# Patient Record
Sex: MALE | Race: WHITE | NOT HISPANIC OR LATINO | Employment: OTHER | URBAN - METROPOLITAN AREA
[De-identification: names, ages, dates, MRNs, and addresses within clinical notes are randomized per-mention and may not be internally consistent; named-entity substitution may affect disease eponyms.]

---

## 2017-01-03 ENCOUNTER — COMMUNICATION - HEALTHEAST (OUTPATIENT)
Dept: INTERNAL MEDICINE | Facility: CLINIC | Age: 82
End: 2017-01-03

## 2017-04-17 ENCOUNTER — RECORDS - HEALTHEAST (OUTPATIENT)
Dept: ADMINISTRATIVE | Facility: OTHER | Age: 82
End: 2017-04-17

## 2017-04-18 ENCOUNTER — COMMUNICATION - HEALTHEAST (OUTPATIENT)
Dept: INTERNAL MEDICINE | Facility: CLINIC | Age: 82
End: 2017-04-18

## 2017-04-19 ENCOUNTER — COMMUNICATION - HEALTHEAST (OUTPATIENT)
Dept: INTERNAL MEDICINE | Facility: CLINIC | Age: 82
End: 2017-04-19

## 2017-04-19 DIAGNOSIS — I10 UNSPECIFIED ESSENTIAL HYPERTENSION: ICD-10-CM

## 2017-04-26 ENCOUNTER — OFFICE VISIT - HEALTHEAST (OUTPATIENT)
Dept: INTERNAL MEDICINE | Facility: CLINIC | Age: 82
End: 2017-04-26

## 2017-04-26 DIAGNOSIS — M48.00 SPINAL STENOSIS, UNSPECIFIED REGION OTHER THAN CERVICAL: ICD-10-CM

## 2017-04-26 DIAGNOSIS — Z79.899 ENCOUNTER FOR LONG-TERM (CURRENT) USE OF OTHER MEDICATIONS: ICD-10-CM

## 2017-04-26 DIAGNOSIS — G47.30 SLEEP APNEA, UNSPECIFIED TYPE: ICD-10-CM

## 2017-04-26 DIAGNOSIS — I25.84 CORONARY ATHEROSCLEROSIS DUE TO CALCIFIED CORONARY LESION: ICD-10-CM

## 2017-04-26 DIAGNOSIS — I10 ESSENTIAL HYPERTENSION WITH GOAL BLOOD PRESSURE LESS THAN 140/90: ICD-10-CM

## 2017-04-26 DIAGNOSIS — I25.10 CORONARY ATHEROSCLEROSIS DUE TO CALCIFIED CORONARY LESION: ICD-10-CM

## 2017-04-26 ASSESSMENT — MIFFLIN-ST. JEOR: SCORE: 1336.96

## 2017-04-27 ENCOUNTER — COMMUNICATION - HEALTHEAST (OUTPATIENT)
Dept: INTERNAL MEDICINE | Facility: CLINIC | Age: 82
End: 2017-04-27

## 2017-04-27 DIAGNOSIS — E87.6 HYPOKALEMIA: ICD-10-CM

## 2017-05-05 ENCOUNTER — AMBULATORY - HEALTHEAST (OUTPATIENT)
Dept: LAB | Facility: CLINIC | Age: 82
End: 2017-05-05

## 2017-05-05 DIAGNOSIS — E87.6 HYPOKALEMIA: ICD-10-CM

## 2017-05-06 ENCOUNTER — COMMUNICATION - HEALTHEAST (OUTPATIENT)
Dept: INTERNAL MEDICINE | Facility: CLINIC | Age: 82
End: 2017-05-06

## 2017-05-06 DIAGNOSIS — J30.9 ALLERGIC RHINITIS: ICD-10-CM

## 2017-05-08 ENCOUNTER — COMMUNICATION - HEALTHEAST (OUTPATIENT)
Dept: INTERNAL MEDICINE | Facility: CLINIC | Age: 82
End: 2017-05-08

## 2017-05-12 ENCOUNTER — AMBULATORY - HEALTHEAST (OUTPATIENT)
Dept: CARDIOLOGY | Facility: CLINIC | Age: 82
End: 2017-05-12

## 2017-05-17 ENCOUNTER — RECORDS - HEALTHEAST (OUTPATIENT)
Dept: ADMINISTRATIVE | Facility: OTHER | Age: 82
End: 2017-05-17

## 2017-05-30 ENCOUNTER — COMMUNICATION - HEALTHEAST (OUTPATIENT)
Dept: INTERNAL MEDICINE | Facility: CLINIC | Age: 82
End: 2017-05-30

## 2017-06-29 ENCOUNTER — RECORDS - HEALTHEAST (OUTPATIENT)
Dept: ADMINISTRATIVE | Facility: OTHER | Age: 82
End: 2017-06-29

## 2017-09-01 ENCOUNTER — RECORDS - HEALTHEAST (OUTPATIENT)
Dept: ADMINISTRATIVE | Facility: OTHER | Age: 82
End: 2017-09-01

## 2017-09-05 ENCOUNTER — HOSPITAL ENCOUNTER (OUTPATIENT)
Dept: CT IMAGING | Facility: HOSPITAL | Age: 82
Discharge: HOME | End: 2017-09-05
Attending: INTERNAL MEDICINE

## 2017-09-05 ENCOUNTER — RECORDS - HEALTHEAST (OUTPATIENT)
Dept: ADMINISTRATIVE | Facility: OTHER | Age: 82
End: 2017-09-05

## 2017-09-05 DIAGNOSIS — C18.9 MALIGNANT NEOPLASM OF COLON, UNSPECIFIED SITE: ICD-10-CM

## 2017-09-06 ENCOUNTER — RECORDS - HEALTHEAST (OUTPATIENT)
Dept: ADMINISTRATIVE | Facility: OTHER | Age: 82
End: 2017-09-06

## 2017-09-07 ENCOUNTER — COMMUNICATION - HEALTHEAST (OUTPATIENT)
Dept: INTERNAL MEDICINE | Facility: CLINIC | Age: 82
End: 2017-09-07

## 2017-09-08 ENCOUNTER — COMMUNICATION - HEALTHEAST (OUTPATIENT)
Dept: INTERNAL MEDICINE | Facility: CLINIC | Age: 82
End: 2017-09-08

## 2017-09-08 ENCOUNTER — OFFICE VISIT - HEALTHEAST (OUTPATIENT)
Dept: INTERNAL MEDICINE | Facility: CLINIC | Age: 82
End: 2017-09-08

## 2017-09-08 DIAGNOSIS — I25.10 CORONARY ATHEROSCLEROSIS DUE TO CALCIFIED CORONARY LESION: ICD-10-CM

## 2017-09-08 DIAGNOSIS — I10 ESSENTIAL HYPERTENSION WITH GOAL BLOOD PRESSURE LESS THAN 140/90: ICD-10-CM

## 2017-09-08 DIAGNOSIS — D12.6 HIGH GRADE DYSPLASIA IN COLONIC ADENOMA: ICD-10-CM

## 2017-09-08 DIAGNOSIS — Z79.899 ENCOUNTER FOR LONG-TERM (CURRENT) USE OF OTHER MEDICATIONS: ICD-10-CM

## 2017-09-08 DIAGNOSIS — Z01.818 PRE-OP EXAM: ICD-10-CM

## 2017-09-08 DIAGNOSIS — G47.30 SLEEP APNEA, UNSPECIFIED TYPE: ICD-10-CM

## 2017-09-08 DIAGNOSIS — I25.84 CORONARY ATHEROSCLEROSIS DUE TO CALCIFIED CORONARY LESION: ICD-10-CM

## 2017-09-08 LAB
ATRIAL RATE - MUSE: 67 BPM
DIASTOLIC BLOOD PRESSURE - MUSE: NORMAL MMHG
INTERPRETATION ECG - MUSE: NORMAL
P AXIS - MUSE: 62 DEGREES
PR INTERVAL - MUSE: 152 MS
QRS DURATION - MUSE: 86 MS
QT - MUSE: 394 MS
QTC - MUSE: 416 MS
R AXIS - MUSE: -12 DEGREES
SYSTOLIC BLOOD PRESSURE - MUSE: NORMAL MMHG
T AXIS - MUSE: 49 DEGREES
VENTRICULAR RATE- MUSE: 67 BPM

## 2017-09-08 ASSESSMENT — MIFFLIN-ST. JEOR: SCORE: 1336.96

## 2017-09-11 ENCOUNTER — AMBULATORY - HEALTHEAST (OUTPATIENT)
Dept: LAB | Facility: CLINIC | Age: 82
End: 2017-09-11

## 2017-09-11 ENCOUNTER — COMMUNICATION - HEALTHEAST (OUTPATIENT)
Dept: INTERNAL MEDICINE | Facility: CLINIC | Age: 82
End: 2017-09-11

## 2017-09-12 ASSESSMENT — MIFFLIN-ST. JEOR: SCORE: 1336.96

## 2017-09-13 ENCOUNTER — ANESTHESIA - HEALTHEAST (OUTPATIENT)
Dept: SURGERY | Facility: HOSPITAL | Age: 82
End: 2017-09-13

## 2017-09-13 ENCOUNTER — SURGERY - HEALTHEAST (OUTPATIENT)
Dept: SURGERY | Facility: HOSPITAL | Age: 82
End: 2017-09-13

## 2017-09-13 ASSESSMENT — MIFFLIN-ST. JEOR: SCORE: 1342.4

## 2017-09-21 ENCOUNTER — OFFICE VISIT - HEALTHEAST (OUTPATIENT)
Dept: GERIATRICS | Facility: CLINIC | Age: 82
End: 2017-09-21

## 2017-09-21 DIAGNOSIS — I25.10 CORONARY ATHEROSCLEROSIS: ICD-10-CM

## 2017-09-21 DIAGNOSIS — C18.9 COLON CANCER (H): ICD-10-CM

## 2017-09-21 DIAGNOSIS — M25.551 RIGHT HIP PAIN: ICD-10-CM

## 2017-09-21 DIAGNOSIS — G47.33 OSA (OBSTRUCTIVE SLEEP APNEA): ICD-10-CM

## 2017-09-21 DIAGNOSIS — I10 ESSENTIAL HYPERTENSION: ICD-10-CM

## 2017-09-25 ENCOUNTER — COMMUNICATION - HEALTHEAST (OUTPATIENT)
Dept: INTERNAL MEDICINE | Facility: CLINIC | Age: 82
End: 2017-09-25

## 2017-09-26 ENCOUNTER — AMBULATORY - HEALTHEAST (OUTPATIENT)
Dept: GERIATRICS | Facility: CLINIC | Age: 82
End: 2017-09-26

## 2017-09-27 ENCOUNTER — COMMUNICATION - HEALTHEAST (OUTPATIENT)
Dept: GERIATRICS | Facility: CLINIC | Age: 82
End: 2017-09-27

## 2017-09-29 ENCOUNTER — OFFICE VISIT - HEALTHEAST (OUTPATIENT)
Dept: INTERNAL MEDICINE | Facility: CLINIC | Age: 82
End: 2017-09-29

## 2017-09-29 DIAGNOSIS — G47.30 SLEEP APNEA, UNSPECIFIED TYPE: ICD-10-CM

## 2017-09-29 DIAGNOSIS — I25.10 CORONARY ATHEROSCLEROSIS DUE TO CALCIFIED CORONARY LESION: ICD-10-CM

## 2017-09-29 DIAGNOSIS — N40.0 BENIGN PROSTATIC HYPERPLASIA, PRESENCE OF LOWER URINARY TRACT SYMPTOMS UNSPECIFIED, UNSPECIFIED MORPHOLOGY: ICD-10-CM

## 2017-09-29 DIAGNOSIS — I25.84 CORONARY ATHEROSCLEROSIS DUE TO CALCIFIED CORONARY LESION: ICD-10-CM

## 2017-09-29 DIAGNOSIS — Z79.899 ENCOUNTER FOR LONG-TERM (CURRENT) USE OF OTHER MEDICATIONS: ICD-10-CM

## 2017-09-29 DIAGNOSIS — D64.9 ANEMIA, UNSPECIFIED TYPE: ICD-10-CM

## 2017-09-29 DIAGNOSIS — C18.9 MALIGNANT NEOPLASM OF COLON, UNSPECIFIED PART OF COLON (H): ICD-10-CM

## 2017-09-29 DIAGNOSIS — M48.00 SPINAL STENOSIS, UNSPECIFIED REGION OTHER THAN CERVICAL: ICD-10-CM

## 2017-09-29 DIAGNOSIS — I10 ESSENTIAL HYPERTENSION WITH GOAL BLOOD PRESSURE LESS THAN 140/90: ICD-10-CM

## 2017-09-29 ASSESSMENT — MIFFLIN-ST. JEOR: SCORE: 1282.53

## 2017-10-02 ENCOUNTER — COMMUNICATION - HEALTHEAST (OUTPATIENT)
Dept: INTERNAL MEDICINE | Facility: CLINIC | Age: 82
End: 2017-10-02

## 2017-10-04 ENCOUNTER — RECORDS - HEALTHEAST (OUTPATIENT)
Dept: ADMINISTRATIVE | Facility: OTHER | Age: 82
End: 2017-10-04

## 2017-10-11 ENCOUNTER — RECORDS - HEALTHEAST (OUTPATIENT)
Dept: ADMINISTRATIVE | Facility: OTHER | Age: 82
End: 2017-10-11

## 2017-11-01 ENCOUNTER — OFFICE VISIT - HEALTHEAST (OUTPATIENT)
Dept: INTERNAL MEDICINE | Facility: CLINIC | Age: 82
End: 2017-11-01

## 2017-11-01 ENCOUNTER — COMMUNICATION - HEALTHEAST (OUTPATIENT)
Dept: INTERNAL MEDICINE | Facility: CLINIC | Age: 82
End: 2017-11-01

## 2017-11-01 DIAGNOSIS — I10 ESSENTIAL HYPERTENSION: ICD-10-CM

## 2017-11-01 DIAGNOSIS — I25.84 CORONARY ATHEROSCLEROSIS DUE TO CALCIFIED CORONARY LESION: ICD-10-CM

## 2017-11-01 DIAGNOSIS — G47.30 SLEEP APNEA, UNSPECIFIED TYPE: ICD-10-CM

## 2017-11-01 DIAGNOSIS — D64.9 ANEMIA, UNSPECIFIED TYPE: ICD-10-CM

## 2017-11-01 DIAGNOSIS — M48.061 SPINAL STENOSIS OF LUMBAR REGION WITHOUT NEUROGENIC CLAUDICATION: ICD-10-CM

## 2017-11-01 DIAGNOSIS — I25.10 CORONARY ATHEROSCLEROSIS DUE TO CALCIFIED CORONARY LESION: ICD-10-CM

## 2017-11-01 DIAGNOSIS — N40.0 BENIGN PROSTATIC HYPERPLASIA, UNSPECIFIED WHETHER LOWER URINARY TRACT SYMPTOMS PRESENT: ICD-10-CM

## 2017-11-01 ASSESSMENT — MIFFLIN-ST. JEOR: SCORE: 1314.28

## 2017-11-02 ENCOUNTER — COMMUNICATION - HEALTHEAST (OUTPATIENT)
Dept: INTERNAL MEDICINE | Facility: CLINIC | Age: 82
End: 2017-11-02

## 2017-11-29 ENCOUNTER — COMMUNICATION - HEALTHEAST (OUTPATIENT)
Dept: INTERNAL MEDICINE | Facility: CLINIC | Age: 82
End: 2017-11-29

## 2017-12-07 ENCOUNTER — COMMUNICATION - HEALTHEAST (OUTPATIENT)
Dept: INTERNAL MEDICINE | Facility: CLINIC | Age: 82
End: 2017-12-07

## 2017-12-07 ENCOUNTER — OFFICE VISIT - HEALTHEAST (OUTPATIENT)
Dept: INTERNAL MEDICINE | Facility: CLINIC | Age: 82
End: 2017-12-07

## 2017-12-07 DIAGNOSIS — I10 ESSENTIAL HYPERTENSION: ICD-10-CM

## 2017-12-07 DIAGNOSIS — Z12.5 PROSTATE CANCER SCREENING: ICD-10-CM

## 2017-12-07 DIAGNOSIS — Z00.00 ROUTINE GENERAL MEDICAL EXAMINATION AT A HEALTH CARE FACILITY: ICD-10-CM

## 2017-12-07 DIAGNOSIS — Z51.81 MEDICATION MONITORING ENCOUNTER: ICD-10-CM

## 2017-12-07 DIAGNOSIS — G47.30 SLEEP APNEA, UNSPECIFIED TYPE: ICD-10-CM

## 2017-12-07 DIAGNOSIS — Z00.00 HEALTH CARE MAINTENANCE: ICD-10-CM

## 2017-12-07 DIAGNOSIS — M48.00 SPINAL STENOSIS: ICD-10-CM

## 2017-12-07 DIAGNOSIS — N40.0 BENIGN PROSTATIC HYPERPLASIA, UNSPECIFIED WHETHER LOWER URINARY TRACT SYMPTOMS PRESENT: ICD-10-CM

## 2017-12-07 DIAGNOSIS — E78.00 PURE HYPERCHOLESTEROLEMIA: ICD-10-CM

## 2017-12-07 DIAGNOSIS — I25.84 CORONARY ATHEROSCLEROSIS DUE TO CALCIFIED CORONARY LESION: ICD-10-CM

## 2017-12-07 DIAGNOSIS — I25.10 CORONARY ATHEROSCLEROSIS DUE TO CALCIFIED CORONARY LESION: ICD-10-CM

## 2017-12-07 LAB
CHOLEST SERPL-MCNC: 93 MG/DL
FASTING STATUS PATIENT QL REPORTED: YES
HDLC SERPL-MCNC: 39 MG/DL
LDLC SERPL CALC-MCNC: 47 MG/DL
PSA SERPL-MCNC: 1.9 NG/ML (ref 0–6.5)
TRIGL SERPL-MCNC: 33 MG/DL

## 2017-12-07 ASSESSMENT — MIFFLIN-ST. JEOR: SCORE: 1318.81

## 2017-12-08 ENCOUNTER — OFFICE VISIT - HEALTHEAST (OUTPATIENT)
Dept: CARDIOLOGY | Facility: CLINIC | Age: 82
End: 2017-12-08

## 2017-12-08 DIAGNOSIS — I25.10 CORONARY ATHEROSCLEROSIS DUE TO CALCIFIED CORONARY LESION: ICD-10-CM

## 2017-12-08 DIAGNOSIS — I25.84 CORONARY ATHEROSCLEROSIS DUE TO CALCIFIED CORONARY LESION: ICD-10-CM

## 2017-12-08 ASSESSMENT — MIFFLIN-ST. JEOR: SCORE: 1310.88

## 2017-12-12 ENCOUNTER — RECORDS - HEALTHEAST (OUTPATIENT)
Dept: ADMINISTRATIVE | Facility: OTHER | Age: 82
End: 2017-12-12

## 2018-01-04 ENCOUNTER — COMMUNICATION - HEALTHEAST (OUTPATIENT)
Dept: INTERNAL MEDICINE | Facility: CLINIC | Age: 83
End: 2018-01-04

## 2018-01-04 DIAGNOSIS — E78.00 PURE HYPERCHOLESTEROLEMIA: ICD-10-CM

## 2018-02-05 ENCOUNTER — RECORDS - HEALTHEAST (OUTPATIENT)
Dept: ADMINISTRATIVE | Facility: OTHER | Age: 83
End: 2018-02-05

## 2018-05-21 ENCOUNTER — RECORDS - HEALTHEAST (OUTPATIENT)
Dept: ADMINISTRATIVE | Facility: OTHER | Age: 83
End: 2018-05-21

## 2018-05-21 ENCOUNTER — COMMUNICATION - HEALTHEAST (OUTPATIENT)
Dept: INTERNAL MEDICINE | Facility: CLINIC | Age: 83
End: 2018-05-21

## 2018-05-25 ENCOUNTER — COMMUNICATION - HEALTHEAST (OUTPATIENT)
Dept: INTERNAL MEDICINE | Facility: CLINIC | Age: 83
End: 2018-05-25

## 2018-05-25 DIAGNOSIS — J30.9 ALLERGIC RHINITIS: ICD-10-CM

## 2018-06-02 ENCOUNTER — COMMUNICATION - HEALTHEAST (OUTPATIENT)
Dept: INTERNAL MEDICINE | Facility: CLINIC | Age: 83
End: 2018-06-02

## 2018-06-07 ENCOUNTER — OFFICE VISIT - HEALTHEAST (OUTPATIENT)
Dept: INTERNAL MEDICINE | Facility: CLINIC | Age: 83
End: 2018-06-07

## 2018-06-07 ENCOUNTER — COMMUNICATION - HEALTHEAST (OUTPATIENT)
Dept: INTERNAL MEDICINE | Facility: CLINIC | Age: 83
End: 2018-06-07

## 2018-06-07 DIAGNOSIS — N40.0 BENIGN PROSTATIC HYPERPLASIA, UNSPECIFIED WHETHER LOWER URINARY TRACT SYMPTOMS PRESENT: ICD-10-CM

## 2018-06-07 DIAGNOSIS — E78.00 PURE HYPERCHOLESTEROLEMIA: ICD-10-CM

## 2018-06-07 DIAGNOSIS — M48.061 SPINAL STENOSIS OF LUMBAR REGION, UNSPECIFIED WHETHER NEUROGENIC CLAUDICATION PRESENT: ICD-10-CM

## 2018-06-07 DIAGNOSIS — G47.30 SLEEP APNEA, UNSPECIFIED TYPE: ICD-10-CM

## 2018-06-07 DIAGNOSIS — I10 ESSENTIAL HYPERTENSION: ICD-10-CM

## 2018-06-07 DIAGNOSIS — H93.11 TINNITUS OF RIGHT EAR: ICD-10-CM

## 2018-06-07 DIAGNOSIS — I25.84 CORONARY ATHEROSCLEROSIS DUE TO CALCIFIED CORONARY LESION: ICD-10-CM

## 2018-06-07 DIAGNOSIS — I25.10 CORONARY ATHEROSCLEROSIS DUE TO CALCIFIED CORONARY LESION: ICD-10-CM

## 2018-06-07 DIAGNOSIS — Z51.81 MEDICATION MONITORING ENCOUNTER: ICD-10-CM

## 2018-06-07 LAB
ALBUMIN SERPL-MCNC: 4.1 G/DL (ref 3.5–5)
ALP SERPL-CCNC: 61 U/L (ref 45–120)
ALT SERPL W P-5'-P-CCNC: 12 U/L (ref 0–45)
ANION GAP SERPL CALCULATED.3IONS-SCNC: 9 MMOL/L (ref 5–18)
AST SERPL W P-5'-P-CCNC: 18 U/L (ref 0–40)
BILIRUB SERPL-MCNC: 0.5 MG/DL (ref 0–1)
BUN SERPL-MCNC: 27 MG/DL (ref 8–28)
CALCIUM SERPL-MCNC: 9.8 MG/DL (ref 8.5–10.5)
CHLORIDE BLD-SCNC: 107 MMOL/L (ref 98–107)
CO2 SERPL-SCNC: 25 MMOL/L (ref 22–31)
CREAT SERPL-MCNC: 1.16 MG/DL (ref 0.7–1.3)
ERYTHROCYTE [DISTWIDTH] IN BLOOD BY AUTOMATED COUNT: 13 % (ref 11–14.5)
GFR SERPL CREATININE-BSD FRML MDRD: 60 ML/MIN/1.73M2
GLUCOSE BLD-MCNC: 110 MG/DL (ref 70–125)
HCT VFR BLD AUTO: 40.9 % (ref 40–54)
HGB BLD-MCNC: 13.4 G/DL (ref 14–18)
MCH RBC QN AUTO: 30.8 PG (ref 27–34)
MCHC RBC AUTO-ENTMCNC: 32.7 G/DL (ref 32–36)
MCV RBC AUTO: 94 FL (ref 80–100)
PLATELET # BLD AUTO: 218 THOU/UL (ref 140–440)
PMV BLD AUTO: 6.8 FL (ref 7–10)
POTASSIUM BLD-SCNC: 5.1 MMOL/L (ref 3.5–5)
PROT SERPL-MCNC: 7 G/DL (ref 6–8)
RBC # BLD AUTO: 4.35 MILL/UL (ref 4.4–6.2)
SODIUM SERPL-SCNC: 141 MMOL/L (ref 136–145)
WBC: 6.8 THOU/UL (ref 4–11)

## 2018-06-07 RX ORDER — CALCIUM POLYCARBOPHIL 625 MG
2 TABLET ORAL DAILY
Status: ON HOLD | COMMUNITY
Start: 2018-06-07

## 2018-06-07 ASSESSMENT — MIFFLIN-ST. JEOR: SCORE: 1310.88

## 2018-06-11 ENCOUNTER — RECORDS - HEALTHEAST (OUTPATIENT)
Dept: ADMINISTRATIVE | Facility: OTHER | Age: 83
End: 2018-06-11

## 2018-07-11 ENCOUNTER — COMMUNICATION - HEALTHEAST (OUTPATIENT)
Dept: INTERNAL MEDICINE | Facility: CLINIC | Age: 83
End: 2018-07-11

## 2018-08-13 ENCOUNTER — OFFICE VISIT - HEALTHEAST (OUTPATIENT)
Dept: INTERNAL MEDICINE | Facility: CLINIC | Age: 83
End: 2018-08-13

## 2018-08-13 DIAGNOSIS — I25.84 CORONARY ATHEROSCLEROSIS DUE TO CALCIFIED CORONARY LESION: ICD-10-CM

## 2018-08-13 DIAGNOSIS — G47.30 SLEEP APNEA, UNSPECIFIED TYPE: ICD-10-CM

## 2018-08-13 DIAGNOSIS — N40.0 BENIGN PROSTATIC HYPERPLASIA, UNSPECIFIED WHETHER LOWER URINARY TRACT SYMPTOMS PRESENT: ICD-10-CM

## 2018-08-13 DIAGNOSIS — Z85.038 HISTORY OF COLON CANCER: ICD-10-CM

## 2018-08-13 DIAGNOSIS — I10 ESSENTIAL HYPERTENSION: ICD-10-CM

## 2018-08-13 DIAGNOSIS — M48.061 SPINAL STENOSIS OF LUMBAR REGION, UNSPECIFIED WHETHER NEUROGENIC CLAUDICATION PRESENT: ICD-10-CM

## 2018-08-13 DIAGNOSIS — Z01.818 PREOPERATIVE EXAMINATION: ICD-10-CM

## 2018-08-13 DIAGNOSIS — I25.10 CORONARY ATHEROSCLEROSIS DUE TO CALCIFIED CORONARY LESION: ICD-10-CM

## 2018-08-13 DIAGNOSIS — Z51.81 MEDICATION MONITORING ENCOUNTER: ICD-10-CM

## 2018-08-13 LAB
ALBUMIN SERPL-MCNC: 4.1 G/DL (ref 3.5–5)
ALP SERPL-CCNC: 62 U/L (ref 45–120)
ALT SERPL W P-5'-P-CCNC: 19 U/L (ref 0–45)
ANION GAP SERPL CALCULATED.3IONS-SCNC: 13 MMOL/L (ref 5–18)
AST SERPL W P-5'-P-CCNC: 23 U/L (ref 0–40)
BILIRUB SERPL-MCNC: 0.4 MG/DL (ref 0–1)
BUN SERPL-MCNC: 26 MG/DL (ref 8–28)
CALCIUM SERPL-MCNC: 9.4 MG/DL (ref 8.5–10.5)
CHLORIDE BLD-SCNC: 106 MMOL/L (ref 98–107)
CO2 SERPL-SCNC: 22 MMOL/L (ref 22–31)
CREAT SERPL-MCNC: 1.08 MG/DL (ref 0.7–1.3)
ERYTHROCYTE [DISTWIDTH] IN BLOOD BY AUTOMATED COUNT: 12.3 % (ref 11–14.5)
GFR SERPL CREATININE-BSD FRML MDRD: >60 ML/MIN/1.73M2
GLUCOSE BLD-MCNC: 91 MG/DL (ref 70–125)
HCT VFR BLD AUTO: 39.1 % (ref 40–54)
HGB BLD-MCNC: 12.8 G/DL (ref 14–18)
MCH RBC QN AUTO: 31.1 PG (ref 27–34)
MCHC RBC AUTO-ENTMCNC: 32.8 G/DL (ref 32–36)
MCV RBC AUTO: 95 FL (ref 80–100)
PLATELET # BLD AUTO: 197 THOU/UL (ref 140–440)
PMV BLD AUTO: 7.2 FL (ref 7–10)
POTASSIUM BLD-SCNC: 5.4 MMOL/L (ref 3.5–5)
PROT SERPL-MCNC: 7.1 G/DL (ref 6–8)
RBC # BLD AUTO: 4.13 MILL/UL (ref 4.4–6.2)
SODIUM SERPL-SCNC: 141 MMOL/L (ref 136–145)
WBC: 6 THOU/UL (ref 4–11)

## 2018-08-13 ASSESSMENT — MIFFLIN-ST. JEOR: SCORE: 1319.96

## 2018-08-14 ENCOUNTER — COMMUNICATION - HEALTHEAST (OUTPATIENT)
Dept: INTERNAL MEDICINE | Facility: CLINIC | Age: 83
End: 2018-08-14

## 2018-08-14 LAB
ATRIAL RATE - MUSE: 51 BPM
DIASTOLIC BLOOD PRESSURE - MUSE: NORMAL MMHG
INTERPRETATION ECG - MUSE: NORMAL
P AXIS - MUSE: -21 DEGREES
PR INTERVAL - MUSE: 146 MS
QRS DURATION - MUSE: 88 MS
QT - MUSE: 430 MS
QTC - MUSE: 396 MS
R AXIS - MUSE: -17 DEGREES
SYSTOLIC BLOOD PRESSURE - MUSE: NORMAL MMHG
T AXIS - MUSE: 35 DEGREES
VENTRICULAR RATE- MUSE: 51 BPM

## 2018-08-31 ENCOUNTER — COMMUNICATION - HEALTHEAST (OUTPATIENT)
Dept: INTERNAL MEDICINE | Facility: CLINIC | Age: 83
End: 2018-08-31

## 2018-09-19 ENCOUNTER — COMMUNICATION - HEALTHEAST (OUTPATIENT)
Dept: ADMINISTRATIVE | Facility: CLINIC | Age: 83
End: 2018-09-19

## 2018-10-03 ENCOUNTER — COMMUNICATION - HEALTHEAST (OUTPATIENT)
Dept: INTERNAL MEDICINE | Facility: CLINIC | Age: 83
End: 2018-10-03

## 2018-11-19 ENCOUNTER — COMMUNICATION - HEALTHEAST (OUTPATIENT)
Dept: INTERNAL MEDICINE | Facility: CLINIC | Age: 83
End: 2018-11-19

## 2018-11-19 DIAGNOSIS — I10 ESSENTIAL HYPERTENSION: ICD-10-CM

## 2018-12-04 ENCOUNTER — RECORDS - HEALTHEAST (OUTPATIENT)
Dept: ADMINISTRATIVE | Facility: OTHER | Age: 83
End: 2018-12-04

## 2018-12-08 ENCOUNTER — COMMUNICATION - HEALTHEAST (OUTPATIENT)
Dept: INTERNAL MEDICINE | Facility: CLINIC | Age: 83
End: 2018-12-08

## 2018-12-08 DIAGNOSIS — M79.2 NERVE PAIN: ICD-10-CM

## 2018-12-10 ENCOUNTER — COMMUNICATION - HEALTHEAST (OUTPATIENT)
Dept: FAMILY MEDICINE | Facility: CLINIC | Age: 83
End: 2018-12-10

## 2018-12-10 ENCOUNTER — OFFICE VISIT - HEALTHEAST (OUTPATIENT)
Dept: INTERNAL MEDICINE | Facility: CLINIC | Age: 83
End: 2018-12-10

## 2018-12-10 DIAGNOSIS — Z12.5 SCREENING FOR PROSTATE CANCER: ICD-10-CM

## 2018-12-10 DIAGNOSIS — Z51.81 MEDICATION MONITORING ENCOUNTER: ICD-10-CM

## 2018-12-10 DIAGNOSIS — I25.84 CORONARY ATHEROSCLEROSIS DUE TO CALCIFIED CORONARY LESION: ICD-10-CM

## 2018-12-10 DIAGNOSIS — N40.0 BENIGN PROSTATIC HYPERPLASIA, UNSPECIFIED WHETHER LOWER URINARY TRACT SYMPTOMS PRESENT: ICD-10-CM

## 2018-12-10 DIAGNOSIS — E78.00 PURE HYPERCHOLESTEROLEMIA: ICD-10-CM

## 2018-12-10 DIAGNOSIS — Z85.038 HISTORY OF COLON CANCER: ICD-10-CM

## 2018-12-10 DIAGNOSIS — Z00.00 HEALTH CARE MAINTENANCE: ICD-10-CM

## 2018-12-10 DIAGNOSIS — I25.10 CORONARY ATHEROSCLEROSIS DUE TO CALCIFIED CORONARY LESION: ICD-10-CM

## 2018-12-10 DIAGNOSIS — G47.30 SLEEP APNEA, UNSPECIFIED TYPE: ICD-10-CM

## 2018-12-10 DIAGNOSIS — Z00.00 ROUTINE GENERAL MEDICAL EXAMINATION AT A HEALTH CARE FACILITY: ICD-10-CM

## 2018-12-10 DIAGNOSIS — I10 ESSENTIAL HYPERTENSION: ICD-10-CM

## 2018-12-10 DIAGNOSIS — Z23 NEED FOR INFLUENZA VACCINATION: ICD-10-CM

## 2018-12-10 DIAGNOSIS — M48.061 SPINAL STENOSIS OF LUMBAR REGION, UNSPECIFIED WHETHER NEUROGENIC CLAUDICATION PRESENT: ICD-10-CM

## 2018-12-10 LAB
ALBUMIN SERPL-MCNC: 4.1 G/DL (ref 3.5–5)
ALP SERPL-CCNC: 66 U/L (ref 45–120)
ALT SERPL W P-5'-P-CCNC: 15 U/L (ref 0–45)
ANION GAP SERPL CALCULATED.3IONS-SCNC: 8 MMOL/L (ref 5–18)
AST SERPL W P-5'-P-CCNC: 18 U/L (ref 0–40)
BILIRUB SERPL-MCNC: 0.6 MG/DL (ref 0–1)
BUN SERPL-MCNC: 26 MG/DL (ref 8–28)
CALCIUM SERPL-MCNC: 9.4 MG/DL (ref 8.5–10.5)
CHLORIDE BLD-SCNC: 107 MMOL/L (ref 98–107)
CHOLEST SERPL-MCNC: 100 MG/DL
CO2 SERPL-SCNC: 28 MMOL/L (ref 22–31)
CREAT SERPL-MCNC: 1.07 MG/DL (ref 0.7–1.3)
ERYTHROCYTE [DISTWIDTH] IN BLOOD BY AUTOMATED COUNT: 12.4 % (ref 11–14.5)
FASTING STATUS PATIENT QL REPORTED: YES
GFR SERPL CREATININE-BSD FRML MDRD: >60 ML/MIN/1.73M2
GLUCOSE BLD-MCNC: 105 MG/DL (ref 70–125)
HCT VFR BLD AUTO: 40.9 % (ref 40–54)
HDLC SERPL-MCNC: 43 MG/DL
HGB BLD-MCNC: 13.4 G/DL (ref 14–18)
LDLC SERPL CALC-MCNC: 48 MG/DL
MCH RBC QN AUTO: 31.4 PG (ref 27–34)
MCHC RBC AUTO-ENTMCNC: 32.8 G/DL (ref 32–36)
MCV RBC AUTO: 96 FL (ref 80–100)
PLATELET # BLD AUTO: 190 THOU/UL (ref 140–440)
PMV BLD AUTO: 7.3 FL (ref 7–10)
POTASSIUM BLD-SCNC: 4.6 MMOL/L (ref 3.5–5)
PROT SERPL-MCNC: 7 G/DL (ref 6–8)
PSA SERPL-MCNC: 1.4 NG/ML (ref 0–6.5)
RBC # BLD AUTO: 4.27 MILL/UL (ref 4.4–6.2)
SODIUM SERPL-SCNC: 143 MMOL/L (ref 136–145)
TRIGL SERPL-MCNC: 45 MG/DL
WBC: 5.9 THOU/UL (ref 4–11)

## 2018-12-10 ASSESSMENT — MIFFLIN-ST. JEOR: SCORE: 1305.44

## 2018-12-12 ENCOUNTER — OFFICE VISIT - HEALTHEAST (OUTPATIENT)
Dept: CARDIOLOGY | Facility: CLINIC | Age: 83
End: 2018-12-12

## 2018-12-12 DIAGNOSIS — I25.119 ATHEROSCLEROSIS OF NATIVE CORONARY ARTERY OF NATIVE HEART WITH ANGINA PECTORIS (H): ICD-10-CM

## 2018-12-12 ASSESSMENT — MIFFLIN-ST. JEOR: SCORE: 1306.35

## 2019-01-07 ENCOUNTER — COMMUNICATION - HEALTHEAST (OUTPATIENT)
Dept: INTERNAL MEDICINE | Facility: CLINIC | Age: 84
End: 2019-01-07

## 2019-01-07 DIAGNOSIS — E78.00 PURE HYPERCHOLESTEROLEMIA: ICD-10-CM

## 2019-01-21 ENCOUNTER — AMBULATORY - HEALTHEAST (OUTPATIENT)
Dept: CARDIOLOGY | Facility: CLINIC | Age: 84
End: 2019-01-21

## 2019-01-21 ENCOUNTER — HOSPITAL ENCOUNTER (OUTPATIENT)
Dept: CARDIOLOGY | Facility: HOSPITAL | Age: 84
Discharge: HOME | End: 2019-01-21
Attending: INTERNAL MEDICINE

## 2019-01-21 DIAGNOSIS — I25.119 ATHEROSCLEROSIS OF NATIVE CORONARY ARTERY OF NATIVE HEART WITH ANGINA PECTORIS (H): ICD-10-CM

## 2019-01-21 DIAGNOSIS — I34.0 MITRAL REGURGITATION: ICD-10-CM

## 2019-01-21 LAB
CV STRESS CURRENT BP HE: NORMAL
CV STRESS CURRENT HR HE: 102
CV STRESS CURRENT HR HE: 102
CV STRESS CURRENT HR HE: 103
CV STRESS CURRENT HR HE: 59
CV STRESS CURRENT HR HE: 61
CV STRESS CURRENT HR HE: 67
CV STRESS CURRENT HR HE: 68
CV STRESS CURRENT HR HE: 68
CV STRESS CURRENT HR HE: 70
CV STRESS CURRENT HR HE: 71
CV STRESS CURRENT HR HE: 71
CV STRESS CURRENT HR HE: 72
CV STRESS CURRENT HR HE: 73
CV STRESS CURRENT HR HE: 74
CV STRESS CURRENT HR HE: 76
CV STRESS CURRENT HR HE: 76
CV STRESS CURRENT HR HE: 77
CV STRESS CURRENT HR HE: 81
CV STRESS CURRENT HR HE: 81
CV STRESS CURRENT HR HE: 82
CV STRESS CURRENT HR HE: 84
CV STRESS CURRENT HR HE: 86
CV STRESS CURRENT HR HE: 87
CV STRESS CURRENT HR HE: 87
CV STRESS CURRENT HR HE: 89
CV STRESS CURRENT HR HE: 89
CV STRESS CURRENT HR HE: 91
CV STRESS CURRENT HR HE: 91
CV STRESS CURRENT HR HE: 94
CV STRESS CURRENT HR HE: 96
CV STRESS CURRENT HR HE: 98
CV STRESS CURRENT HR HE: 99
CV STRESS DEVIATION TIME HE: NORMAL
CV STRESS ECHO PERCENT HR HE: NORMAL
CV STRESS EXERCISE STAGE HE: NORMAL
CV STRESS FINAL RESTING BP HE: NORMAL
CV STRESS FINAL RESTING HR HE: 71
CV STRESS MAX HR HE: 132
CV STRESS MAX TREADMILL GRADE HE: 16
CV STRESS MAX TREADMILL SPEED HE: 4.2
CV STRESS PEAK DIA BP HE: NORMAL
CV STRESS PEAK SYS BP HE: NORMAL
CV STRESS PHASE HE: NORMAL
CV STRESS PROTOCOL HE: NORMAL
CV STRESS RESTING PT POSITION HE: NORMAL
CV STRESS RESTING PT POSITION HE: NORMAL
CV STRESS ST DEVIATION AMOUNT HE: NORMAL
CV STRESS ST DEVIATION ELEVATION HE: NORMAL
CV STRESS ST EVELATION AMOUNT HE: NORMAL
CV STRESS TEST TYPE HE: NORMAL
CV STRESS TOTAL STAGE TIME MIN 1 HE: NORMAL
STRESS ECHO BASELINE BP: NORMAL
STRESS ECHO BASELINE HR: 62
STRESS ECHO CALCULATED PERCENT HR: 95 %
STRESS ECHO LAST STRESS BP: NORMAL
STRESS ECHO LAST STRESS HR: 102
STRESS ECHO POST ESTIMATED WORKLOAD: 10.5
STRESS ECHO POST EXERCISE DUR MIN: 9
STRESS ECHO POST EXERCISE DUR SEC: 0
STRESS ECHO TARGET HR: 118

## 2019-01-22 ENCOUNTER — COMMUNICATION - HEALTHEAST (OUTPATIENT)
Dept: CARDIOLOGY | Facility: CLINIC | Age: 84
End: 2019-01-22

## 2019-01-28 ENCOUNTER — HOSPITAL ENCOUNTER (OUTPATIENT)
Dept: CARDIOLOGY | Facility: HOSPITAL | Age: 84
Discharge: HOME | End: 2019-01-28
Attending: INTERNAL MEDICINE

## 2019-01-28 DIAGNOSIS — I34.0 MITRAL REGURGITATION: ICD-10-CM

## 2019-01-28 LAB
AORTIC ROOT: 2 CM
AORTIC VALVE MEAN VELOCITY: 81.4 CM/S
AR DECEL SLOPE: 1560 MM/S2
AR PEAK VELOCITY: 360 CM/S
AV DIMENSIONLESS INDEX VTI: 1
AV MEAN GRADIENT: 3 MMHG
AV PEAK GRADIENT: 6.5 MMHG
AV REGURGITANT PEAK GRADIENT: 51.8 MMHG
AV REGURGITATION PRESSURE HALF TIME: 679 MS
AV VALVE AREA: 3.1 CM2
BSA FOR ECHO PROCEDURE: 1.78 M2
CV BLOOD PRESSURE: ABNORMAL MMHG
CV ECHO HEIGHT: 65 IN
CV ECHO WEIGHT: 152 LBS
DOP CALC AO PEAK VEL: 127 CM/S
DOP CALC AO VTI: 23.4 CM
DOP CALC LVOT AREA: 3.14 CM2
DOP CALC LVOT DIAMETER: 2 CM
DOP CALC LVOT STROKE VOLUME: 71.9 CM3
DOP CALCLVOT PEAK VEL VTI: 22.9 CM
EJECTION FRACTION: 70 % (ref 55–75)
FRACTIONAL SHORTENING: 41.9 % (ref 28–44)
INTERVENTRICULAR SEPTUM IN END DIASTOLE: 1 CM (ref 0.6–1)
IVS/PW RATIO: 0.9
LA AREA 1: 20.7 CM2
LA AREA 2: 19.2 CM2
LEFT ATRIUM AREA: 19.2 CM2
LEFT ATRIUM LENGTH: 5 CM
LEFT ATRIUM SIZE: 4 CM
LEFT ATRIUM VOLUME INDEX: 38 ML/M2
LEFT ATRIUM VOLUME: 67.6 ML
LEFT VENTRICLE DIASTOLIC VOLUME INDEX: 32 CM3/M2 (ref 34–74)
LEFT VENTRICLE DIASTOLIC VOLUME: 57 CM3 (ref 62–150)
LEFT VENTRICLE MASS INDEX: 85.7 G/M2
LEFT VENTRICLE SYSTOLIC VOLUME INDEX: 9.6 CM3/M2 (ref 11–31)
LEFT VENTRICLE SYSTOLIC VOLUME: 17 CM3 (ref 21–61)
LEFT VENTRICULAR INTERNAL DIMENSION IN DIASTOLE: 4.3 CM (ref 4.2–5.8)
LEFT VENTRICULAR INTERNAL DIMENSION IN SYSTOLE: 2.5 CM (ref 2.5–4)
LEFT VENTRICULAR MASS: 152.6 G
LEFT VENTRICULAR OUTFLOW TRACT MEAN GRADIENT: 3 MMHG
LEFT VENTRICULAR OUTFLOW TRACT MEAN VELOCITY: 73.2 CM/S
LEFT VENTRICULAR POSTERIOR WALL IN END DIASTOLE: 1.1 CM (ref 0.6–1)
LV STROKE VOLUME INDEX: 40.4 ML/M2
MITRAL VALVE E/A RATIO: 0.9
MV AVERAGE E/E' RATIO: 7.7 CM/S
MV DECELERATION TIME: 257 MS
MV E'TISSUE VEL-LAT: 8.87 CM/S
MV E'TISSUE VEL-MED: 5.36 CM/S
MV LATERAL E/E' RATIO: 6.2
MV MEDIAL E/E' RATIO: 10.3
MV PEAK A VELOCITY: 60.9 CM/S
MV PEAK E VELOCITY: 55.1 CM/S
NUC REST DIASTOLIC VOLUME INDEX: 2432 LBS
NUC REST SYSTOLIC VOLUME INDEX: 65 IN
PR MAX PG: 4 MMHG
PR PEAK VELOCITY: 105 CM/S
TRICUSPID REGURGITATION PEAK PRESSURE GRADIENT: 22.7 MMHG
TRICUSPID VALVE ANULAR PLANE SYSTOLIC EXCURSION: 1.3 CM
TRICUSPID VALVE PEAK REGURGITANT VELOCITY: 238 CM/S

## 2019-01-28 ASSESSMENT — MIFFLIN-ST. JEOR: SCORE: 1306.35

## 2019-02-11 ENCOUNTER — RECORDS - HEALTHEAST (OUTPATIENT)
Dept: ADMINISTRATIVE | Facility: OTHER | Age: 84
End: 2019-02-11

## 2019-02-21 ENCOUNTER — COMMUNICATION - HEALTHEAST (OUTPATIENT)
Dept: INTERNAL MEDICINE | Facility: CLINIC | Age: 84
End: 2019-02-21

## 2019-02-21 DIAGNOSIS — I10 ESSENTIAL HYPERTENSION: ICD-10-CM

## 2019-05-22 ENCOUNTER — RECORDS - HEALTHEAST (OUTPATIENT)
Dept: ADMINISTRATIVE | Facility: OTHER | Age: 84
End: 2019-05-22

## 2019-05-24 ENCOUNTER — RECORDS - HEALTHEAST (OUTPATIENT)
Dept: ADMINISTRATIVE | Facility: OTHER | Age: 84
End: 2019-05-24

## 2019-06-03 ENCOUNTER — OFFICE VISIT - HEALTHEAST (OUTPATIENT)
Dept: INTERNAL MEDICINE | Facility: CLINIC | Age: 84
End: 2019-06-03

## 2019-06-03 DIAGNOSIS — Z51.81 MEDICATION MONITORING ENCOUNTER: ICD-10-CM

## 2019-06-03 DIAGNOSIS — M48.061 SPINAL STENOSIS OF LUMBAR REGION, UNSPECIFIED WHETHER NEUROGENIC CLAUDICATION PRESENT: ICD-10-CM

## 2019-06-03 DIAGNOSIS — I25.84 CORONARY ATHEROSCLEROSIS DUE TO CALCIFIED CORONARY LESION: ICD-10-CM

## 2019-06-03 DIAGNOSIS — I25.10 CORONARY ATHEROSCLEROSIS DUE TO CALCIFIED CORONARY LESION: ICD-10-CM

## 2019-06-03 DIAGNOSIS — E78.00 PURE HYPERCHOLESTEROLEMIA: ICD-10-CM

## 2019-06-03 DIAGNOSIS — Z85.038 HISTORY OF COLON CANCER: ICD-10-CM

## 2019-06-03 DIAGNOSIS — G47.30 SLEEP APNEA, UNSPECIFIED TYPE: ICD-10-CM

## 2019-06-03 DIAGNOSIS — I10 ESSENTIAL HYPERTENSION: ICD-10-CM

## 2019-06-03 DIAGNOSIS — L30.4 INTERTRIGO: ICD-10-CM

## 2019-06-03 LAB
ALBUMIN SERPL-MCNC: 4 G/DL (ref 3.5–5)
ALP SERPL-CCNC: 64 U/L (ref 45–120)
ALT SERPL W P-5'-P-CCNC: 17 U/L (ref 0–45)
ANION GAP SERPL CALCULATED.3IONS-SCNC: 8 MMOL/L (ref 5–18)
AST SERPL W P-5'-P-CCNC: 20 U/L (ref 0–40)
BILIRUB SERPL-MCNC: 0.4 MG/DL (ref 0–1)
BUN SERPL-MCNC: 23 MG/DL (ref 8–28)
CALCIUM SERPL-MCNC: 9.4 MG/DL (ref 8.5–10.5)
CHLORIDE BLD-SCNC: 108 MMOL/L (ref 98–107)
CK SERPL-CCNC: 121 U/L (ref 30–190)
CO2 SERPL-SCNC: 27 MMOL/L (ref 22–31)
CREAT SERPL-MCNC: 1.09 MG/DL (ref 0.7–1.3)
GFR SERPL CREATININE-BSD FRML MDRD: >60 ML/MIN/1.73M2
GLUCOSE BLD-MCNC: 99 MG/DL (ref 70–125)
POTASSIUM BLD-SCNC: 4.1 MMOL/L (ref 3.5–5)
PROT SERPL-MCNC: 6.8 G/DL (ref 6–8)
SODIUM SERPL-SCNC: 143 MMOL/L (ref 136–145)

## 2019-06-04 ENCOUNTER — COMMUNICATION - HEALTHEAST (OUTPATIENT)
Dept: INTERNAL MEDICINE | Facility: CLINIC | Age: 84
End: 2019-06-04

## 2019-06-09 ENCOUNTER — COMMUNICATION - HEALTHEAST (OUTPATIENT)
Dept: INTERNAL MEDICINE | Facility: CLINIC | Age: 84
End: 2019-06-09

## 2019-06-09 DIAGNOSIS — M79.2 NERVE PAIN: ICD-10-CM

## 2019-08-20 ENCOUNTER — COMMUNICATION - HEALTHEAST (OUTPATIENT)
Dept: INTERNAL MEDICINE | Facility: CLINIC | Age: 84
End: 2019-08-20

## 2019-08-20 DIAGNOSIS — I10 ESSENTIAL HYPERTENSION: ICD-10-CM

## 2019-10-16 ENCOUNTER — COMMUNICATION - HEALTHEAST (OUTPATIENT)
Dept: INTERNAL MEDICINE | Facility: CLINIC | Age: 84
End: 2019-10-16

## 2019-10-16 DIAGNOSIS — J30.9 ALLERGIC RHINITIS: ICD-10-CM

## 2019-11-16 ENCOUNTER — COMMUNICATION - HEALTHEAST (OUTPATIENT)
Dept: INTERNAL MEDICINE | Facility: CLINIC | Age: 84
End: 2019-11-16

## 2019-11-16 DIAGNOSIS — I10 ESSENTIAL HYPERTENSION: ICD-10-CM

## 2019-12-03 ENCOUNTER — RECORDS - HEALTHEAST (OUTPATIENT)
Dept: ADMINISTRATIVE | Facility: OTHER | Age: 84
End: 2019-12-03

## 2019-12-09 ENCOUNTER — OFFICE VISIT - HEALTHEAST (OUTPATIENT)
Dept: CARDIOLOGY | Facility: CLINIC | Age: 84
End: 2019-12-09

## 2019-12-09 DIAGNOSIS — I49.5 SSS (SICK SINUS SYNDROME) (H): ICD-10-CM

## 2019-12-09 DIAGNOSIS — I25.119 ATHEROSCLEROSIS OF NATIVE CORONARY ARTERY OF NATIVE HEART WITH ANGINA PECTORIS (H): ICD-10-CM

## 2019-12-09 ASSESSMENT — MIFFLIN-ST. JEOR: SCORE: 1319.96

## 2019-12-11 ENCOUNTER — OFFICE VISIT - HEALTHEAST (OUTPATIENT)
Dept: INTERNAL MEDICINE | Facility: CLINIC | Age: 84
End: 2019-12-11

## 2019-12-11 DIAGNOSIS — Z12.5 SCREENING FOR PROSTATE CANCER: ICD-10-CM

## 2019-12-11 DIAGNOSIS — G47.30 SLEEP APNEA, UNSPECIFIED TYPE: ICD-10-CM

## 2019-12-11 DIAGNOSIS — Z51.81 MEDICATION MONITORING ENCOUNTER: ICD-10-CM

## 2019-12-11 DIAGNOSIS — Z23 NEED FOR IMMUNIZATION AGAINST INFLUENZA: ICD-10-CM

## 2019-12-11 DIAGNOSIS — I10 ESSENTIAL HYPERTENSION: ICD-10-CM

## 2019-12-11 DIAGNOSIS — M48.061 SPINAL STENOSIS OF LUMBAR REGION, UNSPECIFIED WHETHER NEUROGENIC CLAUDICATION PRESENT: ICD-10-CM

## 2019-12-11 DIAGNOSIS — Z00.01 ENCOUNTER FOR GENERAL ADULT MEDICAL EXAMINATION WITH ABNORMAL FINDINGS: ICD-10-CM

## 2019-12-11 DIAGNOSIS — I25.10 CORONARY ATHEROSCLEROSIS DUE TO CALCIFIED CORONARY LESION: ICD-10-CM

## 2019-12-11 DIAGNOSIS — I25.84 CORONARY ATHEROSCLEROSIS DUE TO CALCIFIED CORONARY LESION: ICD-10-CM

## 2019-12-11 DIAGNOSIS — Z00.00 HEALTHCARE MAINTENANCE: ICD-10-CM

## 2019-12-11 DIAGNOSIS — Z85.038 HISTORY OF COLON CANCER: ICD-10-CM

## 2019-12-11 DIAGNOSIS — E78.00 PURE HYPERCHOLESTEROLEMIA: ICD-10-CM

## 2019-12-11 LAB
ALBUMIN SERPL-MCNC: 4.1 G/DL (ref 3.5–5)
ALP SERPL-CCNC: 67 U/L (ref 45–120)
ALT SERPL W P-5'-P-CCNC: 14 U/L (ref 0–45)
ANION GAP SERPL CALCULATED.3IONS-SCNC: 10 MMOL/L (ref 5–18)
AST SERPL W P-5'-P-CCNC: 20 U/L (ref 0–40)
BILIRUB SERPL-MCNC: 0.4 MG/DL (ref 0–1)
BUN SERPL-MCNC: 25 MG/DL (ref 8–28)
CALCIUM SERPL-MCNC: 9.1 MG/DL (ref 8.5–10.5)
CHLORIDE BLD-SCNC: 106 MMOL/L (ref 98–107)
CHOLEST SERPL-MCNC: 96 MG/DL
CO2 SERPL-SCNC: 24 MMOL/L (ref 22–31)
CREAT SERPL-MCNC: 1.24 MG/DL (ref 0.7–1.3)
ERYTHROCYTE [DISTWIDTH] IN BLOOD BY AUTOMATED COUNT: 11.2 % (ref 11–14.5)
FASTING STATUS PATIENT QL REPORTED: YES
GFR SERPL CREATININE-BSD FRML MDRD: 56 ML/MIN/1.73M2
GLUCOSE BLD-MCNC: 100 MG/DL (ref 70–125)
HCT VFR BLD AUTO: 41.6 % (ref 40–54)
HDLC SERPL-MCNC: 40 MG/DL
HGB BLD-MCNC: 13.5 G/DL (ref 14–18)
LDLC SERPL CALC-MCNC: 48 MG/DL
MCH RBC QN AUTO: 32.1 PG (ref 27–34)
MCHC RBC AUTO-ENTMCNC: 32.5 G/DL (ref 32–36)
MCV RBC AUTO: 99 FL (ref 80–100)
PLATELET # BLD AUTO: 238 THOU/UL (ref 140–440)
PMV BLD AUTO: 7.6 FL (ref 7–10)
POTASSIUM BLD-SCNC: 5 MMOL/L (ref 3.5–5)
PROT SERPL-MCNC: 7 G/DL (ref 6–8)
PSA SERPL-MCNC: 2.2 NG/ML (ref 0–6.5)
RBC # BLD AUTO: 4.22 MILL/UL (ref 4.4–6.2)
SODIUM SERPL-SCNC: 140 MMOL/L (ref 136–145)
TRIGL SERPL-MCNC: 39 MG/DL
WBC: 6.1 THOU/UL (ref 4–11)

## 2019-12-11 ASSESSMENT — MIFFLIN-ST. JEOR: SCORE: 1310.88

## 2019-12-12 ENCOUNTER — COMMUNICATION - HEALTHEAST (OUTPATIENT)
Dept: INTERNAL MEDICINE | Facility: CLINIC | Age: 84
End: 2019-12-12

## 2020-01-05 ENCOUNTER — COMMUNICATION - HEALTHEAST (OUTPATIENT)
Dept: INTERNAL MEDICINE | Facility: CLINIC | Age: 85
End: 2020-01-05

## 2020-01-05 DIAGNOSIS — E78.00 PURE HYPERCHOLESTEROLEMIA: ICD-10-CM

## 2020-01-08 ENCOUNTER — HOSPITAL ENCOUNTER (OUTPATIENT)
Dept: MRI IMAGING | Facility: HOSPITAL | Age: 85
Discharge: HOME | End: 2020-01-08
Attending: INTERNAL MEDICINE

## 2020-01-08 ENCOUNTER — COMMUNICATION - HEALTHEAST (OUTPATIENT)
Dept: INTERNAL MEDICINE | Facility: CLINIC | Age: 85
End: 2020-01-08

## 2020-01-08 DIAGNOSIS — M48.061 SPINAL STENOSIS OF LUMBAR REGION, UNSPECIFIED WHETHER NEUROGENIC CLAUDICATION PRESENT: ICD-10-CM

## 2020-01-10 ENCOUNTER — HOSPITAL ENCOUNTER (OUTPATIENT)
Dept: PHYSICAL MEDICINE AND REHAB | Facility: CLINIC | Age: 85
Discharge: HOME | End: 2020-01-10
Attending: INTERNAL MEDICINE

## 2020-01-10 DIAGNOSIS — M48.062 SPINAL STENOSIS OF LUMBAR REGION WITH NEUROGENIC CLAUDICATION: ICD-10-CM

## 2020-01-10 DIAGNOSIS — M54.16 LUMBAR RADICULAR PAIN: ICD-10-CM

## 2020-01-10 DIAGNOSIS — M54.12 CERVICAL RADICULAR PAIN: ICD-10-CM

## 2020-01-10 DIAGNOSIS — M48.02 NEURAL FORAMINAL STENOSIS OF CERVICAL SPINE: ICD-10-CM

## 2020-01-10 ASSESSMENT — MIFFLIN-ST. JEOR: SCORE: 1329.03

## 2020-01-20 ENCOUNTER — HOSPITAL ENCOUNTER (OUTPATIENT)
Dept: MRI IMAGING | Facility: HOSPITAL | Age: 85
Discharge: HOME | End: 2020-01-20
Attending: PHYSICAL MEDICINE & REHABILITATION

## 2020-01-20 DIAGNOSIS — M48.02 NEURAL FORAMINAL STENOSIS OF CERVICAL SPINE: ICD-10-CM

## 2020-01-20 DIAGNOSIS — M99.81 OTHER BIOMECHANICAL LESIONS OF CERVICAL REGION: ICD-10-CM

## 2020-01-20 DIAGNOSIS — M54.12 CERVICAL RADICULAR PAIN: ICD-10-CM

## 2020-01-21 ENCOUNTER — COMMUNICATION - HEALTHEAST (OUTPATIENT)
Dept: PHYSICAL MEDICINE AND REHAB | Facility: CLINIC | Age: 85
End: 2020-01-21

## 2020-01-21 DIAGNOSIS — M54.12 CERVICAL RADICULAR PAIN: ICD-10-CM

## 2020-01-24 ENCOUNTER — OFFICE VISIT - HEALTHEAST (OUTPATIENT)
Dept: PHYSICAL THERAPY | Facility: REHABILITATION | Age: 85
End: 2020-01-24

## 2020-01-24 DIAGNOSIS — M54.2 CERVICALGIA: ICD-10-CM

## 2020-01-24 DIAGNOSIS — M25.511 PAIN IN JOINT OF RIGHT SHOULDER: ICD-10-CM

## 2020-01-24 DIAGNOSIS — M54.12 CERVICAL RADICULAR PAIN: ICD-10-CM

## 2020-01-24 DIAGNOSIS — M54.12 CERVICAL RADICULITIS: ICD-10-CM

## 2020-01-24 DIAGNOSIS — M48.00 SPINAL STENOSIS, UNSPECIFIED REGION OTHER THAN CERVICAL: ICD-10-CM

## 2020-01-28 ENCOUNTER — RECORDS - HEALTHEAST (OUTPATIENT)
Dept: ADMINISTRATIVE | Facility: OTHER | Age: 85
End: 2020-01-28

## 2020-01-30 ENCOUNTER — HOSPITAL ENCOUNTER (OUTPATIENT)
Dept: CARDIOLOGY | Facility: HOSPITAL | Age: 85
Discharge: HOME | End: 2020-01-30
Attending: INTERNAL MEDICINE

## 2020-01-30 DIAGNOSIS — I49.5 SSS (SICK SINUS SYNDROME) (H): ICD-10-CM

## 2020-02-05 ENCOUNTER — OFFICE VISIT - HEALTHEAST (OUTPATIENT)
Dept: INTERNAL MEDICINE | Facility: CLINIC | Age: 85
End: 2020-02-05

## 2020-02-05 DIAGNOSIS — I10 ESSENTIAL HYPERTENSION: ICD-10-CM

## 2020-02-05 DIAGNOSIS — G47.30 SLEEP APNEA, UNSPECIFIED TYPE: ICD-10-CM

## 2020-02-05 DIAGNOSIS — I25.84 CORONARY ATHEROSCLEROSIS DUE TO CALCIFIED CORONARY LESION: ICD-10-CM

## 2020-02-05 DIAGNOSIS — M48.061 SPINAL STENOSIS OF LUMBAR REGION, UNSPECIFIED WHETHER NEUROGENIC CLAUDICATION PRESENT: ICD-10-CM

## 2020-02-05 DIAGNOSIS — M48.02 CERVICAL STENOSIS OF SPINAL CANAL: ICD-10-CM

## 2020-02-05 DIAGNOSIS — I25.10 CORONARY ATHEROSCLEROSIS DUE TO CALCIFIED CORONARY LESION: ICD-10-CM

## 2020-02-07 ENCOUNTER — OFFICE VISIT - HEALTHEAST (OUTPATIENT)
Dept: PHYSICAL THERAPY | Facility: REHABILITATION | Age: 85
End: 2020-02-07

## 2020-02-07 DIAGNOSIS — M48.00 SPINAL STENOSIS, UNSPECIFIED REGION OTHER THAN CERVICAL: ICD-10-CM

## 2020-02-07 DIAGNOSIS — M25.511 PAIN IN JOINT OF RIGHT SHOULDER: ICD-10-CM

## 2020-02-07 DIAGNOSIS — M54.12 CERVICAL RADICULITIS: ICD-10-CM

## 2020-02-07 DIAGNOSIS — M54.12 CERVICAL RADICULAR PAIN: ICD-10-CM

## 2020-02-07 DIAGNOSIS — M54.2 CERVICALGIA: ICD-10-CM

## 2020-02-10 ENCOUNTER — OFFICE VISIT - HEALTHEAST (OUTPATIENT)
Dept: PHYSICAL THERAPY | Facility: REHABILITATION | Age: 85
End: 2020-02-10

## 2020-02-10 ENCOUNTER — COMMUNICATION - HEALTHEAST (OUTPATIENT)
Dept: CARDIOLOGY | Facility: CLINIC | Age: 85
End: 2020-02-10

## 2020-02-10 DIAGNOSIS — G89.29 CHRONIC BILATERAL LOW BACK PAIN WITH RIGHT-SIDED SCIATICA: ICD-10-CM

## 2020-02-10 DIAGNOSIS — M54.41 CHRONIC BILATERAL LOW BACK PAIN WITH RIGHT-SIDED SCIATICA: ICD-10-CM

## 2020-02-10 DIAGNOSIS — R20.0 NUMBNESS OF RIGHT FOOT: ICD-10-CM

## 2020-02-10 DIAGNOSIS — M48.061 SPINAL STENOSIS OF LUMBAR REGION, UNSPECIFIED WHETHER NEUROGENIC CLAUDICATION PRESENT: ICD-10-CM

## 2020-02-21 ENCOUNTER — OFFICE VISIT - HEALTHEAST (OUTPATIENT)
Dept: PHYSICAL THERAPY | Facility: REHABILITATION | Age: 85
End: 2020-02-21

## 2020-02-21 DIAGNOSIS — M48.061 SPINAL STENOSIS OF LUMBAR REGION, UNSPECIFIED WHETHER NEUROGENIC CLAUDICATION PRESENT: ICD-10-CM

## 2020-02-21 DIAGNOSIS — M54.41 CHRONIC BILATERAL LOW BACK PAIN WITH RIGHT-SIDED SCIATICA: ICD-10-CM

## 2020-02-21 DIAGNOSIS — G89.29 CHRONIC BILATERAL LOW BACK PAIN WITH RIGHT-SIDED SCIATICA: ICD-10-CM

## 2020-02-21 DIAGNOSIS — M54.12 CERVICAL RADICULITIS: ICD-10-CM

## 2020-02-21 DIAGNOSIS — M54.2 CERVICALGIA: ICD-10-CM

## 2020-02-21 DIAGNOSIS — R20.0 NUMBNESS OF RIGHT FOOT: ICD-10-CM

## 2020-02-21 DIAGNOSIS — M54.12 CERVICAL RADICULAR PAIN: ICD-10-CM

## 2020-02-26 ENCOUNTER — OFFICE VISIT - HEALTHEAST (OUTPATIENT)
Dept: PHYSICAL THERAPY | Facility: REHABILITATION | Age: 85
End: 2020-02-26

## 2020-02-26 DIAGNOSIS — M54.41 CHRONIC BILATERAL LOW BACK PAIN WITH RIGHT-SIDED SCIATICA: ICD-10-CM

## 2020-02-26 DIAGNOSIS — M48.061 SPINAL STENOSIS OF LUMBAR REGION, UNSPECIFIED WHETHER NEUROGENIC CLAUDICATION PRESENT: ICD-10-CM

## 2020-02-26 DIAGNOSIS — G89.29 CHRONIC BILATERAL LOW BACK PAIN WITH RIGHT-SIDED SCIATICA: ICD-10-CM

## 2020-02-26 DIAGNOSIS — R20.0 NUMBNESS OF RIGHT FOOT: ICD-10-CM

## 2020-03-06 ENCOUNTER — OFFICE VISIT - HEALTHEAST (OUTPATIENT)
Dept: PHYSICAL THERAPY | Facility: REHABILITATION | Age: 85
End: 2020-03-06

## 2020-03-06 DIAGNOSIS — M54.41 CHRONIC BILATERAL LOW BACK PAIN WITH RIGHT-SIDED SCIATICA: ICD-10-CM

## 2020-03-06 DIAGNOSIS — M54.12 CERVICAL RADICULITIS: ICD-10-CM

## 2020-03-06 DIAGNOSIS — M25.511 PAIN IN JOINT OF RIGHT SHOULDER: ICD-10-CM

## 2020-03-06 DIAGNOSIS — R20.0 NUMBNESS OF RIGHT FOOT: ICD-10-CM

## 2020-03-06 DIAGNOSIS — G89.29 CHRONIC BILATERAL LOW BACK PAIN WITH RIGHT-SIDED SCIATICA: ICD-10-CM

## 2020-03-06 DIAGNOSIS — M54.2 CERVICALGIA: ICD-10-CM

## 2020-03-06 DIAGNOSIS — M48.061 SPINAL STENOSIS OF LUMBAR REGION, UNSPECIFIED WHETHER NEUROGENIC CLAUDICATION PRESENT: ICD-10-CM

## 2020-03-06 DIAGNOSIS — M54.12 CERVICAL RADICULAR PAIN: ICD-10-CM

## 2020-03-13 ENCOUNTER — COMMUNICATION - HEALTHEAST (OUTPATIENT)
Dept: CARDIOLOGY | Facility: CLINIC | Age: 85
End: 2020-03-13

## 2020-03-23 ENCOUNTER — COMMUNICATION - HEALTHEAST (OUTPATIENT)
Dept: CARDIOLOGY | Facility: CLINIC | Age: 85
End: 2020-03-23

## 2020-03-27 ENCOUNTER — COMMUNICATION - HEALTHEAST (OUTPATIENT)
Dept: PHYSICAL THERAPY | Facility: REHABILITATION | Age: 85
End: 2020-03-27

## 2020-04-17 ENCOUNTER — COMMUNICATION - HEALTHEAST (OUTPATIENT)
Dept: PHYSICAL THERAPY | Facility: REHABILITATION | Age: 85
End: 2020-04-17

## 2020-05-10 ENCOUNTER — COMMUNICATION - HEALTHEAST (OUTPATIENT)
Dept: INTERNAL MEDICINE | Facility: CLINIC | Age: 85
End: 2020-05-10

## 2020-05-10 DIAGNOSIS — I10 ESSENTIAL HYPERTENSION: ICD-10-CM

## 2020-05-26 ENCOUNTER — RECORDS - HEALTHEAST (OUTPATIENT)
Dept: ADMINISTRATIVE | Facility: OTHER | Age: 85
End: 2020-05-26

## 2020-05-29 ENCOUNTER — OFFICE VISIT - HEALTHEAST (OUTPATIENT)
Dept: PHYSICAL THERAPY | Facility: REHABILITATION | Age: 85
End: 2020-05-29

## 2020-05-29 ENCOUNTER — COMMUNICATION - HEALTHEAST (OUTPATIENT)
Dept: PHYSICAL THERAPY | Facility: REHABILITATION | Age: 85
End: 2020-05-29

## 2020-05-29 DIAGNOSIS — M54.2 CERVICALGIA: ICD-10-CM

## 2020-05-29 DIAGNOSIS — M54.41 CHRONIC BILATERAL LOW BACK PAIN WITH RIGHT-SIDED SCIATICA: ICD-10-CM

## 2020-05-29 DIAGNOSIS — R20.0 NUMBNESS OF RIGHT FOOT: ICD-10-CM

## 2020-05-29 DIAGNOSIS — M25.511 PAIN IN JOINT OF RIGHT SHOULDER: ICD-10-CM

## 2020-05-29 DIAGNOSIS — G89.29 CHRONIC BILATERAL LOW BACK PAIN WITH RIGHT-SIDED SCIATICA: ICD-10-CM

## 2020-05-29 DIAGNOSIS — M54.12 CERVICAL RADICULAR PAIN: ICD-10-CM

## 2020-05-29 DIAGNOSIS — M54.12 CERVICAL RADICULITIS: ICD-10-CM

## 2020-05-29 DIAGNOSIS — M48.061 SPINAL STENOSIS OF LUMBAR REGION, UNSPECIFIED WHETHER NEUROGENIC CLAUDICATION PRESENT: ICD-10-CM

## 2020-06-02 ENCOUNTER — OFFICE VISIT - HEALTHEAST (OUTPATIENT)
Dept: INTERNAL MEDICINE | Facility: CLINIC | Age: 85
End: 2020-06-02

## 2020-06-02 ENCOUNTER — COMMUNICATION - HEALTHEAST (OUTPATIENT)
Dept: INTERNAL MEDICINE | Facility: CLINIC | Age: 85
End: 2020-06-02

## 2020-06-02 DIAGNOSIS — M48.061 SPINAL STENOSIS OF LUMBAR REGION, UNSPECIFIED WHETHER NEUROGENIC CLAUDICATION PRESENT: ICD-10-CM

## 2020-06-02 DIAGNOSIS — M79.2 NERVE PAIN: ICD-10-CM

## 2020-06-02 DIAGNOSIS — I10 ESSENTIAL HYPERTENSION: ICD-10-CM

## 2020-06-02 DIAGNOSIS — I25.84 CORONARY ATHEROSCLEROSIS DUE TO CALCIFIED CORONARY LESION: ICD-10-CM

## 2020-06-02 DIAGNOSIS — G47.30 SLEEP APNEA, UNSPECIFIED TYPE: ICD-10-CM

## 2020-06-02 DIAGNOSIS — I25.10 CORONARY ATHEROSCLEROSIS DUE TO CALCIFIED CORONARY LESION: ICD-10-CM

## 2020-06-02 DIAGNOSIS — R00.1 BRADYCARDIA: ICD-10-CM

## 2020-06-05 ENCOUNTER — OFFICE VISIT - HEALTHEAST (OUTPATIENT)
Dept: PHYSICAL THERAPY | Facility: REHABILITATION | Age: 85
End: 2020-06-05

## 2020-06-05 DIAGNOSIS — M54.2 CERVICALGIA: ICD-10-CM

## 2020-06-05 DIAGNOSIS — M48.061 SPINAL STENOSIS OF LUMBAR REGION, UNSPECIFIED WHETHER NEUROGENIC CLAUDICATION PRESENT: ICD-10-CM

## 2020-06-05 DIAGNOSIS — G89.29 CHRONIC BILATERAL LOW BACK PAIN WITH RIGHT-SIDED SCIATICA: ICD-10-CM

## 2020-06-05 DIAGNOSIS — M54.12 CERVICAL RADICULITIS: ICD-10-CM

## 2020-06-05 DIAGNOSIS — R20.0 NUMBNESS OF RIGHT FOOT: ICD-10-CM

## 2020-06-05 DIAGNOSIS — M25.511 PAIN IN JOINT OF RIGHT SHOULDER: ICD-10-CM

## 2020-06-05 DIAGNOSIS — M54.12 CERVICAL RADICULAR PAIN: ICD-10-CM

## 2020-06-05 DIAGNOSIS — M54.41 CHRONIC BILATERAL LOW BACK PAIN WITH RIGHT-SIDED SCIATICA: ICD-10-CM

## 2020-06-26 ENCOUNTER — OFFICE VISIT - HEALTHEAST (OUTPATIENT)
Dept: PHYSICAL THERAPY | Facility: REHABILITATION | Age: 85
End: 2020-06-26

## 2020-06-26 DIAGNOSIS — M54.12 CERVICAL RADICULITIS: ICD-10-CM

## 2020-06-26 DIAGNOSIS — M54.2 CERVICALGIA: ICD-10-CM

## 2020-06-26 DIAGNOSIS — R20.0 NUMBNESS OF RIGHT FOOT: ICD-10-CM

## 2020-06-26 DIAGNOSIS — M25.511 PAIN IN JOINT OF RIGHT SHOULDER: ICD-10-CM

## 2020-06-26 DIAGNOSIS — M54.41 CHRONIC BILATERAL LOW BACK PAIN WITH RIGHT-SIDED SCIATICA: ICD-10-CM

## 2020-06-26 DIAGNOSIS — M54.12 CERVICAL RADICULAR PAIN: ICD-10-CM

## 2020-06-26 DIAGNOSIS — G89.29 CHRONIC BILATERAL LOW BACK PAIN WITH RIGHT-SIDED SCIATICA: ICD-10-CM

## 2020-06-26 DIAGNOSIS — M48.061 SPINAL STENOSIS OF LUMBAR REGION, UNSPECIFIED WHETHER NEUROGENIC CLAUDICATION PRESENT: ICD-10-CM

## 2020-07-10 ENCOUNTER — OFFICE VISIT - HEALTHEAST (OUTPATIENT)
Dept: PHYSICAL THERAPY | Facility: REHABILITATION | Age: 85
End: 2020-07-10

## 2020-07-10 DIAGNOSIS — R20.0 NUMBNESS OF RIGHT FOOT: ICD-10-CM

## 2020-07-10 DIAGNOSIS — M54.12 CERVICAL RADICULITIS: ICD-10-CM

## 2020-07-10 DIAGNOSIS — G89.29 CHRONIC BILATERAL LOW BACK PAIN WITH RIGHT-SIDED SCIATICA: ICD-10-CM

## 2020-07-10 DIAGNOSIS — M54.41 CHRONIC BILATERAL LOW BACK PAIN WITH RIGHT-SIDED SCIATICA: ICD-10-CM

## 2020-07-10 DIAGNOSIS — M54.12 CERVICAL RADICULAR PAIN: ICD-10-CM

## 2020-07-10 DIAGNOSIS — M25.511 PAIN IN JOINT OF RIGHT SHOULDER: ICD-10-CM

## 2020-07-10 DIAGNOSIS — M54.2 CERVICALGIA: ICD-10-CM

## 2020-07-10 DIAGNOSIS — M48.061 SPINAL STENOSIS OF LUMBAR REGION, UNSPECIFIED WHETHER NEUROGENIC CLAUDICATION PRESENT: ICD-10-CM

## 2020-07-17 ENCOUNTER — OFFICE VISIT - HEALTHEAST (OUTPATIENT)
Dept: PHYSICAL THERAPY | Facility: REHABILITATION | Age: 85
End: 2020-07-17

## 2020-07-17 DIAGNOSIS — M48.061 SPINAL STENOSIS OF LUMBAR REGION, UNSPECIFIED WHETHER NEUROGENIC CLAUDICATION PRESENT: ICD-10-CM

## 2020-07-17 DIAGNOSIS — M25.511 PAIN IN JOINT OF RIGHT SHOULDER: ICD-10-CM

## 2020-07-17 DIAGNOSIS — R20.0 NUMBNESS OF RIGHT FOOT: ICD-10-CM

## 2020-07-17 DIAGNOSIS — M54.12 CERVICAL RADICULAR PAIN: ICD-10-CM

## 2020-07-17 DIAGNOSIS — G89.29 CHRONIC BILATERAL LOW BACK PAIN WITH RIGHT-SIDED SCIATICA: ICD-10-CM

## 2020-07-17 DIAGNOSIS — M54.12 CERVICAL RADICULITIS: ICD-10-CM

## 2020-07-17 DIAGNOSIS — M54.2 CERVICALGIA: ICD-10-CM

## 2020-07-17 DIAGNOSIS — M54.41 CHRONIC BILATERAL LOW BACK PAIN WITH RIGHT-SIDED SCIATICA: ICD-10-CM

## 2020-07-24 ENCOUNTER — OFFICE VISIT - HEALTHEAST (OUTPATIENT)
Dept: PHYSICAL THERAPY | Facility: REHABILITATION | Age: 85
End: 2020-07-24

## 2020-07-24 DIAGNOSIS — M54.12 CERVICAL RADICULITIS: ICD-10-CM

## 2020-07-24 DIAGNOSIS — M54.2 CERVICALGIA: ICD-10-CM

## 2020-07-24 DIAGNOSIS — M54.12 CERVICAL RADICULAR PAIN: ICD-10-CM

## 2020-07-24 DIAGNOSIS — M54.41 CHRONIC BILATERAL LOW BACK PAIN WITH RIGHT-SIDED SCIATICA: ICD-10-CM

## 2020-07-24 DIAGNOSIS — M48.061 SPINAL STENOSIS OF LUMBAR REGION, UNSPECIFIED WHETHER NEUROGENIC CLAUDICATION PRESENT: ICD-10-CM

## 2020-07-24 DIAGNOSIS — M25.511 PAIN IN JOINT OF RIGHT SHOULDER: ICD-10-CM

## 2020-07-24 DIAGNOSIS — G89.29 CHRONIC BILATERAL LOW BACK PAIN WITH RIGHT-SIDED SCIATICA: ICD-10-CM

## 2020-07-24 DIAGNOSIS — R20.0 NUMBNESS OF RIGHT FOOT: ICD-10-CM

## 2020-07-30 ENCOUNTER — COMMUNICATION - HEALTHEAST (OUTPATIENT)
Dept: INTERNAL MEDICINE | Facility: CLINIC | Age: 85
End: 2020-07-30

## 2020-08-03 ENCOUNTER — RECORDS - HEALTHEAST (OUTPATIENT)
Dept: ADMINISTRATIVE | Facility: OTHER | Age: 85
End: 2020-08-03

## 2020-09-02 ENCOUNTER — HOSPITAL ENCOUNTER (OUTPATIENT)
Dept: PHYSICAL MEDICINE AND REHAB | Facility: CLINIC | Age: 85
Discharge: HOME | End: 2020-09-02
Attending: PHYSICAL MEDICINE & REHABILITATION

## 2020-09-02 DIAGNOSIS — M43.16 SPONDYLOLISTHESIS OF LUMBAR REGION: ICD-10-CM

## 2020-09-02 DIAGNOSIS — M48.062 SPINAL STENOSIS OF LUMBAR REGION WITH NEUROGENIC CLAUDICATION: ICD-10-CM

## 2020-09-02 DIAGNOSIS — M48.02 FORAMINAL STENOSIS OF CERVICAL REGION: ICD-10-CM

## 2020-09-02 DIAGNOSIS — R20.2 PARESTHESIA OF RIGHT ARM: ICD-10-CM

## 2020-09-02 DIAGNOSIS — M54.16 LUMBAR RADICULAR PAIN: ICD-10-CM

## 2020-09-02 DIAGNOSIS — G89.29 CHRONIC RIGHT SHOULDER PAIN: ICD-10-CM

## 2020-09-02 DIAGNOSIS — M25.511 CHRONIC RIGHT SHOULDER PAIN: ICD-10-CM

## 2020-09-10 ENCOUNTER — HOSPITAL ENCOUNTER (OUTPATIENT)
Dept: PHYSICAL MEDICINE AND REHAB | Facility: CLINIC | Age: 85
Discharge: HOME | End: 2020-09-10
Attending: PHYSICAL MEDICINE & REHABILITATION

## 2020-09-10 DIAGNOSIS — M43.16 SPONDYLOLISTHESIS OF LUMBAR REGION: ICD-10-CM

## 2020-09-10 DIAGNOSIS — M54.16 LUMBAR RADICULAR PAIN: ICD-10-CM

## 2020-09-10 DIAGNOSIS — M48.062 SPINAL STENOSIS OF LUMBAR REGION WITH NEUROGENIC CLAUDICATION: ICD-10-CM

## 2020-09-14 ENCOUNTER — COMMUNICATION - HEALTHEAST (OUTPATIENT)
Dept: INTERNAL MEDICINE | Facility: CLINIC | Age: 85
End: 2020-09-14

## 2020-09-14 DIAGNOSIS — H91.90 HEARING LOSS, UNSPECIFIED HEARING LOSS TYPE, UNSPECIFIED LATERALITY: ICD-10-CM

## 2020-09-15 ENCOUNTER — HOSPITAL ENCOUNTER (OUTPATIENT)
Dept: MRI IMAGING | Facility: HOSPITAL | Age: 85
Discharge: HOME | End: 2020-09-15
Attending: PHYSICAL MEDICINE & REHABILITATION

## 2020-09-15 DIAGNOSIS — G89.29 CHRONIC RIGHT SHOULDER PAIN: ICD-10-CM

## 2020-09-15 DIAGNOSIS — M25.511 CHRONIC RIGHT SHOULDER PAIN: ICD-10-CM

## 2020-09-16 ENCOUNTER — COMMUNICATION - HEALTHEAST (OUTPATIENT)
Dept: PHYSICAL MEDICINE AND REHAB | Facility: CLINIC | Age: 85
End: 2020-09-16

## 2020-09-16 ENCOUNTER — AMBULATORY - HEALTHEAST (OUTPATIENT)
Dept: PHYSICAL MEDICINE AND REHAB | Facility: CLINIC | Age: 85
End: 2020-09-16

## 2020-09-16 DIAGNOSIS — M75.121 NONTRAUMATIC COMPLETE TEAR OF RIGHT ROTATOR CUFF: ICD-10-CM

## 2020-09-16 DIAGNOSIS — M75.121 COMPLETE TEAR OF RIGHT ROTATOR CUFF, UNSPECIFIED WHETHER TRAUMATIC: ICD-10-CM

## 2020-09-23 ENCOUNTER — HOSPITAL ENCOUNTER (OUTPATIENT)
Dept: PHYSICAL MEDICINE AND REHAB | Facility: CLINIC | Age: 85
Discharge: HOME | End: 2020-09-23
Attending: PHYSICAL MEDICINE & REHABILITATION

## 2020-09-23 DIAGNOSIS — M54.16 LUMBAR RADICULAR PAIN: ICD-10-CM

## 2020-09-23 DIAGNOSIS — M48.062 SPINAL STENOSIS OF LUMBAR REGION WITH NEUROGENIC CLAUDICATION: ICD-10-CM

## 2020-09-23 DIAGNOSIS — R20.2 PARESTHESIA OF RIGHT ARM: ICD-10-CM

## 2020-09-23 DIAGNOSIS — M43.16 SPONDYLOLISTHESIS OF LUMBAR REGION: ICD-10-CM

## 2020-09-23 DIAGNOSIS — M75.121 NONTRAUMATIC COMPLETE TEAR OF RIGHT ROTATOR CUFF: ICD-10-CM

## 2020-09-23 DIAGNOSIS — M48.02 FORAMINAL STENOSIS OF CERVICAL REGION: ICD-10-CM

## 2020-09-24 ENCOUNTER — RECORDS - HEALTHEAST (OUTPATIENT)
Dept: ADMINISTRATIVE | Facility: OTHER | Age: 85
End: 2020-09-24

## 2020-09-30 ENCOUNTER — HOSPITAL ENCOUNTER (OUTPATIENT)
Dept: PHYSICAL MEDICINE AND REHAB | Facility: CLINIC | Age: 85
Discharge: HOME | End: 2020-09-30
Attending: PHYSICAL MEDICINE & REHABILITATION

## 2020-09-30 DIAGNOSIS — M54.16 LUMBAR RADICULAR PAIN: ICD-10-CM

## 2020-09-30 DIAGNOSIS — M48.062 SPINAL STENOSIS OF LUMBAR REGION WITH NEUROGENIC CLAUDICATION: ICD-10-CM

## 2020-09-30 DIAGNOSIS — M43.16 SPONDYLOLISTHESIS OF LUMBAR REGION: ICD-10-CM

## 2020-10-21 ENCOUNTER — HOSPITAL ENCOUNTER (OUTPATIENT)
Dept: PHYSICAL MEDICINE AND REHAB | Facility: CLINIC | Age: 85
Discharge: HOME | End: 2020-10-21
Attending: PHYSICAL MEDICINE & REHABILITATION

## 2020-10-21 DIAGNOSIS — G56.01 CARPAL TUNNEL SYNDROME OF RIGHT WRIST: ICD-10-CM

## 2020-10-21 DIAGNOSIS — M48.062 SPINAL STENOSIS OF LUMBAR REGION WITH NEUROGENIC CLAUDICATION: ICD-10-CM

## 2020-10-21 DIAGNOSIS — M43.16 SPONDYLOLISTHESIS OF LUMBAR REGION: ICD-10-CM

## 2020-10-21 DIAGNOSIS — M54.16 LUMBAR RADICULAR PAIN: ICD-10-CM

## 2020-10-21 DIAGNOSIS — M75.121 NONTRAUMATIC COMPLETE TEAR OF RIGHT ROTATOR CUFF: ICD-10-CM

## 2020-10-27 ENCOUNTER — RECORDS - HEALTHEAST (OUTPATIENT)
Dept: ADMINISTRATIVE | Facility: OTHER | Age: 85
End: 2020-10-27

## 2020-11-18 ENCOUNTER — OFFICE VISIT - HEALTHEAST (OUTPATIENT)
Dept: INTERNAL MEDICINE | Facility: CLINIC | Age: 85
End: 2020-11-18

## 2020-11-18 DIAGNOSIS — Z20.822 EXPOSURE TO COVID-19 VIRUS: ICD-10-CM

## 2020-11-20 ENCOUNTER — COMMUNICATION - HEALTHEAST (OUTPATIENT)
Dept: INTERNAL MEDICINE | Facility: CLINIC | Age: 85
End: 2020-11-20

## 2020-11-20 DIAGNOSIS — Z20.822 EXPOSURE TO COVID-19 VIRUS: ICD-10-CM

## 2020-11-24 ENCOUNTER — AMBULATORY - HEALTHEAST (OUTPATIENT)
Dept: FAMILY MEDICINE | Facility: CLINIC | Age: 85
End: 2020-11-24

## 2020-11-24 DIAGNOSIS — Z20.822 EXPOSURE TO COVID-19 VIRUS: ICD-10-CM

## 2020-11-26 ENCOUNTER — COMMUNICATION - HEALTHEAST (OUTPATIENT)
Dept: SCHEDULING | Facility: CLINIC | Age: 85
End: 2020-11-26

## 2020-11-27 ENCOUNTER — COMMUNICATION - HEALTHEAST (OUTPATIENT)
Dept: INTERNAL MEDICINE | Facility: CLINIC | Age: 85
End: 2020-11-27

## 2020-12-02 ENCOUNTER — HOSPITAL ENCOUNTER (OUTPATIENT)
Dept: PHYSICAL MEDICINE AND REHAB | Facility: CLINIC | Age: 85
Discharge: HOME | End: 2020-12-02
Attending: PHYSICAL MEDICINE & REHABILITATION

## 2020-12-02 DIAGNOSIS — R40.0 DROWSINESS: ICD-10-CM

## 2020-12-02 DIAGNOSIS — M54.16 LUMBAR RADICULAR PAIN: ICD-10-CM

## 2020-12-02 DIAGNOSIS — M43.16 SPONDYLOLISTHESIS OF LUMBAR REGION: ICD-10-CM

## 2020-12-02 DIAGNOSIS — M75.121 COMPLETE TEAR OF RIGHT ROTATOR CUFF, UNSPECIFIED WHETHER TRAUMATIC: ICD-10-CM

## 2020-12-02 DIAGNOSIS — M48.062 SPINAL STENOSIS OF LUMBAR REGION WITH NEUROGENIC CLAUDICATION: ICD-10-CM

## 2020-12-08 ENCOUNTER — COMMUNICATION - HEALTHEAST (OUTPATIENT)
Dept: INTERNAL MEDICINE | Facility: CLINIC | Age: 85
End: 2020-12-08

## 2020-12-08 ENCOUNTER — COMMUNICATION - HEALTHEAST (OUTPATIENT)
Dept: CARDIOLOGY | Facility: CLINIC | Age: 85
End: 2020-12-08

## 2020-12-08 DIAGNOSIS — E78.00 PURE HYPERCHOLESTEROLEMIA: ICD-10-CM

## 2020-12-08 RX ORDER — ATORVASTATIN CALCIUM 20 MG/1
20 TABLET, FILM COATED ORAL DAILY
Qty: 90 TABLET | Refills: 3 | Status: SHIPPED | OUTPATIENT
Start: 2020-12-08 | End: 2021-12-20

## 2020-12-09 ENCOUNTER — OFFICE VISIT - HEALTHEAST (OUTPATIENT)
Dept: CARDIOLOGY | Facility: CLINIC | Age: 85
End: 2020-12-09

## 2020-12-09 DIAGNOSIS — I25.119 ATHEROSCLEROSIS OF NATIVE CORONARY ARTERY OF NATIVE HEART WITH ANGINA PECTORIS (H): ICD-10-CM

## 2020-12-09 ASSESSMENT — MIFFLIN-ST. JEOR: SCORE: 1283.67

## 2020-12-14 ENCOUNTER — OFFICE VISIT - HEALTHEAST (OUTPATIENT)
Dept: INTERNAL MEDICINE | Facility: CLINIC | Age: 85
End: 2020-12-14

## 2020-12-14 ENCOUNTER — COMMUNICATION - HEALTHEAST (OUTPATIENT)
Dept: INTERNAL MEDICINE | Facility: CLINIC | Age: 85
End: 2020-12-14

## 2020-12-14 DIAGNOSIS — Z00.00 ROUTINE GENERAL MEDICAL EXAMINATION AT A HEALTH CARE FACILITY: ICD-10-CM

## 2020-12-14 DIAGNOSIS — G47.33 OBSTRUCTIVE SLEEP APNEA SYNDROME: ICD-10-CM

## 2020-12-14 DIAGNOSIS — E78.00 PURE HYPERCHOLESTEROLEMIA: ICD-10-CM

## 2020-12-14 DIAGNOSIS — I25.119 ATHEROSCLEROSIS OF NATIVE CORONARY ARTERY OF NATIVE HEART WITH ANGINA PECTORIS (H): ICD-10-CM

## 2020-12-14 DIAGNOSIS — Z85.038 HISTORY OF COLON CANCER: ICD-10-CM

## 2020-12-14 DIAGNOSIS — K40.90 UNILATERAL INGUINAL HERNIA WITHOUT OBSTRUCTION OR GANGRENE, RECURRENCE NOT SPECIFIED: ICD-10-CM

## 2020-12-14 DIAGNOSIS — Z12.5 SCREENING FOR PROSTATE CANCER: ICD-10-CM

## 2020-12-14 DIAGNOSIS — I10 ESSENTIAL HYPERTENSION: ICD-10-CM

## 2020-12-14 DIAGNOSIS — Z51.81 MEDICATION MONITORING ENCOUNTER: ICD-10-CM

## 2020-12-14 DIAGNOSIS — M48.061 SPINAL STENOSIS OF LUMBAR REGION, UNSPECIFIED WHETHER NEUROGENIC CLAUDICATION PRESENT: ICD-10-CM

## 2020-12-14 DIAGNOSIS — Z00.00 ENCOUNTER FOR WELLNESS EXAMINATION IN ADULT: ICD-10-CM

## 2020-12-14 LAB
ALBUMIN SERPL-MCNC: 4.2 G/DL (ref 3.5–5)
ALP SERPL-CCNC: 71 U/L (ref 45–120)
ALT SERPL W P-5'-P-CCNC: 10 U/L (ref 0–45)
ANION GAP SERPL CALCULATED.3IONS-SCNC: 9 MMOL/L (ref 5–18)
AST SERPL W P-5'-P-CCNC: 16 U/L (ref 0–40)
BILIRUB SERPL-MCNC: 0.4 MG/DL (ref 0–1)
BUN SERPL-MCNC: 23 MG/DL (ref 8–28)
CALCIUM SERPL-MCNC: 9 MG/DL (ref 8.5–10.5)
CHLORIDE BLD-SCNC: 103 MMOL/L (ref 98–107)
CHOLEST SERPL-MCNC: 98 MG/DL
CO2 SERPL-SCNC: 27 MMOL/L (ref 22–31)
CREAT SERPL-MCNC: 1.1 MG/DL (ref 0.7–1.3)
ERYTHROCYTE [DISTWIDTH] IN BLOOD BY AUTOMATED COUNT: 11.3 % (ref 11–14.5)
FASTING STATUS PATIENT QL REPORTED: YES
GFR SERPL CREATININE-BSD FRML MDRD: >60 ML/MIN/1.73M2
GLUCOSE BLD-MCNC: 109 MG/DL (ref 70–125)
HCT VFR BLD AUTO: 42.4 % (ref 40–54)
HDLC SERPL-MCNC: 44 MG/DL
HGB BLD-MCNC: 13.9 G/DL (ref 14–18)
LDLC SERPL CALC-MCNC: 46 MG/DL
MCH RBC QN AUTO: 32.4 PG (ref 27–34)
MCHC RBC AUTO-ENTMCNC: 32.9 G/DL (ref 32–36)
MCV RBC AUTO: 98 FL (ref 80–100)
PLATELET # BLD AUTO: 239 THOU/UL (ref 140–440)
PMV BLD AUTO: 7.2 FL (ref 7–10)
POTASSIUM BLD-SCNC: 4.3 MMOL/L (ref 3.5–5)
PROT SERPL-MCNC: 6.9 G/DL (ref 6–8)
PSA SERPL-MCNC: 2.5 NG/ML (ref 0–6.5)
RBC # BLD AUTO: 4.3 MILL/UL (ref 4.4–6.2)
SODIUM SERPL-SCNC: 139 MMOL/L (ref 136–145)
TRIGL SERPL-MCNC: 38 MG/DL
WBC: 7.1 THOU/UL (ref 4–11)

## 2020-12-14 ASSESSMENT — MIFFLIN-ST. JEOR: SCORE: 1282.76

## 2021-01-07 ENCOUNTER — COMMUNICATION - HEALTHEAST (OUTPATIENT)
Dept: PHYSICAL MEDICINE AND REHAB | Facility: CLINIC | Age: 86
End: 2021-01-07

## 2021-01-07 DIAGNOSIS — M54.16 LUMBAR RADICULAR PAIN: ICD-10-CM

## 2021-01-07 DIAGNOSIS — M48.062 SPINAL STENOSIS OF LUMBAR REGION WITH NEUROGENIC CLAUDICATION: ICD-10-CM

## 2021-01-11 ENCOUNTER — HOSPITAL ENCOUNTER (OUTPATIENT)
Dept: NUCLEAR MEDICINE | Facility: HOSPITAL | Age: 86
Discharge: HOME | End: 2021-01-11
Attending: INTERNAL MEDICINE

## 2021-01-11 ENCOUNTER — HOSPITAL ENCOUNTER (OUTPATIENT)
Dept: CARDIOLOGY | Facility: HOSPITAL | Age: 86
Discharge: HOME | End: 2021-01-11
Attending: INTERNAL MEDICINE

## 2021-01-11 DIAGNOSIS — I25.119 ATHEROSCLEROSIS OF NATIVE CORONARY ARTERY OF NATIVE HEART WITH ANGINA PECTORIS (H): ICD-10-CM

## 2021-01-11 LAB
CV STRESS CURRENT BP HE: NORMAL
CV STRESS CURRENT HR HE: 61
CV STRESS CURRENT HR HE: 61
CV STRESS CURRENT HR HE: 63
CV STRESS CURRENT HR HE: 74
CV STRESS CURRENT HR HE: 74
CV STRESS CURRENT HR HE: 78
CV STRESS CURRENT HR HE: 80
CV STRESS CURRENT HR HE: 82
CV STRESS CURRENT HR HE: 83
CV STRESS CURRENT HR HE: 84
CV STRESS CURRENT HR HE: 84
CV STRESS CURRENT HR HE: 85
CV STRESS DEVIATION TIME HE: NORMAL
CV STRESS ECHO PERCENT HR HE: NORMAL
CV STRESS EXERCISE STAGE HE: NORMAL
CV STRESS FINAL RESTING BP HE: NORMAL
CV STRESS FINAL RESTING HR HE: 74
CV STRESS MAX HR HE: 85
CV STRESS MAX TREADMILL GRADE HE: 0
CV STRESS MAX TREADMILL SPEED HE: 0
CV STRESS PEAK DIA BP HE: NORMAL
CV STRESS PEAK SYS BP HE: NORMAL
CV STRESS PHASE HE: NORMAL
CV STRESS PROTOCOL HE: NORMAL
CV STRESS RESTING PT POSITION HE: NORMAL
CV STRESS ST DEVIATION AMOUNT HE: NORMAL
CV STRESS ST DEVIATION ELEVATION HE: NORMAL
CV STRESS ST EVELATION AMOUNT HE: NORMAL
CV STRESS TEST TYPE HE: NORMAL
CV STRESS TOTAL STAGE TIME MIN 1 HE: NORMAL
RATE PRESSURE PRODUCT: NORMAL
STRESS ECHO BASELINE DIASTOLIC HE: 79
STRESS ECHO BASELINE HR: 62
STRESS ECHO BASELINE SYSTOLIC BP: 161
STRESS ECHO CALCULATED PERCENT HR: 62 %
STRESS ECHO LAST STRESS DIASTOLIC BP: 69
STRESS ECHO LAST STRESS HR: 83
STRESS ECHO LAST STRESS SYSTOLIC BP: 157
STRESS ECHO TARGET HR: 137

## 2021-01-13 ENCOUNTER — RECORDS - HEALTHEAST (OUTPATIENT)
Dept: ADMINISTRATIVE | Facility: OTHER | Age: 86
End: 2021-01-13

## 2021-01-14 ENCOUNTER — COMMUNICATION - HEALTHEAST (OUTPATIENT)
Dept: CARDIOLOGY | Facility: CLINIC | Age: 86
End: 2021-01-14

## 2021-01-26 ENCOUNTER — COMMUNICATION - HEALTHEAST (OUTPATIENT)
Dept: INTERNAL MEDICINE | Facility: CLINIC | Age: 86
End: 2021-01-26

## 2021-01-26 DIAGNOSIS — J30.9 ALLERGIC RHINITIS: ICD-10-CM

## 2021-01-26 RX ORDER — IPRATROPIUM BROMIDE 21 UG/1
SPRAY, METERED NASAL
Qty: 30 ML | Refills: 6 | Status: SHIPPED | OUTPATIENT
Start: 2021-01-26 | End: 2022-01-17

## 2021-02-01 ENCOUNTER — COMMUNICATION - HEALTHEAST (OUTPATIENT)
Dept: ADMINISTRATIVE | Facility: CLINIC | Age: 86
End: 2021-02-01

## 2021-02-03 ENCOUNTER — OFFICE VISIT - HEALTHEAST (OUTPATIENT)
Dept: INTERNAL MEDICINE | Facility: CLINIC | Age: 86
End: 2021-02-03

## 2021-02-03 ENCOUNTER — COMMUNICATION - HEALTHEAST (OUTPATIENT)
Dept: INTERNAL MEDICINE | Facility: CLINIC | Age: 86
End: 2021-02-03

## 2021-02-03 DIAGNOSIS — G47.33 OBSTRUCTIVE SLEEP APNEA SYNDROME: ICD-10-CM

## 2021-02-03 DIAGNOSIS — R06.02 SHORT OF BREATH ON EXERTION: ICD-10-CM

## 2021-02-03 DIAGNOSIS — M75.101 TEAR OF RIGHT ROTATOR CUFF, UNSPECIFIED TEAR EXTENT, UNSPECIFIED WHETHER TRAUMATIC: ICD-10-CM

## 2021-02-03 DIAGNOSIS — I10 ESSENTIAL HYPERTENSION: ICD-10-CM

## 2021-02-03 DIAGNOSIS — M40.00 KYPHOSIS (ACQUIRED) (POSTURAL): ICD-10-CM

## 2021-02-03 DIAGNOSIS — I25.119 ATHEROSCLEROSIS OF NATIVE CORONARY ARTERY OF NATIVE HEART WITH ANGINA PECTORIS (H): ICD-10-CM

## 2021-02-03 ASSESSMENT — MIFFLIN-ST. JEOR: SCORE: 1292.74

## 2021-02-11 ENCOUNTER — HOSPITAL ENCOUNTER (OUTPATIENT)
Dept: PHYSICAL MEDICINE AND REHAB | Facility: CLINIC | Age: 86
Discharge: HOME | End: 2021-02-11
Attending: PHYSICAL MEDICINE & REHABILITATION

## 2021-02-11 DIAGNOSIS — M54.50 LUMBAR SPINE PAIN: ICD-10-CM

## 2021-02-11 DIAGNOSIS — M25.411 SHOULDER EFFUSION, RIGHT: ICD-10-CM

## 2021-02-11 DIAGNOSIS — R40.0 DROWSINESS: ICD-10-CM

## 2021-02-11 DIAGNOSIS — G56.01 CARPAL TUNNEL SYNDROME OF RIGHT WRIST: ICD-10-CM

## 2021-02-11 DIAGNOSIS — M75.121 COMPLETE TEAR OF RIGHT ROTATOR CUFF, UNSPECIFIED WHETHER TRAUMATIC: ICD-10-CM

## 2021-02-11 DIAGNOSIS — M43.16 SPONDYLOLISTHESIS OF LUMBAR REGION: ICD-10-CM

## 2021-02-11 DIAGNOSIS — M48.062 SPINAL STENOSIS OF LUMBAR REGION WITH NEUROGENIC CLAUDICATION: ICD-10-CM

## 2021-02-11 DIAGNOSIS — M54.16 LUMBAR RADICULAR PAIN: ICD-10-CM

## 2021-02-11 ASSESSMENT — MIFFLIN-ST. JEOR: SCORE: 1265.52

## 2021-02-25 ENCOUNTER — COMMUNICATION - HEALTHEAST (OUTPATIENT)
Dept: INTERNAL MEDICINE | Facility: CLINIC | Age: 86
End: 2021-02-25

## 2021-02-25 DIAGNOSIS — I10 ESSENTIAL HYPERTENSION: ICD-10-CM

## 2021-02-25 RX ORDER — LISINOPRIL 2.5 MG/1
TABLET ORAL
Qty: 90 TABLET | Refills: 2 | Status: SHIPPED | OUTPATIENT
Start: 2021-02-25 | End: 2021-11-30

## 2021-04-14 ENCOUNTER — COMMUNICATION - HEALTHEAST (OUTPATIENT)
Dept: PHYSICAL MEDICINE AND REHAB | Facility: CLINIC | Age: 86
End: 2021-04-14

## 2021-04-14 DIAGNOSIS — M48.062 SPINAL STENOSIS OF LUMBAR REGION WITH NEUROGENIC CLAUDICATION: ICD-10-CM

## 2021-04-14 DIAGNOSIS — M54.16 LUMBAR RADICULAR PAIN: ICD-10-CM

## 2021-04-16 ENCOUNTER — AMBULATORY - HEALTHEAST (OUTPATIENT)
Dept: NURSING | Facility: CLINIC | Age: 86
End: 2021-04-16

## 2021-05-18 ENCOUNTER — AMBULATORY - HEALTHEAST (OUTPATIENT)
Dept: NURSING | Facility: CLINIC | Age: 86
End: 2021-05-18

## 2021-05-18 ENCOUNTER — RECORDS - HEALTHEAST (OUTPATIENT)
Dept: ADMINISTRATIVE | Facility: OTHER | Age: 86
End: 2021-05-18

## 2021-05-21 ENCOUNTER — HOSPITAL ENCOUNTER (OUTPATIENT)
Dept: PHYSICAL MEDICINE AND REHAB | Facility: CLINIC | Age: 86
Discharge: HOME | End: 2021-05-21
Attending: PHYSICAL MEDICINE & REHABILITATION

## 2021-05-21 ENCOUNTER — COMMUNICATION - HEALTHEAST (OUTPATIENT)
Dept: PHYSICAL MEDICINE AND REHAB | Facility: CLINIC | Age: 86
End: 2021-05-21

## 2021-05-21 DIAGNOSIS — M25.411 SHOULDER EFFUSION, RIGHT: ICD-10-CM

## 2021-05-21 DIAGNOSIS — M43.16 SPONDYLOLISTHESIS OF LUMBAR REGION: ICD-10-CM

## 2021-05-21 DIAGNOSIS — M75.121 COMPLETE TEAR OF RIGHT ROTATOR CUFF, UNSPECIFIED WHETHER TRAUMATIC: ICD-10-CM

## 2021-05-21 DIAGNOSIS — M48.062 SPINAL STENOSIS OF LUMBAR REGION WITH NEUROGENIC CLAUDICATION: ICD-10-CM

## 2021-05-21 RX ORDER — PREGABALIN 25 MG/1
25 CAPSULE ORAL 2 TIMES DAILY
Qty: 60 CAPSULE | Refills: 2 | Status: SHIPPED | OUTPATIENT
Start: 2021-05-21 | End: 2021-09-01

## 2021-05-21 ASSESSMENT — MIFFLIN-ST. JEOR: SCORE: 1290.47

## 2021-05-25 ENCOUNTER — AMBULATORY - HEALTHEAST (OUTPATIENT)
Dept: NEUROSURGERY | Facility: CLINIC | Age: 86
End: 2021-05-25

## 2021-05-25 ENCOUNTER — RECORDS - HEALTHEAST (OUTPATIENT)
Dept: ADMINISTRATIVE | Facility: CLINIC | Age: 86
End: 2021-05-25

## 2021-05-25 DIAGNOSIS — M54.16 LUMBAR RADICULOPATHY: ICD-10-CM

## 2021-05-27 ENCOUNTER — RECORDS - HEALTHEAST (OUTPATIENT)
Dept: GENERAL RADIOLOGY | Facility: CLINIC | Age: 86
End: 2021-05-27

## 2021-05-27 ENCOUNTER — OFFICE VISIT - HEALTHEAST (OUTPATIENT)
Dept: INTERNAL MEDICINE | Facility: CLINIC | Age: 86
End: 2021-05-27

## 2021-05-27 ENCOUNTER — RECORDS - HEALTHEAST (OUTPATIENT)
Dept: ADMINISTRATIVE | Facility: OTHER | Age: 86
End: 2021-05-27

## 2021-05-27 DIAGNOSIS — M48.061 SPINAL STENOSIS OF LUMBAR REGION, UNSPECIFIED WHETHER NEUROGENIC CLAUDICATION PRESENT: ICD-10-CM

## 2021-05-27 DIAGNOSIS — Z51.81 ENCOUNTER FOR THERAPEUTIC DRUG MONITORING: ICD-10-CM

## 2021-05-27 DIAGNOSIS — R06.02 SHORTNESS OF BREATH: ICD-10-CM

## 2021-05-27 DIAGNOSIS — H93.8X1 EAR CONGESTION, RIGHT: ICD-10-CM

## 2021-05-27 DIAGNOSIS — I10 ESSENTIAL HYPERTENSION: ICD-10-CM

## 2021-05-27 DIAGNOSIS — R06.02 SHORT OF BREATH ON EXERTION: ICD-10-CM

## 2021-05-27 DIAGNOSIS — I25.119 ATHEROSCLEROSIS OF NATIVE CORONARY ARTERY OF NATIVE HEART WITH ANGINA PECTORIS (H): ICD-10-CM

## 2021-05-27 DIAGNOSIS — R53.83 FATIGUE, UNSPECIFIED TYPE: ICD-10-CM

## 2021-05-27 DIAGNOSIS — G47.33 OBSTRUCTIVE SLEEP APNEA SYNDROME: ICD-10-CM

## 2021-05-27 LAB
ALBUMIN SERPL-MCNC: 4.3 G/DL (ref 3.5–5)
ALP SERPL-CCNC: 72 U/L (ref 45–120)
ALT SERPL W P-5'-P-CCNC: 12 U/L (ref 0–45)
ANION GAP SERPL CALCULATED.3IONS-SCNC: 11 MMOL/L (ref 5–18)
AST SERPL W P-5'-P-CCNC: 18 U/L (ref 0–40)
BILIRUB SERPL-MCNC: 0.3 MG/DL (ref 0–1)
BUN SERPL-MCNC: 26 MG/DL (ref 8–28)
CALCIUM SERPL-MCNC: 8.9 MG/DL (ref 8.5–10.5)
CHLORIDE BLD-SCNC: 104 MMOL/L (ref 98–107)
CO2 SERPL-SCNC: 25 MMOL/L (ref 22–31)
CREAT SERPL-MCNC: 1.04 MG/DL (ref 0.7–1.3)
ERYTHROCYTE [DISTWIDTH] IN BLOOD BY AUTOMATED COUNT: 13.4 % (ref 11–14.5)
GFR SERPL CREATININE-BSD FRML MDRD: >60 ML/MIN/1.73M2
GLUCOSE BLD-MCNC: 104 MG/DL (ref 70–125)
HCT VFR BLD AUTO: 38.3 % (ref 40–54)
HGB BLD-MCNC: 12.5 G/DL (ref 14–18)
MCH RBC QN AUTO: 32.1 PG (ref 27–34)
MCHC RBC AUTO-ENTMCNC: 32.6 G/DL (ref 32–36)
MCV RBC AUTO: 99 FL (ref 80–100)
PLATELET # BLD AUTO: 228 THOU/UL (ref 140–440)
PMV BLD AUTO: 9 FL (ref 7–10)
POTASSIUM BLD-SCNC: 4.7 MMOL/L (ref 3.5–5)
PROT SERPL-MCNC: 7.3 G/DL (ref 6–8)
RBC # BLD AUTO: 3.89 MILL/UL (ref 4.4–6.2)
SODIUM SERPL-SCNC: 140 MMOL/L (ref 136–145)
TSH SERPL DL<=0.005 MIU/L-ACNC: 0.79 UIU/ML (ref 0.3–5)
WBC: 6.3 THOU/UL (ref 4–11)

## 2021-05-28 ENCOUNTER — COMMUNICATION - HEALTHEAST (OUTPATIENT)
Dept: INTERNAL MEDICINE | Facility: CLINIC | Age: 86
End: 2021-05-28

## 2021-05-29 NOTE — PATIENT INSTRUCTIONS - HE
Try the antifungal cream on the skin in the rear.  If that is not improving after 2 to 3 weeks, I would have you consider seeing the dermatologist for further evaluation of that.    Continue on current medications at this time.    If you do get blood pressures less than 110/60 or increasing lightheaded dizzy spells, contact office and you may need to consider a lower dose of atenolol, or different beta-blocker.    If you otherwise feeling well, follow-up in 6 months for physical exam.    Continue to walk on a daily basis.    If you have worsening foot numbness or spinal stenosis symptoms, request a referral to the spine clinic or spine surgeons.    Your 1 year colonoscopy will be due in September.    Results of today's lab work will be mailed to you.  Checking kidney, liver and muscle test.

## 2021-05-29 NOTE — PROGRESS NOTES
[facility] clinic Follow Up Note    Antoni Stoll   84 y.o. male    Date of Visit: 6/3/2019    Chief Complaint   Patient presents with     Follow-up     Subjective  Wily is here for follow-up of multiple medical issues, also complaining of some mild increased intermittent lightheaded dizzy spells with lower blood pressures at home.  He has gotten systolics down to 100.  But here in clinic its been in the 130 over 60s.    Remains on atenolol 25 mg a day and lisinopril 2.5 mg a day.    Blood pressure is 130/68 last December.    He does have coronary artery disease.  Bypass in 2013.  He did have a stress echo and cardiac echo January 2019, I did review those reports today.  Normal echo.  No history of MI.  No pulmonary hypertension.  No aortic stenosis.  There is no wall motion abnormality.  Just mild mitral regurgitation.  Ejection fraction 70%.    No lower extremity edema.  No palpitations or history of arrhythmia.    His CPAP machine is working well, denies significant daytime sleepiness.    Continues on Flomax.  I did review the urology note from May, no nodule on exam.  Given a 2-year follow-up.  December 2018 PSA was stable at 1.4.    He recently saw dermatology last month had a AK frozen on scalp.  Past history of basal cell cancer, no recurrent skin cancer.    He does have a past history of severe spinal stenosis.  Last MRI in 2016 with severe spinal stenosis of L3-L5.  He is seen Dr. Grant in the past but has not seen a surgeon recently.  He takes Tylenol and gabapentin at night.    He does have some mild increased intermittent numbness and paresthesia feeling in his right foot intermittently generally does not last very long, positional.  Relieved with walking.    No falls.    He had some arm paresthesias in bed that he discussed last appointment, but no complaints today.  Last MRI of the neck was in 2015 with some foraminal stenosis from C7-T1.    No chest pain or palpitations.    Continues to go  to [facility] 5 days a week for regular walking.  Stable exertional ability, no new leg weakness.    Past history of colon cancer and a large polyp September 2017 with right hemicolectomy.  September 2018 colonoscopy was negative with a 1 year follow-up.  Bowels are normal and no blood in stool.    No new cough.  Non-smoker.    No flare of his sinusitis.    Past history of borderline increased eye pressures with 1 year follow-up with ophthalmology due in October.    He has some gluteal intertrigo, he has been using some over-the-counter diaper rash cream, similar to zinc oxide.  But the intertrigo does continue, denies significant itch.  He was using a antibacterial ointment in the past.    Retired .  Lives independently.    PMHx:    Past Medical History:   Diagnosis Date     Anemia      Cancer (H)     colon     Carotid artery occlusion      Coronary artery disease      High cholesterol      Hyperlipidemia      Hypertension      Left inguinal hernia      Low back pain      Sleep apnea, obstructive     CPAP     Spinal stenosis     lumbar     PSHx:    Past Surgical History:   Procedure Laterality Date     ARTERIAL BYPASS SURGERY       BLEPHAROPLASTY Bilateral      CARDIAC CATHETERIZATION  06/03/2013     CATARACT EXTRACTION Bilateral      CORONARY ARTERY BYPASS GRAFT  06/28/2013    5-vessel     CORONARY ARTERY BYPASS GRAFT  2013    5 vessel     HERNIA REPAIR      right inguinal     VARICOSE VEIN SURGERY Left      Immunizations:   Immunization History   Administered Date(s) Administered     Influenza [id number], 01/18/2010     Influenza high dose, seasonal 10/13/2015, 10/18/2016, 11/01/2017, 12/10/2018     Influenza, inj, historic,unspecified 10/23/2007, 10/29/2009, 10/11/2010, 11/08/2011     Influenza, seasonal,quad inj 6-35 mos 11/29/2012, 10/25/2013, 11/19/2014     Pneumo Conj 13-V (2010&after) 04/09/2015     Pneumo Polysac 23-V 10/03/1999, 03/06/2007     Td,adult,historic,unspecified 03/11/1992,  10/24/2000, 04/11/2011     ZOSTER, LIVE 11/08/2007       ROS A comprehensive review of systems was performed and was otherwise negative    Medications, allergies, and problem list were reviewed and updated    Exam  /60 (Patient Site: Right Arm, Patient Position: Sitting, Cuff Size: Adult Regular)   Pulse (!) 56   Resp 16   Wt 155 lb (70.3 kg)   BMI 25.79 kg/m    Lungs are clear.  Heart is regular without murmur.  No ankle edema.  Nonfocal neurologic exam.  Able to clamp on the exam table.    Buttock crease examined with moderate upper intertrigo without skin breakdown.    Assessment/Plan  1. Essential hypertension  Blood pressure well controlled today.  He is reporting some lower blood pressures intermittently at home.  Unclear accuracy of those blood pressure checks.  If he does have increasing lightheaded dizzy spells or more documented blood pressures less than 110/60, I would have him consider a lower dose of beta-blocker.  His heart rate is borderline in the 50s and 60s at times.    Consider reduction of atenolol down to 12.5 mg a day by cutting pill in half, or consider change of beta-blocker to Toprol-XL 25 mg a day.    Continue on low-dose lisinopril at this time, but may need to reconsider that as well if lower blood pressure symptoms.    Follow-up as needed the summer or fall, otherwise 6-month follow-up for physical.    2. Coronary atherosclerosis due to calcified coronary lesion  Asymptomatic.  Recent stress test and echo in January, reviewed by me today.    Medical management with Lipitor and aspirin.    No epigastric pain or bleeding with full dose aspirin.    I did discuss toxicity risk with Lipitor including muscle toxicity.  He is having some leg cramps at night.  He can increase to regular stretching.  That may be more related to his spinal stenosis.  He was told to follow-up if worsening leg cramps    3. Hypercholesterolemia  As above.  Continue Lipitor.  Cholesterol well controlled in  December.  Given age and spinal stenosis issue, if worsening leg cramps or myalgia issues, may need a lower dose of Lipitor down to 10 mg a day.    4. Spinal stenosis of lumbar region, unspecified whether neurogenic claudication present  Severe spinal stenosis.  He was offered a referral to the spine clinic or neurosurgeon.  He declined at this time.  Emphasized the importance of regular walking.    Tylenol gabapentin at night.    If symptoms worsen, re-MRI and sent to spine clinic.    5. History of colon cancer  1 year colonoscopy due in September    6. Sleep apnea, unspecified type  Compliant with CPAP    7. Medication monitoring encounter    - Comprehensive Metabolic Panel  - CK Total    8. Intertrigo  Persistent.  Tried antifungal cream.  If it still fails to improve, he may need to consider dermatology referral, or clindamycin cream.  - ketoconazole (NIZORAL) 2 % cream; Apply topically 2 (two) times a day. To affected areas of skin  Dispense: 30 g; Refill: 2    History of basal cell cancer, recently saw dermatology last month.  Otherwise 1 year follow-up.    BPH, saw urology last month.  Continue Flomax.  To your follow-up with urology.    Return in about 6 months (around 12/3/2019) for Annual physical.   Patient Instructions   Try the antifungal cream on the skin in the rear.  If that is not improving after 2 to 3 weeks, I would have you consider seeing the dermatologist for further evaluation of that.    Continue on current medications at this time.    If you do get blood pressures less than 110/60 or increasing lightheaded dizzy spells, contact office and you may need to consider a lower dose of atenolol, or different beta-blocker.    If you otherwise feeling well, follow-up in 6 months for physical exam.    Continue to walk on a daily basis.    If you have worsening foot numbness or spinal stenosis symptoms, request a referral to the spine clinic or spine surgeons.    Your 1 year colonoscopy will be due in  September.    Results of today's lab work will be mailed to you.  Checking kidney, liver and muscle test.    Sarthak Romano MD        Current Outpatient Medications   Medication Sig Dispense Refill     acetaminophen (TYLENOL) 500 MG tablet Take 1,000 mg by mouth daily .             aspirin 325 MG tablet Take 325 mg by mouth daily.       atenolol (TENORMIN) 25 MG tablet TAKE 1 TABLET BY MOUTH DAILY 90 tablet 2     atorvastatin (LIPITOR) 20 MG tablet TAKE 1 TABLET BY MOUTH DAILY 90 tablet 3     B2/VITS A,C,E/LUT/ZEAXANTH/MIN (ICAPS ORAL) Take 1 capsule by mouth daily.       calcium polycarbophil (FIBERCON) 625 mg tablet Take 1,250 mg by mouth daily.       cholecalciferol, vitamin D3, 1,000 unit tablet Take 1,000 Units by mouth daily.       fluticasone (FLONASE) 50 mcg/actuation nasal spray Apply 1 spray into each nostril daily.       gabapentin (NEURONTIN) 300 MG capsule TAKE 1 TO 2 CAPSULES BY MOUTH EVERY EVENING AS NEEDED FOR NERVE PAIN 180 capsule 1     glucosamine-chondroitin 500-400 mg cap Take 1 capsule by mouth 2 (two) times a day.       ipratropium (ATROVENT) 0.03 % nasal spray USE 2 SPRAYS IN EACH NOSTRIL THREE TIMES DAILY AS NEEDED (Patient taking differently: USE 2 SPRAYS IN EACH NOSTRIL DAILY AS NEEDED) 30 mL 8     lisinopril (PRINIVIL,ZESTRIL) 2.5 MG tablet TAKE 1 TABLET BY MOUTH EVERY DAY 90 tablet 2     loratadine (CLARITIN) 10 mg tablet Take 10 mg by mouth daily.       saw palmetto fruit 450 mg cap Take 1 capsule by mouth 2 (two) times a day.       tamsulosin (FLOMAX) 0.4 mg Cp24 Take 0.4 mg by mouth at bedtime.       ketoconazole (NIZORAL) 2 % cream Apply topically 2 (two) times a day. To affected areas of skin 30 g 2     No current facility-administered medications for this visit.      No Known Allergies  Social History     Tobacco Use     Smoking status: Never Smoker     Smokeless tobacco: Never Used   Substance Use Topics     Alcohol use: Yes     Comment: 5 drinks weekly     Drug use: No

## 2021-05-29 NOTE — TELEPHONE ENCOUNTER
Refill Approved    Rx renewed per Medication Renewal Policy. Medication was last renewed on 12/9/18.    Janet Cole, [facility] Triage/Med Refill 6/10/2019     Requested Prescriptions   Pending Prescriptions Disp Refills     gabapentin (NEURONTIN) 300 MG capsule [Pharmacy Med Name: GABAPENTIN 300MG CAPSULES] 180 capsule 0     Sig: TAKE 1 TO 2 CAPSULES BY MOUTH EVERY EVENING AS NEEDED FOR NERVE PAIN       Gabapentin/Levetiracetam/Tiagabine Refill Protocol  Passed - 6/9/2019  3:59 AM        Passed - PCP or prescribing provider visit in past 12 months or next 3 months     Last office visit with prescriber/PCP: 6/3/2019 Sarthak Romano MD OR same dept: Visit date not found OR same specialty: 6/3/2019 Sarthak Romano MD  Last physical: 12/10/2018 Last MTM visit: Visit date not found   Next visit within 3 mo: Visit date not found  Next physical within 3 mo: Visit date not found  Prescriber OR PCP: Sarthak Romano MD  Last diagnosis associated with med order: 1. Nerve pain  - gabapentin (NEURONTIN) 300 MG capsule [Pharmacy Med Name: GABAPENTIN 300MG CAPSULES]; TAKE 1 TO 2 CAPSULES BY MOUTH EVERY EVENING AS NEEDED FOR NERVE PAIN  Dispense: 180 capsule; Refill: 0    If protocol passes may refill for 12 months if within 3 months of last provider visit (or a total of 15 months).

## 2021-05-30 ENCOUNTER — RECORDS - HEALTHEAST (OUTPATIENT)
Dept: ADMINISTRATIVE | Facility: CLINIC | Age: 86
End: 2021-05-30

## 2021-05-30 VITALS — BODY MASS INDEX: 25.23 KG/M2 | HEIGHT: 66 IN | WEIGHT: 157 LBS

## 2021-05-31 VITALS — WEIGHT: 153 LBS | HEIGHT: 65 IN | BODY MASS INDEX: 25.49 KG/M2

## 2021-05-31 VITALS — BODY MASS INDEX: 24.43 KG/M2 | WEIGHT: 152 LBS | HEIGHT: 66 IN

## 2021-05-31 VITALS — WEIGHT: 158.2 LBS | BODY MASS INDEX: 25.43 KG/M2 | HEIGHT: 66 IN

## 2021-05-31 VITALS — HEIGHT: 66 IN | BODY MASS INDEX: 25.23 KG/M2 | WEIGHT: 157 LBS

## 2021-05-31 VITALS — BODY MASS INDEX: 23.3 KG/M2 | WEIGHT: 145 LBS | HEIGHT: 66 IN

## 2021-05-31 VITALS — BODY MASS INDEX: 24.59 KG/M2 | HEIGHT: 66 IN | WEIGHT: 153 LBS

## 2021-05-31 NOTE — TELEPHONE ENCOUNTER
Refill Approved    Rx renewed per Medication Renewal Policy. Medication was last renewed on 11/21/18  Last OV:  6/3/19    Malena Alcazar, [facility] Connection Triage/Med Refill 8/20/2019     Requested Prescriptions   Pending Prescriptions Disp Refills     atenolol (TENORMIN) 25 MG tablet [Pharmacy Med Name: ATENOLOL 25MG TABLETS] 90 tablet 2     Sig: Take 1 tablet (25 mg total) by mouth daily.       Beta-Blockers Refill Protocol Passed - 8/20/2019  9:14 AM        Passed - PCP or prescribing provider visit in past 12 months or next 3 months     Last office visit with prescriber/PCP: 6/3/2019 Sarthak Romano MD OR same dept: 6/3/2019 Sarthak Romano MD OR same specialty: 6/3/2019 Sarthak Romano MD  Last physical: 12/10/2018 Last MTM visit: Visit date not found   Next visit within 3 mo: Visit date not found  Next physical within 3 mo: Visit date not found  Prescriber OR PCP: Sarthak Romano MD  Last diagnosis associated with med order: 1. Essential hypertension  - atenolol (TENORMIN) 25 MG tablet [Pharmacy Med Name: ATENOLOL 25MG TABLETS]; TAKE 1 TABLET BY MOUTH DAILY  Dispense: 90 tablet; Refill: 0    If protocol passes may refill for 12 months if within 3 months of last provider visit (or a total of 15 months).             Passed - Blood pressure filed in past 12 months     BP Readings from Last 1 Encounters:   06/03/19 136/60

## 2021-06-01 VITALS — WEIGHT: 155 LBS | BODY MASS INDEX: 25.83 KG/M2 | HEIGHT: 65 IN

## 2021-06-01 VITALS — HEIGHT: 65 IN | WEIGHT: 153 LBS | BODY MASS INDEX: 25.49 KG/M2

## 2021-06-02 VITALS — BODY MASS INDEX: 25.29 KG/M2 | HEIGHT: 65 IN | WEIGHT: 151.8 LBS

## 2021-06-02 VITALS — HEIGHT: 65 IN | WEIGHT: 152 LBS | BODY MASS INDEX: 25.33 KG/M2

## 2021-06-02 VITALS — WEIGHT: 152 LBS | BODY MASS INDEX: 25.33 KG/M2 | HEIGHT: 65 IN

## 2021-06-02 VITALS — BODY MASS INDEX: 25.79 KG/M2 | WEIGHT: 155 LBS

## 2021-06-02 NOTE — TELEPHONE ENCOUNTER
Refill Approved    Rx renewed per Medication Renewal Policy. Medication was last renewed on 5/29/18.    Janet Cole, [facility] Triage/Med Refill 10/17/2019     Requested Prescriptions   Pending Prescriptions Disp Refills     ipratropium (ATROVENT) 0.03 % nasal spray [Pharmacy Med Name: IPRATROPIUM 0.03% SILVANA SP 30ML (345)] 30 mL 0     Sig: USE 2 SPRAYS IN EACH NOSTRIL THREE TIMES DAILY AS NEEDED       Nasal Spray Protocol Passed - 10/16/2019  7:06 PM        Passed - Patient has had office visit/physical in last 1 year     Last office visit with prescriber/PCP: 6/3/2019 Sarthak Romano MD OR same dept: Visit date not found OR same specialty: 6/3/2019 Sarthak Romano MD Last physical: 12/10/2018 Last MTM visit: Visit date not found    Next visit within 3 mo: Visit date not found  Next physical within 3 mo: Visit date not found  Prescriber OR PCP: Sarthak Romano MD  Last diagnosis associated with med order: 1. Allergic rhinitis  - ipratropium (ATROVENT) 0.03 % nasal spray [Pharmacy Med Name: IPRATROPIUM 0.03% SILVANA SP 30ML (345)]; USE 2 SPRAYS IN EACH NOSTRIL THREE TIMES DAILY AS NEEDED  Dispense: 30 mL; Refill: 0

## 2021-06-03 VITALS
SYSTOLIC BLOOD PRESSURE: 138 MMHG | HEART RATE: 60 BPM | HEIGHT: 65 IN | WEIGHT: 153 LBS | BODY MASS INDEX: 25.49 KG/M2 | DIASTOLIC BLOOD PRESSURE: 65 MMHG

## 2021-06-03 VITALS
HEART RATE: 64 BPM | SYSTOLIC BLOOD PRESSURE: 140 MMHG | HEIGHT: 65 IN | RESPIRATION RATE: 14 BRPM | WEIGHT: 155 LBS | DIASTOLIC BLOOD PRESSURE: 66 MMHG | BODY MASS INDEX: 25.83 KG/M2

## 2021-06-03 NOTE — TELEPHONE ENCOUNTER
Refill Approved    Rx renewed per Medication Renewal Policy. Medication was last renewed on 2/22/19.    Janet Cole, [facility] Triage/Med Refill 11/17/2019     Requested Prescriptions   Pending Prescriptions Disp Refills     lisinopril (PRINIVIL,ZESTRIL) 2.5 MG tablet [Pharmacy Med Name: LISINOPRIL 2.5MG TABLETS] 90 tablet 0     Sig: TAKE 1 TABLET BY MOUTH EVERY DAY       Ace Inhibitors Refill Protocol Passed - 11/16/2019  3:59 AM        Passed - PCP or prescribing provider visit in past 12 months       Last office visit with prescriber/PCP: 6/3/2019 Sarthak Romano MD OR same dept: Visit date not found OR same specialty: 6/3/2019 Sarthak Romano MD  Last physical: 12/10/2018 Last MTM visit: Visit date not found   Next visit within 3 mo: Visit date not found  Next physical within 3 mo: Visit date not found  Prescriber OR PCP: Sarthak Romano MD  Last diagnosis associated with med order: 1. Essential hypertension  - lisinopril (PRINIVIL,ZESTRIL) 2.5 MG tablet [Pharmacy Med Name: LISINOPRIL 2.5MG TABLETS]; TAKE 1 TABLET BY MOUTH EVERY DAY  Dispense: 90 tablet; Refill: 0    If protocol passes may refill for 12 months if within 3 months of last provider visit (or a total of 15 months).             Passed - Serum Potassium in past 12 months     Lab Results   Component Value Date    Potassium 4.1 06/03/2019             Passed - Blood pressure filed in past 12 months     BP Readings from Last 1 Encounters:   06/03/19 136/60             Passed - Serum Creatinine in past 12 months     Creatinine   Date Value Ref Range Status   06/03/2019 1.09 0.70 - 1.30 mg/dL Final

## 2021-06-04 VITALS
BODY MASS INDEX: 25.46 KG/M2 | WEIGHT: 153 LBS | SYSTOLIC BLOOD PRESSURE: 130 MMHG | HEART RATE: 64 BPM | DIASTOLIC BLOOD PRESSURE: 64 MMHG

## 2021-06-04 VITALS — BODY MASS INDEX: 26.16 KG/M2 | WEIGHT: 157 LBS | HEIGHT: 65 IN

## 2021-06-04 NOTE — PATIENT INSTRUCTIONS - HE
Antoni Stoll,    It was a pleasure to see you today at the [facility].     My recommendations after this visit include:    Heart monitor    JANET Guevara MD, FACC, AMIE

## 2021-06-04 NOTE — PROGRESS NOTES
"Cardiology Progress Note    Assessment:  Coronary artery disease status post coronary artery bypass graft surgery in 2013, stable, no angina   Exertional dyspnea, mild, chronic  History of mild sinus bradycardia, rule out chronotropic incompetence  Hypercholesterolemia, good control  Hypertension, good control  Spinal stenosis  Colon cancer, status post partial colectomy      Plan:  Holter monitor  I discussed the results of echo and stress test from last year.  There was no evidence of ischemia,  significant valvular abnormalities or indications of LV systolic or significant diastolic dysfunction.  Clinically he is not fluid overloaded today.    Follow-up in 1 year      Subjective:   This is 84 y.o. male who comes in today for visit.  He reports no new cardiac symptoms.  He continues to feel mildly short of breath.  This is particularly noticeable when he climbs stairs.  He does not have PND orthopnea.  He continues to go to gym 5 times a week.  He denies exertional chest pain or shortness of breath during exercises.  He denies syncope but one time he felt severely lightheaded.  He has not had heart palpitations    Review of Systems:   General: WNL  Eyes: WNL  Ears/Nose/Throat: WNL  Lungs: Shortness of Breath  Heart: Shortness of Breath with activity  Stomach: WNL  Bladder: WNL  Muscle/Joints: WNL  Skin: WNL  Nervous System: WNL  Mental Health: WNL     Blood: WNL    Objective:   /66 (Patient Site: Left Arm, Patient Position: Sitting, Cuff Size: Adult Regular)   Pulse 64   Resp 14   Ht 5' 5\" (1.651 m)   Wt 155 lb (70.3 kg)   BMI 25.79 kg/m    Physical Exam:  GENERAL: no distress  NECK: No JVD  LUNGS: Clear to auscultation.  CARDIAC: regular rhythm, S1 & S2 normal.  No heaves, thrills, gallops or murmurs.  ABDOMEN: flat, negative hepatosplenomegaly, soft and non-tender.  EXTREMITIES: No evidence of cyanosis, clubbing or edema.    Current Outpatient Medications   Medication Sig Dispense Refill     " acetaminophen (TYLENOL) 500 MG tablet Take 1,000 mg by mouth daily .             aspirin 325 MG tablet Take 325 mg by mouth daily.       atenolol (TENORMIN) 25 MG tablet Take 1 tablet (25 mg total) by mouth daily. 90 tablet 2     atorvastatin (LIPITOR) 20 MG tablet TAKE 1 TABLET BY MOUTH DAILY 90 tablet 3     calcium polycarbophil (FIBERCON) 625 mg tablet Take 1,250 mg by mouth daily.       cholecalciferol, vitamin D3, 1,000 unit tablet Take 1,000 Units by mouth daily.       fluticasone (FLONASE) 50 mcg/actuation nasal spray Apply 1 spray into each nostril daily.       gabapentin (NEURONTIN) 300 MG capsule TAKE 1 TO 2 CAPSULES BY MOUTH EVERY EVENING AS NEEDED FOR NERVE PAIN 180 capsule 3     glucosamine-chondroitin 500-400 mg cap Take 1 capsule by mouth 2 (two) times a day.       ipratropium (ATROVENT) 0.03 % nasal spray USE 2 SPRAYS IN EACH NOSTRIL THREE TIMES DAILY AS NEEDED (Patient taking differently: USE 2 SPRAYS IN EACH NOSTRIL DAILY AS NEEDED) 30 mL 8     ipratropium (ATROVENT) 0.03 % nasal spray USE 2 SPRAYS IN EACH NOSTRIL THREE TIMES DAILY AS NEEDED 30 mL 6     lisinopril (PRINIVIL,ZESTRIL) 2.5 MG tablet TAKE 1 TABLET BY MOUTH EVERY DAY 90 tablet 1     loratadine (CLARITIN) 10 mg tablet Take 10 mg by mouth daily.       saw palmetto fruit 450 mg cap Take 1 capsule by mouth 2 (two) times a day.       tamsulosin (FLOMAX) 0.4 mg Cp24 Take 0.4 mg by mouth at bedtime.       B2/VITS A,C,E/LUT/ZEAXANTH/MIN (ICAPS ORAL) Take 1 capsule by mouth daily.       ketoconazole (NIZORAL) 2 % cream Apply topically 2 (two) times a day. To affected areas of skin 30 g 2     No current facility-administered medications for this visit.        Cardiographics:    Holter 2014   Sinus bradycardia noted, particularly at night with some suggestion of   sinus node dysfunction, palpitations associated with sinus rhythm without ectopy.     Stress echo: January 2019  1. Normal stress echocardiogram without evidence of stress induced  ischemia.   2. Normal resting LV systolic performance with an ejection fraction of 55-60%. There is normal improvement in left ventricular systolic performance with a peak ejection fraction of 70-75%.  3. No ECG evidence of ischemia.  4. No anginal chest pain reported with exercise.  5. Good functional capacity for age.      Echo: January 2019    Mild left atrial enlargement    Left ventricle ejection fraction is normal. The calculated left ventricular ejection fraction is 70% without wall motion abnormality.    Normal right ventricular size with decreased systolic function.    Aortic valve sclerosis with trace insufficiency    Mild mitral and tricuspid regurgitation. No evidence of pulmonary hypertension    When compared to the previous study dated 1/21/2019, degree of mitral regurgitation appears only mild on the current study.    Normal central venous pressure    Lab Results:       Lab Results   Component Value Date    CHOL 100 12/10/2018    CHOL 93 12/07/2017    CHOL 97 10/18/2016     Lab Results   Component Value Date    HDL 43 12/10/2018    HDL 39 (L) 12/07/2017    HDL 52 10/18/2016     Lab Results   Component Value Date    LDLCALC 48 12/10/2018    LDLCALC 47 12/07/2017    LDLCALC 36 10/18/2016     Lab Results   Component Value Date    TRIG 45 12/10/2018    TRIG 33 12/07/2017    TRIG 43 10/18/2016     No results found for: BNP    Mars Guevara MD (Ted)

## 2021-06-04 NOTE — TELEPHONE ENCOUNTER
Refill Approved    Rx renewed per Medication Renewal Policy. Medication was last renewed on 1/7/19.    Janet Cole, [facility] Triage/Med Refill 1/5/2020     Requested Prescriptions   Pending Prescriptions Disp Refills     atorvastatin (LIPITOR) 20 MG tablet [Pharmacy Med Name: ATORVASTATIN 20MG TABLETS] 90 tablet 3     Sig: TAKE 1 TABLET BY MOUTH DAILY       Statins Refill Protocol (Hmg CoA Reductase Inhibitors) Passed - 1/5/2020  3:58 AM        Passed - PCP or prescribing provider visit in past 12 months      Last office visit with prescriber/PCP: 6/3/2019 Sarthak Romano MD OR same dept: Visit date not found OR same specialty: 6/3/2019 Sarthak Romano MD  Last physical: 12/11/2019 Last MTM visit: Visit date not found   Next visit within 3 mo: Visit date not found  Next physical within 3 mo: Visit date not found  Prescriber OR PCP: Sarthak Romano MD  Last diagnosis associated with med order: 1. Hypercholesterolemia  - atorvastatin (LIPITOR) 20 MG tablet [Pharmacy Med Name: ATORVASTATIN 20MG TABLETS]; TAKE 1 TABLET BY MOUTH DAILY  Dispense: 90 tablet; Refill: 3    If protocol passes may refill for 12 months if within 3 months of last provider visit (or a total of 15 months).

## 2021-06-04 NOTE — PROGRESS NOTES
Assessment and Plan:   Patient instructions:  Continue with daily walking to reduce tightness in your spinal stenosis.    See  today for referral to the spine clinic and MRI of your back before that appointment.    Proceed with heart monitor in January as planned.    See me in early February to follow-up on the spinal stenosis and the heart rate issue.      1. Healthcare maintenance  Patient's main issue currently is his progressive severe spinal stenosis with reduced mobility.    His artery disease has been asymptomatic.    He is had some increased fatigue with ambulation with history of low heart rate.  Holter monitor planned for January.  I will see him in early February for follow-up on that.    He did get a routine eye exam in October eye pressures were okay and no other issues.  1 year follow-up.    I confirmed full CODE STATUS today.    2. Need for immunization against influenza  Given today  - Influenza High Dose,Seasonal,PF 65+ Yrs    3. Coronary atherosclerosis due to calcified coronary lesion  No chest pain.  He still able to go to the [facility] and do the exercise classes as usual 5 days a week.    Coronary bypass in 2013.  No history of MI.    January 2019 stress echo normal without ischemia.  No wall motion abnormality.  70% ejection fraction.  No aortic stenosis just mild mitral regurgitation.  No pulmonary hypertension.    Continue medical management with Lipitor 20 mg.  LDL 48 and HDL 43 last year.  No new generalized myalgias right upper quadrant pain.  No epigastric pain or bleeding on full dose aspirin.    Reviewed cardiology note from December 9.  No changes planned except for a Holter monitor for bradycardia evaluation.  Blood pressure was 140/66 and heart rate 64 at that appointment.        Given a 1 year follow-up with cardiology for next December.    4. Essential hypertension  Variable control between 115//80.  Just occasional mild orthostasis.    He does  tend to run bradycardic with a heart rate around 60.  That stable.  He still on atenolol 25 mg a day.    He will get the Holter monitor in January which was ordered by cardiology earlier this month.    On lisinopril 2.5 mg a day.    He did report a presyncopal episode while in exercise class 2 months ago.  I will see him again in February but follow-up sooner if recurrence of those spells.  Await Holter monitor.  Blood pressure has been fluctuating, occasionally mildly low.  Could consider discontinuation of lisinopril or change in beta-blocker if lower blood pressures.    5. Hypercholesterolemia  Tolerating Lipitor 20 mg.  Goal LDL less than 80.  - Lipid Cascade    6. Spinal stenosis of lumbar region, unspecified whether neurogenic claudication present  2016 MRI showed severe spinal stenosis at L4-5.  He is having intermittent right leg weakness when going on long car rides.    With regular walking at the [facility] and regular back stretching he has been managing okay for a number of years.    Takes Tylenol and gabapentin at night.    With increasing leg claudication and reduced mobility, I will have him repeat MRI and referred to the spine clinic.  Consider spinal decompression surgery.  - MR Lumbar Spine Without Contrast; Future  - Ambulatory referral to [facility]    7. History of colon cancer  Colon cancer in a large polyp September 2017 with right hemicolectomy.  September 2018 colonoscopy negative.  Patient met with the colorectal clinic last week and they told him the change in plan was a 3-year follow-up colonoscopy which would be due September 2021.  His bowels are regular no blood in stool.    If there is any blood in stool or change in stools, patient was told to get reevaluated to consider earlier colonoscopy surveillance.    8. Sleep apnea, unspecified type  Compliant with CPAP.  No worsening daytime sleepiness.  No pulmonary hypertension on the January 2018 echo.    9. Medication  monitoring encounter    - Comprehensive Metabolic Panel  - HM2(CBC w/o Differential)    10. Screening for prostate cancer  Patient does wish to recheck PSA.  No new urinary complaints  - PSA (Prostatic-Specific Antigen), Annual Screen    Moderate BPH symptoms stable.  On Flomax.  Saw urology in May without nodule and no nodule on today's exam.  December 2018 PSA was 1.4.    11. Encounter for general adult medical examination with abnormal findings  Moderate sized left inguinal hernia without significant symptoms.  He does not wish to have inguinal hernia repair.    History of basal cell cancer.  Saw dermatology in May, negative in 1 year follow-up.    The patient's current medical problems were reviewed.    I have had an Advance Directives discussion with the patient.  The following health maintenance schedule was reviewed with the patient and provided in printed form in the after visit summary:   Health Maintenance   Topic Date Due     ZOSTER VACCINES (2 of 3) 01/03/2008     INFLUENZA VACCINE RULE BASED (1) 08/01/2019     FALL RISK ASSESSMENT  12/10/2019     MEDICARE ANNUAL WELLNESS VISIT  12/10/2019     TD 18+ HE  04/11/2021     ADVANCE CARE PLANNING  12/10/2023     PNEUMOCOCCAL IMMUNIZATION 65+ LOW/MEDIUM RISK  Completed        Subjective:   Chief Complaint: Antoni Stoll is an 84 y.o. male here for an Annual Wellness visit.   HPI: Retired , lives independently.  He has describes some decrease in his regular activity but still going to [facility] 5 days a week with regular ambulation.    Severe spinal stenosis as outlined above.  Worsening right leg claudication symptoms with intermittent weakness.  After a long car ride to [facility] for 4 hours he had right leg weakness that took about an hour to resolve with activity while bowling.  No leg weakness currently.  No falls.  He does have some intermittent radicular paresthesias and numbness sensation on his right foot  intermittently.  He is not had previous spine surgical intervention.    He is not bothered by the left inguinal hernia.  He does not plan intervention.    He had an orthostatic type presyncopal spell 2 months ago and exercise class.    He is having some increasing general fatigue with activity, slight increased dyspnea on exertion with steps.  He runs chronic bradycardia and will have a Holter monitor next month with cardiology.    He did see cardiology earlier this month.  No chest pain or evidence of new ischemia.    Compliant with CPAP.  No increasing lower extremity edema.    Moderate kyphoscoliosis.    No new cough.  He is never smoked.  No swallowing difficulty or mouth sores.    No new headache complaints.    See above for the colon cancer discussion, saw colorectal last week.    Gluteal intertrigo, well controlled after Nizoral cream.  He still uses some zinc oxide cream intermittently, no pain with that now.    Review of Systems: Otherwise negative.  Urinating well.  Please see above.  The rest of the review of systems are negative for all systems.    Patient Care Team:  Sarthak Romano MD as PCP - General  Sarthak Romano MD as Assigned PCP     Patient Active Problem List   Diagnosis     Unspecified glaucoma     Cataract     Spinal Stenosis     Inguinal Hernia     Hypercholesterolemia     Essential hypertension     Atherosclerosis of native coronary artery of native heart with angina pectoris (H)     Sleep Apnea     Skin Condition     Seborrheic Keratosis     Colon cancer (H)     Anemia     BPH (benign prostatic hyperplasia)     Spinal stenosis     History of colon cancer     Medication monitoring encounter     SSS (sick sinus syndrome) (H)     Past Medical History:   Diagnosis Date     Anemia      Cancer (H)     colon     Carotid artery occlusion      Coronary artery disease      High cholesterol      Hyperlipidemia      Hypertension      Left inguinal hernia      Low back pain      Sleep apnea,  obstructive     CPAP     Spinal stenosis     lumbar      Past Surgical History:   Procedure Laterality Date     ARTERIAL BYPASS SURGERY       BLEPHAROPLASTY Bilateral      CARDIAC CATHETERIZATION  06/03/2013     CATARACT EXTRACTION Bilateral      CORONARY ARTERY BYPASS GRAFT  06/28/2013    5-vessel     CORONARY ARTERY BYPASS GRAFT  2013    5 vessel     HERNIA REPAIR      right inguinal     VARICOSE VEIN SURGERY Left       Family History   Problem Relation Age of Onset     Coronary artery disease Mother      Colon cancer Father      Coronary artery disease Father       Social History     Socioeconomic History     Marital status: Single     Spouse name: Not on file     Number of children: Not on file     Years of education: Not on file     Highest education level: Not on file   Occupational History     Not on file   Social Needs     Financial resource strain: Not on file     Food insecurity:     Worry: Not on file     Inability: Not on file     Transportation needs:     Medical: Not on file     Non-medical: Not on file   Tobacco Use     Smoking status: Never Smoker     Smokeless tobacco: Never Used   Substance and Sexual Activity     Alcohol use: Yes     Comment: 5 drinks weekly     Drug use: No     Sexual activity: Not on file   Lifestyle     Physical activity:     Days per week: Not on file     Minutes per session: Not on file     Stress: Not on file   Relationships     Social connections:     Talks on phone: Not on file     Gets together: Not on file     Attends Yarsanism service: Not on file     Active member of club or organization: Not on file     Attends meetings of clubs or organizations: Not on file     Relationship status: Not on file     Intimate partner violence:     Fear of current or ex partner: Not on file     Emotionally abused: Not on file     Physically abused: Not on file     Forced sexual activity: Not on file   Other Topics Concern     Not on file   Social History Narrative     Not on file     "  Current Outpatient Medications   Medication Sig Dispense Refill     acetaminophen (TYLENOL) 500 MG tablet Take 1,000 mg by mouth daily .             aspirin 325 MG tablet Take 325 mg by mouth daily.       atenolol (TENORMIN) 25 MG tablet Take 1 tablet (25 mg total) by mouth daily. 90 tablet 2     atorvastatin (LIPITOR) 20 MG tablet TAKE 1 TABLET BY MOUTH DAILY 90 tablet 3     B2/VITS A,C,E/LUT/ZEAXANTH/MIN (ICAPS ORAL) Take 1 capsule by mouth daily.       calcium polycarbophil (FIBERCON) 625 mg tablet Take 1,250 mg by mouth daily.       cholecalciferol, vitamin D3, 1,000 unit tablet Take 1,000 Units by mouth daily.       fluticasone (FLONASE) 50 mcg/actuation nasal spray Apply 1 spray into each nostril daily.       gabapentin (NEURONTIN) 300 MG capsule TAKE 1 TO 2 CAPSULES BY MOUTH EVERY EVENING AS NEEDED FOR NERVE PAIN 180 capsule 3     glucosamine-chondroitin 500-400 mg cap Take 1 capsule by mouth 2 (two) times a day.       ipratropium (ATROVENT) 0.03 % nasal spray USE 2 SPRAYS IN EACH NOSTRIL THREE TIMES DAILY AS NEEDED 30 mL 6     lisinopril (PRINIVIL,ZESTRIL) 2.5 MG tablet TAKE 1 TABLET BY MOUTH EVERY DAY 90 tablet 1     loratadine (CLARITIN) 10 mg tablet Take 10 mg by mouth daily.       saw palmetto fruit 450 mg cap Take 1 capsule by mouth 2 (two) times a day.       tamsulosin (FLOMAX) 0.4 mg Cp24 Take 0.4 mg by mouth at bedtime.       No current facility-administered medications for this visit.       Objective:   Vital Signs:   Visit Vitals  /74 (Patient Site: Right Arm, Patient Position: Sitting, Cuff Size: Adult Regular)   Pulse 60   Ht 5' 5\" (1.651 m)   Wt 153 lb (69.4 kg)   BMI 25.46 kg/m         VisionScreening:  No exam data present     PHYSICAL EXAM blood pressure was 138/65 on my recheck  Alert and oriented x3.  Good mood and affect.  Clock face drawing and word recall normal.  Pupils and irises equal and reactive.  Extraocular muscles intact.  External ears and nose exam is normal and " tympanic membranes are normal.  Pharynx is moderately crowded.  No pharyngitis.  No oral lesions or leukoplakia.  Teeth in good condition.  No cervical or supraclavicular adenopathy.  No JVD and no carotid bruits.  No thyromegaly or nodularity.  Lungs clear to auscultation with normal respiratory excursion.  Moderate kyphoscoliosis.  Heart is regular, heart rate around 65, no ectopy.  No murmur rub or gallop.  There is no ankle edema and +1 pedal pulses.  Able to climb up on the exam table.  There is no foot drop or leg weakness currently.  Straight leg raise negative.  Abdomen is mildly overweight, nontender and no hepatosplenomegaly.  No pulsatile mass.  There is a moderate sized left inguinal hernia without tenderness.  No testicular mass.  Prostate exam shows mild enlargement but smooth and no nodule.  Skin exam did not show any suspicious lesions.      Assessment Results 12/11/2019   Activities of Daily Living No help needed   Instrumental Activities of Daily Living No help needed   Get Up and Go Score -   Mini Cog Total Score 5   Some recent data might be hidden     A Mini-Cog score of 0-2 suggests the possibility of dementia, score of 3-5 suggests no dementia    Identified Health Risks:     The patient was counseled and encouraged to consider modifying their diet and eating habits. He was provided with information on recommended healthy diet options.  The patient was provided with written information regarding signs of hearing loss.  Patient's advanced directive was discussed and I am comfortable with the patient's wishes.

## 2021-06-05 VITALS
BODY MASS INDEX: 24.49 KG/M2 | OXYGEN SATURATION: 98 % | RESPIRATION RATE: 14 BRPM | HEIGHT: 65 IN | DIASTOLIC BLOOD PRESSURE: 74 MMHG | WEIGHT: 147 LBS | SYSTOLIC BLOOD PRESSURE: 136 MMHG | HEART RATE: 85 BPM

## 2021-06-05 VITALS
HEART RATE: 103 BPM | WEIGHT: 149 LBS | OXYGEN SATURATION: 95 % | BODY MASS INDEX: 24.83 KG/M2 | SYSTOLIC BLOOD PRESSURE: 152 MMHG | HEIGHT: 65 IN | DIASTOLIC BLOOD PRESSURE: 85 MMHG

## 2021-06-05 VITALS — HEIGHT: 65 IN | WEIGHT: 143 LBS | BODY MASS INDEX: 23.82 KG/M2

## 2021-06-05 NOTE — PROGRESS NOTES
Assessment/Plan:      Diagnoses and all orders for this visit:    Lumbar radicular pain  -     Ambulatory referral to Orthopedic Surgery    Spinal stenosis of lumbar region with neurogenic claudication  -     Ambulatory referral to Orthopedic Surgery    Cervical radicular pain  -     MR Cervical Spine Without Contrast; Future; Expected date: 01/10/2020    Neural foraminal stenosis of cervical spine  -     MR Cervical Spine Without Contrast; Future; Expected date: 01/10/2020        Assessment: Pleasant 84 y.o. male with history of colon cancer, hyperlipidemia, hypertension, carotid artery occlusion, coronary artery disease with:    1.  Chronic lumbar spine pain with right lower extremity radicular pain and now weakness in the right EHL.  He has severe lumbar stenosis at L3-4 and L4-5 with an L4-5 spondylolisthesis.  He is now having neurologic compromise.    2.  Cervical spine pain with right shoulder and arm pain in a C5 distribution.  He has multilevel cervical degenerative disc disease with severe foraminal stenosis on the right at C4-5 on MRI from 2016 likely responsible for his right arm pain.  This is chronic.      Discussion:    1. I had a lengthy discussion with the patient today regarding his lumbar spine.  We reviewed the new MRI and the severity of the stenosis.  He now has neurologic compromise with EHL weakness on the right and is having constant numbness.  There is severe stenosis at L4-5 and L3-4 I recommend surgical evaluation.  He was seen by Dr. Mejias and did not care for that interaction and was then seen by Dr. Elkins at [facility] and would like to be sent back to [facility].  I will refer him back to see Dr. Elkins  versus Dr. Kenny at [facility] per patient request.    2.  For the cervical spine, I recommend updated MRI to evaluate for progression of the right foraminal stenosis C4-5.    3.  Following MRI we will contact him by phone with the results.  Likely  would recommend starting physical therapy for the cervical spine along with potential right C4-5 transforaminal epidural steroid injection.    4.  Again follow-up by phone with results and further recommendations.      It was our pleasure caring for your patient today, if there any questions or concerns please do not hesitate to contact us.      Subjective:   Patient ID: Antoni Stoll is a 84 y.o. male.    History of Present Illness: Patient presents for evaluation of low back pain and progressive right leg numbness.  He has a longstanding history of low back pain progressive particularly over the past 3 years.  This is at the lumbosacral junction bilaterally.  He has had numbness down the right medial calf since previous bypass surgery.  Over the past couple of years this is become constant numbness over the right medial ankle into the bottom and top of the right foot.  This numbness and tingling is constant without any nation.  The low back pain seems to be worse with any standing or walking better with sitting for short period of time.  He also has noted some right leg weakness.  It has not given out yet but in general it is becoming weaker.  No bowel or bladder incontinence has been noted or urinary retention.    He also has chronic cervical spine pain and over the past year or so it has become constant on the right arm to the deltoid.  Seems to be worse with carrying or lifting objects but nothing really makes it worse or better.  This is a constant pain and it can even occur with him laying down doing nothing.  Does take Tylenol for all of his pain in the morning and gabapentin 600 mg in the evening this seems to be helpful.  Has had physical therapy for his lumbar spine last and March 2016.    In the past he has been evaluated by Dr. Mejias for lumbar stenosis and the patient felt that the surgery would be to invasive.  He was also seen by Dr. Elkins  at [facility] orth[facility] and the patient reports a  potential minimally invasive surgery could be done but he was waiting to see if his symptoms would progress.  He was happier with the [facility] orthopedics interaction in general.      Imaging: MRI report and images were personally reviewed and discussed with the patient.  A plastic model was utilized during the discussion.  MRI of the lumbar spine personally reviewed.  This was from December 2019.  This shows severe unchanged spinal stenosis L4-5 with spondylolisthesis.  L3-4 unchanged severe spinal stenosis.  There is also severe left foraminal stenosis L1-2    Cervical spine MRI from November 2015 personally reviewed multilevel degenerative changes.  Moderate to severe right moderate left foraminal stenosis C4-5 moderate to severe bilateral foraminal stenosis C6-7.  All other findings are less severe with regards to foraminal stenosis.    Prior interventions:  1.  Physical therapy for the lumbar spine in the past.    Review of Systems: Complains of some ringing in his ears, shortness of, joint and muscle pain, some lightheadedness on occasion.  He has no fevers, headaches, change in vision, chest pain, palpitations, abdominal pain, nausea, vomiting, bowel or bladder incontinence, rash. Remainder of 12 point review systems negative unless listed above.    Past Medical History:   Diagnosis Date     Anemia      Cancer (H)     colon     Carotid artery occlusion      Coronary artery disease      High cholesterol      Hyperlipidemia      Hypertension      Left inguinal hernia      Low back pain      Sleep apnea, obstructive     CPAP     Spinal stenosis     lumbar     Social history: Retired optometrist.  Single.  No tobacco.  Does drink alcohol.  .  The following portions of the patient's history were reviewed and updated as appropriate: allergies, current medications, past family history, past medical history, past social history, past surgical history and problem list.      WHO 5: 25    KOSTA Score:  32      Objective:   Physical Exam:    Vitals:    01/10/20 0957   BP: 123/58   Pulse: 63     Body mass index is 26.13 kg/m .    General:  Well-appearing male in no acute distress.  Pleasant,   cooperative, and interactive throughout the examination and interview.  CV: No lower extremity edema.  Lymphatics: No cervical lymphadenopathy palpated.  Eyes: sclera clear.  Skin: No rashes or lesions seen.  Respirations unlabored.  MSK: Gait is normal.  Able to heel-toe stand without difficulty.    Negative Romberg.  Spine: Appears to have a mild lumbar scoliotic curve.  Dramatically reduced range of motion cervical spine rotation bilaterally.  Head forward posture.  Decreased flexion-extension lumbar spine.  On finger to floor testing.  Palpation: No significant tenderness over the lumbar paraspinals.  Some mild tenderness over the L5-S1 region.  No tenderness of the gluteal tissues.  Hypertonic tissue textures upper trapezius with no significant tenderness in the cervical spine.  Extremities: Full range of motion of the shoulders and abduction, elbows, and wrists with no effusions or tenderness to palpation.  Negative arm drop, empty can, and Speed's test bilaterally.  Negative Hightower and Neer's test bilaterally.  Full range of motion of the hips, knees, and ankles from a seated position with no effusions or tenderness to palpation.    Neurologic exam: Mental status: Patient is alert and oriented with normal affect.  Attention, knowledge, memory, and language are intact.  Normal coordination throughout the examination.  Reflexes are 2+ and symmetric biceps, triceps, brachioradialis, patellar, and 0 Achilles with down-going toes and Negative Etelvina's.  Sensation is decreased to light touch right foot first webspace, intact to light touch throughout the remainder of upper and lower extremities bilaterally.  Manual muscle testing reveals 5 out of 5 strength in the shoulder abductors, elbow   flexors/extensors, wrist  extensors, interosseous, and finger flexors; 5 out of 5   in the hip flexors, knee flexors/extensors, ankle plantar flexors, ankle   dorsiflexors, and 5-/5 right, 5/5 left EHL.  Normal muscle bulk and tone.  Negative   Spurling's test bilaterally.  Negative seated straight leg raise bilaterally.

## 2021-06-05 NOTE — PROGRESS NOTES
[facility] Certification Request    February 10, 2020      Patient: Antoni Stoll  MR Number: [id number]  YOB: 1935  Date of Visit: 2/10/2020      Dear Todd Holter,     Thank you for this referral.   We are seeing Antoni Stoll for Physical Therapy of low back pain.    Medicare and/or Medicaid requires physician review and approval of the treatment plan. Please review the plan of care and verify that you agree with the therapy plan of care by co-signing this note.      Plan of Care  Authorization / Certification Start Date: 02/10/20  Authorization / Certification End Date: 05/10/20  Authorization / Certification Number of Visits: 10  Communication with: Referral Source  Patient Related Instruction: Nature of Condition;Treatment plan and rationale;Self Care instruction;Basis of treatment;Body mechanics;Posture;Precautions;Next steps;Expected outcome  Times per Week: 1  Number of Weeks: 10  Number of Visits: 6-10  Discharge Planning: when goals  are met or plateau in progress  Precautions / Restrictions : history of colon cancer, spinal stenosis  Therapeutic Exercise: ROM;Stretching;Strengthening  Neuromuscular Reeducation: kinesio tape;posture;balance/proprioception;TNE;core  Manual Therapy: soft tissue mobilization;myofascial release;joint mobilization;muscle energy  Manual Therapy: neural mobilization  Modalities: TENS  Modalities: check precautions first  Gait Training: level and uneven ground  Equipment: theraband      Goals:  Pt. will demonstrate/verbalize independence in self-management of condition in : 6 weeks  Pt. will be independent with home exercise program in : 6 weeks  Pt. will have improved quality of sleep: Comment;with less pain;in 12 weeks  Comment:: the patient will be able to fall asleep with 50% less pain so that he can fall asleep fster   Patient will reach / maintain arm movement: forward;overhead;for home chores;for dressing;for sport/recreation;with full  ROM;with less pain;with less difficulty;Comment  Comment: patient will not have a painful arc with reaching forward and up    Pt will: be able to tolerate standing for 20minutes without needing to sit due to increase in pain, within 10 weeks.  Pt will: improve KOSTA score by 6 points or more to decrease impact back pain is having on activities of daily living, within 10 weeks.        If you have any questions or concerns, please don't hesitate to call.    Sincerely,      Rosanna Felipe, PT, DPT        Physician recommendation:     ___ Follow therapist's recommendation        ___ Modify therapy      *Physician co-signature indicates they certify the need for these services furnished within this plan and while under their care.      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 Chronic bilateral low back pain with right-sided sciatica M54.41     G89.29    2. Numbness of right foot R20.0    3. Spinal stenosis of lumbar region, unspecified whether neurogenic claudication present M48.061        Assessment:      Antoni Stoll is a 84 y.o. male who presents to therapy today with chief complaints of low back pain and numbness in the right foot. Low back pain has been present for 10+ years, foot numbness started and has gotten worse over the past year.  Pain symptoms are worse with standing for long and walking for long.  Patient is at risk of falling, and has had two recent falls. Pt demo's signs and sx consistent with chronic low back pain and foot numbness and tingling likely due to lumbar spinal stenosis L3-4/4-5, spondylosis with anterolisthesis. Treatment was initiated focusing on core strengthening and mobility. Patient is motivated and willing to participate in physical  therapy treatment.      The POC is dynamic and will be modified on an ongoing basis.  Barriers to achieving goals as noted in the assessment section may affect outcome.  Prognosis to achieve goals is  good   Pt. is appropriate for skilled PT intervention as outlined in the Plan of Care (POC).  Pt. is a good candidate for skilled PT services to improve pain levels and function.    Plan of care and goals were established in collaboration with patient.     Goals:  Pt. will demonstrate/verbalize independence in self-management of condition in : 6 weeks  Pt. will be independent with home exercise program in : 6 weeks  Pt. will have improved quality of sleep: Comment;with less pain;in 12 weeks  Comment:: the patient will be able to fall asleep with 50% less pain so that he can fall asleep fster   Patient will reach / maintain arm movement: forward;overhead;for home chores;for dressing;for sport/recreation;with full ROM;with less pain;with less difficulty;Comment  Comment: patient will not have a painful arc with reaching forward and up    Pt will: be able to tolerate standing for 20minutes without needing to sit due to increase in pain, within 10 weeks.  Pt will: improve KOSTA score by 6 points or more to decrease impact back pain is having on activities of daily living, within 10 weeks.    Patient's expectations/goals are realistic.    Barriers to Learning or Achieving Goals:  Chronicity of the problem.  Age.        Plan / Patient Instructions:        Plan of Care:   Authorization / Certification Start Date: 02/10/20  Authorization / Certification End Date: 05/10/20  Authorization / Certification Number of Visits: 10  Communication with: Referral Source  Patient Related Instruction: Nature of Condition;Treatment plan and rationale;Self Care instruction;Basis of treatment;Body mechanics;Posture;Precautions;Next steps;Expected outcome  Times per Week: 1  Number of Weeks: 10  Number of Visits: 6-10  Discharge Planning: when  goals  are met or plateau in progress  Precautions / Restrictions : history of colon cancer, spinal stenosis  Therapeutic Exercise: ROM;Stretching;Strengthening  Neuromuscular Reeducation: kinesio tape;posture;balance/proprioception;TNE;core  Manual Therapy: soft tissue mobilization;myofascial release;joint mobilization;muscle energy  Manual Therapy: neural mobilization  Modalities: TENS  Modalities: check precautions first  Gait Training: level and uneven ground  Equipment: theraband      POC and pathology of condition were reviewed with patient.  Pt. is in agreement with the Plan of Care  A Home Exercise Program (HEP) was initiated today.  Pt. was instructed in exercises by PT and patient was given a handout with detailed instructions.    Plan for next visit: progress core exercises, ensure performance of balance exercise, 30 sec STS, trial leg distraction     Subjective:       Patient presents for low back pain and right foot numbness and tingling. Numbness started in the foot within the last year. Low back pain has been present for the last 10 years or so, hasn't changed at all, is able to control with tyelonol and gabapentin.       He went to [facility] ([place] for an orthopedic surgical consult, 1/28/2020. At this point they have decided to try conservative measures. Tingling and numbness occurs all the time. It is in the whole bottom and part of the top part of the foot. Standing increases pain and increases numbness and tingling. Sitting he just has to adjust but is able to tolerate it.     History of numbness in lower leg for years. From heart bypass surgery     Fall history: 10th of January slipped when walking dog, and then a couple weeks ago fell on the ice injured bottom, is now worried about falling outside on the ice. Exercise classes 5 days a week 3 strength training and 2 yoga, walking 30min on treadmill holding on     From Dr. Aviles's:  had a lengthy discussion with the patient today regarding his lumbar spine.   We reviewed the new MRI and the severity of the stenosis.  He now has neurologic compromise with EHL weakness on the right and is having constant numbness.  There is severe stenosis at L4-5 and L3-4 I recommend surgical evaluation.  He was seen by Dr. Mejias and did not care for that interaction and was then seen by Dr. Elkins at [facility] orthopedics and would like to be sent back to [facility].  I will refer him back to see Dr. Elkins  versus Dr. Kenny at [facility] orthopedics per patient request.    Social information:   Living Situation:townhouse, on own, split level, [place] with stairs.    Occupation:retired   Work Status:NA   Equipment Available: None and none    Pain Ratin in the lower back  Pain description: pain in back, tingling and numbness in foot     Functional limitations are described as occurring with:   standing about 15min, sit for about an hour, walk 20-30min    Patient reports benefit from:  gabapentin, tyelonal        Objective:      Note: Items left blank indicates the item was not performed or not indicated at the time of the evaluation.     Patient Outcome Measures :    Modified Oswestry Low Back Pain Disablity Questionnaire  in %: 30     Scores range from 0-100%, where a score of 0% represents minimal pain and maximal function. The minimal clinically important difference is a score reduction of 12%.    Examination  1. Chronic bilateral low back pain with right-sided sciatica     2. Numbness of right foot     3. Spinal stenosis of lumbar region, unspecified whether neurogenic claudication present       Involved side: Right  Posture Observation:      General sitting posture is  fair.  General standing posture is fair.  Shoulder/Thoracic complex: forward rounded shoulders, protracted scapula     Balance Testing:  NBOS: 30 sec, Tandem: 15 right, 16 left  Single leg stance: 10 sec left 5 sec right     Lumbar ROM:    Date: 02/10/20     *Indicate scale AROM AROM AROM   Lumbar Flexion  Pain in low back moderate limitation     Lumbar Extension moderatly limited more pain then flexion      Right Left Right Left Right Left   Lumbar Sidebending WNL pain in back WNL pain in back       Lumbar Rotation WNL wnl       Thoracic Flexion      Thoracic Extension      Thoracic Sidebending         Thoracic Rotation           Lower Extremity Strength:   Within normal limits bilaterally 5/5 unless noted bellow  Date: 02/10/20     LE strength/5 Right Left Right Left Right Left   Hip Flexion (L1-3)         Hip Extension (L5-S1)         Hip Abduction (L4-5)         Hip Adduction (L2-3)         Hip External Rotation         Hip Internal Rotation         Knee Extension (L3-4)         Knee Flexion         Ankle Dorsiflexion (L4-5)         Great Toe Extension (L5)         Ankle Plantar flexion (S1)         Abdominals        Sensation  Within normal limits to light touch jesus LE (reports numbness and tinling in right foot   Palpation: non tender to palpation on this date    Lumbar Special Tests:     Lumbar Special Tests Right Left SI Tests Right  Left   Quadrant test - - SI Compression - -   Straight leg raise - - SI Distraction - -   Crossover response - - POSH Test     Slump + - Sacral Thrust     Sit-up test weakness FADIR - -   SLR  gaenslens - -   Trunk extensor endurance test  Resisted Abduction     Prone instability test  Other:     Pubic shotgun  Other:       Repeated Motion Testing:  Does not centralize  Does not peripheralize    Passive Mobility - Joint Integrity:  Hypomobile    LE Screen:  WNL    Exercises:  Exercise #1: scapular retraction  Comment #1: gently off and on throughout the day  Exercise #2: cervical retraction  Comment #2: gently off and on throughout the day  Exercise #3: standing shoulder flexion with AAROM trying to relax and drop the scapula down and back during this  Comment #3: holding as tolerated  Exercise #4: shoulder IR and ER isometrics  Comment #4: 5-10 seconds 5-10 reps 1-2 times per  "day.  Exercise #5: Pelvic Tilt/TA activation x10  Comment #5: clamshell x10  Exercise #6: bridge x10  Comment #6: seated slump nerve glide x20 (educated on option for SLR slump as well)      Appt time: 885 - 822    Treatment Today:  TREATMENT MINUTES COMMENTS   Evaluation 19 Low Complexity Eval  Patient educated on pathology  Discussed POC   Self-care/ Home management     Manual therapy 12 SL lumbar mobilizations, grade II-III. No change in distal symptoms with performance. \"feels good\" on the low back.    Neuromuscular Re-education     Therapeutic Activity     Therapeutic Exercises 26 Demo/performance of HEP, education on purpose of exercise. Handouts with written instruction provided.    Gait training     Modality__________________                Total 58    Blank areas are intentional and mean the treatment did not include these items.     PT Evaluation Code: (Please list factors)  Patient History/Comorbidities: See PMH   Examination: see above, 4+ elements   Clinical Presentation: stable  Clinical Decision Making: low    Patient History/  Comorbidities Examination  (body structures and functions, activity limitations, and/or participation restrictions) Clinical Presentation Clinical Decision Making (Complexity)   No documented Comorbidities or personal factors 1-2 Elements Stable and/or uncomplicated Low   1-2 documented comorbidities or personal factor 3 Elements Evolving clinical presentation with changing characteristics Moderate   3-4 documented comorbidities or personal factors 4 or more Unstable and unpredictable High            Rosanna Felipe, PT, DPT  2/10/2020  7:38 AM                 "

## 2021-06-05 NOTE — TELEPHONE ENCOUNTER
----- Message from Orlin Mir DO sent at 1/21/2020  7:55 AM CST -----  MRI reviewed of the cervical spine showing multilevel narrowing around the exiting nerve roots.  There has been progression of the stenosis at C4-5 likely responsible for his right shoulder symptoms.    Recommendations: I can offer physical therapy for the cervical spine while he is waiting to see the surgeon for his lumbar spine.

## 2021-06-05 NOTE — PATIENT INSTRUCTIONS - HE
Continue on current medications.  If you do have increasing lightheaded dizzy spells or feeling like you are going to faint when you stand up, contact me and you may consider medication changes at that time.    Otherwise see me in 4 months for routine blood pressure checkup.    Continue to walk and do your exercises on a regular basis, as you are doing.  If you do have progressive numbness or weakness of your legs or arms, seek medical attention to consider further intervention.  
Statement Selected

## 2021-06-05 NOTE — PROGRESS NOTES
[facility] Follow Up Note    Antoni Stoll   84 y.o. male    Date of Visit: 2/5/2020    Chief Complaint   Patient presents with     Follow-up     Recheck spinal stenosis and heart rate issue     Subjective  Wily is here for follow-up on hypertension, relative bradycardia, also severe spinal stenosis.    He does have a past history of coronary disease with a bypass in 2013.  No previous MI.  Negative stress echo January 2019.  No chest pain or worsening dyspnea on exertion.    He goes to [facility] for walking 5 days a week, stable exertional ability on the treadmill.    He still has some stable dyspnea on exertion when he walks upstairs, especially carrying groceries.    He did have a Holter monitor late last month and I did review that today with patient.  Relative bradycardia with average heart rate of 60.  He did have some shortness of breath when climbing stairs and a heart rate of 67 with sinus rhythm at that time.  He does have a tendency towards some chronotropic incompetence, but no bradycardia or pauses.  Rare atrial ectopy but no atrial fibrillation.  No V. tach or complex ventricular ectopy.    Patient continues on atenolol 25 mg a day and lisinopril 2.5 mg a day.    Blood pressure has been well controlled and heart rate usually around 60.    He had a presyncopal episode and exercise class back in October but no recurrence of that.    January 2018 echo reviewed with ejection fraction 70%.  No pulmonary hypertension, mild mitral regurgitation.    He is compliant with CPAP and that is working well for sleep apnea.  No progressive daytime sleepiness.    No lower extremity edema.    No chest pain with exertion.    No increased difficulty with his left inguinal hernia, is not wanted surgery.  Moderate BPH stable on Flomax.    Bowels are normal with right hemicolectomy for T1 colon cancer without chemotherapy.  3-year colonoscopy will be due in September of next year.    He does  have severe spinal stenosis.  He had a repeat MRI of the cervical and lumbar spine in January which I reviewed with patient today.  He has severe central canal stenosis L3-L5 unchanged.  He does have some progression of the left foraminal stenosis at L1-2 as progressions 2016 but he has symptoms more related to an L5-S1 on the right leg with some numbness and paresthesia pain but not severe.  No weakness.  That has not progressed.  Stable, but worse since last year.    He did see the spine surgeon last month, plan for physical therapy, but not planning steroid injections or surgery at this time.  He may see Dr. Kenny of spine clinic.    PMHx:    Past Medical History:   Diagnosis Date     Anemia      Cancer (H)     colon     Carotid artery occlusion      Coronary artery disease      High cholesterol      Hyperlipidemia      Hypertension      Left inguinal hernia      Low back pain      Sleep apnea, obstructive     CPAP     Spinal stenosis     lumbar     PSHx:    Past Surgical History:   Procedure Laterality Date     ARTERIAL BYPASS SURGERY       BLEPHAROPLASTY Bilateral      CARDIAC CATHETERIZATION  06/03/2013     CATARACT EXTRACTION Bilateral      CORONARY ARTERY BYPASS GRAFT  06/28/2013    5-vessel     CORONARY ARTERY BYPASS GRAFT  2013    5 vessel     HERNIA REPAIR      right inguinal     VARICOSE VEIN SURGERY Left      Immunizations:   Immunization History   Administered Date(s) Administered     Influenza [id number], 01/18/2010     Influenza high dose,seasonal,PF, 65+ yrs 10/13/2015, 10/18/2016, 11/01/2017, 12/10/2018, 12/11/2019     Influenza, inj, historic,unspecified 10/23/2007, 10/29/2009, 10/11/2010, 11/08/2011     Influenza, seasonal,quad inj 6-35 mos 11/29/2012, 10/25/2013, 11/19/2014     Pneumo Conj 13-V (2010&after) 04/09/2015     Pneumo Polysac 23-V 10/03/1999, 03/06/2007     Td,adult,historic,unspecified 03/11/1992, 10/24/2000, 04/11/2011     ZOSTER, LIVE 11/08/2007       ROS A comprehensive review of  systems was performed and was otherwise negative    Medications, allergies, and problem list were reviewed and updated    Exam  /64 (Patient Site: Right Arm, Patient Position: Sitting, Cuff Size: Adult Regular)   Pulse 64   Wt 153 lb (69.4 kg)   BMI 25.46 kg/m    Appears well.  Moderate kyphosis.  Able to stand and climb on the table.  No foot drop or leg weakness.  No ankle edema.  Lungs clear.  Heart regular without murmur.  Abdomen nontender.    Assessment/Plan  1. Essential hypertension  Controlled.  No recurrent orthostasis or presyncope.    Relative bradycardia with a heart rate around 60 but tolerates well for the most part.  If he does have increasing dyspnea on exertion, could have him consider reducing atenolol to half pill a day or changing that to Toprol-XL 25 mg a day.    Continue lisinopril 2.5 mg a day.  Follow-up in 4 months for recheck    2. Coronary atherosclerosis due to calcified coronary lesion  Asymptomatic.  Medical management with Lipitor and aspirin    3. Spinal stenosis of lumbar region, unspecified whether neurogenic claudication present  Severe, has stabilized.  Not planning surgery at this time but if progressive symptoms, would need to consider.  Continue physical therapy and regular walking.  Go to [facility] 5 days a week.    4. Cervical stenosis of spinal canal  Right arm radicular pain.  That has stabilized.  Could consider transforaminal epidural steroid injection if worsening follow-up with the spine clinic as needed.  Continue physical therapy at this time.    Takes gabapentin at night and Tylenol once or twice a day.    5. Sleep apnea, unspecified type  Compliant with CPAP    Left inguinal hernia without increased symptoms.  Is not wanted surgery.    Moderate BPH stable on Flomax.    History of right hemicolectomy for colon cancer.  3-year colonoscopy due September 2021    Return in about 4 months (around 6/5/2020) for Recheck.   Patient Instructions    Continue on current medications.  If you do have increasing lightheaded dizzy spells or feeling like you are going to faint when you stand up, contact me and you may consider medication changes at that time.    Otherwise see me in 4 months for routine blood pressure checkup.    Continue to walk and do your exercises on a regular basis, as you are doing.  If you do have progressive numbness or weakness of your legs or arms, seek medical attention to consider further intervention.    Sarthak Romano MD        Current Outpatient Medications   Medication Sig Dispense Refill     acetaminophen (TYLENOL) 500 MG tablet Take 1,000 mg by mouth daily .             aspirin 325 MG tablet Take 325 mg by mouth daily.       atenolol (TENORMIN) 25 MG tablet Take 1 tablet (25 mg total) by mouth daily. 90 tablet 2     atorvastatin (LIPITOR) 20 MG tablet TAKE 1 TABLET BY MOUTH DAILY 90 tablet 3     B2/VITS A,C,E/LUT/ZEAXANTH/MIN (ICAPS ORAL) Take 1 capsule by mouth daily.       calcium polycarbophil (FIBERCON) 625 mg tablet Take 1,250 mg by mouth daily.       cholecalciferol, vitamin D3, 1,000 unit tablet Take 1,000 Units by mouth daily.       fluticasone (FLONASE) 50 mcg/actuation nasal spray Apply 1 spray into each nostril daily.       gabapentin (NEURONTIN) 300 MG capsule TAKE 1 TO 2 CAPSULES BY MOUTH EVERY EVENING AS NEEDED FOR NERVE PAIN 180 capsule 3     glucosamine-chondroitin 500-400 mg cap Take 1 capsule by mouth 2 (two) times a day.       ipratropium (ATROVENT) 0.03 % nasal spray USE 2 SPRAYS IN EACH NOSTRIL THREE TIMES DAILY AS NEEDED 30 mL 6     lisinopril (PRINIVIL,ZESTRIL) 2.5 MG tablet TAKE 1 TABLET BY MOUTH EVERY DAY 90 tablet 1     loratadine (CLARITIN) 10 mg tablet Take 10 mg by mouth daily.       saw palmetto fruit 450 mg cap Take 1 capsule by mouth 2 (two) times a day.       tamsulosin (FLOMAX) 0.4 mg Cp24 Take 0.4 mg by mouth at bedtime.       No current facility-administered medications for this visit.      No  Known Allergies  Social History     Tobacco Use     Smoking status: Never Smoker     Smokeless tobacco: Never Used   Substance Use Topics     Alcohol use: Yes     Comment: 5 drinks weekly     Drug use: No

## 2021-06-05 NOTE — TELEPHONE ENCOUNTER
Patient returned call. Results given and explained. Explained provider has recommended PT for his shoulder while he awaits surgical consult. He reports he would like the referral. Also inquired about an injection. Explained if PT is not helpful, injection may be a possibility per office visit note. Stated understanding. He would like to go to [facility] as he has gone there before. He is aware he will be contacted by the schedulers once order has been placed.    Reports he will be seeing [facility] for lumbar spine next week.

## 2021-06-05 NOTE — PROGRESS NOTES
[facility] Daily Progress     Patient Name: Antoni Stoll  Date: 2/7/2020  Visit #: 2  PTA visit #:  0  Referral Diagnosis: cervical radiculopathy and right shoulder pain  Referring provider: Sarthak Romano MD  Visit Diagnosis:     ICD-10-CM    1. Cervicalgia M54.2    2. Cervical radiculitis M54.12    3. Pain in joint of right shoulder M25.511    4. Cervical radicular pain M54.12         The patient had a fall on his right shoulder after his visit with Dr. Mir.  He is currently presenting with neck upper trap and upper arm pain and will occasionally have numbness and tingling in his hand.  He also presents with weak shoulder ER and painful arc as well as positive impingement signs in his right shoulder.  He has significantly poor posture.  And also reports dizziness with turning his head with walking  Assessment:     Listening to T8 on the left left rotation after neutral spine at T9  14 seconds right leg one leg balance and 3-4 left leg on leg balance.    Manual nerve mobilization to the cervical spine nerve roots. Manual nerve mobilizations to the intercostal nerves between ribs 8 and 9.  Added in shoulder IR and ER isometrics as tolerated.  HEP/POC compliance is  good .  Patient demonstrates understanding/independence with home program.  Patient is benefitting from skilled physical therapy and is making steady progress toward functional goals.    Goal Status:  Pt. will demonstrate/verbalize independence in self-management of condition in : 12 weeks  Pt. will be independent with home exercise program in : 12 weeks  Pt. will have improved quality of sleep: Comment;with less pain;in 12 weeks  Comment:: the patient will be able to fall asleep with 50% less pain so that he can fall asleep fster   Patient will reach / maintain arm movement: forward;overhead;for home chores;for dressing;for sport/recreation;with full ROM;with less pain;with less difficulty;Comment  Comment: patient will not have a  painful arc with reaching forward and up    Pt will: be able to lift light weights overhead in exercise class and will be able to return to putting things into the cupboard with only slight increase in pain.      Plan / Patient Education:   Plan for next visit: assess response to HEP.  Assess balance and gait.  Assess spine mobility throughout the spine.  Assess S/C and A/C joints.   Look at nerve gliding look at dural mobility.  Check cervical spine roots.  Go over posture and body mechanics.  Assess shoulder motion and GH joint mobility.   Continue with initial plan of care.  Progress with home program as tolerated.    Subjective:   The patient reports that he fell on his buttocks when walking in a parking lot on a thin sheet of ice. Fell   Fell on right arm and   Fell on buttocks.    Wednesday maybe overdid on the arm movements because he had a hard time with raising his arm that night.      Pain Ratin        Objective:   Listening to T8 on the left left rotation after neutral spine at T9  14 seconds right leg one leg balance and 3-4 left leg on leg balance.      Treatment Today     TREATMENT MINUTES COMMENTS   Evaluation     Self-care/ Home management     Manual therapy 45 Manual nerve mobilization to the cervical spine nerve roots. Manual nerve mobilizations to the intercostal nerves between ribs 8 and 9.       Neuromuscular Re-education     Therapeutic Activity     Therapeutic Exercises 15   Added in shoulder IR and ER isometrics as tolerated.  One leg baance     Gait training     Modality__________________                Total 60    Blank areas are intentional and mean the treatment did not include these items.       Desi Mobley PT  2020

## 2021-06-06 NOTE — PROGRESS NOTES
[facility] Daily Progress     Patient Name: Antoni Stoll  Date: 2/21/2020  Visit #: 3  PTA visit #:  0  Referral Diagnosis:M54.12 (ICD-10-CM) - Cervical radicular pain, spinal stenosis L3-4,4,5 spondylosis with L3-4,5 anterolisthesis  Referring provider: Sarthak Romano MD, Dr. Todd Holter  Visit Diagnosis:     ICD-10-CM    1. Chronic bilateral low back pain with right-sided sciatica M54.41     G89.29    2. Numbness of right foot R20.0    3. Spinal stenosis of lumbar region, unspecified whether neurogenic claudication present M48.061    4. Cervicalgia M54.2    5. Cervical radiculitis M54.12    6. Cervical radicular pain M54.12          Assessment:   L5,4,3,2,  T10 T9 T8 right rotated    Intercostal nerves on the left between 11 and 12 and between 11 and 10.   then afterwards the spine was neutral up to T8 where it was right rotated  T8 and the patient still has the convexity on the right in the upper back.  HEP/POC compliance is  good .  Patient demonstrates understanding/independence with home program.  Patient is benefitting from skilled physical therapy and is making steady progress toward functional goals.    Goal Status:  Pt. will demonstrate/verbalize independence in self-management of condition in : 6 weeks  Pt. will be independent with home exercise program in : 6 weeks  Pt. will have improved quality of sleep: Comment;with less pain;in 12 weeks  Comment:: the patient will be able to fall asleep with 50% less pain so that he can fall asleep fster   Patient will reach / maintain arm movement: forward;overhead;for home chores;for dressing;for sport/recreation;with full ROM;with less pain;with less difficulty;Comment  Comment: patient will not have a painful arc with reaching forward and up    Pt will: be able to tolerate standing for 20minutes without needing to sit due to increase in pain, within 10 weeks.  Pt will: improve KOSTA score by 6 points or more to decrease impact back pain is having on  activities of daily living, within 10 weeks.      Plan / Patient Education:     Continue with initial plan of care.  Progress with home program as tolerated.    Subjective:      Elbow dull sensation constant.  Increased pain with use 5/10 pain up to the shoulder   Right arm pain 3-4/10 pain.  Neck constant 2/10 pain up to a 4 with certain movements  Upper back 1-2/10 pain  Low back pain 2-3/10 pain constant   Right foot numb like a stocking worse on the bottom 7/10 numbness.  constant prickly.  Left foot will get a little prickling but not as bad as the right left 1-2/10.          Objective:   L5,4,3,2,  T10 T9 T8 right rotated    Intercostal nerves on the left between 11 and 12 and between 11 and 10.   then afterwards the spine was neutral up to T8 where it was right rotated  T8 and the patient still has the convexity on the right in the upper back.      Exercises:  Exercise #1: scapular retraction verbsl cues to drop the shoulder blades  Comment #1: gently off and on throughout the day  Exercise #2: cervical retraction patient needs to do slight flexion with this to make his head neutral with his spine  Comment #2: gently off and on throughout the day  Exercise #3: standing shoulder flexion with AAROM trying to relax and drop the scapula down and back during this, verbal review  Comment #3: holding as tolerated  Exercise #4: shoulder IR and ER isometrics, progressed to IR and ER with Exband as tolerated patient not quite able to do ER with band without pain so is to wait until he is able to do this without pain before adding this in.  Comment #4: 5-10 seconds 5-10 reps 1-2 times per day.  Exercise #5: Pelvic Tilt/TA activation x10 patient needed cuing to use his abdominals to posteriorly tilt his pelvis as he was having pain in his hamstrings with this.  Comment #5: clamshell x10, cues to keep is back straight and not to roll when raising is leg he is also to tighten his abdominals with tis exercise.  Exercise #6:  bridge x10, patient to place his legs whereever he needs to in order to avoid cramping  Comment #6: seated slump nerve glide x20 (educated on option for SLR slump as well) patient able to do the supine SLR well today.      Treatment Today     TREATMENT MINUTES COMMENTS   Evaluation     Self-care/ Home management     Manual therapy 20 Manual nerve mobilizationIntercostal nerves on the left between 11 and 12 and between 11 and 10.   then afterwards the spine was neutral up to T8 where it was right rotated   Neuromuscular Re-education     Therapeutic Activity     Therapeutic Exercises 40 See flowsheet   Gait training     Modality__________________                Total 60    Blank areas are intentional and mean the treatment did not include these items.       Desi Moran  2/21/2020

## 2021-06-06 NOTE — TELEPHONE ENCOUNTER
Possible sick sinus syndrome, pacemaker consideration    S: 84-year-old gentleman with a history of coronary artery bypass graft surgery in 2013.  Please see cardiology note Dr. Dewey Guevara 12/9/2019.  At that time there was no history to suggest angina but patient describes some mild shortness of breath with climbing stairs.  Medications include atenolol 25 or possibly 50 mg daily.      O: Holter monitor was ordered showed a mean heart rate of 60 peak heart rate 101bpm.  Diary indicated that he walked on the treadmill for up to 30 minutes achieving a heart rate of 95 bpm.  A single brief run of atrial tachycardia was noted.  There were no significant pauses.  Patient had a stress cardiac echo in January 2019.  He went 9 minutes on a Petros protocol achieving a heart rate of 132 bpm.  There was no evidence for ischemia with ejection fraction increasing to 70 to 75%.  Cardiac echo January 28, 2019 showed ejection fraction of 70% and mild mitral insufficiency.    A:   Chronotropic response to exercise appears appropriate and by description patient's symptoms seem to be mild.    P: Assess exercise tolerance and symptoms with phone interview.  If symptoms are indeed mild a pacemaker is not recommended      Discontinue atenolol      Repeat Holter monitor if there is concern for significant symptoms 1 week after stopping atenolol

## 2021-06-06 NOTE — TELEPHONE ENCOUNTER
Follow up phone call to patient, he is agreeable to discontinuing atenolol and calling us in 1 week to report symptoms.    He is encouraged to call prior to 1 week if he has issues.    Will order holter as needed pending symptom reassessment.    Patient also ask that I forward this to his PMD.    Medication list updated.  JW

## 2021-06-06 NOTE — PROGRESS NOTES
[facility] Daily Progress     Patient Name: Antoni Stoll  Date: 2/26/2020  Visit #: 4  PTA visit #:  0  Referral Diagnosis:M54.12 (ICD-10-CM) - Cervical radicular pain, spinal stenosis L3-4,4,5 spondylosis with L3-4,5 anterolisthesis  Referring provider: Sarthak Romano MD, Dr. Todd Holter  Visit Diagnosis:     ICD-10-CM    1. Chronic bilateral low back pain with right-sided sciatica M54.41     G89.29    2. Numbness of right foot R20.0    3. Spinal stenosis of lumbar region, unspecified whether neurogenic claudication present M48.061        Initial Eval : Antoni Stoll is a 84 y.o. male who presents to therapy today with chief complaints of low back pain and numbness in the right foot. Low back pain has been present for 10+ years, foot numbness started and has gotten worse over the past year.  Pain symptoms are worse with standing for long and walking for long.  Patient is at risk of falling, and has had two recent falls. Pt demo's signs and sx consistent with chronic low back pain and foot numbness and tingling likely due to lumbar spinal stenosis L3-4/4-5, spondylosis with anterolisthesis. Treatment was initiated focusing on core strengthening and mobility. Patient is motivated and willing to participate in physical therapy treatment.    Assessment:   No change in pain or numbness yet with therapy. No positive nerve tension testing in the LEs on this date. Hypomobility in the lumbar spine. Treatment included manual therapy and exercise, patient responded well with 50% decrease in pain.  HEP/POC compliance is  good .  Patient demonstrates understanding/independence with home program.  Patient is benefitting from skilled physical therapy and is making steady progress toward functional goals.    Goal Status:  Pt. will demonstrate/verbalize independence in self-management of condition in : 6 weeks  Pt. will be independent with home exercise program in : 6 weeks  Pt. will have improved quality of  sleep: Comment;with less pain;in 12 weeks  Comment:: the patient will be able to fall asleep with 50% less pain so that he can fall asleep fster   Patient will reach / maintain arm movement: forward;overhead;for home chores;for dressing;for sport/recreation;with full ROM;with less pain;with less difficulty;Comment  Comment: patient will not have a painful arc with reaching forward and up    Pt will: be able to tolerate standing for 20minutes without needing to sit due to increase in pain, within 10 weeks.  Pt will: improve KOSTA score by 6 points or more to decrease impact back pain is having on activities of daily living, within 10 weeks.      Plan / Patient Education:     Continue with initial plan of care.  Progress with home program as tolerated.  Plan for next visit: as of right now two different episodes of care, should we combine and just stick with one of us or have you do neck and me back?, come up with POC  Subjective:      Pain in low back: 2-3  Neck and arm pain: 2    Patient reports that his pain does not seem to have changed much. Numbness in the foot is still present has not varied at all. After seeing therapy last week improved neck/arm symptoms for 2 days then worse again. Has been doing exercises two times a day when he can.       Objective:      Balance Testing:  NBOS: 30 sec, Tandem: 15 right, 16 left  Single leg stance: 10 sec left 5 sec right     Slump:negative right and left  SLR: negative right and left    Exercises:  Exercise #1: scapular retraction verbsl cues to drop the shoulder blades  Comment #1: gently off and on throughout the day  Exercise #2: cervical retraction patient needs to do slight flexion with this to make his head neutral with his spine  Comment #2: gently off and on throughout the day  Exercise #3: standing shoulder flexion with AAROM trying to relax and drop the scapula down and back during this, verbal review  Comment #3: holding as tolerated  Exercise #4: shoulder IR and  "ER isometrics, progressed to IR and ER with Exband as tolerated patient not quite able to do ER with band without pain so is to wait until he is able to do this without pain before adding this in.  Comment #4: 5-10 seconds 5-10 reps 1-2 times per day.  Exercise #5: Pelvic Tilt/TA activation x10 needing cuing to perform correcting  Comment #5: clamshell x10 progressed adding band  Exercise #6: bridge x10, added marching jesus  Comment #6: seated slump nerve glide x20 (educated on option for SLR slump as well)  Exercise #7: LTR x20 jesus        Treatment Today     TREATMENT MINUTES COMMENTS   Evaluation     Self-care/ Home management     Manual therapy 16 SL lumbar mobilizations, grade II-III. No change in distal symptoms with performance. \"feels good\" on the low back decrease in pain in low back.  LE distraction [id number] right   Neuromuscular Re-education     Therapeutic Activity     Therapeutic Exercises 24 See flowsheet Progressed and reviewed low back exercise   Gait training     Modality__________________                Total 40    Blank areas are intentional and mean the treatment did not include these items.       Rosanna Nicholas, DPT  2/26/2020  "

## 2021-06-07 NOTE — TELEPHONE ENCOUNTER
Dr. Garay,     See telephone visit for possible sick sinus syndrome.    Patient is calling today 1 week after stopping atenolol as instructed.    He reports feeling well.  Mild symptoms of dizziness that haven't changed since stopping the atenolol.  Hasn't be able to go to the gym to work out but doesn't feel his shortness of breath or activity tolerance is different.  Regularly checks his BP and Pulse, both have been normal with pulse range of 59-80.     Overall he is feeling well.    Further recommendations?    Thanks  Lianne

## 2021-06-07 NOTE — TELEPHONE ENCOUNTER
Called the patient he is getting the exercises in 2 times per day the arm is getting a little better and not gone completely.  He is to try to add in scapular retraction with the exercise band and if he needs a new exercise band he is to call and get a new one.    Angela Mobley PT

## 2021-06-07 NOTE — TELEPHONE ENCOUNTER
Called the patient and discussed with him adding in cervical retraction into the bed or wall and supine nodding and holding 5 seconds to gently strengthen cervical spine anteriorly.    Patient to call if he has any questions.    Angela Mobley PT

## 2021-06-08 NOTE — PROGRESS NOTES
"Antoni Stoll is a 85 y.o. male who is being evaluated via a billable telephone visit.      The patient has been notified of following:     \"This telephone visit will be conducted via a call between you and your physician/provider. We have found that certain health care needs can be provided without the need for a physical exam.  This service lets us provide the care you need with a short phone conversation.  If a prescription is necessary we can send it directly to your pharmacy.  If lab work is needed we can place an order for that and you can then stop by our lab to have the test done at a later time.    Telephone visits are billed at different rates depending on your insurance coverage. During this emergency period, for some insurers they may be billed the same as an in-person visit.  Please reach out to your insurance provider with any questions.    If during the course of the call the physician/provider feels a telephone visit is not appropriate, you will not be charged for this service.\"    Patient has given verbal consent to a Telephone visit? Yes    What phone number would you like to be contacted at? [phone]     Patient would like to receive their AVS by AVS Preference: Mail a copy.    Additional provider notes:    [facility] clinic Follow Up Note    Antoni Stoll   85 y.o. male    Date of Visit: 6/2/2020    Chief Complaint   Patient presents with     Follow-up     Frequent BP checks since atenolol discontinued. BP's ranging 140-163/56-93 and lowest BP was 94/66.     Subjective  Antoni requested a phone call visit to avoid clinic visit during the coronavirus outbreak.    Patient is following up for hypertension and a history of bradycardia with fatigue.    Patient does have a history of hypertension and chronic bradycardia.    I did review today the Holter monitor from January of this year with an average heart rate of 60 and no major tachycardia or bradycardia or pauses.    Patient did " have some fatigue with lower heart rates in March and cardiology by phone told him to stop the atenolol 25 mg a day.    He continues on lisinopril 2.5 mg a day.    In February his blood pressure was 130/64.    Today's blood pressure was 113/71.    He has had labile blood pressures, sometimes as high as 168/93 but that is rare.  Only occasionally above 140/80.  Sometimes he does get lower blood pressures with some mild lightheadedness but not severe.    She has not had any major fatigue or near syncopal spells.    He still walking for about an hour every day outside.  Stable exertional ability.    No increasing shortness of breath or chest pain.    Coronary artery disease with a bypass in 2013.  No history of MI.  Negative stress echo in 2019.    He is on Lipitor 20 mg and full dose aspirin.    I did review labs from December 2019 with an LDL of 48 and HDL 40.  Creatinine 1.2 and potassium 5.0.    I did review heart echo from 2018 with ejection fraction 70%.  No pulmonary hypertension and mild mitral regurgitation.    He is compliant with CPAP for his sleep apnea.  No lower extremity edema.  No worsening daytime sleepiness.    He has severe L3-5 spinal stenosis.  He is being followed by the spine clinic and did not want to proceed with decompressive surgery at this time.  He continues to get some intermittent right leg radicular pain.  Is mainly paresthesias which is some very mild subjective numbness.  He has complained of some intermittent worsening, but it is intermittent, he can alleviate symptoms with change in position.  He still able to walk on a regular basis without foot drop or weakness.  No falls.    He has restarted the physical therapy exercises daily.    Moderate BPH stable on Flomax.    Still no significant symptoms from his left inguinal hernia.  He has not wanted surgery for that.    Status post right hemicolectomy for a T1 colon cancer, no chemo.  3-year colonoscopy will be due September of next  year.  Bowels are regular without blood in stool.    PMHx:    Past Medical History:   Diagnosis Date     Anemia      Cancer (H)     colon     Carotid artery occlusion      Coronary artery disease      High cholesterol      Hyperlipidemia      Hypertension      Left inguinal hernia      Low back pain      Sleep apnea, obstructive     CPAP     Spinal stenosis     lumbar     PSHx:    Past Surgical History:   Procedure Laterality Date     ARTERIAL BYPASS SURGERY       BLEPHAROPLASTY Bilateral      CARDIAC CATHETERIZATION  06/03/2013     CATARACT EXTRACTION Bilateral      CORONARY ARTERY BYPASS GRAFT  06/28/2013    5-vessel     CORONARY ARTERY BYPASS GRAFT  2013    5 vessel     HERNIA REPAIR      right inguinal     VARICOSE VEIN SURGERY Left      Immunizations:   Immunization History   Administered Date(s) Administered     Influenza [id number], 01/18/2010     Influenza high dose,seasonal,PF, 65+ yrs 10/13/2015, 10/18/2016, 11/01/2017, 12/10/2018, 12/11/2019     Influenza, inj, historic,unspecified 10/23/2007, 10/29/2009, 10/11/2010, 11/08/2011     Influenza, seasonal,quad inj 6-35 mos 11/29/2012, 10/25/2013, 11/19/2014     Pneumo Conj 13-V (2010&after) 04/09/2015     Pneumo Polysac 23-V 10/03/1999, 03/06/2007     Td,adult,historic,unspecified 03/11/1992, 10/24/2000, 04/11/2011     ZOSTER, LIVE 11/08/2007       ROS A comprehensive review of systems was performed and was otherwise negative    Medications, allergies, and problem list were reviewed and updated    Exam  There were no vitals taken for this visit.  Phone consult.  Alert and oriented.    Assessment/Plan  1. Essential hypertension  Blood pressure labile but usually on the low side.  Some very mild intermittent fatigue and some mild more positional dizziness but not bk orthostasis.    Continue on the lisinopril 2.5 mg a day.  Patient was told to contact me if worsening lightheaded dizzy spells or weak spells.    Just some borderline renal insufficiency with  elevated potassium in December of last year.  Follow-up in December of this year for physical exam.    2. Bradycardia  Chronic bradycardia consistent with conduction disease.  If significant presyncope or sudden fatigue spells or significant worsening dyspnea on exertion, plan to repeat heart monitor and evaluate for progressive conduction disease that may need a pacemaker.    He will stay off atenolol.    3. Coronary atherosclerosis due to calcified coronary lesion  No chest pain/asymptomatic.  Negative stress echo January 2019.    Continue medical management with Lipitor 20 mg with excellent cholesterol last December.  Continue aspirin daily without evidence of bleeding or history of ulcer.    4. Spinal stenosis of lumbar region, unspecified whether neurogenic claudication present  Severe spinal stenosis with continued intermittent right leg pain.  Patient was warned that if he does delay intervention with progressive symptoms at there is risk of permanent nerve injury with neurologic deficit.    At this time patient is minimally symptomatic and symptoms are intermittent.  He still able to walk on a daily basis.    Continue back exercises and regular walking.    If worsening symptoms, consider repeat MRI and refer back to spine clinic to consider surgical options.    5. Sleep apnea, unspecified type  Compliant with CPAP.  No lower extremity edema.    Moderate BPH controlled on Flomax.    Left inguinal hernia still without significant symptoms and he does not want surgery.    Colon cancer history, 3-year colonoscopy due September 2021    Return in 6 months (on 12/14/2020) for Annual physical.   Patient Instructions   Contact me if your heart rate is running less than 50, or if you do start developing worsening lightheaded dizzy spells or feeling like you are going to faint, or sudden weak or severe fatigue spells, or if you begin to have rapid heart rate spells or palpitations.    Continue lisinopril 2.5 mg a day.   Goal blood pressure 120//85    Contact me if you are having worsening right foot numbness or pain symptoms.  You may need to repeat your MRI and return to the spine clinic to discuss surgical decompression option.  Continue your regular walking and physical therapy exercises for your back.    Sarthak Romano MD        Current Outpatient Medications   Medication Sig Dispense Refill     acetaminophen (TYLENOL) 500 MG tablet Take 1,000 mg by mouth daily .             aspirin 325 MG tablet Take 325 mg by mouth daily.       atorvastatin (LIPITOR) 20 MG tablet TAKE 1 TABLET BY MOUTH DAILY 90 tablet 3     B2/VITS A,C,E/LUT/ZEAXANTH/MIN (ICAPS ORAL) Take 1 capsule by mouth daily.       calcium polycarbophil (FIBERCON) 625 mg tablet Take 1,250 mg by mouth daily.       cholecalciferol, vitamin D3, 1,000 unit tablet Take 1,000 Units by mouth daily.       fluticasone (FLONASE) 50 mcg/actuation nasal spray Apply 1 spray into each nostril daily.       gabapentin (NEURONTIN) 300 MG capsule TAKE 1 TO 2 CAPSULES BY MOUTH EVERY EVENING AS NEEDED FOR NERVE PAIN 180 capsule 3     glucosamine-chondroitin 500-400 mg cap Take 1 capsule by mouth 2 (two) times a day.       ipratropium (ATROVENT) 0.03 % nasal spray USE 2 SPRAYS IN EACH NOSTRIL THREE TIMES DAILY AS NEEDED 30 mL 6     lisinopriL (PRINIVIL,ZESTRIL) 2.5 MG tablet TAKE 1 TABLET BY MOUTH EVERY DAY 90 tablet 2     loratadine (CLARITIN) 10 mg tablet Take 10 mg by mouth daily.       saw palmetto fruit 450 mg cap Take 1 capsule by mouth 2 (two) times a day.       tamsulosin (FLOMAX) 0.4 mg Cp24 Take 0.4 mg by mouth at bedtime.       No current facility-administered medications for this visit.      No Known Allergies  Social History     Tobacco Use     Smoking status: Never Smoker     Smokeless tobacco: Never Used   Substance Use Topics     Alcohol use: Yes     Comment: 5 drinks weekly     Drug use: No             Phone call duration:  13 minutes    Elyssa Lopez, [facility]

## 2021-06-08 NOTE — PROGRESS NOTES
[facility] Daily Progress     Patient Name: Antoni Stoll  Date: 6/5/2020  Visit #: 8  PTA visit #:  0  Referral Diagnosis:M54.12 (ICD-10-CM) - Cervical radicular pain, spinal stenosis L3-4,4,5 spondylosis with L3-4,5 anterolisthesis  Referring provider: Holter, Todd J., PA-C, Dr. Todd Holter  Visit Diagnosis:     ICD-10-CM    1. Chronic bilateral low back pain with right-sided sciatica  M54.41     G89.29    2. Numbness of right foot  R20.0    3. Spinal stenosis of lumbar region, unspecified whether neurogenic claudication present  M48.061    4. Cervicalgia  M54.2    5. Cervical radiculitis  M54.12    6. Cervical radicular pain  M54.12    7. Pain in joint of right shoulder  M25.511        Initial Eval : Antoni Stoll is a 84 y.o. male who presents to therapy today with chief complaints of low back pain and numbness in the right foot. Low back pain has been present for 10+ years, foot numbness started and has gotten worse over the past year.  Pain symptoms are worse with standing for long and walking for long.  Patient is at risk of falling, and has had two recent falls. Pt demo's signs and sx consistent with chronic low back pain and foot numbness and tingling likely due to lumbar spinal stenosis L3-4/4-5, spondylosis with anterolisthesis. Treatment was initiated focusing on core strengthening and mobility. Patient is motivated and willing to participate in physical therapy treatment.  Assessment:   T4 and T11,12 right rotated.  Pain with reaching cleaning the shower doors and washing hair pain.  Pain at night not too bad when first go to bed and wakes him 1 time per night and is able to fall back asleep.  Isometrics 5-10 seconds 5-10 reps each increase.  Reviewed shoulder distraction with IE and ER.patient needed cuing for how to do these exercises.    HEP/POC compliance is  good .  Patient demonstrates understanding/independence with home program.  Patient is benefitting from skilled physical  therapy and is making steady progress toward functional goals.    Goal Status:  Pt. will demonstrate/verbalize independence in self-management of condition in : 6 weeks;Met  Pt. will be independent with home exercise program in : 6 weeks;Met    Pt will: be able to tolerate standing for 20minutes without needing to sit due to increase in pain, within 10 weeks. goal improving 10-20 minutes  Pt will: improve KOSTA score by 6 points or more to decrease impact back pain is having on activities of daily living, within 10 weeks.  Goals:  Pt. will demonstrate/verbalize independence in self-management of condition in : 12 weeks  Pt. will be independent with home exercise program in : 12 weeks  Pt. will have improved quality of sleep: Comment;with less pain;in 12 weeks  Comment:: the patient will be able to fall asleep with 50% less pain so that he can fall asleep fster   Patient will reach / maintain arm movement: forward;overhead;for home chores;for dressing;for sport/recreation;with full ROM;with less pain;with less difficulty;Comment  Comment: patient will not have a painful arc with reaching forward and up         Plan / Patient Education:     Continue with initial plan of care.  Progress with home program as tolerated.  Plan for next visit: Assess response to plank and cervical isometrics as well as discontinuing bridges and wall circles.  Reassess strength and ROM.  Go over posture and body mechanics.  Reassess hip flexor stretch standing with anterior fascial line stretching.  Assess shoulder special tests and reassess cervical spine ROM.  Assess nerve mobility ad assess response to reach and rolls.  Check hernia with planks.  Subjective:   2-3/10 pain low back 1-2 shoulder at rest and 2-3 with motio  Numbness in his right foot is returning to original level after getting rid of the wall circles. Patient is having pain with reaching cleaning the shower doors and washing hair pain.  Pain at night not too bad when first  go to bed and wakes him 1 time per night and is able to fall back asleep.  Isometrics 5-10 seconds 5-10 reps each increase.    Pain with reaching cleaning the shower doors and washing hair pain.  Pain at night not too bad when first go to bed and wakes him 1 time per night and is able to fall back asleep.  Isometrics 5-10 seconds 5-10 reps each increase.  Reviewed shoulder distraction with IE and ER.        Objective:   aT4 and T11,12 right rotated.  Lateral shoulder bursa is tight and tender posterior lateral and straight lateral shoulder did manual therapy to this .  Lateral shoulder 1/4th the way down is tight and did manual therapy to the lateral shoulder.          Exercises:  Exercise #1: scapular retraction verbal cues to drop the shoulder blades patient performed well today  Comment #1: gently off and on throughout the day  Exercise #2: cervical retraction patient needs to do slight flexion with this to make his head neutral with his spine, patient performed well today  Comment #2: gently off and on throughout the day  Exercise #3: standing shoulder flexion with AAROM trying to relax and drop the scapula down and back during this, patient performed well today  Comment #3: holding as tolerated  Exercise #4: IR and ER with Exband patient performed well today with verbal cuing  Comment #4: 5-10 seconds 5-10 reps 1-2 times per day.  Exercise #5: Pelvic Tilt/TA activation x10 needing cuing to perform correcting patient performed well today  Comment #5: clamshell x10 progressed adding band, patient performed well today  Exercise #6: bridge x10, added marching jesus may progress to plank with protracted shoulders shoulders patient performed well today patient may discontinue bridges as he gets cramping with this exercise  Comment #6: seated slump nerve glide x20 (educated on option for SLR slump as well)  Exercise #7: LTR x20 jesus, patient performed well today  Exercise #8: Wall circles (amyla hoop)  Slowly keeping your  hips facing forward and your arms straight slowly bring your pelvis around in a [place] clockwise and counterclockwise 10 reps each no pain just stretching.discontinue as this caused more symptoms into his foot.  Exercise #9: Elbows and forearms on the wall one leg back and then stay there until it loosens up.  Let the stomach come forward.verbal  Exercise #10: supine cervical roation gently side to side  Exercise #11: Elbows on the wall stanng with the feet even try to pull your shoulder blades down and back.  Do this in various positions working toward the spot where you have the most difficulty.not done today  Exercise #12: cervical isometrics flexion,  extension and sidebending and rotation  Exercise #13: pectoralis muscle stretch one arm low middle and high, verbal review  Exercise #14: biceps stretch hand in the doorway, verbal  Exercise #15: home manual traction with IR and ER gently, patient demonstratd well after verbal and tactile instruction  Exercise #16: reach and roll modified  Comment #16: gently back and forth knees below 90    T10,11  T4 right rotated      Treatment Today     TREATMENT MINUTES COMMENTS   Evaluation     Self-care/ Home management     Manual therapy 25  Did manual therapy to the anterior pectorals on the right.  Did manual therapy to the bursa on the right as well as the lateral shoulder about 1/4 the way down.   Neuromuscular Re-education     Therapeutic Activity     Therapeutic Exercises 30 Changed standing stretch hip flexors on the wall to supine psoas stretch with pillow under the hips and arms over the head.  Added Side lying latissimus stretch or quadriped latissimus stretch.  Counter pushups with a plus he may do this with his plank as needed.  Instructed the patient in how to monitor his separation in his stomach to make sure that it does not get any wider.   Gait training     Modality__________________                Total 55    Blank areas are intentional and mean the  treatment did not include these items.       Desi Moran  6/5/2020

## 2021-06-08 NOTE — PATIENT INSTRUCTIONS - HE
Contact me if your heart rate is running less than 50, or if you do start developing worsening lightheaded dizzy spells or feeling like you are going to faint, or sudden weak or severe fatigue spells, or if you begin to have rapid heart rate spells or palpitations.    Continue lisinopril 2.5 mg a day.  Goal blood pressure 120//85    Contact me if you are having worsening right foot numbness or pain symptoms.  You may need to repeat your MRI and return to the spine clinic to discuss surgical decompression option.  Continue your regular walking and physical therapy exercises for your back.

## 2021-06-08 NOTE — TELEPHONE ENCOUNTER
Refill Approved    Rx renewed per Medication Renewal Policy. Medication was last renewed on 11/17/19.    Janet Cole, [facility] Triage/Med Refill 5/12/2020     Requested Prescriptions   Pending Prescriptions Disp Refills     atenoloL (TENORMIN) 25 MG tablet [Pharmacy Med Name: ATENOLOL 25MG TABLETS] 90 tablet 2     Sig: TAKE 1 TABLET(25 MG) BY MOUTH DAILY       Beta-Blockers Refill Protocol Passed - 5/10/2020  3:59 AM        Passed - PCP or prescribing provider visit in past 12 months or next 3 months     Last office visit with prescriber/PCP: 2/5/2020 Sarthak Romano MD OR same dept: 2/5/2020 Sarthak Romano MD OR same specialty: 2/5/2020 Sarthak Romano MD  Last physical: 12/11/2019 Last MTM visit: Visit date not found   Next visit within 3 mo: Visit date not found  Next physical within 3 mo: Visit date not found  Prescriber OR PCP: Sarthak Romano MD  Last diagnosis associated with med order: 1. Essential hypertension  - atenoloL (TENORMIN) 25 MG tablet [Pharmacy Med Name: ATENOLOL 25MG TABLETS]; TAKE 1 TABLET(25 MG) BY MOUTH DAILY  Dispense: 90 tablet; Refill: 2  - lisinopriL (PRINIVIL,ZESTRIL) 2.5 MG tablet [Pharmacy Med Name: LISINOPRIL 2.5MG TABLETS]; TAKE 1 TABLET BY MOUTH EVERY DAY  Dispense: 90 tablet; Refill: 1    If protocol passes may refill for 12 months if within 3 months of last provider visit (or a total of 15 months).             Passed - Blood pressure filed in past 12 months     BP Readings from Last 1 Encounters:   02/05/20 130/64                lisinopriL (PRINIVIL,ZESTRIL) 2.5 MG tablet [Pharmacy Med Name: LISINOPRIL 2.5MG TABLETS] 90 tablet 1     Sig: TAKE 1 TABLET BY MOUTH EVERY DAY       Ace Inhibitors Refill Protocol Passed - 5/10/2020  3:59 AM        Passed - PCP or prescribing provider visit in past 12 months       Last office visit with prescriber/PCP: 2/5/2020 Sarthak Romano MD OR same dept: 2/5/2020 Sarthak Romano MD OR same specialty: 2/5/2020 Sarthak Romano MD  Last  physical: 12/11/2019 Last MTM visit: Visit date not found   Next visit within 3 mo: Visit date not found  Next physical within 3 mo: Visit date not found  Prescriber OR PCP: Sarthak Romano MD  Last diagnosis associated with med order: 1. Essential hypertension  - atenoloL (TENORMIN) 25 MG tablet [Pharmacy Med Name: ATENOLOL 25MG TABLETS]; TAKE 1 TABLET(25 MG) BY MOUTH DAILY  Dispense: 90 tablet; Refill: 2  - lisinopriL (PRINIVIL,ZESTRIL) 2.5 MG tablet [Pharmacy Med Name: LISINOPRIL 2.5MG TABLETS]; TAKE 1 TABLET BY MOUTH EVERY DAY  Dispense: 90 tablet; Refill: 1    If protocol passes may refill for 12 months if within 3 months of last provider visit (or a total of 15 months).             Passed - Serum Potassium in past 12 months     Lab Results   Component Value Date    Potassium 5.0 12/11/2019             Passed - Blood pressure filed in past 12 months     BP Readings from Last 1 Encounters:   02/05/20 130/64             Passed - Serum Creatinine in past 12 months     Creatinine   Date Value Ref Range Status   12/11/2019 1.24 0.70 - 1.30 mg/dL Final                      atenolol (TENORMIN) 25 MG tablet [[id number]]     Electronically signed by: Malena Alcazar RN on 08/20/19 1707  Status: Discontinued    Ordering user: Malena Alcazar RN 08/20/19 1707  Ordering provider: Sarthak Romano MD    Authorized by: Sarthak Romano MD    Frequency: DAILY 08/20/19 - 03/13/20  Discontinued by: Elyssa Aranda RN 03/13/20 1533 [Side effects]

## 2021-06-08 NOTE — PROGRESS NOTES
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 Todd Holter  Visit Diagnosis:     ICD-10-CM    1. Chronic bilateral low back pain with right-sided sciatica  M54.41     G89.29    2. Numbness of right foot  R20.0    3. Spinal stenosis of lumbar region, unspecified whether neurogenic claudication present  M48.061    4. Cervicalgia  M54.2    5. Cervical radiculitis  M54.12    6. Cervical radicular pain  M54.12    7. Pain in joint of right shoulder  M25.511        Initial Eval : Antoni Stoll is a 84 y.o. male who presents to therapy today with chief complaints of low back pain and numbness in the right foot. Low back pain has been present for 10+ years, foot numbness started and has gotten worse over the past year.  Pain symptoms are worse with standing for long and walking for long.  Patient is at risk of falling, and has had two recent falls. Pt demo's signs and sx consistent with chronic low back pain and foot numbness and tingling likely due to lumbar spinal stenosis L3-4/4-5, spondylosis with anterolisthesis. Treatment was initiated focusing on core strengthening and mobility. Patient is motivated and willing to participate in physical therapy treatment.  Assessment:   Spine was straight both cervical and lumbar spine the thoracic spine T9,8,7 was right rotated and patient was standing with left shoulder lower than the right.  The patient has a forward head and rounded shoulders.  The patient was able to progress his HEP well today.     HEP/POC compliance is  good .  Patient demonstrates understanding/independence with home program.  Patient is benefitting from skilled physical therapy and is making steady progress toward functional goals.    Goal Status:  Pt.  will demonstrate/verbalize independence in self-management of condition in : 6 weeks;Met  Pt. will be independent with home exercise program in : 6 weeks;Met    Pt will: be able to tolerate standing for 20minutes without needing to sit due to increase in pain, within 10 weeks. goal improving 10-20 minutes  Pt will: improve KOSTA score by 6 points or more to decrease impact back pain is having on activities of daily living, within 10 weeks.      Plan / Patient Education:     Continue with initial plan of care.  Progress with home program as tolerated.  Plan for next visit: Assess response to plank and cervical isometrics as well as discontinuing bridges and wall circles.  Reassess strength and ROM.  Go over posture and body mechanics.  Reassess hip flexor stretch standing with anterior fascial line stretching.  Assess shoulder special tests and reassess cervical spine ROM.  Assess nerve mobility ad assess response to reach and rolls.  Check hernia with planks.  Subjective:     The patient reports that he is getting more numbness in his right foot.  It is moving into the dorsum of the foot  And the ankle and also and continues to be on the bottom of the foot.     Flare up of the right shoulder may 15 with gardening and better after 10 days but it is still waking him up at night  Then he has trouble .  Better when he is on his shoulder or on his back with his arm raised   Pain in low back: 3/10 pain constant.  Neck pain 4/10 pain lower cervical sp[ine  Right arm pain 2/10 pain          Objective:     Spine was straight both cervical and lumbar spine the thoracic spine T9,8,7 was right rotated and patient was standing with left shoulder lower than the right.  The patient has a forward head and rounded shoulders.  The patient was able to progress his HEP well today        Exercises:  Exercise #1: scapular retraction verbsl cues to drop the shoulder blades  Comment #1: gently off and on throughout the day  Exercise #2:  cervical retraction patient needs to do slight flexion with this to make his head neutral with his spine  Comment #2: gently off and on throughout the day  Exercise #3: standing shoulder flexion with AAROM trying to relax and drop the scapula down and back during this, verbal review  Comment #3: holding as tolerated  Exercise #4: shoulder IR and ER isometrics, progressed to IR and ER with Exband as tolerated patient not quite able to do ER with band without pain so is to wait until he is able to do this without pain before adding this in.  Comment #4: 5-10 seconds 5-10 reps 1-2 times per day.  Exercise #5: Pelvic Tilt/TA activation x10 needing cuing to perform correcting  Comment #5: clamshell x10 progressed adding band  Exercise #6: bridge x10, added marching jesus may progress to plank with protracted shoulders shoulders  Comment #6: seated slump nerve glide x20 (educated on option for SLR slump as well)  Exercise #7: LTR x20 jesus  Exercise #8: Wall circles (hula hoop)  Slowly keeping your hips facing forward and your arms straight slowly bring your pelvis around in a [place] clockwise and counterclockwise 10 reps each no pain just stretching.  Exercise #9: Elbows and forearms on the wall one leg back and then stay there until it loosens up.  Let the stomach come forward.  Exercise #10: Wall circles (hula hoop)  Slowly keeping your hips facing forward and your arms straight slowly bring your pelvis around in a [place] clockwise and counterclockwise 10 reps each no pain just stretching.discontinue  Exercise #11: Elbows on the wall stanng with the feet even try to pull your shoulder blades down and back.  Do this in various positions working toward the spot where you have the most difficulty.  Exercise #12: Elbows and forearms on the wall one leg back and then stay there until it loosens up.  Let the stomach come forward.  Exercise #13: pectoralis muscle stretch one arm low middle and high  Exercise #14: biceps stretch  hand in the doorway  Exercise #15:  home manual traction with IR and ER gently         Treatment Today     TREATMENT MINUTES COMMENTS   Evaluation     Self-care/ Home management     Manual therapy 35 Tightness in the tibial nerve manually lower 1/3 of the lower leg and into the medial ankle and bottom of the foot.  Did neural glides to help with this.  MFR, leg pull to bilateral lower legs  As well as right upper extremity from the elbow on up.  Did manual therapy to the anterior pectorals on the right as well as the biceps on the right.  Did manual therapy to the bursa on the right as well as bursal massage ( traction with IR, and ER of the shoulder.    Neuromuscular Re-education     Therapeutic Activity     Therapeutic Exercises 25 See flow sheet   Gait training     Modality__________________                Total 55    Blank areas are intentional and mean the treatment did not include these items.       Desi NavarrofderharPT  5/29/2020

## 2021-06-08 NOTE — TELEPHONE ENCOUNTER
Called the patient and told him to monitor his hernia with the planks and moake sure that nothing is sticking out more than it usually does after doing that exercise.    Angela Mobley PT

## 2021-06-08 NOTE — TELEPHONE ENCOUNTER
Refill Request  Medication name:   Requested Prescriptions     Pending Prescriptions Disp Refills     gabapentin (NEURONTIN) 300 MG capsule 180 capsule 3     Sig: TAKE 1 TO 2 CAPSULES BY MOUTH EVERY EVENING AS NEEDED FOR NERVE PAIN     Who prescribed the medication: Juan Antonio  Last refill on medication: 6/10/19, Dispense of 180 Refill of 3  Requested Pharmacy: [facility]  Last appointment with PCP: 6/2/2020  Next appointment: none

## 2021-06-09 VITALS
DIASTOLIC BLOOD PRESSURE: 62 MMHG | SYSTOLIC BLOOD PRESSURE: 124 MMHG | HEART RATE: 80 BPM | BODY MASS INDEX: 24.46 KG/M2 | TEMPERATURE: 96.5 F | WEIGHT: 146.8 LBS | HEIGHT: 65 IN

## 2021-06-09 NOTE — PROGRESS NOTES
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 Todd Holter  Visit Diagnosis:     ICD-10-CM    1. Chronic bilateral low back pain with right-sided sciatica  M54.41     G89.29    2. Numbness of right foot  R20.0    3. Spinal stenosis of lumbar region, unspecified whether neurogenic claudication present  M48.061    4. Cervicalgia  M54.2    5. Cervical radiculitis  M54.12    6. Cervical radicular pain  M54.12    7. Pain in joint of right shoulder  M25.511        Initial Eval : Antoni Stoll is a 84 y.o. male who presents to therapy today with chief complaints of low back pain and numbness in the right foot. Low back pain has been present for 10+ years, foot numbness started and has gotten worse over the past year.  Pain symptoms are worse with standing for long and walking for long.  Patient is at risk of falling, and has had two recent falls. Pt demo's signs and sx consistent with chronic low back pain and foot numbness and tingling likely due to lumbar spinal stenosis L3-4/4-5, spondylosis with anterolisthesis. Treatment was initiated focusing on core strengthening and mobility. Patient is motivated and willing to participate in physical therapy treatment.  Assessment:   Right rotation entire lumbar spine and thoracic spine to T5 is neutral and above. Left rotation at T1,2  Right rotated C2,3    T10,9,8 right rotated only with a book under right foot and the illiac crest is still lower on the right compared to the left but better.  Gave instructions to try adjust a lift heel lifts and see if that helps.    Did mechanical traction today and patient reports that it dropped the numbness in the foot by 1-2 points and will assess how long that it helps and report back to me  next visit.      HEP/POC compliance is  good .  Patient demonstrates understanding/independence with home program.  Patient is benefitting from skilled physical therapy and is making steady progress toward functional goals.    Goal Status:  Pt. will demonstrate/verbalize independence in self-management of condition in : 6 weeks;Met  Pt. will be independent with home exercise program in : 6 weeks;Met    Pt will: be able to tolerate standing for 20minutes without needing to sit due to increase in pain, within 10 weeks. goal improving up to 30 minutes without needing to sit down  Pt will: improve KOSTA score by 6 points or more to decrease impact back pain is having on activities of daily living, within 10 weeks.  Goals:  Pt. will demonstrate/verbalize independence in self-management of condition in : 12 weeks  Pt. will be independent with home exercise program in : 12 weeks  Pt. will have improved quality of sleep: Comment;with less pain;in 12 weeks  Comment:: the patient will be able to fall asleep with 50% less pain so that he can fall asleep fster   Patient will reach / maintain arm movement: forward;overhead;for home chores;for dressing;for sport/recreation;with full ROM;with less pain;with less difficulty;Comment  Comment: patient will not have a painful arc with reaching forward and up         Plan / Patient Education:     Continue with initial plan of care.  Progress with home program as tolerated.  Plan for next visit: Assess response to plank and cervical isometrics as well as discontinuing bridges and wall circles.  Reassess strength and ROM.  Go over posture and body mechanics.  Reassess hip flexor stretch standing with anterior fascial line stretching.  Assess shoulder special tests and reassess cervical spine ROM.  Assess nerve mobility ad assess response to reach and rolls.  Check hernia with planks.  Subjective:     Currrently  Pain in the neck 1-2/10 pain.  Right shoulder and upper arm 1-2/10  pain.    Pain 3-4/10 low back pain.upper thigh bilaterally right more than the left  The upper thigh pain started after doing patio doors on Wednesday.    Then did the rest of the windows yesterday he reports that it took 3 hours last night to fall asleep due to pain.    The patient reports that he has had the pain in the both buttocks and thighs since starting the patio doors on Wednesday.      Objective:   Right rotation entire lumbar spine and thoracic spine to T5 is neutral and above. Left rotation at T1,2  Right rotated C2,3    T10,9,8 right rotated only with a book under right foot and the illiac crest is still lower on the right compared to the left but better.  Gave instructions to try adjust a lift heel lifts and see if that helps.      Listening to left hip and sacrum region.  The patient reports that his numbness is a 7-8/10 numbness on the right foot both medially laterally and dorsum of the foot as well as the dorsum of the toes.  Up to 15 seconds 15 reps of the plank on the floor elbows and knees   Exercises:  Exercise #1: scapular retraction verbal cues to drop the shoulder blades verbal review  Comment #1: gently off and on throughout the day  Exercise #2: cervical retraction patient needs to do slight flexion with this to make his head neutral with his spine, verbal review  Comment #2: gently off and on throughout the day  Exercise #3: standing shoulder flexion with AAROM trying to relax and drop the scapula down and back during this, verbal review  Comment #3: holding as tolerated  Exercise #4: IR and ER with Exband patient verbal review  Comment #4: 5-10 seconds 5-10 reps 1-2 times per day.  Exercise #5: Pelvic Tilt/TA activation x10 verbal review  Comment #5: clamshell x10 progressed adding band, verbal review  Exercise #6: bridge x10, added marching jesus may progress to plank with protracted shoulders shoulders verbal review  Comment #6: seated slump nerve glide x20 (educated on option for SLR  slump as well)  Exercise #7: LTR x20 jesus, verbal review  Exercise #8: d/c wall circles  Exercise #9: Elbows and forearms on the wall one leg back and then stay there until it loosens up.  Let the stomach come forward.verbalChanged standing stretch hip flexors on the wall to supine psoas stretch with pillow under the hips and arms over the head.  Added Side lying latissimus stretch or quadriped latissimus stretch.  Counter pushups with a plus he may do this with his plank as needed.  Instructed the patient in how to monitor his separation in his stomach to make sure that it does not get any wider.  Exercise #10: supine cervical roation gently side to side  Exercise #11: Elbows on the wall stanng with the feet even try to pull your shoulder blades down and back.  Do this in various positions working toward the spot where you have the most difficulty.verbal review  Exercise #12: cervical isometrics flexion,  extension and sidebending and rotation  Exercise #13: pectoralis muscle stretch one arm low middle and high, verbal review  Exercise #14: biceps stretch hand in the doorway, verbal  Exercise #15: home manual traction with IR and ER gently, patient demonstratd well after verbal and tactile instruction  Exercise #16: reach and roll modified  Comment #16: gently back and forth knees below 90          Treatment Today     TREATMENT MINUTES COMMENTS   Evaluation     Self-care/ Home management     Manual therapy     Neuromuscular Re-education     Therapeutic Activity     Therapeutic Exercises 3  brief verbal review     Gait training     Modality_traction 70 lbs 3 steps _min level 25 5 second hold 10 second rest_______ 12 minutes with 8 minute set up total 20 minutes Patient noted slight decrease in his numbness in his ankle after treatment and is to let me know how long that it helped for              Total 23    Blank areas are intentional and mean the treatment did not include these items.       Desi FULLER  AufderharPT  7/17/2020

## 2021-06-09 NOTE — PROGRESS NOTES
[facility] Daily Progress     Patient Name: Antoni Stoll  Date: 7/24/2020  Visit #: 12  PTA visit #:    Referral Diagnosis:   1. Chronic bilateral low back pain with right-sided sciatica  M54.41       G89.29     2. Numbness of right foot  R20.0     3. Spinal stenosis of lumbar region, unspecified whether neurogenic claudication present  M48.061     4. Cervicalgia  M54.2     5. Cervical radiculitis  M54.12     6. Cervical radicular pain  M54.12     7. Pain in joint of right shoulder  M25.511        Referring provider: Holter, Todd J., PA-C  Visit Diagnosis:     ICD-10-CM    1. Chronic bilateral low back pain with right-sided sciatica  M54.41     G89.29    2. Numbness of right foot  R20.0    3. Spinal stenosis of lumbar region, unspecified whether neurogenic claudication present  M48.061    4. Cervicalgia  M54.2    5. Cervical radiculitis  M54.12    6. Cervical radicular pain  M54.12    7. Pain in joint of right shoulder  M25.511          Assessment:   Improvement in right wrist extension strength, no significant change in ROM cervical spine or lumbar spine.  Decreased pain in right shoulder with AROM as well as with sleeping.  No significant change in pain in the cervical spine or lumbar spine.  Patient has good compliance with HEP.  HEP/POC compliance is  good .  Patient demonstrates understanding/independence with home program.    Goal Status:  Pt. will demonstrate/verbalize independence in self-management of condition in : 12 weeks goal met  Pt. will be independent with home exercise program in : 12 weeks goal met  Pt. will have improved quality of sleep: Comment;with less pain;in 12 weeks  Not up due to pain goal met  Comment:: the patient will be able to fall asleep with 50% less pain so that he can fall asleep faster Only occasional twinges goal met.  Patient will reach / maintain arm movement: forward;overhead;for home chores;for dressing;for sport/recreation;with full ROM;with less pain;with  less difficulty;Comment  Comment: patient will not have a painful arc with reaching forward and up no painful arc but some discomfort 3/10 pain at 120 degrees. improving  Able to stand for 20 minutes before needing to sit due to pain goal met pain at 4-5/10  Pt. will demonstrate/verbalize independence in self-management of condition in : 6 weeks;Met  Pt. will be independent with home exercise program in : 6 weeks;Met    Pt will: be able to tolerate standing for 20minutes without needing to sit due to increase in pain, within 10 weeks. goal improving up to 30 minutes without needing to sit down  Pt will: improve KOSTA score by 6 points or more to decrease impact back pain is having on activities of daily living, within 10 weeks.  no change in goal    Plan / Patient Education:     Discharge PT to a home program and the patient is to follow up with his MD as needed    Subjective:       3-4/10 pain in upper neck,   Shoulder pain 1/10 pain right 0/10 pain left ,     shoulder improved 40-50% compared to the left.      Right L4-S2 central and right side 3-4/10 pain no change in in back.    overall no change in low back, neck         Objective:   Laying on the back with knees up seems to help with the pain and numbness for 15 minutes.  Shoulder flexion 140 degrees bilaterally  Right 144 left 145 [place] abduction  Lumbar flexion WFL no change in pain just stretching 1/2 way down the shins  Lumbar extension slight past neutral pain 3/10 in low back    Cervical flexion 1 finger to chest cervical extension 30 degrees and slight increase in pain 3/10 pain in the upper thoracic spine and slightly in the neck as well.    41 degrees right 3-4/10 pain 37 left cervical rotation 2-3/10 pain    Shoulder flexion 5/5 bilaterally flexion and extension right shoulder pain abduction 3/1- flexion 2/10 left shoulder 1/10 pain  Elbow flexion extension wrist flexion extension 5/5 biaterally previously right wrst extension was weak and now no  longer weak still a slight pain with elbow flexiojn bilaterally  LE strength 5/5 kbilaterally  20 reps 1 leg heel raises  1/10 arch of foot 2-3/10 plantar aspect of metatarsal heads, 5-6/10 numbness lateral anlkle and 7-810 numbness dorsum of foot and medial ankle and medial side of the lower leg and   T3,4 T11,2 Left shoulder lower thant the right C3,4 left rotated   Patient has a slight scoliosis  Appearing spine convex right and thoracic spine and convex left cercvical spine  when wearing one level of a adjust a lift heel lift on the right.  He has a very slight leg length discrepancy 0.5 of a cm right being shorter. Spine looked like less of a rotation the T8 left C3 right and that is all.  The patient is to take  2-3 weeks and slowly add it in and assess if it helps with his pain if not he may discontinue it. Today when having 1 level of the heel lift he noted as well as the therapist that he walked more gently with and without he came down harder on his right leg with heel strike.          Treatment Today     TREATMENT MINUTES COMMENTS   Evaluation     Self-care/ Home management     Manual therapy     Neuromuscular Re-education     Therapeutic Activity     Therapeutic Exercises 55 AROM, cervical spine lumbar spine shoulder reviewed entire HEP and let the patient know what the exercises are for.   Gait training     Modality__________________                Total 55    Blank areas are intentional and mean the treatment did not include these items.       Desi Mobley PT  7/24/2020

## 2021-06-09 NOTE — PROGRESS NOTES
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 Todd Holter  Visit Diagnosis:     ICD-10-CM    1. Chronic bilateral low back pain with right-sided sciatica  M54.41     G89.29    2. Numbness of right foot  R20.0    3. Spinal stenosis of lumbar region, unspecified whether neurogenic claudication present  M48.061    4. Cervicalgia  M54.2    5. Cervical radiculitis  M54.12    6. Cervical radicular pain  M54.12    7. Pain in joint of right shoulder  M25.511        Initial Eval : Antoni Stoll is a 84 y.o. male who presents to therapy today with chief complaints of low back pain and numbness in the right foot. Low back pain has been present for 10+ years, foot numbness started and has gotten worse over the past year.  Pain symptoms are worse with standing for long and walking for long.  Patient is at risk of falling, and has had two recent falls. Pt demo's signs and sx consistent with chronic low back pain and foot numbness and tingling likely due to lumbar spinal stenosis L3-4/4-5, spondylosis with anterolisthesis. Treatment was initiated focusing on core strengthening and mobility. Patient is motivated and willing to participate in physical therapy treatment.  Assessment:   Right rotation entire lumbar spine and thoracic spine to T5 is neutral and above.  Listening to left lateral thigh above the knee.  Then did MFR to this area and then   Listening to head.  Did neural mobilization to cervical spine and to the dura throughout as well as neural mobilization to the ankle and foot.  Lateral ankle and dorsum of the foot on the left and medial ankle and dorsum on the right and into the toes bilaterally.  Palpated the patient's right shoulder briefly and gave a slight inferior  glide and it moved nicely today patient's pain was down to 0-1 in the shoulder after dural and neural gliding.      HEP/POC compliance is  good .  Patient demonstrates understanding/independence with home program.  Patient is benefitting from skilled physical therapy and is making steady progress toward functional goals.    Goal Status:  Pt. will demonstrate/verbalize independence in self-management of condition in : 6 weeks;Met  Pt. will be independent with home exercise program in : 6 weeks;Met    Pt will: be able to tolerate standing for 20minutes without needing to sit due to increase in pain, within 10 weeks. goal improving up to 30 minutes without needing to sit down  Pt will: improve KOSTA score by 6 points or more to decrease impact back pain is having on activities of daily living, within 10 weeks.  Goals:  Pt. will demonstrate/verbalize independence in self-management of condition in : 12 weeks  Pt. will be independent with home exercise program in : 12 weeks  Pt. will have improved quality of sleep: Comment;with less pain;in 12 weeks  Comment:: the patient will be able to fall asleep with 50% less pain so that he can fall asleep fster   Patient will reach / maintain arm movement: forward;overhead;for home chores;for dressing;for sport/recreation;with full ROM;with less pain;with less difficulty;Comment  Comment: patient will not have a painful arc with reaching forward and up         Plan / Patient Education:     Continue with initial plan of care.  Progress with home program as tolerated.  Plan for next visit: Assess response to plank and cervical isometrics as well as discontinuing bridges and wall circles.  Reassess strength and ROM.  Go over posture and body mechanics.  Reassess hip flexor stretch standing with anterior fascial line stretching.  Assess shoulder special tests and reassess cervical spine ROM.  Assess nerve mobility ad assess response to reach and rolls.  Check hernia with  planks.  Subjective:       Pain 2/10 low back pain.  Shoulder 1-2 /10 pain constant and then intermittently 5-10 seconds pain 5-6/10 throbbing pain today when holding razor up into his face.position of ER and flexion..    Right rotation entire lumbar spine and thoracic spine to T5 is neutral and above.    Objective:   Right rotation entire lumbar spine and thoracic spine to T5 is neutral and above.      Listening to left lateral thigh above the knee  Listening to head  Dura throughout  Listening to left lateral calf  Tingling in the left foot medial arch and lateral arch and the dorsum.  After treatment T7,8,9,10 right rotated otherwise normal and only slightly rotated.  Low back 1/10 right shoulder 0-1/10      Exercises:  Exercise #1: scapular retraction verbal cues to drop the shoulder blades patient performed well today  Comment #1: gently off and on throughout the day  Exercise #2: cervical retraction patient needs to do slight flexion with this to make his head neutral with his spine, patient performed well today  Comment #2: gently off and on throughout the day  Exercise #3: standing shoulder flexion with AAROM trying to relax and drop the scapula down and back during this, patient performed well today  Comment #3: holding as tolerated  Exercise #4: IR and ER with Exband patient performed well today with verbal cuing  Comment #4: 5-10 seconds 5-10 reps 1-2 times per day.  Exercise #5: Pelvic Tilt/TA activation x10 needing cuing to perform correcting patient performed well today  Comment #5: clamshell x10 progressed adding band, patient performed well today  Exercise #6: bridge x10, added marching jesus may progress to plank with protracted shoulders shoulders patient performed well today patient may discontinue bridges as he gets cramping with this exercise  Comment #6: seated slump nerve glide x20 (educated on option for SLR slump as well)  Exercise #7: LTR x20 jesus, patient performed well today  Exercise #8:  Wall circles (hula hoop)  Slowly keeping your hips facing forward and your arms straight slowly bring your pelvis around in a [place] clockwise and counterclockwise 10 reps each no pain just stretching.discontinue as this caused more symptoms into his foot.  Exercise #9: Elbows and forearms on the wall one leg back and then stay there until it loosens up.  Let the stomach come forward.verbalChanged standing stretch hip flexors on the wall to supine psoas stretch with pillow under the hips and arms over the head.  Added Side lying latissimus stretch or quadriped latissimus stretch.  Counter pushups with a plus he may do this with his plank as needed.  Instructed the patient in how to monitor his separation in his stomach to make sure that it does not get any wider.  Exercise #10: supine cervical roation gently side to side  Exercise #11: Elbows on the wall stanng with the feet even try to pull your shoulder blades down and back.  Do this in various positions working toward the spot where you have the most difficulty.not done today  Exercise #12: cervical isometrics flexion,  extension and sidebending and rotation  Exercise #13: pectoralis muscle stretch one arm low middle and high, verbal review  Exercise #14: biceps stretch hand in the doorway, verbal  Exercise #15: home manual traction with IR and ER gently, patient demonstratd well after verbal and tactile instruction  Exercise #16: reach and roll modified  Comment #16: gently back and forth knees below 90          Treatment Today     TREATMENT MINUTES COMMENTS   Evaluation     Self-care/ Home management     Manual therapy 55 Right rotation entire lumbar spine and thoracic spine to T5 is neutral and above.  Listening to left lateral thigh above the knee.  Then did MFR to this area and then   Listening to head.  Did neural mobilization to cervical spine and to the dura throughout as well as neural mobilization to the ankle and foot.  Lateral ankle and dorsum of the  foot on the left and medial ankle and dorsum on the right and into the toes bilaterally.  Palpated the patient's right shoulder briefly and gave a slight inferior glide and it moved nicely today patient's pain was down to 0-1 in the shoulder after dural and neural gliding.   Neuromuscular Re-education     Therapeutic Activity     Therapeutic Exercises 5  brief verbal review     Gait training     Modality__________________                Total 60    Blank areas are intentional and mean the treatment did not include these items.       Desi Moran  7/10/2020

## 2021-06-09 NOTE — PROGRESS NOTES
[facility] Daily Progress     Patient Name: Antoni Stoll  Date: 6/26/2020  Visit #: 9  PTA visit #:  0  Referral Diagnosis:M54.12 (ICD-10-CM) - Cervical radicular pain, spinal stenosis L3-4,4,5 spondylosis with L3-4,5 anterolisthesis  Referring provider: Sarthak Romano MD, Dr. Todd Holter  Visit Diagnosis:     ICD-10-CM    1. Chronic bilateral low back pain with right-sided sciatica  M54.41     G89.29    2. Numbness of right foot  R20.0    3. Spinal stenosis of lumbar region, unspecified whether neurogenic claudication present  M48.061    4. Cervicalgia  M54.2    5. Cervical radiculitis  M54.12    6. Cervical radicular pain  M54.12    7. Pain in joint of right shoulder  M25.511        Initial Eval : Antoni Stoll is a 84 y.o. male who presents to therapy today with chief complaints of low back pain and numbness in the right foot. Low back pain has been present for 10+ years, foot numbness started and has gotten worse over the past year.  Pain symptoms are worse with standing for long and walking for long.  Patient is at risk of falling, and has had two recent falls. Pt demo's signs and sx consistent with chronic low back pain and foot numbness and tingling likely due to lumbar spinal stenosis L3-4/4-5, spondylosis with anterolisthesis. Treatment was initiated focusing on core strengthening and mobility. Patient is motivated and willing to participate in physical therapy treatment.  Assessment:   Right lateral shoulder bursa is tight and tender posterior lateral and straight lateral shoulder did manual therapy to this, also did posterior GH joint mobilizations to the right shoulder with slight IR of the shoulder as the patient was able to tolerate. .  After laying supine 90/90 bottom of the foot 1-2 dorsum of foot 3-4/10 pain in the shoulder is 1/10 pain.  Went through the entire HEP and when he was to do them and discussed adding in supine decompression exercise for 20 minutes per day and also  discussed when to do the exercises and when to possibly lay off of the exercises.  Patient to do the exercises 1/2 one day and 1/2 the other day with 20 minutes of decompression daily either supine or in a pool with a jogging belt or fun noodle as able to.    HEP/POC compliance is  good .  Patient demonstrates understanding/independence with home program.  Patient is benefitting from skilled physical therapy and is making steady progress toward functional goals.    Goal Status:  Pt. will demonstrate/verbalize independence in self-management of condition in : 6 weeks;Met  Pt. will be independent with home exercise program in : 6 weeks;Met    Pt will: be able to tolerate standing for 20minutes without needing to sit due to increase in pain, within 10 weeks. goal improving up to 30 minutes without needing to sit down  Pt will: improve KOSTA score by 6 points or more to decrease impact back pain is having on activities of daily living, within 10 weeks.  Goals:  Pt. will demonstrate/verbalize independence in self-management of condition in : 12 weeks  Pt. will be independent with home exercise program in : 12 weeks  Pt. will have improved quality of sleep: Comment;with less pain;in 12 weeks  Comment:: the patient will be able to fall asleep with 50% less pain so that he can fall asleep fster   Patient will reach / maintain arm movement: forward;overhead;for home chores;for dressing;for sport/recreation;with full ROM;with less pain;with less difficulty;Comment  Comment: patient will not have a painful arc with reaching forward and up         Plan / Patient Education:     Continue with initial plan of care.  Progress with home program as tolerated.  Plan for next visit: Assess response to plank and cervical isometrics as well as discontinuing bridges and wall circles.  Reassess strength and ROM.  Go over posture and body mechanics.  Reassess hip flexor stretch standing with anterior fascial line stretching.  Assess shoulder  special tests and reassess cervical spine ROM.  Assess nerve mobility ad assess response to reach and rolls.  Check hernia with planks.  Subjective:   Pain on tresday unable to raise it up at class yoga class.    Pain 2-4/10 low back pain and up into shoulders and neck  Shoulder 1-2 /10 pain constant and then intermittently 5-10 seconds pain 5-6/10 pain.  No better after stopping the wall circles numbness is the entire foot and medial and ankle a7/10 numbness worsee after sitting a while the first few steps that he takes.       Objective:   Lateral shoulder bursa is tight and tender posterior lateral and straight lateral shoulder did manual therapy to this .  After laying supine 90/90 bottom of the foot 1-2 dorsum of foot 3-4/10 pain in the shoulder is 1/10 pain        Exercises:  Exercise #1: scapular retraction verbal cues to drop the shoulder blades patient performed well today  Comment #1: gently off and on throughout the day  Exercise #2: cervical retraction patient needs to do slight flexion with this to make his head neutral with his spine, patient performed well today  Comment #2: gently off and on throughout the day  Exercise #3: standing shoulder flexion with AAROM trying to relax and drop the scapula down and back during this, patient performed well today  Comment #3: holding as tolerated  Exercise #4: IR and ER with Exband patient performed well today with verbal cuing  Comment #4: 5-10 seconds 5-10 reps 1-2 times per day.  Exercise #5: Pelvic Tilt/TA activation x10 needing cuing to perform correcting patient performed well today  Comment #5: clamshell x10 progressed adding band, patient performed well today  Exercise #6: bridge x10, added marching jesus may progress to plank with protracted shoulders shoulders patient performed well today patient may discontinue bridges as he gets cramping with this exercise  Comment #6: seated slump nerve glide x20 (educated on option for SLR slump as well)  Exercise #7:  LTR x20 jesus, patient performed well today  Exercise #8: Wall circles (hula hoop)  Slowly keeping your hips facing forward and your arms straight slowly bring your pelvis around in a [place] clockwise and counterclockwise 10 reps each no pain just stretching.discontinue as this caused more symptoms into his foot.  Exercise #9: Elbows and forearms on the wall one leg back and then stay there until it loosens up.  Let the stomach come forward.verbalChanged standing stretch hip flexors on the wall to supine psoas stretch with pillow under the hips and arms over the head.  Added Side lying latissimus stretch or quadriped latissimus stretch.  Counter pushups with a plus he may do this with his plank as needed.  Instructed the patient in how to monitor his separation in his stomach to make sure that it does not get any wider.  Exercise #10: supine cervical roation gently side to side  Exercise #11: Elbows on the wall stanng with the feet even try to pull your shoulder blades down and back.  Do this in various positions working toward the spot where you have the most difficulty.not done today  Exercise #12: cervical isometrics flexion,  extension and sidebending and rotation  Exercise #13: pectoralis muscle stretch one arm low middle and high, verbal review  Exercise #14: biceps stretch hand in the doorway, verbal  Exercise #15: home manual traction with IR and ER gently, patient demonstratd well after verbal and tactile instruction  Exercise #16: reach and roll modified  Comment #16: gently back and forth knees below 90          Treatment Today     TREATMENT MINUTES COMMENTS   Evaluation     Self-care/ Home management     Manual therapy 25 Right lateral shoulder bursa is tight and tender posterior lateral and straight lateral shoulder did manual therapy to this, also did posterior GH joint mobilizations to the right shoulder with slight IR of the shoulder as the patient was able to tolerate. .    Neuromuscular Re-education      Therapeutic Activity     Therapeutic Exercises 30  After laying supine 90/90 bottom of the foot 1-2 dorsum of foot 3-4/10 pain i  Went through the entire HEP and when he was to do them and discussed adding in supine decompression exercise for 20 minutes per day and also discussed when to do the exercises and when to possibly lay off of the exercises.  Patient to do the exercises 1/2 one day and 1/2 the other day with 20 minutes of decompression daily either supine or in a pool with a jogging belt or fun noodle as able to.  Went over all exercises changed them to every other day and  them to only 9 exercises per day and also added laying supine in a decompression position for 20 minutes daily.     Gait training     Modality__________________                Total 55    Blank areas are intentional and mean the treatment did not include these items.       Desi FULLER AufderharPT  6/26/2020

## 2021-06-10 NOTE — TELEPHONE ENCOUNTER
Form received from [facility] today. Signed by PCP and faxed back to them. Patient was called to be informed about this but no answer. Did inform the patient in a voicemail.

## 2021-06-10 NOTE — TELEPHONE ENCOUNTER
Who is calling:  Patient   Reason for Call:  Patient states is requesting to send updated  medical necessity form to [employer] .  He need to get CPAP equipment As soon as possible.  He is going out of town day after tomorrow .  Date of last appointment with primary care: 06/02/20  Okay to leave a detailed message: No

## 2021-06-10 NOTE — TELEPHONE ENCOUNTER
Patient stated that [facility] told him they need clinic info stating his need of the CPAP equipment. They faxed us a form but I have not seen this form. Will call them at [phone].    Update: That was apparently the wrong dept. within [facility]. The representative from that # said to call Thierno. I did call him and left a message.

## 2021-06-10 NOTE — PROGRESS NOTES
[facility] Follow Up Note    Antoni Stoll   81 y.o. male    Date of Visit: 4/26/2017    Chief Complaint   Patient presents with     Follow-up     6 mo follow up, Rt upper pain, runny nose/allergies     Subjective  Antoni is here for follow-up of multiple medical problems.  He had a bypass in 2013 and has done well since.  He saw cardiology in December of last year.  No angina and doing well.  Active with walking at [facility] 5 days a week.    He had a stress echo December of last year that was normal, negative for ischemia.  Normal ejection fraction.    No increasing shortness of breath or edema.  No palpitations or history of arrhythmia.    Cholesterol well controlled last October with an LDL of 36 and HDL 52 on 20 mg of Lipitor.    No epigastric pain or bleeding with aspirin daily.    He has sleep apnea, CPAP working okay but needs some new equipment and tubing which he is planning to get and has a prescription.  Minimal daytime somnolence.    Hypertension has been well controlled running in the 120-140s over 70s.  No lightheaded dizzy spells.  On atenolol 25 mg a day and lisinopril 2.5 mg a day.    Severe spinal stenosis.  MRI October 2016 was similar to previous year MRI with severe central L3-L5 stenosis.  He did see the surgeon, Dr. Elkins, who did not recommend surgery at this time because of patient's excellent functional status and lack of radicular symptoms.  Chronic moderate back pain, the gabapentin in the morning and Tylenol at night helps.  No new leg weakness and no falls.    He has had some chronic neck pain, better now, he did some physical therapy last year, he does feel he strained his right shoulder and has some intermittent tendinitis type pain in the lateral deltoid area with certain arm movements, but not now.  No rash or swelling.  He takes occasional Tylenol.    History of allergic rhinitis with sinusitis last year.  He is just had mild postnasal  "drip cough now.  Uses Astelin nasal spray every evening and Flonase just occasionally.    Moderate BPH, saw urology last November.  Exam showed no nodule, given Flomax and that has helped considerably.  PSA was 1.4 last October.    Past history of basal cell cancer of the scalp, no new lesions noted.  He has a follow-up appointment dermatology next month.    He saw the eye doctor last October.    PMHx:    Past Medical History:   Diagnosis Date     Carotid artery occlusion      Coronary artery disease      High cholesterol      Hyperlipidemia      Hypertension      Low back pain      Sleep apnea, obstructive      PSHx:    Past Surgical History:   Procedure Laterality Date     ARTERIAL BYPASS SURGERY       BLEPHAROPLASTY Bilateral      CARDIAC CATHETERIZATION  06/03/2013     CATARACT EXTRACTION Bilateral      CORONARY ARTERY BYPASS GRAFT  06/28/2013    5-vessel     VARICOSE VEIN SURGERY Left      Immunizations:   Immunization History   Administered Date(s) Administered     Influenza K5d1-25, 01/18/2010     Influenza high dose, seasonal 10/13/2015, 10/18/2016     Influenza, inj, historic 10/23/2007, 10/29/2009, 10/11/2010, 11/08/2011     Influenza, seasonal,quad inj 6-35 mos 11/29/2012, 10/25/2013, 11/19/2014     Pneumo Conj 13-V (2010&after) 04/09/2015     Pneumo Polysac 23-V 10/03/1999, 03/06/2007     Td, historic 03/11/1992, 10/24/2000, 04/11/2011     ZOSTER 11/08/2007       ROS A comprehensive review of systems was performed and was otherwise negative    Medications, allergies, and problem list were reviewed and updated    Exam  /70  Pulse (!) 56  Ht 5' 5.5\" (1.664 m)  Wt 157 lb (71.2 kg)  SpO2 98%  BMI 25.73 kg/m2  Patient appears well.  Alert and oriented ×3.  No jaundice.  Pupils and irises equal and reactive.  No obvious skin lesions noted.  No cervical or supra clavicular adenopathy.  No JVD and no carotid bruits.  Lungs clear to auscultation and normal respiratory excursion.  Heart is regular " without murmur.  Abdomen is nontender no hepatosplenomegaly.  No ankle edema.  Right shoulder appears normal to inspection and palpation, no tenderness or evidence of tendon rupture.    Assessment/Plan  1. Essential hypertension with goal blood pressure less than 140/90  Controlled, continue current medication    2. Coronary atherosclerosis due to calcified coronary lesion  Asymptomatic and recent negative stress test.  Continue regular treadmill exercise    Medical management with aspirin and Lipitor    3. Spinal Stenosis  Severe.  But good functional status.  Continue regular ambulation.  Gabapentin in the morning and Tylenol at night.  If worsening symptoms, consider repeat MRI and return to Dr. Cardenas, the back surgeon    4. Sleep apnea, unspecified type  Patient will get new equipment, continue CPAP.  No alcohol.    5. Encounter for long-term (current) use of other medications      Patient has a colonoscopy scheduled for June.  - Comprehensive Metabolic Panel I did write down a plan for him to stop aspirin 1 week before the colonoscopy and restart day after.  I discussed risk of heart attack with being off aspirin.  Patient accepts risk and will proceed.  He had 2 tubular adenomas and a family history of colon cancer with his father on previous colonoscopy.    I will have him take the atenolol in the morning of the colonoscopy, he takes lisinopril in the evening.    Chronic allergic rhinitis symptoms of postnasal drip, fairly mild.  No significant pharyngitis on today's exam.  Continue the Astelin nasal spray and Flonase if needed.    BPH, better on Flomax.    History of basal cell cancer, follow-up with one-year dermatology follow-up next month.    Return in about 6 months (around 10/26/2017) for Annual physical.   There are no Patient Instructions on file for this visit.  Sarthak Romano MD  Total time with patient over 25 minutes and over 50% coord care.  Time all face to face.  The following high BMI  interventions were performed this visit: encouragement to exercise    Current Outpatient Prescriptions   Medication Sig Dispense Refill     acetaminophen (TYLENOL) 500 MG tablet Take 500 mg by mouth every 6 (six) hours as needed for pain.       ascorbic acid (VITAMIN C) 100 MG tablet Take 1,000 mg by mouth 2 (two) times a day.       aspirin 325 MG tablet Take 325 mg by mouth daily.       atenolol (TENORMIN) 25 MG tablet TAKE 1 TABLET BY MOUTH DAILY 90 tablet 3     atorvastatin (LIPITOR) 20 MG tablet TAKE 1 TABLET BY MOUTH DAILY 90 tablet 3     cholecalciferol, vitamin D3, 1,000 unit tablet Take 1,000 Units by mouth daily.       fluticasone (FLONASE) 50 mcg/actuation nasal spray 2 sprays into each nostril daily.       gabapentin (NEURONTIN) 300 MG capsule TAKE 1 TO 2 CAPSULES BY MOUTH EVERY EVENING AS NEEDED FOR NERVE PAIN 180 capsule 2     GLUCOSAM HCL/CHONDRO BOLANOS A/C/MN (GLUCOSAMINE-CHONDROITIN COMPLX ORAL) Take 1 tablet by mouth 2 (two) times a day.       ipratropium (ATROVENT) 0.03 % nasal spray INSTILL 2 SPRAYS INTO EACH NOSTRIL THREE TIMES DAILY AS NEEDED 30 mL 3     lisinopril (PRINIVIL,ZESTRIL) 2.5 MG tablet TAKE 1 TABLET BY MOUTH ONCE DAILY 90 tablet 3     loratadine (CLARITIN) 10 mg tablet Take 10 mg by mouth daily.       LUTEIN EXTRACT/ZEAXANTHIN EXT (LUTEIN-ZEAXANTHIN ORAL) Take 1 tablet by mouth daily.       multivitamin (MULTIVITAMIN) per tablet Take 1 tablet by mouth daily.       saw palmetto fruit 450 mg cap Take 1 capsule by mouth 2 (two) times a day.       tamsulosin (FLOMAX) 0.4 mg Cp24 TK 1 C PO QD  3     lidocaine (XYLOCAINE) 5 % ointment apply small amount to affected area twice daily as needed 50 g 1     No current facility-administered medications for this visit.      No Known Allergies  Social History   Substance Use Topics     Smoking status: Never Smoker     Smokeless tobacco: None     Alcohol use 2.4 oz/week     2 Glasses of wine, 2 Shots of liquor per week

## 2021-06-10 NOTE — TELEPHONE ENCOUNTER
Left a msg informing the patient that we still have not received anything via fax or any returned calls from [facility] regarding his CPAP supplies. Asked that the patient call them and get back to us after verifying what they need from us.

## 2021-06-11 NOTE — PATIENT INSTRUCTIONS - HE
1. Lumbar injection    2. MRI of your right shoulder    3. EMG of your right arm    [facility] Injection Requirements      A  is required for all fluoroscopically-guided injections.    Injection appointments may be cancelled if there are signs/symptoms of an active infection or if the patient is being actively treated with antibiotics for a diagnosed infection.    Patients may have their steroid injection cancelled if they have had another steroid injection within 2 weeks.    Diabetic patients will have their blood glucose levels checked the day of their injection and the appointment will be rescheduled if the blood glucose level is 300 or higher.    Patients with allergies to cortisone, local anesthetics, iodine, or contrast dye should contact the Spine Center to further discuss these considerations.    Patients scheduled for medial branch block diagnostic injections should refrain from taking pain medication the day of the procedure.  The medial branch block injection appointment will be rescheduled if the patient's pain rating is not 5/10 or greater at the time of the procedure.    Patients taking warfarin/Coumadin will have their INR checked the day of the procedure and the procedure may be rescheduled if the INR is greater than 3.0.    Please contact the Spine Center (#[phone]) if you are taking any prescription blood-thinning medications (warfarin, Plavix, Lovenox, Eliquis, Brilinta, Effient, etc.) as special dosing adjustments may need to be made depending on the type of injection you are scheduled to receive.    It is recommended that you delay having your steroid injection if you have received a flu shot or shingles vaccine within 2 weeks.

## 2021-06-11 NOTE — TELEPHONE ENCOUNTER
Phone call to patient to review results and provider's recommendations. Results given and explained. Discussed PSP's recommendation to continue with the scheduled EMG, but also to get a orthopedic evaluation regarding the rotator cuff tear. Stated understanding.     He reports he has seen Piotr Elkins and Shalom at [facility]. Contact information provided for O. Instructed to contact them if they have not reached out to him in the next couple of business days. Stated understanding.

## 2021-06-11 NOTE — PROGRESS NOTES
Assessment/Plan:      Diagnoses and all orders for this visit:    Lumbar radicular pain  -     OPS TFESI Lumbar Bilateral; Future; Expected date: 09/02/2020    Spinal stenosis of lumbar region with neurogenic claudication  -     OPS TFESI Lumbar Bilateral; Future; Expected date: 09/02/2020    Spondylolisthesis of lumbar region  -     OPS TFESI Lumbar Bilateral; Future; Expected date: 09/02/2020    Chronic right shoulder pain  -     MR Shoulder Without Contrast Right; Future; Expected date: 09/02/2020    Paresthesia of right arm  -     EMG - Clinic; Future; Expected date: 09/02/2020    Foraminal stenosis of cervical region  -     EMG - Clinic; Future; Expected date: 09/02/2020        Assessment: Pleasant 85 y.o. male  with history of colon cancer, hyperlipidemia, hypertension, carotid artery occlusion, coronary artery disease with:       1.  Chronic lumbar spine pain waxing and waning.  He continues to have worsening pain and worsening right lower extremity pain and paresthesias.  Some last exam which has resolved.  He has severe spinal stenosis L3-4 and most significant L4-5 with L4-5 spondylolisthesis.  There is tortuosity of the nerve roots.  Pain persist despite physical therapy and gabapentin.    2.  Right shoulder pain consistent with rotator cuff pathology    3.  Carpal tunnel  intermittent right arm and hand paresthesias all fingers involved.  Question syndrome versus  cervical radicular symptoms.    4.  Multilevel degenerative disc disease and foraminal stenosis in the cervical spine which could contribute to his right shoulder and arm symptoms.      Discussion:    1.  I discussed the diagnosis and treatment options.  With regards to the lumbar spine we discussed options of further diagnostics along with injections and returning to surgeon.  He was seen by Dr. Kenny his physician assistant at [facility] orthopedics.  To avoid potential surgery they started with physical therapy.  Given the severity of the  stenosis, my hope is that he would be seen by Dr. Kenny per  patient request for surgical opinion so the patient can make an informed decision regarding surgery.  He is going to make that appointment.    2.  I recommend bilateral L5-S1 transforaminal epidural steroid injection to help with back pain and the leg radicular symptoms from the neurogenic claudication.  This will be done with Dr. Van.    3.  Recommend MRI of the right shoulder to evaluate for rotator cuff pathology and determine the cause for his right shoulder and neck pain.    4.  EMG right upper extremity to evaluate for contribution from the cervical spine such as cervical radiculopathy as well as median neuropathy at the wrist.    5.  Follow-up with me after EMG.    25 minutes were spent with this patient in addition to any procedure with greater than 50% in counseling and coordination of care.      It was our pleasure caring for your patient today, if there any questions or concerns please do not hesitate to contact us.      Subjective:   Patient ID: Antoni Stoll is a 85 y.o. male.    History of Present Illness: Patient presents for follow-up of multiple pain complaints.  Most significant is lumbar spine pain with right greater than left lower extremity radicular pain and paresthesias.  Also following up with cervical spine and right shoulder pain and arm paresthesias.  His back pain has worsened.  Pain is in the lumbar spine with numbness down the legs mostly in the right medial ankle into the bottom and top of the right foot and left bottom and top of the foot.  His symptoms are worse with any walking and standing.  Has to sit down for relief when doing any thing prolonged standing such as working in the kitchen.  He has poor balance as well.  He was seen by [facility] orthopedics in January.  He was seen by physician assistant of Dr. Kenny and I reviewed the note.  They recommended physical therapy.  He has done physical therapy at  [facility] and with  Desi Leos and [facility] rehab working on cervical spine and lumbar spine but he continues to have issues.  He also has a heel lift that he wears.  Pain is a 7/10 at worst 3/10 today 1/10 at best.    He also continues to have neck pain most significant right shoulder however.  This is anterior right shoulder and over laterally to the deltoid and posterior tricep region radiating up into the upper trapezius towards the cervical spine.  He has numbness and tingling in his right hand with activities such as driving.  This is worsened since last visit as well.  I last saw him in January.  After that visit he did fall on the ice on his right arm and since that time his pain is been worsening.  He has a sharp pain at time with moving his right arm not sure what makes it better.  Continues to take gabapentin 600 mg at bedtime.      Imaging: MRI report and images were personally reviewed and discussed with the patient.  A plastic model was utilized during the discussion.  MRI of the lumbar spine personally reviewed from January 2020.  This shows spondylolisthesis L4-5 with severe spinal stenosis.  There is also severe spinal stenosis L3-4 and severe left foraminal stenosis L1-2.    Cervical spine MRIFrom January 2020 was reviewed showing multilevel degenerative disc disease moderate to severe right foraminal stenosis and severe left foraminal stenosis at C4-5 and C3-4 with severe bilateral foraminal stenosis C5-6 and moderate at C6-7.       Review of Systems: Pertinent positives: Numbness and tingling in both legs and right arm sense of weakness in the right arm and legs.  Poor balance..  Pertinent negatives:   No bowel or bladder incontinence.  No urinary retention.  No fevers, unintentional weight loss, balance changes, headaches, frequent falling, difficulty swallowing, or coordination difficulties.  All others reviewed are negative.         Past Medical History:   Diagnosis Date     Anemia       Cancer (H)     colon     Carotid artery occlusion      Coronary artery disease      High cholesterol      Hyperlipidemia      Hypertension      Left inguinal hernia      Low back pain      Sleep apnea, obstructive     CPAP     Spinal stenosis     lumbar       The following portions of the patient's history were reviewed and updated as appropriate: allergies, current medications, past family history, past medical history, past social history, past surgical history and problem list.           Objective:   Physical Exam:    Vitals:    09/02/20 1121   BP: 158/70   Pulse: 88     There is no height or weight on file to calculate BMI.      General: Alert and oriented with normal affect. Attention, knowledge, memory, and language are intact. No acute distress.   Eyes: Sclerae are clear.  Respirations: Unlabored. CV: No lower extremity edema.  Skin: No rashes seen.    Gait:  Nonantalgic, cautious shuffling.  Flexed at the waist.  Right shoulder decreased internal rotation with pain.  Positive Hightower on the right positive arm drop for weakness and pain along with speeds test.  Sensation is decreased to light touch right medial malleolus and dorsal feet/first webspace bilaterally.  Intact to light touch throughout the upper  extremities.  Reflexes are 1+ and symmetric in the biceps triceps and brachioradialis with negative Hoffmans.  1+ patellar 0 Achilles.    Manual muscle testing reveals:  Right /Left out of 5  4/5 shoulder abductors and flexors  5/5 elbow flexors  5/5 elbow extensors  5/5 wrist extensors  5/5 interosseus  5/5 finger flexors  5/5 hip flexors  5/5 knee flexors  5/5 knee extensors  5/5 ankle plantar flexors  5/5 ankle dorsiflexors  5/5  EHL

## 2021-06-11 NOTE — PATIENT INSTRUCTIONS - HE
1. See Ortho as planned for your shoulder    2. See [facility] for your lumbar spine    3. Get a carpal tunnel brace and wear it when you sleep    4. Lumbar epidural injection

## 2021-06-11 NOTE — PATIENT INSTRUCTIONS - HE
Follow-up visit with Dr. Mir in 2-4 weeks to discuss injection outcome and determine care plan going forward.       DISCHARGE INSTRUCTIONS    During office hours (8:00 a.m.- 4:00 p.m.) questions or concerns may be answered  by calling Spine Center Navigation Nurses at  [phone].  Messages received after hours will be returned the following business day.      In the case of an emergency, please dial 911 or seek assistance at the nearest Emergency Room/Urgent Care facility.     All Patients:    ? You may experience an increase in your symptoms for the first 2 days (It may take anywhere between 2 days- 2 weeks for the steroid to have maximum effect).    ? You may use ice on the injection site, as frequently as 20 minutes each hour if needed.    ? You may take your pain medicine.    ? You may continue taking your regular medication after your injection. If you have had a Medial Branch Block you may resume pain medication once your pain diary is completed.    ? You may shower. No swimming, tub bath or hot tub for 48 hours.  You may remove your bandaid/bandage as soon as you are home.    ? You may resume light activities, as tolerated.    ? Resume your usual diet as tolerated.    ? It is strongly advised that you do not drive for 1-3 hours post injection.    ? If you have had oral sedation:  Do not drive for 8 hours post injection.      ? If you have had IV sedation:  Do not drive for 24 hours post injection.  Do not operate hazardous machinery or make important personal/business decisions for 24 hours.      POSSIBLE STEROID SIDE EFFECTS (If steroid/cortisone was used for your procedure)    -If you experience these symptoms, it should only last for a short period      Swelling of the legs                Skin redness (flushing)       Mouth (oral) irritation     Blood sugar (glucose) levels              Sweats                      Mood changes    Headache    Sleeplessness         POSSIBLE PROCEDURE SIDE  EFFECTS  -Call the Spine Center if you are concerned    Increased Pain             Increased numbness/tingling        Nausea/Vomiting            Bruising/bleeding at site        Redness or swelling                                                Difficulty walking        Weakness             Fever greater than 100.5    *In the event of a severe headache after an epidural steroid injection that is relieved by lying down, please call the [facility] to speak with a clinical staff member*

## 2021-06-11 NOTE — PATIENT INSTRUCTIONS - HE
Follow-up visit with Dr. Mir in 2-4 weeks to discuss injection outcome and determine care plan going forward.       DISCHARGE INSTRUCTIONS    During office hours (8:00 a.m.- 4:00 p.m.) questions or concerns may be answered  by calling Spine Center Navigation Nurses at  [phone].  Messages received after hours will be returned the following business day.      In the case of an emergency, please dial 911 or seek assistance at the nearest Emergency Room/Urgent Care facility.     All Patients:    ? You may experience an increase in your symptoms for the first 2 days (It may take anywhere between 2 days- 2 weeks for the steroid to have maximum effect).    ? You may use ice on the injection site, as frequently as 20 minutes each hour if needed.    ? You may take your pain medicine.    ? You may continue taking your regular medication after your injection. If you have had a Medial Branch Block you may resume pain medication once your pain diary is completed.    ? You may shower. No swimming, tub bath or hot tub for 48 hours.  You may remove your bandaid/bandage as soon as you are home.    ? You may resume light activities, as tolerated.    ? Resume your usual diet as tolerated.    ? It is strongly advised that you do not drive for 1-3 hours post injection.    ? If you have had oral sedation:  Do not drive for 8 hours post injection.      ? If you have had IV sedation:  Do not drive for 24 hours post injection.  Do not operate hazardous machinery or make important personal/business decisions for 24 hours.      POSSIBLE STEROID SIDE EFFECTS (If steroid/cortisone was used for your procedure)    -If you experience these symptoms, it should only last for a short period      Swelling of the legs                Skin redness (flushing)       Mouth (oral) irritation     Blood sugar (glucose) levels              Sweats                      Mood changes    Headache    Sleeplessness         POSSIBLE PROCEDURE SIDE  EFFECTS  -Call the Spine Center if you are concerned    Increased Pain             Increased numbness/tingling        Nausea/Vomiting            Bruising/bleeding at site        Redness or swelling                                                Difficulty walking        Weakness             Fever greater than 100.5    *In the event of a severe headache after an epidural steroid injection that is relieved by lying down, please call the [facility] to speak with a clinical staff member*

## 2021-06-11 NOTE — TELEPHONE ENCOUNTER
Patient Returning Call  Reason for call:  Return call.  Information relayed to patient:  Patient was asked what was the reason for his call.  Patient has additional questions:  No  If YES, what are your questions/concerns:  n/a  Okay to leave a detailed message?: No    Patient stated he needs Sarthak Romano MD to writer him a back-dated referral. Patient stated he saw an audiologist at [facility] in [place] yesterday, Dr. Jack Hernández for bilateral hearing loss. Patient stated audiology told him they need a referral for his insurance to cover the visit from yesterday.

## 2021-06-11 NOTE — PROGRESS NOTES
Assessment/Plan:      Diagnoses and all orders for this visit:    Paresthesia of right arm  -     EMG - Clinic; Standing  -     EMG - Clinic    Foraminal stenosis of cervical region  -     EMG - Clinic; Standing  -     EMG - Clinic    Spinal stenosis of lumbar region with neurogenic claudication  -     OPS TFESI Lumbar Bilateral; Future; Expected date: 09/23/2020  -     Ambulatory referral to Orthopedic Surgery    Spondylolisthesis of lumbar region  -     OPS TFESI Lumbar Bilateral; Future; Expected date: 09/23/2020  -     Ambulatory referral to Orthopedic Surgery    Lumbar radicular pain  -     OPS TFESI Lumbar Bilateral; Future; Expected date: 09/23/2020  -     Ambulatory referral to Orthopedic Surgery    Nontraumatic complete tear of right rotator cuff        Assessment: Pleasant 85 y.o. male  with history of colon cancer, hyperlipidemia, hypertension, carotid artery occlusion, coronary artery disease with:        1.  Intermittent right arm and hand paresthesias all fingers involved.    Consistent with carpal tunnel syndrome.  He does have mild to moderate median neuropathy at the wrist on EMG today.    2. Chronic lumbar spine pain waxing and waning.  He continues to have     right lower extremity pain and paresthesias.   He has severe spinal stenosis L3-4 and most significant L4-5 with L4-5 spondylolisthesis.  There is tortuosity of the nerve roots.  Pain persist despite physical therapy and gabapentin.  Recent bilateral L5-S1 transforaminal epidural steroid injection did offer some relief but only 40 to 50%.  Still having symptoms.     3.   Right shoulder pain consistent with rotator cuff pathology.  He does have aFull-thickness rotator cuff tear of the supraspinatus tendon with muscle retraction.  Orthopedic referral pending tomorrow.        4.  Multilevel degenerative disc disease and foraminal stenosis in the cervical spine.  No radiculopathy noted on EMG.  Has right shoulder pain is likely related to  rotator cuff pathology.    Discussion:    1.  I discussed the diagnosis and treatment options.  We discussed response of the injection regarding the lumbar spine pain we discussed the EMG results today.  He does have mild to moderate median neuropathy at the wrist without any cervical radiculopathy.    2.  I recommend he continue with plan to see orthopedics for his right shoulder.  He has an appointment tomorrow    3.  I recommend wearing carpal tunnel splint at bedtime to see if this helps his right hand paresthesias which are mild for him compared to his other issues.     4.   I discussed options regarding the lumbar spine we reviewed the MRI again.  He does have severe stenosis L3-4 and L4-5.  Some mild benefit from Bilateral L5-S1 transforaminal epidural steroid injection.  Recommend bilateral L 4-5 transforaminal epidural steroid injection with Dr. Van to see if we get him some more relief.  He agrees with this.  This will be scheduled.    5.  He would also like to go back to see his surgeon for an opinion.  He would like to see Dr. Kenny at [facility] orthopedics and a referral placed.  I would like him to see Dr. Kenny directly and not to be evaluated by his PA again as we have continue with conservative management.    6. Follow-up with me 2 to 4 weeks after injection      It was our pleasure caring for your patient today, if there any questions or concerns please do not hesitate to contact us.      Subjective:   Patient ID: Antoni Stoll is a 85 y.o. male.    History of Present Illness: Patient presents for evaluation of multiple pain complaints after EMG today.  Continues to have right shoulder pain upper back pain with the pain to the right elbow worse with any movement of his right arm.  Has recently had an MRI of the right shoulder.  Was referred to [facility] orthopedics but has not yet been evaluated by them.  Here for MRI review.    He also follows up for lumbar spine pain and right leg  paresthesias with bilateral lower extremity radicular pain after lumbar epidural L5-S1.  This was done by Dr. Van.  40 to 50% improved.  1 day of great relief and then the pain is slowly worsened.  Worse with any standing and walking better with sitting.  Has constant numbness in the right foot.  Pain is a 5/10 at worst 3/10 today 1/10 at best.  His shoulder is actually more painful than his low back.    He also continues have numbness and tingling in the right hand all digits involved.  This is intermittent and again not related to the neck or upper back or shoulder pain and seems to be the least of his problems.  Continues to take gabapentin and Tylenol for pain.    In the past has met with Dr. Kenny at orthopedics/[facility] orthopedics for his lumbar spine.  He actually met with the PA he has been trying conservative management.      Imaging: MRI cervical spine images and report personally reviewed from January 2020 reveals moderate to severe right foraminal stenosis C4-5, C3-4 and moderate C6-7.  Moderate severe bilateral C5-6 with moderate to severe left foraminal stenosis C6-7, severe C3-4 and C4-5 on the left.    MRI lumbar spine personally reviewed showing moderate multilevel degenerative disc disease with severe spinal stenosis L4-5 and L3-4 and left foraminal stenosis L1-2.  With spondylolisthesis L4-5.    Recent MRI of the right shoulder personally reviewed with the patient.  This shows a full-thickness retracted supraspinatus tear measuring 2.8 cm.  Minimal fatty streaks within the supraspinatus and infraspinatus.  Advanced AC osteoarthritis.  Subacromial subdeltoid bursitis noted.    EMG: Mild to moderate right median neuropathy at the wrist consistent with entrapment in the carpal tunnel.  No cervical radiculopathy noted.  No ulnar neuropathy.    Review of Systems: Pertinent positives: Numbness tingling weakness right shoulder.  Numbness and tingling right hand.    Pertinent negatives: No  numbness, tingling or weakness.  No bowel or bladder incontinence.  No urinary retention.  No fevers, unintentional weight loss, balance changes, headaches,  difficulty swallowing, or coordination difficulties.        Prior interventions:  1.  Bilateral L5-S1 transforaminal epidural steroid injection September 10, 2020 with Dr. Van    Past Medical History:   Diagnosis Date     Anemia      Cancer (H)     colon     Carotid artery occlusion      Coronary artery disease      High cholesterol      Hyperlipidemia      Hypertension      Left inguinal hernia      Low back pain      Sleep apnea, obstructive     CPAP     Spinal stenosis     lumbar       The following portions of the patient's history were reviewed and updated as appropriate: allergies, current medications, past family history, past medical history, past social history, past surgical history and problem list.           Objective:   Physical Exam:    Vitals:    09/23/20 1355   BP: 143/68   Pulse: 77     There is no height or weight on file to calculate BMI.      General: Alert and oriented with normal affect. Attention, knowledge, memory, and language are intact. No acute distress.   Eyes: Sclerae are clear.  Respirations: Unlabored. CV: No lower extremity edema.  Skin: No rashes seen.    Gait:  Nonantalgic  Positive empty can and arm drop on the right.  Sensation is mildly decreased to light touch right medial and lateral malleolus intact to light touch throughout the upper   extremities.  Reflexes are 1+ and symmetric in the biceps triceps and brachioradialis with negative Hoffmans. 1+ patellar and 0 Achilles with equivocal toes.    Manual muscle testing reveals:  Right /Left out of 5  4/5 shoulder abductors  5/5 elbow flexors  5/5 elbow extensors  5/5 wrist extensors  5/5 interosseus  5/5 finger flexors     5/5 ankle plantar flexors  5/5 ankle dorsiflexors  5/5  EHL

## 2021-06-11 NOTE — TELEPHONE ENCOUNTER
Who is calling: Patient  Reason for Call:  Requesting a return phone call. Patient declined on providing any additional details.  Date of last appointment with primary care: 06/02/2020  Okay to leave a detailed message: No

## 2021-06-11 NOTE — TELEPHONE ENCOUNTER
----- Message from Orlin Mir DO sent at 9/16/2020 11:11 AM CDT -----  Right shoulder MRI show a large rotator cuff tear.    Recommend orthopedic evaluation and continue plan of EMG

## 2021-06-11 NOTE — TELEPHONE ENCOUNTER
Pls see patient's request for an audiology order to [facility] in [place].   Dr. Jack Hernández    *Pt has BCBS and Medicare Insurance - non referral based insurances.

## 2021-06-11 NOTE — PROGRESS NOTES
Patient presents at the request of Dr. Orlin Mir for right upper extremity EMG.  He has right shoulder pain with weakness.  His right hand numbness and tingling all digits involved intermittently.    EMG/NCS  results: Please see scanned full report.    Comment NCS: Abnormal study:    1.  Mildly prolonged latency right median SNAP and transcarpal study with low amplitudes.  2.  Mildly prolonged right median CMAP latency and significant decreased amplitude.  3.  Prolonged right median versus ulnar transcarpal latency comparison.  4. Normal Right ulnar studies and radial SNAP.    Comment EMG: Normal study.  1.  Normal needle EMG right upper extremity.    Interpretation: Abnormal study: There is electrodiagnostic evidence of:    1.  Right median neuropathy at the wrist consistent with entrapment in the carpal tunnel, mild-moderate in severity.    2. There is no electrodiagnostic evidence of cervical radiculopathy, brachial plexopathy, or ulnar neuropathy in the right upper extremity.    The testing was completed in its entirety by the physician.      It was our pleasure caring for your patient today, if there any questions or concerns please do not hesitate to contact us.

## 2021-06-12 NOTE — ANESTHESIA POSTPROCEDURE EVALUATION
Patient: Antoni Stoll  ROBOTIC ASSISTED RIGHT HEMICOLECTOMY  Anesthesia type: general    Patient location: PACU  Last vitals:   Vitals:    09/13/17 1550   BP: 157/77   Pulse: 64   Resp: 14   Temp: 36.3  C (97.4  F)   SpO2: 99%     Post vital signs: stable  Level of consciousness: awake and responds to simple questions  Post-anesthesia pain: pain controlled  Post-anesthesia nausea and vomiting: no  Pulmonary: spontaneous ventilation, face mask  Cardiovascular: stable and blood pressure at baseline  Hydration: adequate  Anesthetic events: no    QCDR Measures:  ASA# 11 - Tamar-op Cardiac Arrest: ASA11B - Patient did NOT experience unanticipated cardiac arrest  ASA# 12 - Tamar-op Mortality Rate: ASA12B - Patient did NOT die  ASA# 13 - PACU Re-Intubation Rate: ASA13B - Patient did NOT require a new airway mgmt  ASA# 10 - Composite Anes Safety: ASA10A - No serious adverse event    Additional Notes:

## 2021-06-12 NOTE — PROGRESS NOTES
[facility] Follow Up Note    Antoni Stoll   82 y.o. male    Date of Visit: 9/8/2017    Chief Complaint   Patient presents with     Pre-op Exam     Rt hemicolectomy, on 9/13, Dr Gresham, @ [facility]  Antoni is here for a preop for right hemicolectomy with robotic assistance on September 13 at [facility] for adenocarcinoma of the colon seen on August 30, 2017 colonoscopy.  2 polyps of 10 mm and 16-20 mm noted.  Patient reports that he was told there was a focus of adenocarcinoma in those polyps.  CT scan of the abdomen and pelvis on September 5, 2017 without evidence of metastatic disease.    Patient has had previous colon polyps and his father had colon cancer.    He does have a moderate left inguinal hernia containing some sigmoid colon without obstruction.    Status post right inguinal hernia surgery in 2013.    His bowels are regular and no blood in stool.  Occasional abdominal cramping prior to bowel movement but otherwise no abdominal pain.    Patient had coronary artery bypass in 2013.  December 2016 stress echo showed normal ejection fraction of 55-60%.  No ischemia.    No history of arrhythmia or history of syncope.    He remains active with regular walking.  Walks at [facility] 5 times a week.  Good exertional ability.    No new cough or increasing shortness of breath.  No fever or URI symptoms.    Moderate BPH controlled with Flomax nightly.  No dysuria or hematuria.    Never smoked.    Hypercholesterolemia well-controlled on Lipitor.    Continues on aspirin daily.  No history of ulcer or bleeding.  Her graph 2011 ultrasound negative for AAA.  2015 carotid ultrasound negative for carotid artery stenosis.    He does have severe central canal spinal stenosis L3-L5 with bilateral L4 nerve root impingement with foraminal stenosis.  He has been having some moderate right hip and lateral thigh pain with ambulation intermittently.  But no weakness or  "numbness.  Pain is not severe.  He takes gabapentin in the morning and Tylenol at night.    Past history of basal cell cancer of the scalp.  Saw dermatology in May with negative exam.    He has sleep apnea but compliant with CPAP.  He still is waiting for his new CPAP machine, but his old one does work okay.    Hypertension is been well-controlled and denies lightheaded dizzy spells.  On atenolol 25 mg a day and lisinopril 2.5 mg a day, both in the morning.    No history of DVT.  No history of seizure.  No history of asthma or COPD.      Extensive review of medical record.    PMHx:    Past Medical History:   Diagnosis Date     Carotid artery occlusion      Coronary artery disease      High cholesterol      Hyperlipidemia      Hypertension      Low back pain      Sleep apnea, obstructive      PSHx:    Past Surgical History:   Procedure Laterality Date     ARTERIAL BYPASS SURGERY       BLEPHAROPLASTY Bilateral      CARDIAC CATHETERIZATION  06/03/2013     CATARACT EXTRACTION Bilateral      CORONARY ARTERY BYPASS GRAFT  06/28/2013    5-vessel     VARICOSE VEIN SURGERY Left      Immunizations:   Immunization History   Administered Date(s) Administered     Influenza [id number], 01/18/2010     Influenza high dose, seasonal 10/13/2015, 10/18/2016     Influenza, inj, historic 10/23/2007, 10/29/2009, 10/11/2010, 11/08/2011     Influenza, seasonal,quad inj 6-35 mos 11/29/2012, 10/25/2013, 11/19/2014     Pneumo Conj 13-V (2010&after) 04/09/2015     Pneumo Polysac 23-V 10/03/1999, 03/06/2007     Td, historic 03/11/1992, 10/24/2000, 04/11/2011     ZOSTER 11/08/2007       ROS A comprehensive review of systems was performed and was otherwise negative    Medications, allergies, and problem list were reviewed and updated    Exam  /68  Pulse 67  Ht 5' 5.5\" (1.664 m)  Wt 157 lb (71.2 kg)  SpO2 98%  BMI 25.73 kg/m2  Patient is alert and oriented ×3.  Normal mood and affect.  Normal neurologic exam.  Pupils and irises equal and " reactive.  Extraocular muscles intact.  No jaundice or conjunctivitis.  Pharynx is normal.  Teeth in good condition.  No cervical or supraclavicular adenopathy.  No JVD and no carotid bruits.  Lungs clear to auscultation and normal respiratory excursion.  Mild scoliosis and hyperlordosis.  Heart is regular without murmur rub or gallop.  Sternal scar well-healed.  No lower extremity edema.  Abdomen is mildly overweight, nontender.  Right lower abdominal inguinal hernia repair scar well-healed.  No hepatosplenomegaly.  No pulsatile abdominal mass.    ECG, reviewed by me, shows normal sinus rhythm, borderline left atrial enlargement, but otherwise normal EKG and no change from previous.    Assessment/Plan  1. Pre-op exam  Patient clinically stable to proceed with needed surgery for right hemicolectomy.    I reviewed some risks of surgery including infection and bowel perforation, major bleeding, heart attack or stroke, DVT and pulmonary embolism, and other risks.  Patient accepts these risks and wishes to proceed with surgery as planned.    N.p.o. after midnight and he has received his bowel prep orders with neomycin from the surgeon.  - Electrocardiogram Perform and Read    2. 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  Patient reports that a focus of adenocarcinoma was noted in the polyps.    CT scan of the abdomen does not show evidence of metastatic disease.  Anticipate not needing adjuvant chemotherapy, but await surgical pathology.    3. Essential hypertension with goal blood pressure less than 140/90  Well-controlled.  He will take atenolol 25 mg the morning of surgery with a sip of water.  He will not take lisinopril the morning of surgery.    4. Coronary atherosclerosis due to calcified coronary lesion  Stop aspirin now.  Restart aspirin after surgery.    Lipitor at night.    Patient is full code.    5. Sleep  apnea, unspecified type  He will bring CPAP machine with him to the hospital.    6. Encounter for long-term (current) use of other medications    - Comprehensive Metabolic Panel  - HM2(CBC w/o Differential)    Severe spinal canal stenosis.  Still ambulatory and with minimal pain.  Some new right hip and lateral thigh pain.  Can use gabapentin as needed.  Chronic neck pain with history of severe facet arthropathy and foraminal stenosis.    Moderate BPH, he will take Flomax the night before surgery.  Monitor for bladder retention after surgery.    Return in about 4 weeks (around 10/6/2017) for Recheck after surgery.   There are no Patient Instructions on file for this visit.  Sarthak Romano MD  Total time with patient over 40 minutes and over 50% coord care.  Time all face to face.      Current Outpatient Prescriptions   Medication Sig Dispense Refill     acetaminophen (TYLENOL) 500 MG tablet Take 500 mg by mouth every 6 (six) hours as needed for pain.       aspirin 325 MG tablet Take 325 mg by mouth daily.       atenolol (TENORMIN) 25 MG tablet TAKE 1 TABLET BY MOUTH DAILY 90 tablet 3     atorvastatin (LIPITOR) 20 MG tablet TAKE 1 TABLET BY MOUTH DAILY 90 tablet 3     cholecalciferol, vitamin D3, 1,000 unit tablet Take 1,000 Units by mouth daily.       fluticasone (FLONASE) 50 mcg/actuation nasal spray 2 sprays into each nostril daily.       gabapentin (NEURONTIN) 300 MG capsule TAKE 1 TO 2 CAPSULES BY MOUTH EVERY EVENING AS NEEDED FOR NERVE PAIN 180 capsule 3     GLUCOSAM HCL/CHONDRO BOLANOS A/C/MN (GLUCOSAMINE-CHONDROITIN COMPLX ORAL) Take 1 tablet by mouth 2 (two) times a day.       ipratropium (ATROVENT) 0.03 % nasal spray USE 2 SPRAYS IN EACH NOSTRIL THREE TIMES DAILY AS NEEDED 30 mL 6     lisinopril (PRINIVIL,ZESTRIL) 2.5 MG tablet TAKE 1 TABLET BY MOUTH ONCE DAILY 90 tablet 3     loratadine (CLARITIN) 10 mg tablet Take 10 mg by mouth daily.       LUTEIN EXTRACT/ZEAXANTHIN EXT (LUTEIN-ZEAXANTHIN ORAL) Take 1 tablet  by mouth daily.       saw palmetto fruit 450 mg cap Take 1 capsule by mouth 2 (two) times a day.       tamsulosin (FLOMAX) 0.4 mg Cp24 TK 1 C PO QD  3     No current facility-administered medications for this visit.      No Known Allergies  Social History   Substance Use Topics     Smoking status: Never Smoker     Smokeless tobacco: None     Alcohol use 2.4 oz/week     2 Glasses of wine, 2 Shots of liquor per week

## 2021-06-12 NOTE — PATIENT INSTRUCTIONS - HE
1. Continue to wear your wrist brace,but cut a space for your first finger joint.    2. Continue to monitor your right shoulder pain    3. See Orthopedics for your lumbar stenosis    4. Continue with gabapentin at bedtime and add 100mg in the morning

## 2021-06-12 NOTE — ANESTHESIA PREPROCEDURE EVALUATION
Anesthesia Evaluation      Patient summary reviewed   No history of anesthetic complications     Airway   Mallampati: II  Neck ROM: full   Pulmonary - negative ROS and normal exam    breath sounds clear to auscultation  (+) sleep apnea on CPAP, , a smoker  (-) COPD, asthma                         Cardiovascular - normal exam  Exercise tolerance: > or = 4 METS (Active, walks at community center 5x/week)  (+) hypertension well controlled, CAD, CABG/stent (CABG in 2013, asymptomatic since, negative recent stress echo w/ resting EF 55-60%., 70% w/ exercise.), ,   Received beta blockers in last 24 hours prior to incision.  ,  (-) angina, murmur  ECG reviewed  Rhythm: regular  Rate: normal,    no murmur      Neuro/Psych      Comments: Spinal stenosis  Carotid stenosis listed in history, but 2015 ultrasound negative for stenosis.      Endo/Other       GI/Hepatic/Renal      Comments: Colon cancer          Dental - normal exam                        Anesthesia Plan  Planned anesthetic: general endotracheal    ASA 3   Induction: intravenous   Anesthetic plan and risks discussed with: patient  Anesthesia plan special considerations: IV therapy two IVs,   Post-op plan: routine recovery

## 2021-06-12 NOTE — PROGRESS NOTES
Assessment/Plan:      Diagnoses and all orders for this visit:    Spinal stenosis of lumbar region with neurogenic claudication  -     gabapentin (NEURONTIN) 100 MG capsule; 1 cap (100mg) in the morning.  Dispense: 30 capsule; Refill: 2    Spondylolisthesis of lumbar region    Lumbar radicular pain  -     gabapentin (NEURONTIN) 100 MG capsule; 1 cap (100mg) in the morning.  Dispense: 30 capsule; Refill: 2    Carpal tunnel syndrome of right wrist    Nontraumatic complete tear of right rotator cuff        Assessment: Pleasant 85 y.o. male  with history of colon cancer, hyperlipidemia, hypertension, carotid artery occlusion, coronary artery disease with:        1. Chronic lumbar spine pain waxing and waning.  He continues to have     right lower extremity pain and paresthesias.   He has severe spinal stenosis L3-4 and most significant L4-5 with L4-5 spondylolisthesis.  There is tortuosity of the nerve roots.  Pain persist despite physical therapy and gabapentin.  Recent bilateral L5-S1 transforaminal epidural steroid injection did offer some relief but only 40 to 50%.    Also slightly improved after bilateral L4-5 transforaminal epidural steroid injection.  Again still having low back pain and right foot numbness and tingling.     2.   Intermittent right arm and hand paresthesias all fingers involved.    Consistent with carpal tunnel syndrome.  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  Some slight improvement with wearing a carpal tunnel brace.  Does have some pressure areas on his right hand from the brace.     3.   Right shoulder pain consistent with rotator cuff pathology.  He does have aFull-thickness rotator cuff tear of the supraspinatus tendon with muscle retraction.    Evaluated by orthopedics at [facility].  Had a right subacromial bursa injection and recommended conservative management.        4.  Multilevel degenerative disc disease and foraminal stenosis in the cervical spine.  No  radiculopathy noted on EMG.  Has right shoulder pain is likely related to rotator cuff pathology.       Discussion:    1.  Discussed the diagnosis and treatment options.  We went through each issue for him.    2.  Regarding the lumbar spine stenosis and radicular pain, he is going to see Dr. Kenny at [facility] orthopedics in the next week or 2 and encouraged him to continue with that plan as he has severe spinal stenosis and short of ongoing injections intermittently, I do recommend surgery as he is complaining of some mild urinary leakage and poor balance.    3.  I will provide gabapentin 100 mg in the morning in addition to his 600 mg that he takes at night which may help with some of his radicular pain and pain related to spinal stenosis.    4.  Continue to use a carpal tunnel splint on the right hand at night.  He does have a pressure area over his right first MCP joint.  He can either cut the brace or wear a bandage over his MCP joint.    5.  I do offer him further therapy on his right shoulder and he declines.  We will continue with conservative monitoring of his right shoulder pain after the injection.    6.  Follow-up 4 to 6 weeks.    It was our pleasure caring for your patient today, if there any questions or concerns please do not hesitate to contact us.      Subjective:   Patient ID: Antoni Stoll is a 85 y.o. male.    History of Present Illness:Patient presents for an evaluation of multiple pain complaints including lumbar spinal stenosis with right leg radicular pain, right carpal tunnel syndrome and right hand numbness and tingling and right shoulder pain related to rotator cuff tear.    1.  Lumbar spine pain across the lumbosacral junction persists worse with standing and walking better with sitting.  Numbness and tingling in his right foot.  This pain is relatively constant.  Had a bilateral L4-5 transforaminal epidural steroid injection since last visit.  Did help some of the symptoms still  having some pain however.  Pain is a 6/10 at worst 2/10 today 1/10 at best.    2. Also continues to have right hand numbness and tingling all fingers involved.  Wearing the carpal tunnel splint at night which does feel better.  He still has some numbness and tingling with activities such as driving but overall improved.  He does have a pressure area over his right hand first MCP joint that he wants an opinion regarding this normally resolves during the day and is most prominent first thing in the morning.    3. He also has right shoulder pain.  He was seen by [facility] orthopedics.  I reviewed the note from Dr. Cox.  Underwent a right subacromial bursa injection.  They recommended conservative management despite complete rotator cuff tear with retraction.  Patient has had extensive therapy from January through July of this year for his back pain and shoulder pain.    Imaging: I personally reviewed lumbar spine MRI imaging.  There is a scoliotic curve.  Degenerative disc disease most significant L4-5 with severe spinal stenosis.  Appears to have a severe facet arthropathy L5-S1 and severe spinal stenosis L3-4 as well.    Right shoulder MRI reveals a broad-based full-thickness retracted supraspinatus tear.    Review of Systems: Pertinent positives: Numbness tingling weakness right hand and right foot.  Weakness of the right shoulder..  He has some urinary urgency at times.  Pertinent negatives:   No bowel or bladder incontinence.  No urinary retention.  No fevers, unintentional weight loss, balance changes, headaches, frequent falling, difficulty swallowing, or coordination difficulties.  All others reviewed are negative.      Past Medical History:   Diagnosis Date     Anemia      Cancer (H)     colon     Carotid artery occlusion      Coronary artery disease      High cholesterol      Hyperlipidemia      Hypertension      Left inguinal hernia      Low back pain      Sleep apnea, obstructive     CPAP     Spinal  stenosis     lumbar       The following portions of the patient's history were reviewed and updated as appropriate: allergies, current medications, past family history, past medical history, past social history, past surgical history and problem list.           Objective:   Physical Exam:    Vitals:    10/21/20 1401   BP: 154/71   Pulse: 73     There is no height or weight on file to calculate BMI.      General: Alert and oriented with normal affect. Attention, knowledge, memory, and language are intact. No acute distress.   Eyes: Sclerae are clear.  Respirations: Unlabored. CV: No lower extremity edema.  Skin: No rashes seen.  Mild erythema over the lateral aspect of the right MCP joint  Gait:  Nonantalgic    Sensation is intact to light touch throughout the upper and lower extremities.  Reflexes are 1+ and symmetric in the biceps triceps and brachioradialis with negative Hoffmans. 1+ patellar and 0Achilles      Manual muscle testing reveals:  Right /Left out of 5     5/5 elbow flexors  5/5 elbow extensors  5/5 wrist extensors  5/5 interosseus  5/5 finger flexors  5/5 hip flexors  5/5 knee flexors  5/5 knee extensors  5/5 ankle plantar flexors  5/5 ankle dorsiflexors  5/5    ankle evertors

## 2021-06-12 NOTE — ANESTHESIA CARE TRANSFER NOTE
Last vitals:   Vitals:    09/13/17 1540   BP: (!) 192/85   Pulse: 67   Resp: 14   Temp: 36.3  C (97.4  F)   SpO2: 99%     Patient's level of consciousness is drowsy  Spontaneous respirations: yes  Maintains airway independently: yes  Dentition unchanged: yes  Oropharynx: oropharynx clear of all foreign objects    QCDR Measures:  ASA# 20 - Surgical Safety Checklist: WHO surgical safety checklist completed prior to induction  PQRS# 430 - Adult PONV Prevention: 4558F - Pt received => 2 anti-emetic agents (different classes) preop & intraop  ASA# 8 - Peds PONV Prevention: NA - Not pediatric patient, not GA or 2 or more risk factors NOT present  PQRS# 424 - Tamar-op Temp Management: 4559F - At least one body temp DOCUMENTED => 35.5C or 95.9F within required timeframe  PQRS# 426 - PACU Transfer Protocol: - Transfer of care checklist used  ASA# 14 - Acute Post-op Pain: ASA14B - Patient did NOT experience pain >= 7 out of 10     Patient Blood pressure currently 166/77.

## 2021-06-13 NOTE — PROGRESS NOTES
[facility]        Visit Type: H & P (Multiple medical issues)    Code Status:  FULL CODE  Facility:  [facility] [[id number]]          PCP: Sarthak Romano MD       PHONE: [phone]     FAX:[phone]        ASSESSMENT/PLAN:  1. Colon cancer   status post robotic assisted right hemicolectomy.  Continue PT/OT, continue pain management with Tylenol.  The patient now is refusing oxycodone which we will discontinue.  Patient will follow up with surgery by [facility]   2. Coronary Artery Disease   stable on atenolol, aspirin, atorvastatin   3. Essential hypertension   stable on lisinopril, atenolol   4. Right hip pain   did not which he states has been a problem even prior to surgery but resolves on its own, gabapentin has helped   5. ALECIA (obstructive sleep apnea)   stable   6.     Acute blood loss anemia- will either recheck of hemogram on 9/25 with goal of greater than 9 in this patient who has a history of CAD      HISTORY OF PRESENT ILLNESS:   Antoni Stoll is a 82 y.o. male with a history of ALECIA, CAD status post CABG ×5, hypertension who underwent a colonoscopy and was found to have 2 polyps with pathology showing invasive adenocarcinoma.  The patient underwent elective robotic assisted right hemicolectomy on 9/13/17.  Postoperatively, the patient did well with good pain management.  The patient had good appetite and was able to have good bowel movements in the hospital prior to discharge.    Currently, the patient states that he is feeling much better with very minimal pain in the abdominal area when he starts walking.  He also notes that he is eating much better and that he has had daily bowel movements without any problem.  He does not have any blood per rectum, abdominal pain, nausea or vomiting.  He does not have any diarrhea.  He does not have any urinary problems.  He is also sleeping well and denies any dizziness or lightheadedness.  He does  not feel weak.  He is doing well with physical therapy without any dizziness or lightheadedness.  He does have any chest pains or shortness of breath.    Other review of systems are negative.      PAST MEDICAL/SURGICAL HISTORY:  Past Medical History:   Diagnosis Date     Anemia      Cancer     colon     Carotid artery occlusion      Coronary artery disease      High cholesterol      Hyperlipidemia      Hypertension      Left inguinal hernia      Low back pain      Sleep apnea, obstructive     CPAP     Spinal stenosis     lumbar     Past Surgical History:   Procedure Laterality Date     ARTERIAL BYPASS SURGERY       BLEPHAROPLASTY Bilateral      CARDIAC CATHETERIZATION  06/03/2013     CATARACT EXTRACTION Bilateral      CORONARY ARTERY BYPASS GRAFT  06/28/2013    5-vessel     CORONARY ARTERY BYPASS GRAFT  2013    5 vessel     HERNIA REPAIR      right inguinal     VARICOSE VEIN SURGERY Left        SOCIAL HISTORY:  Social History     Social History     Marital status: Single     Spouse name: N/A     Number of children: N/A     Years of education: N/A     Occupational History     Not on file.     Social History Main Topics     Smoking status: Never Smoker     Smokeless tobacco: Never Used     Alcohol use Yes      Comment: 5 drinks weekly     Drug use: No     Sexual activity: Not on file     Other Topics Concern     Not on file     Social History Narrative       FAMILY HISTORY:  Family History   Problem Relation Age of Onset     Coronary artery disease Mother      Colon cancer Father      Coronary artery disease Father        MEDICATIONS:  Current Outpatient Prescriptions on File Prior to Visit   Medication Sig     acetaminophen (TYLENOL) 500 MG tablet Take 1,000 mg by mouth every morning.     aspirin 325 MG tablet Take 325 mg by mouth daily.     atenolol (TENORMIN) 25 MG tablet Take 25 mg by mouth daily.     atorvastatin (LIPITOR) 20 MG tablet Take 20 mg by mouth at bedtime.     B2/VITS A,C,E/LUT/ZEAXANTH/MIN (ICAPS  ORAL) Take 1 capsule by mouth daily.     cholecalciferol, vitamin D3, 1,000 unit tablet Take 1,000 Units by mouth daily.     ferrous sulfate 325 (65 FE) MG tablet Take 1 tablet (325 mg total) by mouth 3 (three) times a day with meals.     fluticasone (FLONASE) 50 mcg/actuation nasal spray 2 sprays into each nostril daily as needed.      gabapentin (NEURONTIN) 300 MG capsule Take 600 mg by mouth at bedtime.     glucosamine-chondroitin 500-400 mg cap Take 1 capsule by mouth 2 (two) times a day.     ipratropium (ATROVENT) 0.03 % nasal spray 2 sprays into each nostril 3 (three) times a day as needed for rhinitis.     lisinopril (PRINIVIL,ZESTRIL) 2.5 MG tablet Take 2.5 mg by mouth daily.     loratadine (CLARITIN) 10 mg tablet Take 10 mg by mouth daily.     oxyCODONE (ROXICODONE) 5 MG immediate release tablet Take 1 tablet (5 mg total) by mouth every 4 (four) hours as needed.     saw palmetto fruit 450 mg cap Take 1 capsule by mouth 2 (two) times a day.     tamsulosin (FLOMAX) 0.4 mg Cp24 Take 0.4 mg by mouth at bedtime.     No current facility-administered medications on file prior to visit.        ALLERGIES:  No Known Allergies      PHYSICAL EXAMINATION:  Vital signs 122/54, 66, 95%, 97.8, 18  General: Awake, Alert, oriented x3, not in any form of acute distress, answers questions appropriately, follows simple commands, conversant  HEENT: Pink conjunctiva, anicteric sclerae, oral mucosa is moist  NECK: Supple, without any lymphadenopathy, thyromegaly or any masses  LUNG: Clear to auscultation, good chest expansion. There are no crackles, no wheezes, normal AP diameter  BACK: No kyphosis of the thoracic spine  CVS: There is good S1  S2, there are no murmurs, no heaves, rhythm is regular, old sternotomy scar  ABDOMEN: Globular and soft, nontender to palpation, no organomegaly, good bowel sounds surgical site  about 3.5 inches midline with Steri-Strips, small incisions on the lower right abdomen and upper mid abdomen are  clean without any discharge and healing.  There is also a huge fading ecchymoses on the right lower abdomen area  EXTREMITIES: Good range of motion on both upper and lower extremities, no pedal edema, no cyanosis or clubbing, no calf tenderness  SKIN: Warm and dry, no rashes or erythema noted    LABS:  Lab Results   Component Value Date    WBC 8.1 09/17/2017    HGB 7.6 (L) 09/17/2017    HCT 23.3 (L) 09/17/2017    MCV 97 09/17/2017     09/17/2017     Lab Results   Component Value Date    CREATININE 0.88 09/14/2017    BUN 20 09/14/2017     09/14/2017    K 4.6 09/14/2017     09/14/2017    CO2 22 09/14/2017         45 minutes of total time spent, greater than 55% of the time spent in coordination of care and counseling regarding the above medical issues and plan of care. I have reviewed the patient's medical records, labs and medications.       Electronically signed by:Dora De Leon MD

## 2021-06-13 NOTE — TELEPHONE ENCOUNTER
Orders being requested: Covid 19  Reason service is needed/diagnosis: Had an exposure to someone with Covid 19, requesting test to be done. Patient was told he would hear back within 24-48 hours and still has not heard back. He does not feel symptomatic but would like test done as he goes out of town next week. Patient spoke with Shamika   When are orders needed by: As soon as possible  Where to send Orders: Dr Romano's clinic  Okay to leave detailed message?  Yes    Patient would like to be informed when order placed.

## 2021-06-13 NOTE — PROGRESS NOTES
[facility] Follow Up Note    Antoni Stoll   82 y.o. male    Date of Visit: 9/29/2017    Chief Complaint   Patient presents with     Hospital Visit Follow Up     Rt hip pain/lower back, CPAP order and supplies     Subjective  Antoni is here for hospital follow-up after right hemicolectomy for a small focus of adenocarcinoma of the colon found on colonoscopy.  Review the pathology report with patient.  There is not residual cancer, 2 adenomas removed, one with high-grade dysplasia.  But 0 out of 24 nodes, nodes negative.  No evidence of invasion.  CT scan before surgery was negative for metastatic disease.    He otherwise tolerated surgery well.  Minimal pain and no longer on oxycodone.  Bowels are returning to normal.  Incisions have healed well without drainage or erythema.  No fever.    Significant postop anemia with a discharge hemoglobin of 7.6.  He did not get a transfusion.  Is on iron tablets 3 times a day and tolerating that.  Diet is returned to normal.  No nausea complaints now.    He is moderately lightheaded and globally weak.  His blood pressure is running low at 100 110/50-60.  Still on lisinopril 2.5 mg a day and atenolol 25 mg a day, which was same as before surgery.    He does have a past history of coronary artery disease with a bypass in 2013.  He did restart the aspirin.  Back on his Lipitor.  No chest pain or chest pressure.  No palpitations.    He does have sleep apnea, diagnosed as obstructive sleep apnea a number of years ago.  He has not seen the sleep clinic recently.  He is 100% compliant with his CPAP, uses every night and feels it works well.  It is an old machine and he needs a replacement.  In the process of getting that now.  We discussed his CPAP use today, he continues to needed.  Minimal daytime sleepiness.    He does have severe spinal stenosis at L3-5 with an increasing right hip and thigh pain that started just before the surgery.  A few days after the  "surgery it caused him minimal pain, but has increased activity he was having more achiness this past week.  No rash or erythema.  Worse in the morning, loosens up during the day and does not feel too bad when he has been up walking around and moving around.  No shooting pain down the legs or footdrop.  He takes gabapentin 600 mg nightly.  Tylenol twice a day.    Moderate BPH history.  Urinating well on Flomax currently.  No dysuria or hematuria.    No cough or increasing shortness of breath.    No calf tenderness or leg swelling.    PMHx:    Past Medical History:   Diagnosis Date     Anemia      Cancer     colon     Carotid artery occlusion      Coronary artery disease      High cholesterol      Hyperlipidemia      Hypertension      Left inguinal hernia      Low back pain      Sleep apnea, obstructive     CPAP     Spinal stenosis     lumbar     PSHx:    Past Surgical History:   Procedure Laterality Date     ARTERIAL BYPASS SURGERY       BLEPHAROPLASTY Bilateral      CARDIAC CATHETERIZATION  06/03/2013     CATARACT EXTRACTION Bilateral      CORONARY ARTERY BYPASS GRAFT  06/28/2013    5-vessel     CORONARY ARTERY BYPASS GRAFT  2013    5 vessel     HERNIA REPAIR      right inguinal     VARICOSE VEIN SURGERY Left      Immunizations:   Immunization History   Administered Date(s) Administered     Influenza [id number], 01/18/2010     Influenza high dose, seasonal 10/13/2015, 10/18/2016     Influenza, inj, historic 10/23/2007, 10/29/2009, 10/11/2010, 11/08/2011     Influenza, seasonal,quad inj 6-35 mos 11/29/2012, 10/25/2013, 11/19/2014     Pneumo Conj 13-V (2010&after) 04/09/2015     Pneumo Polysac 23-V 10/03/1999, 03/06/2007     Td, historic 03/11/1992, 10/24/2000, 04/11/2011     ZOSTER 11/08/2007       ROS A comprehensive review of systems was performed and was otherwise negative    Medications, allergies, and problem list were reviewed and updated    Exam  BP 92/50  Pulse 64  Ht 5' 5.5\" (1.664 m)  Wt 145 lb (65.8 kg)  " SpO2 98%  BMI 23.76 kg/m2  No jaundice.  No thrush.  No JVD.  Lungs are clear to auscultation with good respiratory excursion.  Heart is regular without murmur.  Abdomen is soft without significant tenderness, incisions were examined and all healed well with no erythema or dehiscence.  Small bruise in the right side of his abdomen.  No lower extremity edema or calf tenderness.    Assessment/Plan  1. Coronary atherosclerosis due to calcified coronary lesion  Asymptomatic.  Continue medical management with Lipitor and aspirin.    2. Essential hypertension with goal blood pressure less than 140/90  Blood pressure running low with anemia from surgery.  Discontinue lisinopril.  He will follow blood pressures closely, when he seen blood pressures above 135/85, he can restart the lisinopril 2.5 mg a day.  Open 1 month to recheck blood pressure.    Continue atenolol 25 mg a day.    3. Spinal Stenosis  Severe.  Continued right hip and thigh pain.  Improves with activity.  Continue walking on a regular basis.  Gabapentin at night.  Tylenol as needed.  If worsening symptoms, may need reimaging and possible surgery.    4. Sleep apnea, unspecified type  Patient compliant with CPAP and requires CPAP for obstructive sleep apnea treatment.  New order for a CPAP machine was written and will be faxed to his CPAP supplier.    If he continues to have difficulty getting the new CPAP , he may need to return to sleep clinic to coordinate this.    5. Malignant neoplasm of colon, unspecified part of colon  Pathology report reviewed, no evidence of metastatic disease, nodes negative, margins negative, remaining polyps removed.  Patient will need a follow-up colonoscopy surveillance.  No evidence of metastatic disease and not anticipating need for adjuvant chemotherapy.    On aspirin daily.    6. Benign prostatic hyperplasia, presence of lower urinary tract symptoms unspecified, unspecified morphology  Urinating well on  Flomax    7. Anemia, unspecified type  Surgical blood loss.  Recheck hemoglobin today, continue iron tablets 3 times a day and recheck in 1 month.    8. Encounter for long-term (current) use of other medications    - Comprehensive Metabolic Panel  - HM2(CBC w/o Differential)    Return in about 4 weeks (around 10/27/2017) for Recheck.   There are no Patient Instructions on file for this visit.  Sarthak Romano MD  Total time with patient over 25 minutes and over 50% coord care.  Time all face to face.      Current Outpatient Prescriptions   Medication Sig Dispense Refill     acetaminophen (TYLENOL) 500 MG tablet Take 1,000 mg by mouth every morning.       aspirin 325 MG tablet Take 325 mg by mouth daily.       atenolol (TENORMIN) 25 MG tablet Take 25 mg by mouth daily.       atorvastatin (LIPITOR) 20 MG tablet Take 20 mg by mouth at bedtime.       B2/VITS A,C,E/LUT/ZEAXANTH/MIN (ICAPS ORAL) Take 1 capsule by mouth daily.       cholecalciferol, vitamin D3, 1,000 unit tablet Take 1,000 Units by mouth daily.       ferrous sulfate 325 (65 FE) MG tablet Take 1 tablet (325 mg total) by mouth 3 (three) times a day with meals.  0     gabapentin (NEURONTIN) 300 MG capsule Take 600 mg by mouth at bedtime.       glucosamine-chondroitin 500-400 mg cap Take 1 capsule by mouth 2 (two) times a day.       ipratropium (ATROVENT) 0.03 % nasal spray 2 sprays into each nostril 3 (three) times a day as needed for rhinitis.       lisinopril (PRINIVIL,ZESTRIL) 2.5 MG tablet Take 2.5 mg by mouth daily.       loratadine (CLARITIN) 10 mg tablet Take 10 mg by mouth daily.       saw palmetto fruit 450 mg cap Take 1 capsule by mouth 2 (two) times a day.       tamsulosin (FLOMAX) 0.4 mg Cp24 Take 0.4 mg by mouth at bedtime.       No current facility-administered medications for this visit.      No Known Allergies  Social History   Substance Use Topics     Smoking status: Never Smoker     Smokeless tobacco: Never Used     Alcohol use Yes       Comment: 5 drinks weekly

## 2021-06-13 NOTE — PATIENT INSTRUCTIONS - HE
Antoni Stoll,    It was a pleasure to see you today at the [facility].     My recommendations after this visit include:    Stress test    JANET Guevara MD, FACC, AMIE

## 2021-06-13 NOTE — PATIENT INSTRUCTIONS - HE
1.  I recommend that you follow-up with your orthopedic surgeon for your right shoulder.    2.  Continue to monitor your low back pain and if your pain worsens, you can return for another injection in the lumbar spine.    3.  As you are drowsy, decreased the gabapentin to 100 mg in the morning and 300 mg at night.  If your pain worsens, you can increase the nighttime dose back to 600 mg.

## 2021-06-13 NOTE — PROGRESS NOTES
[facility] Follow Up Note    Antoni Stoll   82 y.o. 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    September 13 surgery, 0 out of 24 nodes negative and CT scan before the surgery was negative for any spread.  No plans for adjuvant chemotherapy.  His bowels returned to normal and factor on the constipated side now.  No abdominal pain.  His incisions are healed well.  Eating normally and gaining weight again.    He is still taking 2-3 iron pills a day for some severe postop anemia.  His hemoglobin was 7.6 in September after the surgery.  On September 29 his hemoglobin was up to 10.8.    No blood in stool noted    He had a low blood pressure down to 92/50 in September and I had him stop the lisinopril.  Over the past 3 or 4 days his blood pressure is been getting up to 150-160 over 80s-90, and he just restarted the lisinopril 2.5 mg a day today.  Blood pressure normal today.  Still on atenolol 25 mg a day.    Coronary artery disease with bypass in 2013.  No chest pain or chest pressure.  No palpitations or increasing shortness of breath.  He walks on a regular basis.    He does have severe spinal stenosis at L3 L5 he has been having continued right hip pain and right thigh pain, even before the surgery.  It is not worsened.  No radiation further down the leg and no leg weakness or falls.  Still on gabapentin 600 mg in the evening and Tylenol twice a day.  Walking seems to help.  No new leg weakness or numbness.    Moderate BPH with stable, urinating adequately on Flomax and no dysuria.    Sleep apnea, wearing his CPAP well but he still needs to fix the hydration part, still working with his company on paperwork for that.  He was given paperwork again sent in September.    No increasing  "shortness of breath or new cough.    Continued right shoulder pain consistent with tendinitis about the same.  Mild reduction range of motion.  No radicular symptoms there.        PMHx:    Past Medical History:   Diagnosis Date     Anemia      Cancer     colon     Carotid artery occlusion      Coronary artery disease      High cholesterol      Hyperlipidemia      Hypertension      Left inguinal hernia      Low back pain      Sleep apnea, obstructive     CPAP     Spinal stenosis     lumbar     PSHx:    Past Surgical History:   Procedure Laterality Date     ARTERIAL BYPASS SURGERY       BLEPHAROPLASTY Bilateral      CARDIAC CATHETERIZATION  06/03/2013     CATARACT EXTRACTION Bilateral      CORONARY ARTERY BYPASS GRAFT  06/28/2013    5-vessel     CORONARY ARTERY BYPASS GRAFT  2013    5 vessel     HERNIA REPAIR      right inguinal     VARICOSE VEIN SURGERY Left      Immunizations:   Immunization History   Administered Date(s) Administered     Influenza U2d4-83, 01/18/2010     Influenza high dose, seasonal 10/13/2015, 10/18/2016, 11/01/2017     Influenza, inj, historic 10/23/2007, 10/29/2009, 10/11/2010, 11/08/2011     Influenza, seasonal,quad inj 6-35 mos 11/29/2012, 10/25/2013, 11/19/2014     Pneumo Conj 13-V (2010&after) 04/09/2015     Pneumo Polysac 23-V 10/03/1999, 03/06/2007     Td, historic 03/11/1992, 10/24/2000, 04/11/2011     ZOSTER 11/08/2007       ROS A comprehensive review of systems was performed and was otherwise negative    Medications, allergies, and problem list were reviewed and updated    Exam  /66  Pulse 60  Ht 5' 5.5\" (1.664 m)  Wt 152 lb (68.9 kg)  SpO2 95%  BMI 24.91 kg/m2  Alert and oriented ×3.  No jaundice.  No JVD.  Mild reduced range of motion above 110  on the right shoulder but no tenderness or swelling.  Lungs are clear with good respiratory excursion.  Heart is regular without murmur.  Abdomen is mildly overweight nontender no ankle edema.  No foot drop and able to clamp on the " exam table.    Assessment/Plan  1. Essential hypertension  Well-controlled today.  Recently high, but now back on lisinopril 2.5 mg a day.  Continue atenolol 25 mg a day.    He follows up in 1 month for physical    2. Anemia, unspecified type  Anticipate this improving.  If hemoglobin okay today, reduce iron pills to 1 a day until his physical  - Hemoglobin      Status post right hemicolectomy for adenocarcinoma, can add fiber supplement if needed for constipation.    Patient met with his colorectal surgeon early October and given a 3 month follow-up.  Plan for follow-up colonoscopy was not specified.      3. Coronary atherosclerosis due to calcified coronary lesion  Asymptomatic.  Medical management with aspirin with Lipitor    4. Sleep apnea, unspecified type  CPAP, he was told to call if needed further help with paperwork or orders.    5. Benign prostatic hyperplasia, unspecified whether lower urinary tract symptoms present  Controlled on Flomax    6. Spinal stenosis of lumbar region without neurogenic claudication  Severe, continued right hip and thigh pain suggestive of an L2 distribution.  Continue regular ambulation which does help.  Patient was told to contact me if worsening symptoms or if leg weakness develops, consider MRI of the LS spine and refer to the spine surgeon at that time.    Continue gabapentin nightly and Tylenol twice a day.    Flu shot given today.    Right shoulder pain consistent with tendinitis.  Continue gentle range of motion, he can get referral to physical therapy in the    Return in 5 weeks (on 12/7/2017) for Annual physical.   Patient Instructions   Go to lab recheck to hemoglobin today.    If hemoglobin level is okay, just take 1 iron tablet a day.    If you are constipated, okay to use a stool softener or Metamucil with a glass of water every day.    See me December 7 for your physical.  Come early by about 15 minutes for paperwork.    Let me know if the right hip pain is  worsening.    Continue lisinopril 2.5 mg a day and your atenolol.    Sarthak Romano MD  Total time with patient over 25 minutes and over 50% coord care.  Time all face to face.      Current Outpatient Prescriptions   Medication Sig Dispense Refill     acetaminophen (TYLENOL) 500 MG tablet Take 1,000 mg by mouth 2 (two) times a day.        aspirin 325 MG tablet Take 325 mg by mouth daily.       atenolol (TENORMIN) 25 MG tablet Take 25 mg by mouth daily.       atorvastatin (LIPITOR) 20 MG tablet Take 20 mg by mouth at bedtime.       B2/VITS A,C,E/LUT/ZEAXANTH/MIN (ICAPS ORAL) Take 1 capsule by mouth daily.       cholecalciferol, vitamin D3, 1,000 unit tablet Take 1,000 Units by mouth daily.       ferrous sulfate 325 (65 FE) MG tablet Take 1 tablet (325 mg total) by mouth 3 (three) times a day with meals.  0     gabapentin (NEURONTIN) 300 MG capsule Take 600 mg by mouth at bedtime.       glucosamine-chondroitin 500-400 mg cap Take 1 capsule by mouth 2 (two) times a day.       ipratropium (ATROVENT) 0.03 % nasal spray 2 sprays into each nostril 3 (three) times a day as needed for rhinitis.       lisinopril (PRINIVIL,ZESTRIL) 2.5 MG tablet Take 2.5 mg by mouth daily.       loratadine (CLARITIN) 10 mg tablet Take 10 mg by mouth daily.       saw palmetto fruit 450 mg cap Take 1 capsule by mouth 2 (two) times a day.       tamsulosin (FLOMAX) 0.4 mg Cp24 Take 0.4 mg by mouth at bedtime.       No current facility-administered medications for this visit.      No Known Allergies  Social History   Substance Use Topics     Smoking status: Never Smoker     Smokeless tobacco: Never Used     Alcohol use Yes      Comment: 5 drinks weekly

## 2021-06-13 NOTE — PROGRESS NOTES
Assessment and Plan:   Patient instructions:  No change in treatment plan.  Continue to walk on a daily basis to prevent your back from stiffening up too much.    If you feel more unsteady, you may need to consider a cane or walker in the future.    Proceed with heart stress testing in January.    We will plan to have you repeat your colonoscopy in 3 years, or September 2023    Follow-up with me in the spring in 5-6 months for a nonfasting general checkup.    1. Encounter for wellness examination in adult  Patient confirmed full CODE STATUS wishes.  He did see ophthalmology this past October, no new issues given a 1 year follow-up.  Borderline eye pressures are being followed, no drops.  He denies vision changes and no new headaches.    He already got the flu shot.  Otherwise up-to-date on immunizations.    2. History of colon cancer  53 colon cancer with right hemicolectomy.  Patient reports he just had his colonoscopy September 2020 and colorectal clinic and was negative.  He was given a 3-year follow-up plan at this time.    Father with colon cancer family history.    3. Atherosclerosis of native coronary artery of native heart with angina pectoris (H)  Asymptomatic.  Bypass in 2013.  No history of MI.  No history of recent event.    Has been having some mild increasing dyspnea on exertion, likely related to his worsening kyphosis and general deconditioning with lack of activity because of COVID-19.    I did review the cardiology note from December 9.  No change in medication treatment plan and continues on Lipitor 20 mg and aspirin daily.    A stress test was set up for January 11, to further evaluate the dyspnea on exertion.    History of bradycardia, but resolved after atenolol was discontinued.  Heart rates have been in the 60s to 70s.  No syncope.  Not planning pacemaker.    January 2019 stress echo was normal which is mild mitral regurgitation, no aortic stenosis and no pulmonary hypertension.    4.  Hypercholesterolemia  Tolerating Lipitor 20 mg.  No generalized myalgias.  Goal LDL less than 100.  - Lipid Cascade    5. Essential hypertension  Well-controlled.  Denies orthostasis.  Just on lisinopril 2.5 mg a day.  Blood pressures been ranging in the 120s to 130s over 70s to 80s.  Highest was 137/89 and lowest was 107/53.    Continue current lisinopril and follow-up in the spring for blood pressure checkup.    6. Obstructive sleep apnea syndrome  Compliant with CPAP    He is having worsening kyphoscoliosis with chest cavity crowding.    He is only mildly overweight.  He has been losing some weight.    7. Spinal stenosis of lumbar region, unspecified whether neurogenic claudication present  Severe L3-5.  Intermittent leg claudication previously but no complaints at this time.  Transforaminal epidural steroid injection in September with some benefit.  He walks on a daily basis.  Is not been able to go to the [facility] as usual.  Does take Tylenol and gabapentin.  No NSAID use.    Not planning surgery at this time with a significant worsening.    Mild unsteady gait.  No recent falls since last year.  He does not want use a walker or cane at this time but I did discuss that his option    8. Medication monitoring encounter    - Comprehensive Metabolic Panel  - HM2(CBC w/o Differential)    9. Unilateral inguinal hernia without obstruction or gangrene, recurrence not specified  He is not bothered by his hernia, no growth.  He is not interested in surgery.    10. Screening for prostate cancer  Patient had normal prostate exam last year.  His PSA was slightly higher than usual at 2.2 last year, therefore I will recheck it today.  Given age, PSA stabilizes, would not plan further prostate cancer screening.    He denies any urinary symptoms.  - PSA (Prostatic-Specific Antigen), Annual Screen    11. Routine general medical examination at a health care facility  History of basal cell cancer.  I did review  the dermatology note from May of this year, negative exam and no new skin findings.  Seborrheic keratoses on back noted.  1 year follow-up with dermatology will be due in May.    Moderate BPH controlled with Flomax    No new cough or fever or new rash.    The patient's current medical problems were reviewed.    I have had an Advance Directives discussion with the patient.  The following health maintenance schedule was reviewed with the patient and provided in printed form in the after visit summary:   Health Maintenance   Topic Date Due     ZOSTER VACCINES (2 of 3) 01/03/2008     TD 18+ HE  04/11/2021     MEDICARE ANNUAL WELLNESS VISIT  12/14/2021     FALL RISK ASSESSMENT  12/14/2021     ADVANCE CARE PLANNING  12/11/2024     Pneumococcal Vaccine: 65+ Years  Completed     INFLUENZA VACCINE RULE BASED  Completed     Pneumococcal Vaccine: Pediatrics (0 to 5 Years) and At-Risk Patients (6 to 64 Years)  Aged Out     HEPATITIS B VACCINES  Aged Out        Subjective:   Chief Complaint: Antoni Stoll is an 85 y.o. male here for an Annual Wellness visit.   HPI: 85-year-old retired , lives independently.  He is going to visit his family in [place] tomorrow.    He has not been as active with the COVID-19 outbreak.  Still trying to walk on a daily basis.  Walks his dog.  No recent falls since last year.  Chronic back pain with intermittent radicular pain.  Followed by Ortho, previous steroid injections in September as above.    Mild increased dyspnea on exertion with stairs.  Not an acute change.  No increasing lower extremity edema or orthopnea.    He is compliant with his CPAP for sleep apnea.    No new palpitations and no history of atrial fibrillation.    He was having bradycardia and atenolol discontinued but heart rates now have been in the 60s, not needing pacemaker.    He did meet with cardiology with plan stress test but no chest pain or jaw pain or exertional type pain issues.    Some mild increased  fatigue and daytime sleepiness but not severe.    Denies new urinary symptoms.    He is never smoked.    No mouth sores or swallowing difficulty.    Review of Systems: Otherwise negative please see above.  The rest of the review of systems are negative for all systems.    Patient Care Team:  Sarthak Romano MD as PCP - General  Sarthak Romano MD as Assigned PCP  Mars Guevara (MD Blake as Assigned Heart and Vascular Provider     Patient Active Problem List   Diagnosis     Unspecified glaucoma     Cataract     Spinal Stenosis     Inguinal Hernia     Hypercholesterolemia     Essential hypertension     Atherosclerosis of native coronary artery of native heart with angina pectoris (H)     Sleep Apnea     Skin Condition     Seborrheic Keratosis     Colon cancer (H)     Anemia     BPH (benign prostatic hyperplasia)     Spinal stenosis     History of colon cancer     Medication monitoring encounter     SSS (sick sinus syndrome) (H)     Past Medical History:   Diagnosis Date     Anemia      Cancer (H)     colon     Carotid artery occlusion      Coronary artery disease      High cholesterol      Hyperlipidemia      Hypertension      Left inguinal hernia      Low back pain      Sleep apnea, obstructive     CPAP     Spinal stenosis     lumbar      Past Surgical History:   Procedure Laterality Date     ARTERIAL BYPASS SURGERY       BLEPHAROPLASTY Bilateral      CARDIAC CATHETERIZATION  06/03/2013     CATARACT EXTRACTION Bilateral      CORONARY ARTERY BYPASS GRAFT  06/28/2013    5-vessel     CORONARY ARTERY BYPASS GRAFT  2013    5 vessel     HERNIA REPAIR      right inguinal     VARICOSE VEIN SURGERY Left       Family History   Problem Relation Age of Onset     Coronary artery disease Mother      Colon cancer Father      Coronary artery disease Father       Social History     Socioeconomic History     Marital status: Single     Spouse name: Not on file     Number of children: Not on file     Years of education: Not  on file     Highest education level: Not on file   Occupational History     Not on file   Social Needs     Financial resource strain: Not on file     Food insecurity     Worry: Not on file     Inability: Not on file     Transportation needs     Medical: Not on file     Non-medical: Not on file   Tobacco Use     Smoking status: Never Smoker     Smokeless tobacco: Never Used   Substance and Sexual Activity     Alcohol use: Yes     Comment: 5 drinks weekly     Drug use: No     Sexual activity: Not on file   Lifestyle     Physical activity     Days per week: Not on file     Minutes per session: Not on file     Stress: Not on file   Relationships     Social connections     Talks on phone: Not on file     Gets together: Not on file     Attends [employer] service: Not on file     Active member of club or organization: Not on file     Attends meetings of clubs or organizations: Not on file     Relationship status: Not on file     Intimate partner violence     Fear of current or ex partner: Not on file     Emotionally abused: Not on file     Physically abused: Not on file     Forced sexual activity: Not on file   Other Topics Concern     Not on file   Social History Narrative     Not on file      Current Outpatient Medications   Medication Sig Dispense Refill     acetaminophen (TYLENOL) 500 MG tablet Take 1,000 mg by mouth daily .             aspirin 325 MG tablet Take 325 mg by mouth daily.       atorvastatin (LIPITOR) 20 MG tablet Take 1 tablet (20 mg total) by mouth daily. 90 tablet 3     B2/VITS A,C,E/LUT/ZEAXANTH/MIN (ICAPS ORAL) Take 1 capsule by mouth daily.       calcium polycarbophil (FIBERCON) 625 mg tablet Take 1,250 mg by mouth daily.       cholecalciferol, vitamin D3, 1,000 unit tablet Take 1,000 Units by mouth daily.       fluticasone (FLONASE) 50 mcg/actuation nasal spray Apply 1 spray into each nostril daily.       gabapentin (NEURONTIN) 100 MG capsule 1 cap (100mg) in the morning. 30 capsule 2     gabapentin  "(NEURONTIN) 300 MG capsule TAKE 1 TO 2 CAPSULES BY MOUTH EVERY EVENING AS NEEDED FOR NERVE PAIN (Patient taking differently: Take 300 mg by mouth at bedtime. ) 180 capsule 3     glucosamine-chondroitin 500-400 mg cap Take 1 capsule by mouth 2 (two) times a day.       ipratropium (ATROVENT) 0.03 % nasal spray USE 2 SPRAYS IN EACH NOSTRIL THREE TIMES DAILY AS NEEDED 30 mL 6     lisinopriL (PRINIVIL,ZESTRIL) 2.5 MG tablet TAKE 1 TABLET BY MOUTH EVERY DAY 90 tablet 2     loratadine (CLARITIN) 10 mg tablet Take 10 mg by mouth daily.       saw palmetto fruit 450 mg cap Take 1 capsule by mouth 2 (two) times a day.       tamsulosin (FLOMAX) 0.4 mg Cp24 Take 0.4 mg by mouth at bedtime.       No current facility-administered medications for this visit.       Objective:   Vital Signs:   Visit Vitals  /62 (Patient Site: Right Arm, Patient Position: Sitting, Cuff Size: Adult Regular)   Pulse 80   Temp (!) 96.5  F (35.8  C) (Other) Comment (Src): forehead   Ht 5' 5\" (1.651 m)   Wt 146 lb 12.8 oz (66.6 kg)   BMI 24.43 kg/m           VisionScreening:  No exam data present     PHYSICAL EXAM  Alert oriented x3.  Good mood and affect.  Clock face drawing and word recall normal.  Gait still within normal limits, no foot drop.  Able to climb up on exam table.  He has mild to moderate kyphoscoliosis, I do note worsening scoliosis compared to a few years ago.  Pupils and irises equal and reactive.  Extraocular muscles intact.  No jaundice or conjunctivitis.  External ears and nose exam is normal.  Wearing hearing aids.  No significant cerumen.  Tympanic membranes are normal.  No cervical or supraclavicular adenopathy.  No thyromegaly or nodularity.  No JVD and no carotid bruits.  Lungs are clear to auscultation, still good respiratory excursion but mildly reduced with his kyphoscoliosis chest crowding.  No crackles or wheezing.  Heart is regular without murmur rub or gallop.  +1 pedal pulses bilaterally and no ankle edema.  Abdomen " is just mildly overweight, nontender no hepatosplenomegaly.  No pulsatile abdominal mass.  Skin exam shows multiple seborrheic keratoses on back.  I did not see any new suspicious skin lesions.  Declined  exam.    Assessment Results 12/14/2020   Activities of Daily Living No help needed   Instrumental Activities of Daily Living No help needed   Mini Cog Total Score 5   Some recent data might be hidden     A Mini-Cog score of 0-2 suggests the possibility of dementia, score of 3-5 suggests no dementia    Identified Health Risks:     He is at risk for falling and has been provided with information to reduce the risk of falling at home.  Patient's advanced directive was discussed and I am comfortable with the patient's wishes.

## 2021-06-13 NOTE — PROGRESS NOTES
"Antoni Stoll is a 85 y.o. male who is being evaluated via a billable telephone visit.      The patient has been notified of following:     \"This telephone visit will be conducted via a call between you and your physician/provider. We have found that certain health care needs can be provided without the need for a physical exam.  This service lets us provide the care you need with a short phone conversation.  If a prescription is necessary we can send it directly to your pharmacy.  If lab work is needed we can place an order for that and you can then stop by our lab to have the test done at a later time.    Telephone visits are billed at different rates depending on your insurance coverage. During this emergency period, for some insurers they may be billed the same as an in-person visit.  Please reach out to your insurance provider with any questions.    If during the course of the call the physician/provider feels a telephone visit is not appropriate, you will not be charged for this service.\"    Patient has given verbal consent to a Telephone visit? Yes    What phone number would you like to be contacted at? [phone]    Patient would like to receive their AVS by AVS Preference: Mail a copy.    Additional provider notes: The patient reports exposure to COVID-19 two weeks ago today. He denies a fever, chills, cough, worsening shortness of breath, chest pain, chest pressure, nausea, vomiting, diarrhea, or body aches.  He would like to be tested as he is leaving on vacation soon.     Assessment/Plan:  1. Exposure to COVID-19 virus: Two weeks ago. Discussed the likely would have already been infected. He is not symptomatic. He would like to still be tested for a piece of mind. COVID testing ordered. Discussed he needs to be seen for follow up if developing any new symptoms as discussed above.         Phone call duration:  11 minutes    Claudia Maynard CNP  "

## 2021-06-13 NOTE — PROGRESS NOTES
[facility] Patient Outreach:     Discharge Date::  9/22/17      Reason for TCU stay (discharge diagnosis)::  Colon cancer s/p right hemicolectomy, CAD, ALECIA, acute blood loss anemia      Are you feeling better, the same or worse since your discharge?:  Patient is feeling better          As part of your discharge plan, did they discuss home care with you?: No            Did you receive any new medications, or was there a change to your medications?: No            Do you have any follow up visits scheduled with your PCP or Specialist?:  Yes, with PCP      (RN) Is it scheduled soon enough (3-5 days)?: No        (RN) Is the patient okay with moving appointment up (if RN feels appropriate)?: Yes            (RN) Patient transferred to [facility]? **If immediate concers (e.g. patient is feeling worse and/or not taking new medictations), send in basket message to PCP with quick summary of concern.: Yes

## 2021-06-13 NOTE — PROGRESS NOTES
"Antoni Stoll is a 85 y.o. male who is being evaluated via a billable video visit.      The patient has been notified of following:     \"This video visit will be conducted via a call between you and your physician/provider. We have found that certain health care needs can be provided without the need for an in-person physical exam.  This service lets us provide the care you need with a video conversation.  If a prescription is necessary we can send it directly to your pharmacy.  If lab work is needed we can place an order for that and you can then stop by our lab to have the test done at a later time.    Video visits are billed at different rates depending on your insurance coverage. Please reach out to your insurance provider with any questions.    If during the course of the call the physician/provider feels a video visit is not appropriate, you will not be charged for this service.\"    Patient has given verbal consent to a Video visit? Yes  How would you like to obtain your AVS? AVS Preference: Mail a copy.  If dropped by the video visit, the video invitation should be sent to: Text to cell phone: [phone]  Will anyone else be joining your video visit? No        Video Start Time: 2:39 PM    Additional provider notes:     Assessment/Plan:      Diagnoses and all orders for this visit:    Spinal stenosis of lumbar region with neurogenic claudication    Spondylolisthesis of lumbar region    Lumbar radicular pain    Complete tear of right rotator cuff, unspecified whether traumatic    Drowsiness        Assessment: Pleasant 85 y.o. male  with history of colon cancer, hyperlipidemia, hypertension, carotid artery occlusion, coronary artery disease with:        1. Chronic lumbar spine pain waxing and waning.  He continues to have     right lower extremity pain and paresthesias.   He has severe spinal stenosis L3-4 and most significant L4-5 with L4-5 spondylolisthesis.  There is tortuosity of the nerve roots.  Pain " persist despite physical therapy and gabapentin.  Recent bilateral L5-S1 transforaminal epidural steroid injection did offer some relief but only 40 to 50% and that benefit is slowly diminishing over time..  He continues to have numbness and tingling in his right foot.  He was seen by Dr. Kenny at [facility] orthopedics.  He would be a surgical candidate but holding off as long as possible.      2. Right shoulder pain consistent with rotator cuff pathology.  He does have aFull-thickness rotator cuff tear of the supraspinatus tendon with muscle retraction.    Evaluated by orthopedics at [facility].  Had a right subacromial bursa injection and recommended conservative management.  The injection only lasted 3 to 4 weeks his right shoulder is having significant pain again.     3.  Drowsiness.  Question related to medications.    4.  Intermittent right arm and hand paresthesias all fingers involved.    Consistent with carpal tunnel syndrome.  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  Some slight improvement with wearing a carpal tunnel brace.    Not addressed today.       5.  Multilevel degenerative disc disease and foraminal stenosis in the cervical spine.  No radiculopathy noted on EMG.  Has right shoulder pain is likely related to rotator cuff pathology.            Discussion:    1.  We discussed diagnosis and treatment options.  We discussed the right shoulder pain.  He has a complete rotator cuff tear.  He has completed 12 visits of physical therapy and now only had 3 to 4 weeks of any benefit from a subacromial bursa injection.  I recommend he follow-up with his orthopedic surgeon at [facility] orthopedics he will make an appointment.    2.  With regard to the lumbar spine, we discussed options of further injections however I would recommend holding off until his pain is severe enough to warrant another injection.  At this time he has had 2 injections fairly close together 2 months ago and  want to avoid excessive amounts of steroid and he agrees.  He is considering returning to Dr. Kenny at some point for surgical intervention.    3.  He will decrease his gabapentin at bedtime as he is drowsy and having some side effects of medications.  Decrease gabapentin to 300 mg at night and continue with 100 mg in the morning.  If this is not effective for his pain he can return to his current dose of 100 mg in the morning and 600 mg.    4.  Follow-up with me as needed when he returns from his holiday travels.      It was our pleasure caring for your patient today, if there any questions or concerns please do not hesitate to contact us.      Subjective:   Patient ID: Antoni Stoll is a 85 y.o. male.    History of Present Illness:Patient presents for video visit follow-up of low back and right shoulder pain.  Unfortunately after a few minutes the Internet connection became unstable and was not able to complete the visit via video.  We completed via telephone.  Patient complains of worsening right shoulder pain with any movement.  Was seen by [facility] and the note was reviewed.  They considered surgical management as a last option and that he is not even really a surgical option at this time due to his age.  Is a complete rotator cuff tear.  He has completed physical therapy 12 visits over the summer for shoulder and back without benefit.  He had an injection at [facility] only lasted 3 to 4 weeks and now his pain is returned and is quite severe with movement of his right shoulder.    He also has low back pain.  He had a lumbar injection which improved his symptoms 40 to 50% had 2 injections within a few weeks of each other here at the spine center.  He also has right lower extremity radicular symptoms with numbness in the right foot.  Pain is a 2/10 today.  Was seen by Dr. Kenny at [facility] orth[facility] and the note was reviewed.  He recommended continuing the  conservative  management as long as possible.  We discussed what that would mean at this point with medications.    He is taking gabapentin 100 mg in the morning which was increased last visit 600 mg he is somewhat drowsy throughout the day unsure if this is worsened since increasing the dose.  He is not sure if this is from medication or just his general conditioning.  Also taking Tylenol as needed for pain.      Imaging: MRI report of the shoulder personally reviewed from September 2020.  This reveals broad-based full-thickness retracted supraspinatus tendon tear with 2.8 cm of proximal fibers retraction.    MRI lumbar spine from December 2019 images personally reviewed.  Shows severe spinal stenosis L4-5 L3-4 and severe left foraminal stenosis L1-2 with spondylolisthesis of L4 on L5 and L3 on L4.    Review of Systems: Pertinent positives: Some numbness and tingling of the right foot..  Pertinent negatives: No  weakness.  No bowel or bladder incontinence.  No urinary retention.  No fevers, unintentional weight loss, balance changes, headaches, frequent falling, difficulty swallowing, or coordination difficulties.  All others reviewed are negative.    Prior interventions:        1.  Bilateral L5-S1 transforaminal epidural steroid injection September 10, 2020 Dr. Van    2.  Bilateral L4-5 transforaminal epidural steroid injection September 30, 2020 Dr. Van.    3.  Right subacromial bursa injection [facility] September 24 2020    Past Medical History:   Diagnosis Date     Anemia      Cancer (H)     colon     Carotid artery occlusion      Coronary artery disease      High cholesterol      Hyperlipidemia      Hypertension      Left inguinal hernia      Low back pain      Sleep apnea, obstructive     CPAP     Spinal stenosis     lumbar       The following portions of the patient's history were reviewed and updated as appropriate: allergies, current medications, past family history, past medical history, past social history,  past surgical history and problem list.           Objective:   Physical Exam:      General:  Well-appearing male in no acute distress.  Pleasant,   cooperative, and interactive throughout the examination and interview.  HEENT: Head atraumatic normocephalic, sclera clear.  Skin: No rashes or lesions seen over the face.  Unfortunately I was not able to further examine the patient as the Internet connection became unstable.      Video-Visit Details    Type of service:  Video Visit    Video End Time (time video stopped): 2:59 PM  Total time: 20 minutes    Originating Location (pt. Location): Home    Distant Location (provider location):  [facility]     Platform used for Video Visit: Francesca Mir DO

## 2021-06-13 NOTE — TELEPHONE ENCOUNTER
Note from 11/18/2020 from Cheyanne Maynard CNP states she was to order COVID testing, however, I do not see any orders in place.    Note from visit below:  Marlene PATEL CMA      Assessment/Plan:  1. Exposure to COVID-19 virus: Two weeks ago. Discussed the likely would have already been infected. He is not symptomatic. He would like to still be tested for a piece of mind. COVID testing ordered. Discussed he needs to be seen for follow up if developing any new symptoms as discussed above.

## 2021-06-13 NOTE — TELEPHONE ENCOUNTER
Wellness Screening Tool  Symptom Screening:  Do you have one of the following NEW symptoms:    Fever (subjective or >100.0)?  No    A new cough?  No    Shortness of breath?  No     Chills? No     New loss of taste or smell? No     Generalized body aches? No     New persistent headache? No     New sore throat? No     Nausea, vomiting, or diarrhea?  No    Within the past 2 weeks, have you been exposed to someone with a known positive illness below:    COVID-19 (known or suspected)?  No    Chicken pox?  No    Mealses?  No    Pertussis?  No    Patient notified of visitor policy- No visitors are allowed to accompany the patient at this time. Yes  Pt informed to wear a mask: Yes  Pt notified if they develop any symptoms listed above, prior to their appointment, they are to call the clinic directly at [phone] for further instructions.  Yes  Patient's appointment status: Patient will be seen in clinic as scheduled on 12/9

## 2021-06-14 NOTE — TELEPHONE ENCOUNTER
I left a message offering the patient an office visit for today (2/3/21) at 1:00pm to replace his appointment for tomorrow (2/4/21) at 11:20am. There will be inclement weather tomorrow. When the patient returns this call, please confirm whether he can make it. If not, when today between 1-4pm would work for him? Thanks.

## 2021-06-14 NOTE — PROGRESS NOTES
Assessment and Plan:   Continue excellent diet and exercise plan.  Goes to [facility] 5 days a week.    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  Needs to meet with colorectal surgery next month and determine his colonoscopy follow-up plan.  Continue aspirin daily.    1. Health care maintenance  History of basal cell cancer of the scalp, yearly dermatology follow-up in May.    I pressures were okay, yearly eye exam in May or June.    2. Essential hypertension  Well-controlled.  Continue lisinopril 2.5 mg a day which he restarted in October.  Continue atenolol 25 mg a day.    Blood pressure runs high this winter, could consider increasing lisinopril.  Otherwise follow-up in the spring.    3. Hypercholesterolemia  Well-controlled on Lipitor 20 mg.  Goal LDL less than 70.   - Lipid Cascade    4. Coronary atherosclerosis due to calcified coronary lesion  Asymptomatic with excellent exercise tolerance.  Lipitor and aspirin.    5. Sleep apnea, unspecified type  CPAP is working well.  He has all the new supplies.  Just occasional alcohol in the evening, half hour.    6. Benign prostatic hyperplasia, unspecified whether lower urinary tract symptoms present  Well-controlled on Flomax.  Irregularity on last years exam had improved.  He saw urology last year, no biopsy.    I did discuss option of not screening for prostate cancer further as he is 82 years old now.  Patient will consider next year.    7. Spinal stenosis  Patient has significant improvement in his right thigh and hip pain with regular walking and recovery after his colon surgery.  He does have severe spinal stenosis at L3 5 seen on October 2015 MRI.  He seen Dr. Elkins of orthopedic surgery, with plan to follow-up if symptoms worsen.  Patient does not feel he needs surgery at this time.  He has some baseline right L4 numbness that has persisted.  He continues with gabapentin 600 mg nightly and Tylenol  twice daily.  Continue with regular walking at the [facility].    Chronic neck pain, MRI November 2015 negative for stenosis.  Moderate to severe facet arthropathy from C7-T1.  Moderate foraminal stenosis.  No new neck pain, that has been feeling better as well, has had some mild right shoulder pain that is chronic.    Uses blue emu cream    8. Medication monitoring encounter    - Comprehensive Metabolic Panel  - HM2(CBC w/o Differential)    9. Prostate cancer screening    - PSA (Prostatic-Specific Antigen), Annual Screen    No flare of his chronic sinusitis, continue current treatment plan.    The patient's current medical problems were reviewed.    I have had an Advance Directives discussion with the patient.  Patient is full code, no prolonged artificial life support if poor prognosis  The following health maintenance schedule was reviewed with the patient and provided in printed form in the after visit summary:   Health Maintenance   Topic Date Due     FALL RISK ASSESSMENT  11/01/2018     TD 18+ HE  04/11/2021     ADVANCE DIRECTIVES DISCUSSED WITH PATIENT  10/18/2021     PNEUMOCOCCAL POLYSACCHARIDE VACCINE AGE 65 AND OVER  Completed     INFLUENZA VACCINE RULE BASED  Completed     PNEUMOCOCCAL CONJUGATE VACCINE FOR ADULTS (PCV13 OR PREVNAR)  Completed     ZOSTER VACCINE  Completed        Subjective:   Chief Complaint: Antoni Stoll is an 82 y.o. male here for an Annual Wellness visit.   HPI: Antoni is here for an adult wellness visit, subsequent.    He is recovering from his right hemicolectomy in September.  His bowels returned to normal.  No abdominal pain.  Was an adenocarcinoma inside of polyp.  No nodes and CT scan negative for metastases.  He follows up with colorectal in January, needs to determine his follow-up colonoscopy.    He has a past history of coronary artery disease but excellent exercise tolerance.  Goes to [facility] 5 days a week.  No abdominal pain or  jaundice.    Positive family history of heart disease.    Heart bypass in 2013 with LIMA to the LAD.  Saphenous vein graft to the first diagonal.  Saphenous vein graft the second diagonal was subsequent to the RCA.  No good circumflex target.  No symptoms since.  December 2016 stress echo with normal ejection fraction and no ischemia.  No valve problems.    No lower extremity edema or palpitations or history of arrhythmia.  No history of syncope.    His blood pressure is been well-controlled in the 120s over 70s back on lisinopril 2.5 mg a day in addition to atenolol 25 mg a day.    His CPAP machine is working well and he has his new equipment.  No significant daytime sleepiness.    He has never smoked.  No cough or increasing shortness of breath.  Her graph is moderate BPH is well controlled with Flomax.  He had some slight irregularity to his right prostate last year but saw urology did not feel the nodule and no biopsy.  PSA was normal last year.    Ultrasound in 2011 negative for AAA.  Ultrasound 2015 showed carotid arteries torturous but not stenotic.    Severe spinal stenosis, see description above.    There is some question of borderline eye pressures in the past but his eye pressures have been normal on recent exams.  He just does yearly eye exams in the spring and did do it this past spring.    Past history of basal cell cancer, negative exam made, no new skin lesions.    No symptoms of his left inguinal hernia which is stable.    He is a retired .  Lives independently.    No flare of his chronic sinusitis, is used Flonase, Claritin and Astelin his past    Review of Systems:    Please see above.  The rest of the review of systems are negative for all systems.    Patient Care Team:  Sarthak Romano MD as PCP - General     Patient Active Problem List   Diagnosis     Glaucoma     Cataract     Spinal Stenosis     Inguinal Hernia     Hypercholesterolemia     Essential hypertension     Coronary Artery  Disease     Sleep Apnea     Skin Condition     Seborrheic Keratosis     Encounter for long-term (current) use of other medications     Colon cancer     Anemia     BPH (benign prostatic hyperplasia)     Spinal stenosis     Past Medical History:   Diagnosis Date     Anemia      Cancer     colon     Carotid artery occlusion      Coronary artery disease      High cholesterol      Hyperlipidemia      Hypertension      Left inguinal hernia      Low back pain      Sleep apnea, obstructive     CPAP     Spinal stenosis     lumbar      Past Surgical History:   Procedure Laterality Date     ARTERIAL BYPASS SURGERY       BLEPHAROPLASTY Bilateral      CARDIAC CATHETERIZATION  06/03/2013     CATARACT EXTRACTION Bilateral      CORONARY ARTERY BYPASS GRAFT  06/28/2013    5-vessel     CORONARY ARTERY BYPASS GRAFT  2013    5 vessel     HERNIA REPAIR      right inguinal     VARICOSE VEIN SURGERY Left       Family History   Problem Relation Age of Onset     Coronary artery disease Mother      Colon cancer Father      Coronary artery disease Father       Social History     Social History     Marital status: Single     Spouse name: N/A     Number of children: N/A     Years of education: N/A     Occupational History     Not on file.     Social History Main Topics     Smoking status: Never Smoker     Smokeless tobacco: Never Used     Alcohol use Yes      Comment: 5 drinks weekly     Drug use: No     Sexual activity: Not on file     Other Topics Concern     Not on file     Social History Narrative      Current Outpatient Prescriptions   Medication Sig Dispense Refill     acetaminophen (TYLENOL) 500 MG tablet Take 1,000 mg by mouth 2 (two) times a day.        aspirin 325 MG tablet Take 325 mg by mouth daily.       atenolol (TENORMIN) 25 MG tablet Take 1 tablet (25 mg total) by mouth daily. 90 tablet 3     atorvastatin (LIPITOR) 20 MG tablet Take 20 mg by mouth at bedtime.       B2/VITS A,C,E/LUT/ZEAXANTH/MIN (ICAPS ORAL) Take 1 capsule by  "mouth daily.       cholecalciferol, vitamin D3, 1,000 unit tablet Take 1,000 Units by mouth daily.       ferrous sulfate 325 (65 FE) MG tablet Take 1 tablet (325 mg total) by mouth 3 (three) times a day with meals.  0     gabapentin (NEURONTIN) 300 MG capsule Take 600 mg by mouth at bedtime.       glucosamine-chondroitin 500-400 mg cap Take 1 capsule by mouth 2 (two) times a day.       ipratropium (ATROVENT) 0.03 % nasal spray 2 sprays into each nostril daily as needed for rhinitis.        lisinopril (PRINIVIL,ZESTRIL) 2.5 MG tablet Take 2.5 mg by mouth daily.       loratadine (CLARITIN) 10 mg tablet Take 10 mg by mouth daily.       saw palmetto fruit 450 mg cap Take 1 capsule by mouth 2 (two) times a day.       tamsulosin (FLOMAX) 0.4 mg Cp24 Take 0.4 mg by mouth at bedtime.       No current facility-administered medications for this visit.       Objective:   Vital Signs:   Visit Vitals     /70     Pulse (!) 58     Ht 5' 5.5\" (1.664 m)     Wt 153 lb (69.4 kg)     SpO2 98%     BMI 25.07 kg/m2        VisionScreening:  No exam data present     PHYSICAL EXAM  Alert and oriented ×3 with normal mood and affect.  Pupils and irises equal and reactive.  External ocular muscles intact.  Pupils are almost equal, perhaps slightly reduced on the left pupil but reactive.  No conjunctivitis or scleral icterus.  External ears and nose exam normal.  Pharynx is normal.  No oral lesions or leukoplakia.  Teeth in good condition.  No cervical or supraclavicular or axillary or inguinal adenopathy.  No JVD and no carotid bruits.  No thyromegaly or nodularity.  Lungs clear to auscultation with normal respiratory excursion.  Spine is straight.  Heart is regular with no murmur or gallop.  Abdomen is mildly overweight, nontender no hepatosplenomegaly or pulsatile mass.  Well healed suprapubic scar.  Testicles normal bilaterally, somewhat atrophic on the left.  Moderate inguinal hernia on the left.  Prostate is just mildly enlarged just " a very slight irregularity to the right lobe similar but less prominent than last year but no nodule.  Gait normal.  Moderate hammertoes without callus.  +1 pedal pulses bilaterally no ankle edema.  Skin without suspicious lesions noted.    Assessment Results 12/7/2017   Activities of Daily Living No help needed   Instrumental Activities of Daily Living No help needed   Get Up and Go Score Less than 12 seconds   Mini Cog Total Score 5   Some recent data might be hidden     A Mini-Cog score of 0-2 suggests the possibility of dementia, score of 3-5 suggests no dementia    Identified Health Risks:     Patient's advanced directive was discussed and I am comfortable with the patient's wishes.

## 2021-06-14 NOTE — TELEPHONE ENCOUNTER
Patient scheduled with Dr. Romano for 2/4/2021.  Barbara Cobian CMA ............... 4:24 PM, 02/01/21

## 2021-06-14 NOTE — TELEPHONE ENCOUNTER
Reason for call:  Patient reporting a symptom    Symptom or request: SOB    Duration (how long have symptoms been present): A WHILE    Have you been treated for this before? Yes    Additional comments: PT HAD A HOLTER MONITOR DONE IN JAN IT CAME BACK NORMAL. WONDERING WHY HE IS STILL HAVING THE shortness of breath.    Phone Number patient can be reached at:    Cell number on file:    Telephone Information:   Mobile [phone]       Best Time:  ANYTIME     Can we leave a detailed message on this number: Yes    Call taken on 2/1/2021 at 4:01 PM by Desiree Walker

## 2021-06-14 NOTE — PATIENT INSTRUCTIONS - HE
Get your CPAP machine to work well every night.  Talk to your [employer] company where you got the CPAP machine, to see if they can adjust your mask or set up to get it to fit better.  If you have a leaky mask and are not getting adequate pressure control at night for your sleep apnea, this could lead to pulmonary hypertension and worsening shortness of breath.    Back in 2017 you had evidence of some lung scarring and small chest cavity, likely related to your previous heart bypass operation.    When you return this spring, I will plan to have you undergo a chest CT scan and repeat heart echo to further evaluate your chest and to evaluate for pulmonary hypertension.    Contact me as soon as you get back in the spring to set up a visit and proceed with further evaluation.    Continue to walk on a regular basis.    Do not use ibuprofen or Aleve type medication for your pain, as that can put you at risk for affecting kidney function, blood pressure and bleeding risk with ulcer.    You can use Tylenol 2-3 times a day as you are doing.    Sign up for [facility] portal to help schedule your Covid vaccine.

## 2021-06-14 NOTE — PROGRESS NOTES
[facility] Follow Up Note    Antoni Stoll   85 y.o. male    Date of Visit: 2/3/2021    Chief Complaint   Patient presents with     Shortness of Breath     Subjective  Wily is an 85-year-old male with long history of sleep apnea but compliant with CPAP.  He is here for progressive shortness of breath with exertion.    Coronary artery disease with a coronary bypass in 2013, no history of MI.  On aspirin and Lipitor 20 mg.    He had a chest CT scan 2017 that did show a small chest cavity and some upper lobe scarring.    No history of asthma and he is never smoked.    He had similar shortness of breath last December when he was here for evaluation with a stable normal hemoglobin of 13.9.  Normal creatinine and liver tests and blood sugar.    He has a past history of some chronotropic incompetence with bradycardia in the past.  But he stopped atenolol not largely resolved.  January 2020 Holter monitor showed some sinus bradycardia with propensity toward lower heart rate but no severe pauses or bradycardia.  No arrhythmia.  He does not have any palpitations.  Normal MD interval.    No syncope history.    No chest pain.    He did have a nuclear medicine pharmacologic stress test January 2021 and I reviewed with patient today that was negative for ischemia and ejection fraction 70%.    Last stress echo January 2019 showed mild mitral regurgitation but no aortic stenosis and no pulmonary hypertension.    Hypertension has been controlled in the past and last December was 124/62.  Mildly high today.  Is not check blood pressure recently.  He is compliant with his lisinopril 2.5 mg a day.    No lower extremity edema.    He does have longstanding kyphosis, but some worsening over the past year.  He has had general deconditioning over the past year.  Used to walk and [place] [employer] regularly but has not been doing that with the Covid isolation.    He still walks his dog and does some walking and does  the treadmill at his residence about 5 days a week.  He has noticed more shortness of breath with that activity.    He would notice shortness of breath with brushing his hair recently.    He is mildly overweight but weight stable, up 3 pounds.    Severe lumbar stenosis L3-5 with chronic back pain.  He is also had a recent right rotator cuff tear.  Is not been using regular NSAIDs.  Uses 2 Tylenol twice a day.  Does do range of motion exercises and generally that does not severely inhibit his daily activities.  Not planning surgery.  He was seen by orthopedic last month for this who did not recommend surgery.    No cough or fever.    PMHx:    Past Medical History:   Diagnosis Date     Anemia      Cancer (H)     colon     Carotid artery occlusion      Cataract     Created by Conversion      Coronary artery disease      High cholesterol      History of colon cancer 8/13/2018     Hyperlipidemia      Hypertension      Left inguinal hernia      Low back pain      Sleep apnea, obstructive     CPAP     Spinal stenosis     lumbar     SSS (sick sinus syndrome) (H) 12/9/2019     PSHx:    Past Surgical History:   Procedure Laterality Date     ARTERIAL BYPASS SURGERY       BLEPHAROPLASTY Bilateral      CARDIAC CATHETERIZATION  06/03/2013     CATARACT EXTRACTION Bilateral      CORONARY ARTERY BYPASS GRAFT  06/28/2013    5-vessel     CORONARY ARTERY BYPASS GRAFT  2013    5 vessel     HERNIA REPAIR      right inguinal     VARICOSE VEIN SURGERY Left      Immunizations:   Immunization History   Administered Date(s) Administered     Influenza [id number], 01/18/2010     Influenza high dose,seasonal,PF, 65+ yrs 10/13/2015, 10/18/2016, 11/01/2017, 12/10/2018, 12/11/2019     Influenza, inj, historic,unspecified 10/23/2007, 10/29/2009, 10/11/2010, 11/08/2011     Influenza, seasonal,quad inj 6-35 mos 11/29/2012, 10/25/2013, 11/19/2014     Influenza,quad,high Dose,PF, 65yr + 11/30/2020     Pneumo Conj 13-V (2010&after) 04/09/2015     Pneumo  "Polysac 23-V 10/03/1999, 03/06/2007     Td, Adult, Absorbed 03/11/1992, 10/24/2000, 04/11/2011     ZOSTER, LIVE 11/08/2007       ROS A comprehensive review of systems was performed and was otherwise negative    Medications, allergies, and problem list were reviewed and updated    Exam  /85   Pulse (!) 103   Ht 5' 5\" (1.651 m)   Wt 149 lb (67.6 kg)   SpO2 95%   BMI 24.79 kg/m    He does have moderate kyphosis, unchanged.  Lungs are clear to auscultation without wheezing or crackles but some reduced respiratory excursion similar to previous.  Heart is regular, I did recheck the heart rate and it was in the 80s.  Patient had just come in from the waiting room with high blood pressure and pulse.  But the heart rate was regular.  There was 2 premature beats during extended pulse monitoring.  But no irregularity.  Abdomen is just mildly overweight but nontender and there is no ankle edema.  Some reduced range of motion of the right shoulder but still can lift his arm up to 90 degrees and still good  strength.    Assessment/Plan  1. Short of breath on exertion  I am suspicious that he is having worsening pulmonary hypertension with his history of sleep apnea and worsening kyphosis.    He does note that his CPAP machine that he got a year ago is not always fitting well.  He gets a \"grumpy face \"on his machine in the morning often.  He tries to tighten his mask more and that he gets a \"smile on his machine face \"but then it is uncomfortable for him because it is so tight.    He has some mild daytime sleepiness but not severe.    He he has had some deconditioning as well with less activity this year but still walking on the treadmill and exercising regularly, but still having progressive dyspnea on exertion.    I do not suspect ischemia as a cause given his negative stress test last month.  He is not having chest pain.    No infectious symptoms.    His chest CT scan from 2017 showed a small chest cavity and " some upper lobe scarring, likely related to his previous bypass operation.    I will plan a repeat chest CT scan and repeat heart echo, but patient does wish to wait until after he gets his Covid vaccine, because of the current COVID-19 outbreak.    If he has significant worsening shortness of breath before his spring follow-up, get evaluated right away.    History of borderline bradycardia with on atenolol but his heart rate is normal today, and he is not had significant bradycardia noted in other situations I would not suspect that a bradycardia issue is causing his shortness of breath.  Could consider repeat Holter monitor and EKG at future appointment.    2. Obstructive sleep apnea syndrome  I did asked patient to contact his [employer] where he got a CPAP machine to see if they can get him a new mask or somehow changes set up so that is better tolerated and that he gets a :-) on his machine in the morning.  I am worried that his mask may be leaking and that is what his machine is noticing.    Could consider referral back to the sleep clinic later this spring as well.    3. Kyphosis (acquired) (postural)  Slowly progressive.    4. Atherosclerosis of native coronary artery of native heart with angina pectoris (H)  No chest pain and negative stress test last month.  Continue medical management with Lipitor and aspirin    5. Essential hypertension  Borderline high blood pressure today but has been controlled recently.  I suspect this is whitecoat hypertension from coming into the clinic quickly today.    Continue lisinopril 2.5 mg a day.  I did  patient to avoid regular NSAID use for his right rotator cuff tear    6. Tear of right rotator cuff, unspecified tear extent, unspecified whether traumatic  As above.  Continue the range of motion exercises regularly.  Tylenol twice daily.  Avoid regular use of NSAIDs with his lisinopril use, also risk of bleeding and affecting kidney function.  He  could use occasional ibuprofen sparingly for more severe pain exacerbations.  If significant pain exacerbation, consider referral back to the orthopedic clinic for a cortisone shot.    History of colon cancer with hemicolectomy but September 2020 colonoscopy negative and hemoglobin was 13.9 last December.    Severe lumbar DJD with spinal stenosis, continue regular walking.    We did talk with patient about getting set up for my chart and to try to get scheduled for the COVID-19 vaccine.  Patient is [place] to help care for his niece who was recently diagnosed with stage IV ovarian cancer.  Patient would be willing to delay his trip in order to get the COVID-19 vaccine, however.    Patient will plan to follow-up with me as soon as he gets back from caring for his niece.  Anticipate follow-up in April, but hopefully sooner if he return sooner.  No follow-ups on file.   Patient Instructions   Get your CPAP machine to work well every night.  Talk to your [employer] where you got the CPAP machine, to see if they can adjust your mask or set up to get it to fit better.  If you have a leaky mask and are not getting adequate pressure control at night for your sleep apnea, this could lead to pulmonary hypertension and worsening shortness of breath.    Back in 2017 you had evidence of some lung scarring and small chest cavity, likely related to your previous heart bypass operation.    When you return this spring, I will plan to have you undergo a chest CT scan and repeat heart echo to further evaluate your chest and to evaluate for pulmonary hypertension.    Contact me as soon as you get back in the spring to set up a visit and proceed with further evaluation.    Continue to walk on a regular basis.    Do not use ibuprofen or Aleve type medication for your pain, as that can put you at risk for affecting kidney function, blood pressure and bleeding risk with ulcer.    You can use Tylenol 2-3 times  a day as you are doing.    Sign up for [facility] portal to help schedule your Covid vaccine.    Sarthak Romano MD        Current Outpatient Medications   Medication Sig Dispense Refill     acetaminophen (TYLENOL) 500 MG tablet Take 1,000 mg by mouth daily .             aspirin 325 MG tablet Take 325 mg by mouth daily.       atorvastatin (LIPITOR) 20 MG tablet Take 1 tablet (20 mg total) by mouth daily. 90 tablet 3     B2/VITS A,C,E/LUT/ZEAXANTH/MIN (ICAPS ORAL) Take 1 capsule by mouth daily.       calcium polycarbophil (FIBERCON) 625 mg tablet Take 1,250 mg by mouth daily.       cholecalciferol, vitamin D3, 1,000 unit tablet Take 1,000 Units by mouth daily.       fluticasone (FLONASE) 50 mcg/actuation nasal spray Apply 1 spray into each nostril daily.       gabapentin (NEURONTIN) 100 MG capsule TAKE 1 CAPSULE(100 MG) BY MOUTH IN THE MORNING 30 capsule 2     gabapentin (NEURONTIN) 300 MG capsule TAKE 1 TO 2 CAPSULES BY MOUTH EVERY EVENING AS NEEDED FOR NERVE PAIN (Patient taking differently: Take 300 mg by mouth at bedtime. ) 180 capsule 3     glucosamine-chondroitin 500-400 mg cap Take 1 capsule by mouth 2 (two) times a day.       ipratropium (ATROVENT) 0.03 % nasal spray USE 2 SPRAYS IN EACH NOSTRIL THREE TIMES DAILY AS NEEDED 30 mL 6     lisinopriL (PRINIVIL,ZESTRIL) 2.5 MG tablet TAKE 1 TABLET BY MOUTH EVERY DAY 90 tablet 2     loratadine (CLARITIN) 10 mg tablet Take 10 mg by mouth daily.       saw palmetto fruit 450 mg cap Take 1 capsule by mouth 2 (two) times a day.       tamsulosin (FLOMAX) 0.4 mg Cp24 Take 0.4 mg by mouth at bedtime.       No current facility-administered medications for this visit.      No Known Allergies  Social History     Tobacco Use     Smoking status: Never Smoker     Smokeless tobacco: Never Used   Substance Use Topics     Alcohol use: Yes     Comment: 5 drinks weekly     Drug use: No

## 2021-06-15 NOTE — TELEPHONE ENCOUNTER
Refill Approved    Rx renewed per Medication Renewal Policy. Medication was last renewed on 5/12/20.    Chris Kurtz, [facility] Triage/Med Refill 2/25/2021     Requested Prescriptions   Pending Prescriptions Disp Refills     lisinopriL (PRINIVIL,ZESTRIL) 2.5 MG tablet [Pharmacy Med Name: LISINOPRIL 2.5MG TABLETS] 90 tablet 2     Sig: TAKE 1 TABLET BY MOUTH EVERY DAY       [facility] [employer] Refill Protocol Passed - 2/25/2021  4:00 AM        Passed - PCP or prescribing provider visit in past 12 months       Last office visit with prescriber/PCP: 2/3/2021 Sarthak Romano MD OR same dept: 2/3/2021 Sarthak Romano MD OR same specialty: 2/3/2021 Sarthak Romano MD  Last physical: 12/14/2020 Last MTM visit: Visit date not found   Next visit within 3 mo: Visit date not found  Next physical within 3 mo: Visit date not found  Prescriber OR PCP: Sarthak Romano MD  Last diagnosis associated with med order: 1. Essential hypertension  - lisinopriL (PRINIVIL,ZESTRIL) 2.5 MG tablet [Pharmacy Med Name: LISINOPRIL 2.5MG TABLETS]; TAKE 1 TABLET BY MOUTH EVERY DAY  Dispense: 90 tablet; Refill: 2    If protocol passes may refill for 12 months if within 3 months of last provider visit (or a total of 15 months).             Passed - Serum Potassium in past 12 months     Lab Results   Component Value Date    Potassium 4.3 12/14/2020             Passed - Blood pressure filed in past 12 months     BP Readings from Last 1 Encounters:   02/11/21 156/74             Passed - Serum Creatinine in past 12 months     Creatinine   Date Value Ref Range Status   12/14/2020 1.10 0.70 - 1.30 mg/dL Final

## 2021-06-15 NOTE — PATIENT INSTRUCTIONS - HE
1. Consider Lyrica in place of gabapentin    2. We will consider updating the MRI of you low back    3. Consider injection in the right shoulder. Call and schedule the injection when you return from your trip

## 2021-06-15 NOTE — PROGRESS NOTES
Assessment/Plan:      Diagnoses and all orders for this visit:    Lumbar spine pain    Spondylolisthesis of lumbar region    Lumbar radicular pain    Spinal stenosis of lumbar region with neurogenic claudication    Complete tear of right rotator cuff, unspecified whether traumatic  -     OPS US Large Joint Injection Unilateral; Future; Expected date: 02/11/2021    Carpal tunnel syndrome of right wrist    Drowsiness    Shoulder effusion, right  -     OPS US Large Joint Injection Unilateral; Future; Expected date: 02/11/2021        Assessment: Pleasant 85 y.o. male  with history of colon cancer, hyperlipidemia, hypertension, carotid artery occlusion, coronary artery disease with:        1.  Persistent chronic lumbar spine pain waxing and waning.  He continues to have right lower extremity pain and paresthesias related to severe spinal stenosis L3-4 and most significant L4-5 with L4-5 spondylolisthesis.  There is tortuosity of the nerve roots.  Pain persist despite physical therapy and gabapentin.  Recent bilateral L5-S1 transforaminal epidural steroid injection did offer some relief but only 40 to 50% and that benefit is slowly diminishing over time.  He continues to have numbness and tingling in his right foot.  He was seen by Dr. Kenny at [facility] orthopedics.  He would be a surgical candidate but holding off as long as possible.  No weakness as of yet.  Discussed options today of spinal cord stimulator versus updating MRI or another surgical opinion.      2.  Persistent right shoulder pain consistent with rotator cuff pathology.  He does have aFull-thickness rotator cuff tear of the supraspinatus tendon with muscle retraction.  Also has joint effusion.   Evaluated by orthopedics at [facility].  Had a right subacromial bursa injection and recommended conservative management.  The injection only lasted 3 to 4 weeks his right shoulder is having significant pain again.  Consideration for glenohumeral joint  injection.     3.  Drowsiness.  Question related to medications.  Improved with decrease gabapentin.     4.  Intermittent right arm and hand paresthesias all fingers involved.    Consistent with carpal tunnel syndrome.  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  Some slight improvement with wearing a carpal tunnel brace.     Will continue to wear brace.        5.  Multilevel degenerative disc disease and foraminal stenosis in the cervical spine.  No radiculopathy noted on EMG.  Has right shoulder pain is likely related to rotator cuff pathology.       Discussion:    1.  We discussed the diagnosis and treatment options today.  He is having significant right shoulder and low back pain.  We discussed medication management versus injections versus spinal cord stimulator versus updating imaging and surgical referral.  He wants to delay many treatments as [place] is [place] as his niece has stage IV ovarian cancer.  Like to [place] and determine amount of time.    2.  He does have a small glenohumeral joint effusion of the right shoulder and steroid injection may be helpful with ultrasound guidance into the glenohumeral joint.  This will be ordered and he can schedule this at his earliest convenience.    3.  Can consider changing gabapentin to Lyrica 25 mg twice daily to see if that we more effective for his pain management.  He wants to wait until he returns from his [place].    4.  Consider updating MRI of the lumbar spine to evaluate for progressive spinal stenosis.  Another surgical opinion from our neurosurgeons may be beneficial for him for lumbar decompression as he has such severe stenosis at 2 levels I suspect he would get much improvement from a lumbar decompression.    5.  If he is not a surgical candidate can consider work-up for spinal cord stimulator.    6.   continue to wear carpal tunnel brace.    7.  Follow-up at his earliest convenience for right  glenohumeral joint injection.    35 minutes were spent on the date of the encounter performing chart review, patient visit and documentation in addition to any procedure.      It was our pleasure caring for your patient today, if there any questions or concerns please do not hesitate to contact us.      Subjective:   Patient ID: Antoni Stoll is a 85 y.o. male.    History of Present Illness: Patient presents for follow-up of right shoulder pain along with low back pain and right lower extremity paresthesias.  Lumbar spine pain is unchanged.  Worse with any bending or standing better with sitting.  Has constant numbness in the right foot and leg without any weakness of significance.  Gabapentin 100 mg in the morning 300 mg at night is tolerated well with less drowsiness.    Another issue is right shoulder pain worse with any movement of his right arm and feels weak in the shoulder.  Was seen by [facility] orthopedics, Dr. Rhoades again.  Reported injection could be an option.  Still did not feel he was surgical.  Was seen by his primary care provider as well working of shortness of breath.  Increased Tylenol to 2 pills twice a day unsure if it is helpful.  Pain is a 6/10 at worst 2/10 today 1/10 at best.    Also has numbness and tingling right hand wearing carpal tunnel splint at night unsure if it is helpful.    Imaging: I personally reviewed MRI of the right  shoulder along with the report.  There is a small joint effusion of the glenohumeral joint along with full-thickness supraspinatus tear.    MRI lumbar spine from January 2020 also personally reviewed showing spondylolisthesis L3-4 L4-5 with severe spinal stenosis at those levels with tortuosity of the nerve roots.    Review of Systems: Pertinent positives: Numbness and tingling right hand along with right foot.  Weakness right shoulder..  Pertinent negatives:   No bowel or bladder incontinence.  No urinary retention.  No fevers, unintentional weight loss,  balance changes, headaches, frequent falling, difficulty swallowing, or coordination difficulties.  All others reviewed are negative.         Past Medical History:   Diagnosis Date     Anemia      Cancer (H)     colon     Carotid artery occlusion      Cataract     Created by Conversion      Coronary artery disease      High cholesterol      History of colon cancer 8/13/2018     Hyperlipidemia      Hypertension      Left inguinal hernia      Low back pain      Sleep apnea, obstructive     CPAP     Spinal stenosis     lumbar     SSS (sick sinus syndrome) (H) 12/9/2019       The following portions of the patient's history were reviewed and updated as appropriate: allergies, current medications, past family history, past medical history, past social history, past surgical history and problem list.           Objective:   Physical Exam:    Vitals:    02/11/21 1125   BP: 156/74   Pulse: 72     Body mass index is 23.8 kg/m .      General: Alert and oriented with normal affect. Attention, knowledge, memory, and language are intact. No acute distress.   Eyes: Sclerae are clear.  Respirations: Unlabored. CV: No lower extremity edema.  Skin: No rashes seen.    Decreased internal and external rotation of the shoulders bilaterally with significant positive Hightower on the right.  Cautious movements.    Sensation is decreased to light touch right foot medial lateral malleolus and dorsal foot.  Reflexes are   negative Hoffmans. 1+ patellar and 0 Achilles with downgoing toes.    Manual muscle testing reveals:  Right /Left out of 5     5/5 elbow flexors  5/5 elbow extensors  5/5 wrist extensors  5/5 interosseus  5/5 finger flexors     5/5 ankle plantar flexors  5/5 ankle dorsiflexors  5/5  EHL

## 2021-06-16 PROBLEM — M48.00 SPINAL STENOSIS: Status: ACTIVE | Noted: 2017-12-07

## 2021-06-16 PROBLEM — D64.9 ANEMIA: Status: ACTIVE | Noted: 2017-09-16

## 2021-06-16 PROBLEM — N40.0 BPH (BENIGN PROSTATIC HYPERPLASIA): Status: ACTIVE | Noted: 2017-09-16

## 2021-06-17 NOTE — TELEPHONE ENCOUNTER
Prior Authorization Request  Who s requesting:  Pharmacy  Pharmacy Name and Location: [facility]  Medication Name: Lyrica 25 mg  Insurance Plan:   Insurance Member ID Number:    CoverMyMeds Key: [id number]  Informed patient that prior authorizations can take up to 10 business days for response:   Yes  Okay to leave a detailed message: Yes

## 2021-06-17 NOTE — PATIENT INSTRUCTIONS - HE
Patient Instructions by Desi Mobley PT at 1/24/2020  1:00 PM     Author: Desi Mobley PT Service: -- Author Type: Physical Therapist    Filed: 1/24/2020  2:42 PM Encounter Date: 1/24/2020 Status: Signed    : Desi Mobley PT (Physical Therapist)        CHIN TUCK - SUPINE    While lying on your back, tuck your chin towards your chest and press the back of your head into the table.    Maintain contact of head with the surface you are lying on the entire time.    RETRACTION / CHIN TUCK    Slowly draw your head back so that your ears line up with your shoulders.  Off and on throughout the day.  If it seems to be helping you may do more and see if it helps to get the pain out of the arm and into the neck.  Don't do if it cause pain to go down the arm.          SCAPULAR RETRACTIONS    Draw your shoulder blades back and down.  Off and on throughout the day.        FLEXION SELF ASSISTED - SUPINE    While lying on your back with your arm at your side, grasp your affected arm and slowly raise it up upwards and towards overhead.  Your affected arm should be relaxed and your other arm performing the work.     Standing up gently back and forth slowly.

## 2021-06-17 NOTE — PATIENT INSTRUCTIONS - HE
1. Schedule the right shoulder injection with Dr Mir or Dr Van for 2 weeks from now    2. See neurosurgery for an opinion regarding your lumbar spine    3. Stop gabapentin and try Lyrica 25 mg at bedtime for 1 week and then can increase to 25 mg twice daily (if you want)

## 2021-06-17 NOTE — PROGRESS NOTES
Assessment/Plan:      Diagnoses and all orders for this visit:    Complete tear of right rotator cuff, unspecified whether traumatic    Shoulder effusion, right    Spondylolisthesis of lumbar region  -     Cancel: Ambulatory referral to Neurosurgery  -     pregabalin (LYRICA) 25 MG capsule; Take 1 capsule (25 mg total) by mouth 2 (two) times a day. 1 tab at bedtime x 5 days.  Then may increase to 1 cap  Twice daily.  Dispense: 60 capsule; Refill: 2  -     Ambulatory referral to Neurosurgery    Spinal stenosis of lumbar region with neurogenic claudication  -     Cancel: Ambulatory referral to Neurosurgery  -     pregabalin (LYRICA) 25 MG capsule; Take 1 capsule (25 mg total) by mouth 2 (two) times a day. 1 tab at bedtime x 5 days.  Then may increase to 1 cap  Twice daily.  Dispense: 60 capsule; Refill: 2  -     Ambulatory referral to Neurosurgery        Assessment: Pleasant 86 y.o. male  with history of colon cancer, hyperlipidemia, hypertension, carotid artery occlusion, coronary artery disease with:        1.  Persistent right shoulder pain with any movement.  He has glenohumeral joint effusion with complete rotator cuff tear of the supraspinatus with retraction.  Not a surgical candidate..  He also has mild glenohumeral joint osteoarthritis all contributing to his significant pain.  Has not had glenohumeral joint injection.    2. Persistent and worsening chronic lumbar spine pain waxing and waning.  He continues to have right lower extremity pain and paresthesias related to severe spinal stenosis L4-5 and slightly less at L3-4  with L4-5 spondylolisthesis.  There is tortuosity of the nerve roots.  Pain persist despite physical therapy and gabapentin. bilateral L5-S1 transforaminal epidural steroid injection did offer some relief but only 40 to 50% and that benefit slowly diminished over 1 month..  He continues to have numbness and tingling in his right foot.  He was seen by Dr. Kenny at [facility] orthopedics.   He would be a surgical candidate but holding off as long as possible.  No weakness as of yet.  He has fallen however due to right leg numbness and is having some urinary leakage.     3.  Drowsiness.  Question related to medications.  Improved with decrease gabapentin.     4.  Intermittent right arm and hand paresthesias all fingers involved.    Consistent with carpal tunnel syndrome.  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  Some slight improvement with wearing a carpal tunnel brace.       Not currently wearing the brace.  Was not helpful.  Will monitor.                Discussion:    1.  We discussed the diagnosis and treatment options.  We discussed options for the lumbar spine including further injections and surgical evaluation.  He is seeing Dr. Kenny we discussed option of a second opinion from neurosurgery here.  We also discussed medication changes as well as treatment for his right shoulder.  He is not surgical candidate for his right shoulder we discussed having the glenohumeral joint injection.    2.  Recommend he proceed with a right glenohumeral joint injection under ultrasound guidance.  He did recently have his Covid vaccination a couple days ago so he needs to wait 2 weeks.  Will have this when he returns from [place].    3.  Stop gabapentin which he is taking 100 mg in the morning 300 mg at bedtime.  Try Lyrica 25 mg at bedtime for 1 week and may increase to twice a day.  My hope is that this will help with his neuropathic pain from the spinal stenosis.    4.  I will provide a second opinion regarding his lumbar spine with neurosurgery, Dr. Eisenberg or Dr. Maya.  He wants a second opinion as he has been seen by Dr. Mejias in the past as well.    5.  Follow-up with me at his earliest convenience for right glenohumeral joint injection.      It was our pleasure caring for your patient today, if there any questions or concerns please do not hesitate to contact  us.      Subjective:   Patient ID: Antoni Stoll is a 86 y.o. male.    History of Present Illness: Patient presents for follow-up of right shoulder pain along with low back pain and right leg numbness and tingling.  Right shoulder has worsened or at least not improved over time.  Has pain deep in the right shoulder with any movement has sharp episodes with reaching of his arm.  He had a fall recently due to his low back and landed on his right arm and had increased shoulder pain.  He is not a candidate for surgery and is considering a right glenohumeral joint injection has not scheduled that.  That was ordered at last visit.    Also has increased low back pain and increased frequency of right lower extremity numbness and tingling in the foot and low back pain in the bilateral gluteal region worse with standing and walking better with sitting.  He was sitting for quite a while though and had complete numbness of the right leg and went to stand and had some weakness and fell due to the numbness in his leg.  He also has noticed some increased urinary leakage at times.  Pain is a 6/10 at worst 3/10 today 1/10 at best.    We reviewed again today that he is seeing Dr. Kenny at [facility] and has had surgical opinion and is a surgical candidate.  Has been trying to hold off surgery but is considering surgery.  He is taking gabapentin 100 mg in the morning 300 mg at bedtime not sure if it is helpful with his pain is somewhat drowsy on this but improved when compared to the higher dose that he was on.  Has to travel back at least to [place] to see his family as his niece just passed away.  Also continues to have numbness and tingling intermittently right hand brace was not helpful for carpal tunnel.      Imaging: Lumbar MRI images personally reviewed from January 2020.  Moderate multilevel lumbar spondylosis with severe spinal stenosis at L4-5 and L3-4.  Severe left foraminal stenosis L1-2 right lumbar scoliosis.   Spondylolisthesis grade 1 L4 on L5 and trace of L3 on L4.  The most significant facet arthropathy appears at L4-5.      Right shoulder MRI also personally reviewed.  Retraction supraspinatus with full-thickness tear.  Mild glenohumeral joint effusion with mild glenohumeral joint osteoarthritis.    Review of Systems: Pertinent positives: Has numbness tingling weakness right leg.  Numbness and tingling right hand.  Complains of loss of bladder control and fall  Pertinent negatives  No bowel   incontinence.  No urinary retention.  No fevers, unintentional weight loss, balance changes, headaches difficulty swallowing, or coordination difficulties.  All others reviewed are negative.       Past Medical History:   Diagnosis Date     Anemia      Cancer (H)     colon     Carotid artery occlusion      Cataract     Created by Conversion      Coronary artery disease      High cholesterol      History of colon cancer 8/13/2018     Hyperlipidemia      Hypertension      Left inguinal hernia      Low back pain      Sleep apnea, obstructive     CPAP     Spinal stenosis     lumbar     SSS (sick sinus syndrome) (H) 12/9/2019       The following portions of the patient's history were reviewed and updated as appropriate: allergies, current medications, past family history, past medical history, past social history, past surgical history and problem list.           Objective:   Physical Exam:    Vitals:    05/21/21 0830   BP: 146/72     Body mass index is 23.4 kg/m .      General: Alert and oriented with normal affect. Attention, knowledge, memory, and language are intact. No acute distress.   Eyes: Sclerae are clear.  Respirations: Unlabored. CV: No lower extremity edema.  Skin: No rashes seen.    Gait: Nonantalgic.  Decreased right shoulder range of motion abduction with pain.  Decreased internal/external rotation with pain as well.  Sensation is intact to light touch throughout the upper and lower extremities.  Reflexes are  negative  Hoffmans. 2+ patellar and 0 Achilles      Manual muscle testing reveals:  Right /Left out of 5     5/5 elbow flexors  5/5 elbow extensors  5/5 wrist extensors  5/5 interosseus  5/5 finger flexors     5/5 knee flexors  5/5 knee extensors  5/5 ankle plantar flexors  5/5 ankle dorsiflexors  5/5  EHL

## 2021-06-17 NOTE — PATIENT INSTRUCTIONS - HE
Patient Instructions by Sarthak Romano MD at 12/11/2019  9:20 AM     Author: Sarthak Romano MD Service: -- Author Type: Physician    Filed: 12/11/2019  9:57 AM Encounter Date: 12/11/2019 Status: Addendum    : Sarthak Romano MD (Physician)    Related Notes: Original Note by Sarthak Romano MD (Physician) filed at 12/11/2019  9:57 AM       Continue with daily walking to reduce tightness in your spinal stenosis.    See  today for referral to the spine clinic and MRI of your back before that appointment.    Proceed with heart monitor in January as planned.    See me in early February to follow-up on the spinal stenosis and the heart rate issue.  Patient Education   Understanding USDA MyPlate  The USDA (US Department of Agriculture) has guidelines to help you make healthy food choices. These are called MyPlate. MyPlate shows the food groups that make up healthy meals using the image of a place setting. Before you eat, think about the healthiest choices for what to put onto your plate or into your cup or bowl. To learn more about building a healthy plate, visit www.choosemyplate.gov.       The Food Groups    Fruits: Any fruit or 100% fruit juice counts as part of the Fruit Group. Fruits may be fresh, canned, frozen, or dried, and may be whole, cut-up, or pureed. Make half your plate fruits and vegetables.    Vegetables: Any vegetable or 100% vegetable juice counts as a member of the Vegetable Group. Vegetables may be fresh, frozen, canned, or dried. They can be served raw or cooked and may be whole, cut-up, or mashed. Make half your plate fruits and vegetables.     Grains: All foods made from grains are part of the Grains Group. These include wheat, rice, oats, cornmeal, and barley such as bread, pasta, oatmeal, cereal, tortillas, and grits. Grains should be no more than a quarter of your plate. At least half of your grains should be whole grains.    Protein: This group includes meat, poultry, seafood,  beans and peas, eggs, processed soy products (like tofu), nuts (including nut butters), and seeds. Make protein choices no more than a quarter of your plate. Meat and poultry choices should be lean or low fat.    Dairy: All fluid milk products and foods made from milk that contain calcium, like yogurt and cheese are part of the Dairy Group. (Foods that have little calcium, such as cream, butter, and cream cheese, are not part of the group.) Most dairy choices should be low-fat or fat-free.    Oils: These are fats that are liquid at room temperature. They include canola, corn, olive, soybean, and sunflower oil. Foods that are mainly oil include mayonnaise, certain salad dressings, and soft margarines. You should have only 5 to 7 teaspoons of oils a day. You probably already get this much from the food you eat.  Use [employer] to Help Build Your Meals  The SuperTracker can help you plan and track your meals and activity. You can look up individual foods to see or compare their nutritional value. You can get guidelines for what and how much you should eat. You can compare your food choices. And you can assess personal physical activities and see ways you can improve. Go to www.chooseAprexis Health Solutionsplate.gov/supertracker/.    0098-6929 [employer]. [address] [place] [address], [place] [address]. All rights reserved. This information is not intended as a substitute for professional medical care. Always follow your healthcare professional's instructions.           Patient Education   Signs of Hearing Loss  Hearing loss is a problem shared by many people. In fact, it is one of the most common health conditions, particularly as people age. Most people over age 65 have some hearing loss, and by age 80, almost everyone does. Because hearing loss usually occurs slowly over the years, you may not realize your hearing ability has gotten worse.       Have your hearing checked  Contact your [facility] care provider if you:    Have to  strain to hear normal conversation.    Have to watch other peoples faces very carefully to follow what theyre saying.    Need to ask people to repeat what theyve said.    Often misunderstand what people are saying.    Turn the volume of the television or radio up so high that others complain.    Feel that people are mumbling when theyre talking to you.    Find that the effort to hear leaves you feeling tired and irritated.    Notice, when using the phone, that you hear better with 1 ear than the other.    1465-7708 [employer]. [address] [place] [address]. All rights reserved. This information is not intended as a substitute for professional medical care. Always follow your healthcare professional's instructions.           Advance Directive  Patients advance directive was discussed and I am comfortable with the patients wishes.  Patient Education   Personalized Prevention Plan  You are due for the preventive services outlined below.  Your care team is available to assist you in scheduling these services.  If you have already completed any of these items, please share that information with your care team to update in your medical record.  Health Maintenance   Topic Date Due   ? ZOSTER VACCINES (2 of 3) 01/03/2008   ? INFLUENZA VACCINE RULE BASED (1) 08/01/2019   ? FALL RISK ASSESSMENT  12/10/2019   ? MEDICARE ANNUAL WELLNESS VISIT  12/10/2019   ? TD 18+ HE  04/11/2021   ? ADVANCE CARE PLANNING  12/10/2023   ? PNEUMOCOCCAL IMMUNIZATION 65+ LOW/MEDIUM RISK  Completed

## 2021-06-17 NOTE — TELEPHONE ENCOUNTER
Telephone Encounter by Lizeth Butts LPN at 8/3/2020  2:09 PM     Author: Lizeth Butts LPN Service: -- Author Type: Licensed Nurse    Filed: 8/3/2020  2:16 PM Encounter Date: 7/30/2020 Status: Signed    : Lizeth Butts LPN (Licensed Nurse)       Patient Returning Call  Reason for call:  Patient returning call   Information relayed to patient:  Elyssa Lopez, [facility]           3:06 PM   Note      Left a msg informing the patient that we still have not received anything via fax or any returned calls from [facility] regarding his CPAP supplies. Asked that the patient call them and get back to us after verifying what they need from us.      Patient has additional questions:  Yes  If YES, what are your questions/concerns:  Patient states he called [facility] they faxed to clinic on 08/31/20 and they are going to fax  Again today . Patient requested a call from clinic if they received the fax because he is going out of town in two days .  Okay to leave a detailed message?: No

## 2021-06-17 NOTE — TELEPHONE ENCOUNTER
Central PA team  [phone]  Pool: [facility]ONATHAN PA MED ([id number])          PA has been initiated.       PA form completed and faxed insurance via Cover My Meds     Key:  [id number] - PA Case ID: [id number] - Rx #: [id number]     Medication:  Pregabalin 25MG capsules    Insurance:  [employer]        Response will be received via fax and may take up to 5-10 business days depending on plan

## 2021-06-18 NOTE — PATIENT INSTRUCTIONS - HE
Patient Instructions by Desi Mobley PT at 3/6/2020 10:00 AM     Author: Desi Mobley PT Service: -- Author Type: Physical Therapist    Filed: 3/6/2020 11:12 AM Encounter Date: 3/6/2020 Status: Addendum    : Desi Mobley PT (Physical Therapist)    Related Notes: Original Note by Desi Mobley PT (Physical Therapist) filed at 3/6/2020 10:23 AM       Wall circles (hula hoop)  Slowly keeping your hips facing forward and your arms straight slowly bring your pelvis around in a [place] clockwise and counterclockwise 10 reps each no pain just stretching.    Elbows and forearms on the wall one leg back and then stay there until it loosens up.  Let the stomach come forward.    Elbows on the wall stanng with the feet even try to pull your shoulder blades down and back.  Do this in various positions working toward the spot where you have the most difficulty.     DOORWAY STRETCH - HIGH    While standing in a doorway, place your arms up on the door frame and lean in until a stretch is felt along the front of your chest and/or shoulders. Your upper arms should be placed upward along the door frame.     NOTE: Your legs should control how much you stretch by bending or straightening your knee through the doorway.    DOORWAY STRETCH - LOW    While standing in a doorway, place your arm downward on the door frame and lean in until a stretch is felt along the front of your chest and/or shoulder. Your arm should be pointed downward towards the floor along the door frame.    NOTE: Your legs should control how much you stretch by bending or straightening your knee through the doorway.    Pec corner stretch    Find a comfortable position for your hands and lightly lean forward into the corner until you feel a good stretch in your pecs. Hold 20-30 sec, x2-3 reps                                     BICEP STRETCH    Place hand on the wall at shoulder height.  Keeping elbow straight, turn  your body gently toward the opposite side to feel a stretch in your bicep muscle.      Hold for 30 seconds x 2 each side.  Also put your arm down low in a doorway and stretch

## 2021-06-18 NOTE — PATIENT INSTRUCTIONS - HE
Patient Instructions by Desi Mobley PT at 2/21/2020  7:00 AM     Author: Desi Mobley PT Service: -- Author Type: Physical Therapist    Filed: 2/21/2020  8:05 AM Encounter Date: 2/21/2020 Status: Signed    : Desi Mobley PT (Physical Therapist)        ELASTIC BAND SHOULDER INTERNAL ROTATION    While holding an elastic band at your side with your elbow bent, start with your hand away from your stomach, then pull the band towards your stomach. Keep your elbow near your side the entire time.    Do 10-15 repetitions.      ELASTIC BAND SHOULDER EXTERNAL ROTATION    While holding an elastic band at your side with your elbow bent, start with your hand near your stomach and then pull the band away. Keep your elbow at your side the entire time.    Do 10-15 repetitions.

## 2021-06-18 NOTE — PATIENT INSTRUCTIONS - HE
Patient Instructions by Desi Mobley PT at 6/5/2020 11:00 AM     Author: Desi Mobley PT Service: -- Author Type: Physical Therapist    Filed: 6/5/2020 12:32 PM Encounter Date: 6/5/2020 Status: Addendum    : Desi Mobley PT (Physical Therapist)    Related Notes: Original Note by Desi Mobley PT (Physical Therapist) filed at 6/5/2020 12:10 PM          COUNTER PUSH UP PLUS  Slowly lower body to parallel with upper arms for push up position.  Then push back to arms straight and round shoulder blades pushing upper back as far away as can from the counter. Push shoulder blades forward but do not hike your shoulders.        SIDEBEND FLEXION TRUNK STRETCH - STANDING     front of a table and bend forward at the waist. Support yourself with your hands on a table as shown.     Next, slowly walk your hands to the side for a gentle stretch.       You can do it this way or you can lay on your side and stretch you arm over your head to stretch.  30 seconds 3 reps or hold for 2-3 minutes 1 time.    Lay down on your back and have your legs straight.  Put a pillow under your buttocks make sure that you can drop your legs down.  Then raise your arms over your head support them as needed so that you do not have pain.  Relax.  Try to have a little lower of a pillow than usual so that your neck can relax stay there for 5 minutes.

## 2021-06-18 NOTE — PATIENT INSTRUCTIONS - HE
Patient Instructions by Desi Mobley PT at 2/7/2020 10:00 AM     Author: Desi Mobley PT Service: -- Author Type: Physical Therapist    Filed: 2/7/2020 11:05 AM Encounter Date: 2/7/2020 Status: Addendum    : Desi Mobley PT (Physical Therapist)    Related Notes: Original Note by Desi Mobley PT (Physical Therapist) filed at 2/7/2020 10:49 AM       Tighten stomach and stand on one leg to balance do 30 seconds up to 2 minutes.  Do this in a safe place either a corner or a dry sink  With a chair behind you.      .   ISOMETRIC FLEXION    Gently push your fist forward into a wall with your elbow bent. Not yet    ISOMETRIC EXTENSION    Gently push your a bent elbow back into a wall.  Not yet    ISOMETRIC ADDUCTION    Gently push your elbow into the side of your body.  Not yet    ISOMETRIC ABDUCTION    Gently push your elbow out to the side into a wall with your elbow bent. Not yet    ISOMETRIC EXTERNAL ROTATION     Gently press your hand into a wall using the back side of your hand.  Maintain a bent elbow the entire time.    ISOMETRIC INTERNAL ROTATION     Gently press your hand into a wall using the palm side of your hand.  Maintain a bent elbow the entire time.  Please only do this 30-50% strength and elbow away from your side a fist width  5-10 seconds 5-10 reps 1 time per day

## 2021-06-18 NOTE — PATIENT INSTRUCTIONS - HE
Patient Instructions by Desi Mobley PT at 5/29/2020 11:00 AM     Author: Desi Mobley PT Service: -- Author Type: Physical Therapist    Filed: 5/29/2020 12:16 PM Encounter Date: 5/29/2020 Status: Addendum    : Desi Mobley PT (Physical Therapist)    Related Notes: Original Note by Desi Mobley PT (Physical Therapist) filed at 5/29/2020 12:15 PM        PLANK    While lying face down, lift your body up on your elbows and toes. Try and maintain a straight spine. Do not allow your hips or pelvis on either side to drop.     You may modify by planking on your knees  round your shoulders when you do this to make your shoulder blades work.          Stop doing the hula hoop      SIDELYING TRUNK ROTATION - MODIFIED    While lying on your side with your knees bent,  slowly twist your upper body to the side and rotated your spine.      ISOMETRIC FLEXION    Place your fingers on your forehead and gently push your head into your fingers. Start with 2 reps do these very gently every other day     ISOMETRIC EXTENSION    Place your fingers on the back of your head and gently draw your head back into your fingers.    ISOMETRIC SIDE BEND    Place your fingers on the side of your head and gently tilt your head to the side and into your fingers.    ISOMETRIC ROTATION    Place your fingers on your check bone (forehead and gently turn your head into your fingers.      ISOMETRIC FLEXION    Place your fingers on your forehead and gently push your head into your fingers.    ISOMETRIC EXTENSION    Place your fingers on the back of your head and gently draw your head back into your fingers.    ISOMETRIC SIDE BEND    Place your fingers on the side of your head and gently tilt your head to the side and into your fingers.    ISOMETRIC ROTATION    Place your fingers on your check bone and gently turn your head into your fingers.        Laying flat on your bed or floor, slowly turn your head to  the right.  Return to neutral  Repeat to the left.  Do not move through pain

## 2021-06-18 NOTE — PATIENT INSTRUCTIONS - HE
Patient Instructions by Sarthak Romano MD at 12/14/2020 10:00 AM     Author: Sarthak Romano MD Service: -- Author Type: Physician    Filed: 12/14/2020 10:35 AM Encounter Date: 12/14/2020 Status: Addendum    : Sarthak Romano MD (Physician)    Related Notes: Original Note by Sarthak Romano MD (Physician) filed at 12/14/2020 10:35 AM       No change in treatment plan.  Continue to walk on a daily basis to prevent your back from stiffening up too much.    If you feel more unsteady, you may need to consider a cane or walker in the future.    Proceed with heart stress testing in January.    We will plan to have you repeat your colonoscopy in 3 years, or September 2023    Follow-up with me in the spring in 5-6 months for a nonfasting general checkup.  Patient Education   Preventing Falls in the Home  As you get older, falls are more likely. Thats because your reaction time slows. Your muscles and joints may also get stiffer, making them less flexible. Illness, medications, and vision changes can also affect your balance. A fall could leave you unable to live on your own. To make your home safer, follow these tips:    Floors    Put nonskid pads under area rugs.    Remove throw rugs.    Replace worn floor coverings.    Tack carpets firmly to each step on carpeted stairs. Put nonskid strips on the edges of uncarpeted stairs.    Keep floors and stairs free of clutter and cords.    Arrange furniture so there are clear pathways.    Clean up any spills right away.    Bathrooms    Install grab bars in the tub or shower.    Apply nonskid strips or put a nonskid rubber mat in the tub or shower.    Sit on a bath chair to bathe.    Use bathmats with nonskid backing.    Lighting    Keep a flashlight in each room.    Put a nightlight along the pathway between the bedroom and the bathroom.    8194-2483 [employer]. [address] [place] [address] [id number]. All rights reserved. This information is not intended as  a substitute for professional medical care. Always follow your healthcare professional's instructions.           Advance Directive  Patients advance directive was discussed and I am comfortable with the patients wishes.  Patient Education   Personalized Prevention Plan  You are due for the preventive services outlined below.  Your care team is available to assist you in scheduling these services.  If you have already completed any of these items, please share that information with your care team to update in your medical record.  Health Maintenance   Topic Date Due   ? ZOSTER VACCINES (2 of 3) 01/03/2008   ? TD 18+ HE  04/11/2021   ? MEDICARE ANNUAL WELLNESS VISIT  12/14/2021   ? FALL RISK ASSESSMENT  12/14/2021   ? ADVANCE CARE PLANNING  12/14/2025   ? Pneumococcal Vaccine: 65+ Years  Completed   ? INFLUENZA VACCINE RULE BASED  Completed   ? Pneumococcal Vaccine: Pediatrics (0 to 5 Years) and At-Risk Patients (6 to 64 Years)  Aged Out   ? HEPATITIS B VACCINES  Aged Out

## 2021-06-18 NOTE — PATIENT INSTRUCTIONS - HE
"Patient Instructions by Rosanna Felipe PT at 2/10/2020  8:00 AM     Author: Rosanna Felipe PT Service: -- Author Type: Physical Therapist    Filed: 2/10/2020  8:42 AM Encounter Date: 2/10/2020 Status: Signed    : Rosanna Felipe PT (Physical Therapist)           Lie on your back   - Grab behind your knee slightly pulling your knee to your chest (you can use a towel if needed)   - Straighten the leg as far as you can. Get a nice easy stretch. Do not hold   - Bring the leg down       Sit on the edge of a table or chair.  Grab behind your knee.  Slowly extend your leg, as you extend your leg tip your head back.  Extend your knee as far as you can.  Do not hold.  Slowly lower your leg and tip your head forward.               PELVIC TILT    While lying on your back, use your stomach muscles to press your spine downwards towards the ground. Do not move into a painful position.      BRACE HEEL SLIDES    While lying on your back with your knees bent,  slowly slide your heel foward on the floor/bed and then slide it back. Use your stomach muscles to keep your spine from moving.      BRIDGING    While lying on your back, tighten your lower abdominals, squeeze your buttocks and then raise your buttocks off the floor/bed as creating a \"Bridge\" with your body.      CLAM SHELLS    While lying on your side with your knees bent, draw up the top knee while keeping contact of your feet together.    Do not let your pelvis roll back during the lifting movement.                  "

## 2021-06-18 NOTE — PROGRESS NOTES
[facility] Follow Up Note    Antoni Sotll   83 y.o. male    Date of Visit: 6/7/2018    Chief Complaint   Patient presents with     Follow-up     tinnitis     Subjective  Antoni is here for follow-up of multiple medical problems.    He does have a new complaint of ringing in the right ear worsening over the last 6 months.  Some mild decreased hearing.  Some mild unsteadiness but he does not believe that significantly worse.  No falls or for vertigo.  No ear pain or sinus congestion.  He has chronic sinusitis controlled with nasal sprays.  No purulent discharge or cough.    Past medical history of coronary artery disease with a bypass in 2013.  December 2016 stress test negative with normal ejection fraction and normal heart valves.  He saw cardiology in December with no changes in 1 year follow-up.  No chest pain.  He continues to walk and stay active regularly at the [facility].  No increasing shortness of breath or new exertional symptoms.    No leg claudication or edema.    He has never smoked.    No epigastric pain or blood in stool or bleeding issues.    His CPAP machine is working well, sleeps adequately at night.  Some mild daytime sleepiness, occasionally takes a nap.  He feels the machine is working adequately, however.    His blood pressures been somewhat lower recently, as low as 98/60.  Averaging around 110/70, the highest he got was 135/77.  He is just been checking over the last couple of weeks.    He is on lisinopril 2.5 mg a day and atenolol 25 mg a day.  He has had some lower blood pressures in the past.  Last December his blood pressure was running in the 120s over 70s.    No abdominal pain or change in stools.  He is taking FiberCon pills which is help reduce his stool frequency.    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  At the hemicolectomy had no residual cancer just some tubular adenoma with high-grade  dysplasia.  Lymph nodes were negative.  He saw colorectal surgery in February, started on FiberCon pills, follow-up with his colorectal doctor next week.  He plans to have his one-year follow-up colonoscopy in September.    Patient is taking an aspirin a day.    Chronic spinal stenosis stable.  Walking on a regular basis.  He did have severe stenosis at L3-L5 on October 2015 MRI.  Has some chronic right leg L4 numbness.  He has not seen his back surgeon, Dr. Morales, recently takes gabapentin 600 mg at night and Tylenol in the morning.  In the new oil.  Stable leg function.    Some mild unsteadiness but no falls.    Past history of basal cell cancer of the scalp, benign exam in May, given a one-year follow-up.    BPH moderate severity stable on Flomax.  PSA was 1.9 last December.    Past history of borderline ocular pressures, not on treatment, he is due for spring follow-up with ophthalmology.  No vision changes.    PMHx:    Past Medical History:   Diagnosis Date     Anemia      Cancer     colon     Carotid artery occlusion      Coronary artery disease      High cholesterol      Hyperlipidemia      Hypertension      Left inguinal hernia      Low back pain      Sleep apnea, obstructive     CPAP     Spinal stenosis     lumbar     PSHx:    Past Surgical History:   Procedure Laterality Date     ARTERIAL BYPASS SURGERY       BLEPHAROPLASTY Bilateral      CARDIAC CATHETERIZATION  06/03/2013     CATARACT EXTRACTION Bilateral      CORONARY ARTERY BYPASS GRAFT  06/28/2013    5-vessel     CORONARY ARTERY BYPASS GRAFT  2013    5 vessel     HERNIA REPAIR      right inguinal     VARICOSE VEIN SURGERY Left      Immunizations:   Immunization History   Administered Date(s) Administered     Influenza [id number], 01/18/2010     Influenza high dose, seasonal 10/13/2015, 10/18/2016, 11/01/2017     Influenza, inj, historic,unspecified 10/23/2007, 10/29/2009, 10/11/2010, 11/08/2011     Influenza, seasonal,quad inj 6-35 mos 11/29/2012,  "10/25/2013, 11/19/2014     Pneumo Conj 13-V (2010&after) 04/09/2015     Pneumo Polysac 23-V 10/03/1999, 03/06/2007     Td,adult,historic,unspecified 03/11/1992, 10/24/2000, 04/11/2011     ZOSTER, LIVE 11/08/2007       ROS A comprehensive review of systems was performed and was otherwise negative    Medications, allergies, and problem list were reviewed and updated    Exam  /70  Pulse 62  Ht 5' 5\" (1.651 m)  Wt 153 lb (69.4 kg)  SpO2 97%  BMI 25.46 kg/m2  He was and irises appear normal.  No jaundice or conjunctivitis.  Tympanic membranes are normal.  Pharynx is normal, no significant postnasal drip and teeth in good condition.  No cervical or supraclavicular adenopathy.  No JVD and no carotid bruits.  Lungs clear to auscultation with normal respiratory excursion.  Heart is regular with no murmur.  Abdomen is nontender no hepatomegaly.  No ankle edema.  Gait is normal.    Assessment/Plan  1. Essential hypertension  Blood pressure recently on the low side with some mild increased dizziness according to patient.  It has been normal in clinic today and last December.    I will have him check that more closely, but if his blood pressure is running less than 110/60 and continued lightheaded spells, he can stop the lisinopril as long as his blood pressure stays less than 130/80.    Continue atenolol.    2. Coronary atherosclerosis due to calcified coronary lesion  Asymptomatic with good exercise tolerance.  Continue aspirin and Lipitor.    3. Hypercholesterolemia  Check cholesterol and physical exam in December    4. Sleep apnea, unspecified type  Compliant with CPAP    5. Spinal stenosis of lumbar region, unspecified whether neurogenic claudication present  Stable symptoms.  Continue regular walking.  Gabapentin in the evening and Tylenol in the morning.    6. Benign prostatic hyperplasia, unspecified whether lower urinary tract symptoms present  Stable on Flomax    7. Medication monitoring encounter    - " Comprehensive Metabolic Panel  - HM2(CBC w/o Differential)    8. Tinnitus of right ear  Relatively mild symptoms but increasing over the last 6 months.  He was offered a referral to ENT but he declined.  I did discuss possible Ménière's or acoustic neuroma etiology.  Follow-up if worsening.    He was reminded to make his ophthalmology appointment this spring.    Past history of basal cell cancer, one-year follow-up with dermatology next May.    History of colon cancer post hemicolectomy.  Bowels better on FiberCon.  Follow-up with colorectal surgeon on Monday and colonoscopy in September.    Return in about 6 months (around 12/7/2018) for Annual physical.   Patient Instructions   Routine lab work today.    If blood pressure is consistently less than 110/60, or worsening lightheaded dizzy spells, you can consider stopping the lisinopril.    But continue to follow your blood pressure, your goal blood pressure should be less than 130/80.  You may need to restart lisinopril if blood pressure is consistently above 130/80.    Contact me if the ringing of the ears worsens, you can see ear nose throat physician for further evaluation.    Follow-up with your ophthalmologist this spring as planned.    Follow-up in December for your physical.    Sarthak Romano MD      Current Outpatient Prescriptions   Medication Sig Dispense Refill     acetaminophen (TYLENOL) 500 MG tablet Take 1,000 mg by mouth 2 (two) times a day.        aspirin 325 MG tablet Take 325 mg by mouth daily.       atenolol (TENORMIN) 25 MG tablet Take 1 tablet (25 mg total) by mouth daily. 90 tablet 3     atorvastatin (LIPITOR) 20 MG tablet TAKE 1 TABLET BY MOUTH DAILY 90 tablet 3     B2/VITS A,C,E/LUT/ZEAXANTH/MIN (ICAPS ORAL) Take 1 capsule by mouth daily.       calcium polycarbophil (FIBERCON) 625 mg tablet Take 1,250 mg by mouth daily.       cholecalciferol, vitamin D3, 1,000 unit tablet Take 1,000 Units by mouth daily.       ferrous sulfate 325 (65 FE) MG  tablet Take 1 tablet (325 mg total) by mouth 3 (three) times a day with meals. (Patient taking differently: Take 1 tablet by mouth daily with breakfast. )  0     gabapentin (NEURONTIN) 300 MG capsule TAKE 1 TO 2 CAPSULES BY MOUTH EVERY EVENING AS NEEDED FOR NERVE PAIN 180 capsule 1     glucosamine-chondroitin 500-400 mg cap Take 1 capsule by mouth 2 (two) times a day.       ipratropium (ATROVENT) 0.03 % nasal spray USE 2 SPRAYS IN EACH NOSTRIL THREE TIMES DAILY AS NEEDED 30 mL 8     lisinopril (PRINIVIL,ZESTRIL) 2.5 MG tablet TAKE 1 TABLET BY MOUTH EVERY DAY 90 tablet 2     loratadine (CLARITIN) 10 mg tablet Take 10 mg by mouth daily.       saw palmetto fruit 450 mg cap Take 1 capsule by mouth 2 (two) times a day.       tamsulosin (FLOMAX) 0.4 mg Cp24 Take 0.4 mg by mouth at bedtime.       No current facility-administered medications for this visit.      No Known Allergies  Social History   Substance Use Topics     Smoking status: Never Smoker     Smokeless tobacco: Never Used     Alcohol use Yes      Comment: 5 drinks weekly

## 2021-06-18 NOTE — PATIENT INSTRUCTIONS - HE
Patient Instructions by Rosanna Felipe PT at 2/26/2020 10:30 AM     Author: Rosanna Felipe PT Service: -- Author Type: Physical Therapist    Filed: 2/26/2020 11:09 AM Encounter Date: 2/26/2020 Status: Signed    : Rosanna Felipe PT (Physical Therapist)        LOWER TRUNK ROTATIONS - LTR    Lying on your back with your knees bent, gently move your knees side-to-side.      x20 repetitions each way, morning and at night

## 2021-06-19 NOTE — PROGRESS NOTES
Preoperative Exam    Scheduled Procedure: colonoscopy  Surgery Date:  9/5/2018  Surgery Location: [facility] [place]    Surgeon:  Dr Ferguson    Assessment/Plan:     1. Preoperative examination  Patient is cleared to proceed with colonoscopy in the hospital as planned.  Cleared to proceed with sedation for colonoscopy.    Patient accepts risks of procedure.  - Electrocardiogram Perform and Read    2. Coronary atherosclerosis due to calcified coronary lesion  Asymptomatic with good exercise ability with regular walking.    We will hold aspirin for 1 week for the colonoscopy and restart day after.  I did discuss risk of heart attack of being off the aspirin.  Patient accepts this risk.  - Electrocardiogram Perform and Read    Patient is full code.    3. History of colon cancer  One-year follow-up colonoscopy after hemicolectomy September 2017.    4. Essential hypertension  Well-controlled.  Blood pressure does tend to be on the low side around 110/60.  It was somewhat higher yesterday after a higher salt meal was back down in the low range today.  I will have him skip the lisinopril on the morning of the colonoscopy.  He will take atenolol with a sip of water on the morning colonoscopy.    5. Sleep apnea, unspecified type  He will bring his CPAP machine with him to the hospital in case needed.    6. Benign prostatic hyperplasia, unspecified whether lower urinary tract symptoms present  Stable, moderately symptomatic.  On Flomax nightly which she will take the evening before the procedure.    7. Spinal stenosis of lumbar region, unspecified whether neurogenic claudication present  Moderate pain, some residual numbness on the right foot.  Stable with regular ambulation.  Gabapentin nightly and Tylenol twice daily.    8. Medication monitoring encounter    - Comprehensive Metabolic Panel  - HM2(CBC w/o Differential)    Follow-up with me in December for his physical as scheduled.    Surgical Procedure Risk:  Low (reported cardiac risk generally < 1%)  Have you had prior anesthesia?: Yes  Have you or any family members had a previous anesthesia reaction:  No  Do you or any family members have a history of a clotting or bleeding disorder?: No  Cardiac Risk Assessment: no increased risk for major cardiac complications    Patient approved for surgery with general or local anesthesia.    Please Note:  Patient uses a CPAP machine.  Known coronary artery disease.  Severe spinal stenosis.  Has sleep apnea.    Functional Status: Independent  Patient plans to recover at home with family.     Subjective:      Antoni Stoll is a 83 y.o. male who presents for a preoperative consultation.  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  He is due for his 1 year follow-up colonoscopy which will be performed in the [facility] on September 5.    His bowels been normal and no blood in stool.    Patient has coronary artery disease with a bypass in 2013.  Cardiac stress test December 2016 was negative for ischemia.  There is a stress echo and normal valves and normal ejection fraction.  No history of MI.    Cardiology saw him December 2017, was stable and given a one-year follow-up.  He is on medical management with Lipitor and aspirin.    He walks at the [facility] on a daily basis including this morning for 30 minute stretch.  Stable exertional ability.  No chest pain or chest pressure or increasing shortness of breath.    No history of pulmonary disease or COPD or asthma.    He has never smoked.    He does have sleep apnea but highly compliant with CPAP.  No history of CHF.    2011 ultrasound was negative for AAA.  2015 ultrasound of carotids was negative for stenosis.    No history of stroke.    No history of seizure or DVT.    Moderate to severe BPH but voiding is stable and no dysuria or hematuria.  Takes Flomax nightly.    Borderline increased eye  pressures, was seen by ophthalmology in May and given a one-year follow-up.    Spinal stenosis is severe from L3-L5 in 2015 MRI.  Some mild residual right L4 numbness.  He takes gabapentin in the evening and Tylenol twice daily.    He also has some chronic neck pain with facet arthropathy.  But 2015 MRI of the cervical spine was negative for cervical stenosis.    Past history of basal cell cancer and saw dermatology in May with negative exam in 1 year follow-up.    No flare of his chronic sinusitis.    All other systems reviewed and are negative, other than those listed in the HPI.    Pertinent History  Do you have difficulty breathing or chest pain after walking up a flight of stairs: No  History of obstructive sleep apnea: Yes: uses CPAP at night  Steroid use in the last 6 months: No  Frequent Aspirin/NSAID use: Yes: aspirin 325mg daily  Prior Blood Transfusion: No  Prior Blood Transfusion Reaction: No  If for some reason prior to, during or after the procedure, if it is medically indicated, would you be willing to have a blood transfusion?:  There is no transfusion refusal.    Current Outpatient Prescriptions   Medication Sig Dispense Refill     acetaminophen (TYLENOL) 500 MG tablet Take 1,000 mg by mouth 2 (two) times a day.        aspirin 325 MG tablet Take 325 mg by mouth daily.       atenolol (TENORMIN) 25 MG tablet Take 1 tablet (25 mg total) by mouth daily. 90 tablet 3     atorvastatin (LIPITOR) 20 MG tablet TAKE 1 TABLET BY MOUTH DAILY 90 tablet 3     B2/VITS A,C,E/LUT/ZEAXANTH/MIN (ICAPS ORAL) Take 1 capsule by mouth daily.       calcium polycarbophil (FIBERCON) 625 mg tablet Take 1,250 mg by mouth daily.       cholecalciferol, vitamin D3, 1,000 unit tablet Take 1,000 Units by mouth daily.       fluticasone (FLONASE) 50 mcg/actuation nasal spray Apply 1 spray into each nostril daily.       gabapentin (NEURONTIN) 300 MG capsule TAKE 1 TO 2 CAPSULES BY MOUTH EVERY EVENING AS NEEDED FOR NERVE PAIN 180  capsule 1     glucosamine-chondroitin 500-400 mg cap Take 1 capsule by mouth 2 (two) times a day.       ipratropium (ATROVENT) 0.03 % nasal spray USE 2 SPRAYS IN EACH NOSTRIL THREE TIMES DAILY AS NEEDED (Patient taking differently: USE 2 SPRAYS IN EACH NOSTRIL DAILY AS NEEDED) 30 mL 8     lisinopril (PRINIVIL,ZESTRIL) 2.5 MG tablet TAKE 1 TABLET BY MOUTH EVERY DAY 90 tablet 2     loratadine (CLARITIN) 10 mg tablet Take 10 mg by mouth daily.       saw palmetto fruit 450 mg cap Take 1 capsule by mouth 2 (two) times a day.       tamsulosin (FLOMAX) 0.4 mg Cp24 Take 0.4 mg by mouth at bedtime.       ferrous sulfate 325 (65 FE) MG tablet Take 1 tablet (325 mg total) by mouth 3 (three) times a day with meals. (Patient taking differently: Take 1 tablet by mouth daily with breakfast. )  0     No current facility-administered medications for this visit.         No Known Allergies    Patient Active Problem List   Diagnosis     Glaucoma     Cataract     Spinal Stenosis     Inguinal Hernia     Hypercholesterolemia     Essential hypertension     Coronary Artery Disease     Sleep Apnea     Skin Condition     Seborrheic Keratosis     Encounter for long-term (current) use of other medications     Colon cancer (H)     Anemia     BPH (benign prostatic hyperplasia)     Spinal stenosis       Past Medical History:   Diagnosis Date     Anemia      Cancer (H)     colon     Carotid artery occlusion      Coronary artery disease      High cholesterol      Hyperlipidemia      Hypertension      Left inguinal hernia      Low back pain      Sleep apnea, obstructive     CPAP     Spinal stenosis     lumbar       Past Surgical History:   Procedure Laterality Date     ARTERIAL BYPASS SURGERY       BLEPHAROPLASTY Bilateral      CARDIAC CATHETERIZATION  06/03/2013     CATARACT EXTRACTION Bilateral      CORONARY ARTERY BYPASS GRAFT  06/28/2013    5-vessel     CORONARY ARTERY BYPASS GRAFT  2013    5 vessel     HERNIA REPAIR      right inguinal      "VARICOSE VEIN SURGERY Left        Social History     Social History     Marital status: Single     Spouse name: N/A     Number of children: N/A     Years of education: N/A     Occupational History     Not on file.     Social History Main Topics     Smoking status: Never Smoker     Smokeless tobacco: Never Used     Alcohol use Yes      Comment: 5 drinks weekly     Drug use: No     Sexual activity: Not on file     Other Topics Concern     Not on file     Social History Narrative       Patient Care Team:  Sarthak Romano MD as PCP - General          Objective:     Vitals:    08/13/18 1556   BP: 116/60   Pulse: 60   SpO2: 96%   Weight: 155 lb (70.3 kg)   Height: 5' 5\" (1.651 m)         Physical Exam:  Alert and oriented ×3.  Good memory, normal cognition.  Mild kyphosis.  Pupils and irises are equal and reactive.  Extraocular muscles intact.  No jaundice.  Pharynx is without inflammation minimally crowded.  Teeth in good condition.  No thrush.  No cervical or supra clavicular adenopathy.  No neck mass.  No JVD and no carotid bruits.  Lungs are clear to auscultation with normal respiratory excursion.  Heart is regular with no murmur rub or gallop.  No ankle edema.  Abdomen is nontender nonobese with midline scar noted.  No pulsatile mass.  Gait normal.    There are no Patient Instructions on file for this visit.    EKG: EKG tracing read by me today was normal sinus rhythm, normal EKG.  No change from September 8, 2017 EKG.    Labs:  Recent Results (from the past 168 hour(s))   Electrocardiogram Perform and Read    Collection Time: 08/13/18  9:23 PM   Result Value Ref Range    SYSTOLIC BLOOD PRESSURE  mmHg    DIASTOLIC BLOOD PRESSURE  mmHg    VENTRICULAR RATE 51 BPM    ATRIAL RATE 51 BPM    P-R INTERVAL 146 ms    QRS DURATION 88 ms    Q-T INTERVAL 430 ms    QTC CALCULATION (BEZET) 396 ms    P Axis -21 degrees    R AXIS -17 degrees    T AXIS 35 degrees    MUSE DIAGNOSIS       Sinus bradycardia  Otherwise normal ECG  When " compared with ECG of 08-SEP-2017 12:42,  No significant change was found         Immunization History   Administered Date(s) Administered     Influenza T3o4-70, 01/18/2010     Influenza high dose, seasonal 10/13/2015, 10/18/2016, 11/01/2017     Influenza, inj, historic,unspecified 10/23/2007, 10/29/2009, 10/11/2010, 11/08/2011     Influenza, seasonal,quad inj 6-35 mos 11/29/2012, 10/25/2013, 11/19/2014     Pneumo Conj 13-V (2010&after) 04/09/2015     Pneumo Polysac 23-V 10/03/1999, 03/06/2007     Td,adult,historic,unspecified 03/11/1992, 10/24/2000, 04/11/2011     ZOSTER, LIVE 11/08/2007           Electronically signed by Sarthak Romano MD 08/13/18 3:59 PM

## 2021-06-19 NOTE — LETTER
Letter by Sarthak Romano MD at      Author: Sarthak Romano MD Service: -- Author Type: --    Filed:  Encounter Date: 6/4/2019 Status: (Other)         Antoni Stoll  [address] [facility] [address]             June 4, 2019         Dear Mr. Stoll,    Below are the results from your recent visit:    Resulted Orders   Comprehensive Metabolic Panel   Result Value Ref Range    Sodium 143 136 - 145 mmol/L    Potassium 4.1 3.5 - 5.0 mmol/L    Chloride 108 (H) 98 - 107 mmol/L    CO2 27 22 - 31 mmol/L    Anion Gap, Calculation 8 5 - 18 mmol/L    Glucose 99 70 - 125 mg/dL    BUN 23 8 - 28 mg/dL    Creatinine 1.09 0.70 - 1.30 mg/dL    GFR MDRD Af Amer >60 >60 mL/min/1.73m2    GFR MDRD Non Af Amer >60 >60 mL/min/1.73m2    Bilirubin, Total 0.4 0.0 - 1.0 mg/dL    Calcium 9.4 8.5 - 10.5 mg/dL    Protein, Total 6.8 6.0 - 8.0 g/dL    Albumin 4.0 3.5 - 5.0 g/dL    Alkaline Phosphatase 64 45 - 120 U/L    AST 20 0 - 40 U/L    ALT 17 0 - 45 U/L    Narrative    Fasting Glucose reference range is 70-99 mg/dL per  American Diabetes Association (ADA) guidelines.   CK Total   Result Value Ref Range    CK, Total 121 30 - 190 U/L       Kidney and liver tests are normal.  Blood sugar is normal.    CK, muscle test, is normal.    No change in treatment plan.    Please call with questions or contact us using [email or web address].    Sincerely,        Electronically signed by Sarthak Romano MD

## 2021-06-20 ENCOUNTER — HEALTH MAINTENANCE LETTER (OUTPATIENT)
Age: 86
End: 2021-06-20

## 2021-06-20 NOTE — LETTER
Letter by Sarthak Romano MD at      Author: Sarthak Romano MD Service: -- Author Type: --    Filed:  Encounter Date: 1/8/2020 Status: Signed         Antoni Stoll  [address] [facility] [address]             January 8, 2020         Dear Mr. Stoll,    Below are the results from your recent visit:    Resulted Orders   MR Lumbar Spine Without Contrast    Narrative    EXAM: MR LUMBAR SPINE WO CONTRAST  LOCATION: [facility]  DATE/TIME: 1/8/2020 12:32 PM    INDICATION: Right leg weakness.  COMPARISON: MRI lumbar spine without contrast 10/24/2016.  TECHNIQUE: Without IV contrast    FINDINGS:   Nomenclature is based on 5 lumbar type vertebral bodies. Dextroconvex curvature of the thoracolumbar spine with the apex at L1-L2 5 mm right lateral subluxation of L3 on L4. Exaggerated lumbar spine lordosis. Grade 1 retrolisthesis of L1 on L2. Grade 1   anterolisthesis of L3 on L4 and L4 on L5. Normal distal spinal cord and cauda equina with conus medullaris at L1-L2. No extraspinal abnormality. Unremarkable visualized bony pelvis.    T12-L1: Disc desiccation and advanced loss of intervertebral disc space height. Mild circumferential disc osteophyte complex. Normal facets. No spinal canal stenosis or neuroforaminal narrowing.     L1-L2: Disc desiccation and moderate loss of disc space height. Mild circumferential disc osteophyte complex. Mild facet arthropathy. No spinal canal stenosis. No right   neuroforaminal narrowing. Severe left neuroforaminal stenosis.    L2-L3: Disc desiccation. Normal disc space height. Moderate facet arthropathy. Minimal circumferential disc bulge. Mild spinal canal stenosis. No neuroforaminal stenosis.     L3-L4: Disc desiccation and mild loss of disc space height. Grade 1 anterolisthesis. Circumferential disc osteophyte complex. Severe facet arthropathy. Ligamentum flavum thickening. Severe spinal canal stenosis. Mild bilateral neuroforaminal stenosis.    L4-L5: Disc desiccation  and mild loss of disc space height. Anterolisthesis of L4 on L5 with unroofing of the disc. Mild circumferential disc osteophyte complex. Severe facet arthropathy. Ligamentum flavum thickening. Severe spinal canal stenosis. Mild   bilateral neuroforaminal stenosis.    L5-S1: Disc desiccation and mild loss of disc space height. Severe facet arthropathy. No spinal canal stenosis or neuroforaminal narrowing.      Impression    1.  Moderate multilevel lumbar spondylosis including lumbar dextrocurvature and multilevel subluxations as above.  2.  At L4-L5, there is unchanged severe spinal canal stenosis and mild bilateral neural foraminal stenosis.  3.  At L3-L4, there is unchanged severe spinal canal stenosis and mild bilateral neural foraminal stenosis.  4.  At L1-L2, there is severe left neuroforaminal stenosis, progressed from previous exam.       You do have severe spinal stenosis, but that appears unchanged from previous.    You do have progression of a severe left foraminal stenosis at L1-2.  But you described most of your symptoms on your right leg, so it is unclear if that is causing any of your symptoms.    With the severe spinal stenosis, you could consider surgical intervention.  Discuss your treatment options with the spine clinic as planned on January 10    Please call with questions or contact us using [employer].    Sincerely,        Electronically signed by Sarthak Romano MD

## 2021-06-20 NOTE — LETTER
"Letter by Anahi Daniel RN at      Author: Anahi Daniel RN Service: -- Author Type: --    Filed:  Encounter Date: 2/10/2020 Status: (Other)         Antoni Stoll  [address] [facility] [address]             February 10, 2020         Dear Mr. Stoll,    Below are the results from your recent visit:    Resulted Orders   Holter Monitor    [facility]    HOLTER REPORT    Results:    Indication for study: Sick sinus syndrome    The predominant rhythm throughout the tracing was normal sinus rhythm   with an average heart rate of 60 bpm.  The chronotropic response to   activities of daily living appears to be mildly blunted with peak heart   rate of only 101 bpm with exercise.  The SD interval was normal.  The QRS   duration was normal.  The QT interval was normal.  The study demonstrated   no significant bradycardia/pauses.    The patient demonstrated rare atrial ectopy.  Nonsustained ectopic   atrial tachycardia was was observed on 2 occasions with short runs of   ectopic atrial tachycardia the longest of which is 7 beats and the fastest   138 bpm.  Sustained ectopic atrial tachycardia, atrial fibrillation, or   other supraventricular tachycardia was not observed.    The patient demonstrated rare ventricular ectopy.  Complex ventricular   ectopy was not observed.  Sustained ventricular tachycardia was not   observed.    The patient did return a diary.  Patient reported being \"short of   breath\" while carrying groceries upstairs.  Rhythm at that time was normal   sinus with a heart rate of 67 bpm    Impression:    Borderline Holter monitor tracing with evidence of sinus bradycardia and   some tendency towards chronotropic incompetence.  This is further   supported by the patient being short of breath while climbing steps with a   heart rate of only 67 bpm.  Further clinical correlation may be warranted.    Indication for study: Sick sinus syndrome: The patient does manifest "   some degree of chronotropic incompetence and symptom correlation is   present and he may be a candidate for device-based therapy.    No sustained atrial or ventricular tachyarrhythmia was demonstrated.    No profound bradycardia or pauses.      Comment: The recording was for 23 hours and 59 minutes.  The recording   quality was adequate.       Dr. Guevara reviewed your holter results and reports the following message:     Notes recorded by Mars Guevara MD (Ted) on 2/9/2020 at 9:15 AM CST   Abnormal results, he may benefit from pacemaker, have him see EP MD for consult    Please call with questions or contact us using [email or web address].    Sincerely,        Electronically signed by Anahi Daniel RN

## 2021-06-20 NOTE — LETTER
Letter by Sarthak Romano MD at      Author: Sarthak Romano MD Service: -- Author Type: --    Filed:  Encounter Date: 12/12/2019 Status: Signed         Antoni Stoll  [address] [facility] [address]             December 12, 2019         Dear Mr. Stoll,    Below are the results from your recent visit:    Resulted Orders   Comprehensive Metabolic Panel   Result Value Ref Range    Sodium 140 136 - 145 mmol/L    Potassium 5.0 3.5 - 5.0 mmol/L    Chloride 106 98 - 107 mmol/L    CO2 24 22 - 31 mmol/L    Anion Gap, Calculation 10 5 - 18 mmol/L    Glucose 100 70 - 125 mg/dL    BUN 25 8 - 28 mg/dL    Creatinine 1.24 0.70 - 1.30 mg/dL    GFR MDRD Af Amer >60 >60 mL/min/1.73m2    GFR MDRD Non Af Amer 56 (L) >60 mL/min/1.73m2    Bilirubin, Total 0.4 0.0 - 1.0 mg/dL    Calcium 9.1 8.5 - 10.5 mg/dL    Protein, Total 7.0 6.0 - 8.0 g/dL    Albumin 4.1 3.5 - 5.0 g/dL    Alkaline Phosphatase 67 45 - 120 U/L    AST 20 0 - 40 U/L    ALT 14 0 - 45 U/L    Narrative    Fasting Glucose reference range is 70-99 mg/dL per  American Diabetes Association (ADA) guidelines.   HM2(CBC w/o Differential)   Result Value Ref Range    WBC 6.1 4.0 - 11.0 thou/uL    RBC 4.22 (L) 4.40 - 6.20 mill/uL    Hemoglobin 13.5 (L) 14.0 - 18.0 g/dL    Hematocrit 41.6 40.0 - 54.0 %    MCV 99 80 - 100 fL    MCH 32.1 27.0 - 34.0 pg    MCHC 32.5 32.0 - 36.0 g/dL    RDW 11.2 11.0 - 14.5 %    Platelets 238 140 - 440 thou/uL    MPV 7.6 7.0 - 10.0 fL   Lipid Cascade   Result Value Ref Range    Cholesterol 96 <=199 mg/dL    Triglycerides 39 <=149 mg/dL    HDL Cholesterol 40 >=40 mg/dL    LDL Calculated 48 <=129 mg/dL    Patient Fasting > 8hrs? Yes    PSA (Prostatic-Specific Antigen), Annual Screen   Result Value Ref Range    PSA 2.2 0.0 - 6.5 ng/mL    Narrative    Method is Abbott Prostate-Specific Antigen (PSA)  Standard-WHO 1st International (90:10)       Kidney and liver tests are normal.  Blood sugar is normal.    Hemoglobin level is okay and stable.   Other blood counts are normal.    Excellent cholesterol levels.    Prostate test is normal.    Labs look good.  No change in treatment plan.    Please call with questions or contact us using MyChart.    Sincerely,        Electronically signed by Sarthak Romano MD

## 2021-06-21 NOTE — LETTER
Letter by Anahi Daniel RN at      Author: Anahi Daniel RN Service: -- Author Type: --    Filed:  Encounter Date: 1/14/2021 Status: (Other)         Antoni Stoll  [address] [facility] [address]             January 14, 2021         Dear Mr. Stoll,    Below are the results from your recent visit:    Resulted Orders   NM MPI with Lexiscan   Result Value Ref Range    Pharmacologic Protocol  Lexiscan     Test Type Pharmacological     Baseline HR 62     Baseline Systolic      Baseline Diastolic BP 79     Last Stress HR 83     Last Stress Systolic      Last Stress Diastolic BP 69     Target      PERCENT HR 85%     ST Deviation Elevation V1 0.2mm     Deviation Time II -0.5mm     ST Elevation Amount II 0.9mm     ST Deviation Amount he aVR -0.8mm     Final Resting /77     Final Resting HR 74     Max Treadmill Speed 0.0     Max Treadmill Grade 0.0     Peak Systolic /77     Peak Diastolic /77     Max HR 85     Stress Phase Resting     Stress Resting Pt Position MANUAL EVENT     Current HR 63     Current /79     Stress Phase Stress     Stage Minute EXE 00:00     Exercise Stage STAGE 2     Current HR 61     Current /79     Stress Phase Stress     Stage Minute EXE 01:00     Exercise Stage STAGE 3     Current HR 61     Current /79     Stress Phase Stress     Stage Minute EXE 01:49     Exercise Stage STAGE 3     Current HR 84     Current /64     Stress Phase Stress     Stage Minute EXE 02:00     Exercise Stage STAGE 4     Current HR 85     Current /64     Stress Phase Stress     Stage Minute EXE 03:00     Exercise Stage STAGE 5     Current HR 84     Current /64     Stress Phase Stress     Stage Minute EXE 03:22     Exercise Stage STAGE 5     Current HR 83     Current /69     Stress Phase Stress     Stage Minute EXE 04:00     Exercise Stage STAGE 6     Current HR 83     Current /69     Stress Phase Stress     Stage Minute EXE  04:00     Exercise Stage STAGE 6     Current HR 83     Current /69     Stress Phase Recovery     Stage Minute REC 00:32     Exercise Stage Recovery     Current HR 82     Current /72     Stress Phase Recovery     Stage Minute REC 00:59     Exercise Stage Recovery     Current HR 80     Current /72     Stress Phase Recovery     Stage Minute REC 01:59     Exercise Stage Recovery     Current HR 80     Current /72     Stress Phase Recovery     Stage Minute REC 02:01     Exercise Stage Recovery     Current HR 80     Current /77     Stress Phase Recovery     Stage Minute REC 02:59     Exercise Stage Recovery     Current HR 78     Current /77     Stress Phase Recovery     Stage Minute REC 03:59     Exercise Stage Recovery     Current HR 74     Current /77     Stress Phase Recovery     Stage Minute REC 04:06     Exercise Stage Recovery     Current HR 74     Current /77     Calculated Percent HR 62 %    Rate Pressure Product 13,345.0     Narrative    ?  The nuclear stress test is negative for inducible myocardial ischemia   or infarction.  ?  The left ventricular ejection fraction at stress is greater than 70%.  ?  There is no prior study for comparison.       The test results show that your stress test was normal. Please plan to follow up in 1 year or sooner if needed. Here is the message from Dr. Guevara:    Mars Guevara (MD Ferdy Lozano Mallory J, RN             Normal/reassurance/ no new orders          Please call with questions or contact us using [employer].    Sincerely,    Anahi COLLINS

## 2021-06-21 NOTE — LETTER
Letter by Sarthak Romano MD at      Author: Sarthak Romano MD Service: -- Author Type: --    Filed:  Encounter Date: 12/14/2020 Status: (Other)         Antoni Stoll  [address] [facility] [address]             December 14, 2020         Dear Mr. Stoll,    Below are the results from your recent visit:    Resulted Orders   Comprehensive Metabolic Panel   Result Value Ref Range    Sodium 139 136 - 145 mmol/L    Potassium 4.3 3.5 - 5.0 mmol/L    Chloride 103 98 - 107 mmol/L    CO2 27 22 - 31 mmol/L    Anion Gap, Calculation 9 5 - 18 mmol/L    Glucose 109 70 - 125 mg/dL    BUN 23 8 - 28 mg/dL    Creatinine 1.10 0.70 - 1.30 mg/dL    GFR MDRD Af Amer >60 >60 mL/min/1.73m2    GFR MDRD Non Af Amer >60 >60 mL/min/1.73m2    Bilirubin, Total 0.4 0.0 - 1.0 mg/dL    Calcium 9.0 8.5 - 10.5 mg/dL    Protein, Total 6.9 6.0 - 8.0 g/dL    Albumin 4.2 3.5 - 5.0 g/dL    Alkaline Phosphatase 71 45 - 120 U/L    AST 16 0 - 40 U/L    ALT 10 0 - 45 U/L    Narrative    Fasting Glucose reference range is 70-99 mg/dL per  American Diabetes Association (ADA) guidelines.   HM2(CBC w/o Differential)   Result Value Ref Range    WBC 7.1 4.0 - 11.0 thou/uL    RBC 4.30 (L) 4.40 - 6.20 mill/uL    Hemoglobin 13.9 (L) 14.0 - 18.0 g/dL    Hematocrit 42.4 40.0 - 54.0 %    MCV 98 80 - 100 fL    MCH 32.4 27.0 - 34.0 pg    MCHC 32.9 32.0 - 36.0 g/dL    RDW 11.3 11.0 - 14.5 %    Platelets 239 140 - 440 thou/uL    MPV 7.2 7.0 - 10.0 fL   Lipid Cascade   Result Value Ref Range    Cholesterol 98 <=199 mg/dL    Triglycerides 38 <=149 mg/dL    HDL Cholesterol 44 >=40 mg/dL    LDL Calculated 46 <=129 mg/dL    Patient Fasting > 8hrs? Yes    PSA (Prostatic-Specific Antigen), Annual Screen   Result Value Ref Range    PSA 2.5 0.0 - 6.5 ng/mL    Narrative    Method is Abbott Prostate-Specific Antigen (PSA)  Standard-WHO 1st International (90:10)       Kidney and liver tests are normal.  Blood sugar is normal.    Hemoglobin level is stable.  Other blood  counts are normal.    Excellent cholesterol levels.    PSA is stable from last year and normal.  You do not need to check the PSA anymore.    Labs look good.  No change in treatment plan.    Please call with questions or contact us using MyChart.    Sincerely,        Electronically signed by Sarthak Romano MD

## 2021-06-22 ENCOUNTER — HOSPITAL ENCOUNTER (OUTPATIENT)
Dept: PHYSICAL MEDICINE AND REHAB | Facility: CLINIC | Age: 86
Discharge: HOME | End: 2021-06-22
Attending: PHYSICAL MEDICINE & REHABILITATION
Payer: MEDICARE

## 2021-06-22 DIAGNOSIS — M25.411 SHOULDER EFFUSION, RIGHT: ICD-10-CM

## 2021-06-22 DIAGNOSIS — M75.121 COMPLETE TEAR OF RIGHT ROTATOR CUFF, UNSPECIFIED WHETHER TRAUMATIC: ICD-10-CM

## 2021-06-22 NOTE — PROGRESS NOTES
"Cardiology Progress Note    Assessment:  Coronary artery disease status post coronary artery bypass graft surgery in 2013, stable, no angina   Exertional dyspnea, mild, no obvious fluid overload, rule out angina equivalent  Hypercholesterolemia, good control  Hypertension, good control  Spinal stenosis  Colon cancer status post partial colectomy      Plan:  Stress echo  Continue current medications    Follow-up in 1 year    Subjective:   This is 83 y.o. male who comes in today for follow-up visit.  He reports no new chest pain.  He continues to exercise   regularly.  Over the course of last 6 months he has been noticing worsening exertional dyspnea.  He has not had PND, orthopnea, pedal edema.  His weight has been stable.  He denies heart palpitations or syncope.    Review of Systems:   General: WNL  Eyes: WNL  Ears/Nose/Throat: WNL  Lungs: WNL  Heart: Arm Pain, Shortness of Breath with activity  Stomach: WNL  Bladder: WNL  Muscle/Joints: Muscle Weakness, Muscle Pain  Skin: WNL  Nervous System: Daytime Sleepiness, Loss of Balance  Mental Health: WNL     Blood: WNL    Objective:   /56 (Patient Site: Right Arm, Patient Position: Sitting, Cuff Size: Adult Regular)   Pulse 72   Resp 16   Ht 5' 5\" (1.651 m)   Wt 152 lb (68.9 kg)   BMI 25.29 kg/m    Physical Exam:  GENERAL: no distress  NECK: No JVD  LUNGS: Clear to auscultation.  CARDIAC: regular rhythm, S1 & S2 normal.  No heaves, thrills, gallops or murmurs.  ABDOMEN: flat, negative hepatosplenomegaly, soft and non-tender.  EXTREMITIES: No evidence of cyanosis, clubbing or edema.    Current Outpatient Medications   Medication Sig Dispense Refill     acetaminophen (TYLENOL) 500 MG tablet Take 1,000 mg by mouth daily .             aspirin 325 MG tablet Take 325 mg by mouth daily.       atenolol (TENORMIN) 25 MG tablet TAKE 1 TABLET BY MOUTH DAILY 90 tablet 2     atorvastatin (LIPITOR) 20 MG tablet TAKE 1 TABLET BY MOUTH DAILY 90 tablet 3     B2/VITS " A,C,E/LUT/ZEAXANTH/MIN (ICAPS ORAL) Take 1 capsule by mouth daily.       calcium polycarbophil (FIBERCON) 625 mg tablet Take 1,250 mg by mouth daily.       cholecalciferol, vitamin D3, 1,000 unit tablet Take 1,000 Units by mouth daily.       fluticasone (FLONASE) 50 mcg/actuation nasal spray Apply 1 spray into each nostril daily.       gabapentin (NEURONTIN) 300 MG capsule TAKE 1 TO 2 CAPSULES BY MOUTH EVERY EVENING AS NEEDED FOR NERVE PAIN 180 capsule 1     glucosamine-chondroitin 500-400 mg cap Take 1 capsule by mouth 2 (two) times a day.       ipratropium (ATROVENT) 0.03 % nasal spray USE 2 SPRAYS IN EACH NOSTRIL THREE TIMES DAILY AS NEEDED (Patient taking differently: USE 2 SPRAYS IN EACH NOSTRIL DAILY AS NEEDED) 30 mL 8     lisinopril (PRINIVIL,ZESTRIL) 2.5 MG tablet TAKE 1 TABLET BY MOUTH EVERY DAY 90 tablet 2     loratadine (CLARITIN) 10 mg tablet Take 10 mg by mouth daily.       saw palmetto fruit 450 mg cap Take 1 capsule by mouth 2 (two) times a day.       tamsulosin (FLOMAX) 0.4 mg Cp24 Take 0.4 mg by mouth at bedtime.       calcium polycarbophil (FIBERCON) 625 mg tablet Take 1,250 mg by mouth daily.       No current facility-administered medications for this visit.        Cardiographics:    Holter 2014   Sinus bradycardia noted, particularly at night with some suggestion of   sinus node dysfunction, palpitations associated with sinus rhythm without ectopy.     Stress echo: December 2016  1. Normal stress echocardiogram without evidence of stress induced ischemia.   2. Normal resting left ventricular systolic performance with an ejection fraction of 55-60%. There is normal improvement in left ventricular systolic performance with a peak ejection fraction of 70-75%.  3. No ECG evidence of ischemia.  4. No anginal chest pain reported with exercise.        Lab Results:   Results from last 7 days   Lab Units 12/10/18  0914   LN-SODIUM mmol/L 143   LN-POTASSIUM mmol/L 4.6   LN-CHLORIDE mmol/L 107   LN-CO2  mmol/L 28   LN-BLOOD UREA NITROGEN mg/dL 26   LN-CREATININE mg/dL 1.07   LN-CALCIUM mg/dL 9.4     Lab Results   Component Value Date    CHOL 100 12/10/2018    CHOL 93 12/07/2017    CHOL 97 10/18/2016     Lab Results   Component Value Date    HDL 43 12/10/2018    HDL 39 (L) 12/07/2017    HDL 52 10/18/2016     Lab Results   Component Value Date    LDLCALC 48 12/10/2018    LDLCALC 47 12/07/2017    LDLCALC 36 10/18/2016     Lab Results   Component Value Date    TRIG 45 12/10/2018    TRIG 33 12/07/2017    TRIG 43 10/18/2016     No results found for: BNP    Mars (Dewey)  MD Pa[place]

## 2021-06-22 NOTE — PROGRESS NOTES
Assessment and Plan:       1. Health care maintenance  His main issue is mobility concerns with his severe spinal stenosis.  Also with coronary disease condition.    He did want to check his prostate cancer screening today.    He did have an ophthalmology checkup in October, given a 1 year follow-up.  History of borderline ocular pressures.    Some mild issues with word finding issues but functioning well on his own.  He has a good support network with friends from [employer] and a good friend [place].  He functioning well living independently.    2. Need for influenza vaccination    - Influenza High Dose, Seasonal 65+ yrs    3. Coronary atherosclerosis due to calcified coronary lesion  Asymptomatic with good exercise tolerance at the [facility] 5 times a week.    Medical management with Lipitor 20 mg and full aspirin daily.    4. Hypercholesterolemia  Well-controlled on Lipitor 20 mg.  - Lipid Cascade    5. History of colon cancer  Colon cancer in situ enlarged polyp with right hemicolectomy September 2017.  Bowels regular with FiberCon supplement.  He just had a September 2018 colonoscopy which she reports is negative.  Given a 1 year follow-up.    6. Essential hypertension  Controlled.  Continue atenolol 25 mg a day and lisinopril 2.5 mg a day.  No orthostasis.  Blood pressure averaging around 130s over 70s to 140/80.    7. Sleep apnea, unspecified type  Compliant with CPAP    8. Benign prostatic hyperplasia, unspecified whether lower urinary tract symptoms present  Stable on Flomax.  Prostate exam stable.    9. Spinal stenosis of lumbar region, unspecified whether neurogenic claudication present  Severe.  Stable ability to ambulate at the [facility].    Also having some intermittent bilateral arm paresthesias symptoms while in bed, generally last about 10 minutes.  No deficit symptoms now.  Symptoms are relatively mild.    I did discuss option for MRI imaging of his cervical and lumbar spine  for further evaluation.  But his exertional abilities are unchanged, pain is minimal, and upper extremity symptoms are relatively mild and intermittent.    If worsening symptoms, plan MRI imaging and referral back to his spine surgeon.  He has seen Dr. Elkins in the past.    He takes one Tylenol a day and gabapentin nightly.    10. Medication monitoring encounter    - Comprehensive Metabolic Panel  - HM2(CBC w/o Differential)    11. Screening for prostate cancer    - PSA (Prostatic-Specific Antigen), Annual Screen    12. Routine general medical examination at a health care facility  History of basal cell cancer of the scalp.  1 year follow-up with dermatology in May.  May 2018 exam was negative for recurrent cancer.    No significant symptoms from his left inguinal hernia which is stable.  No intervention planned.    The patient's current medical problems were reviewed.    I have had an Advance Directives discussion with the patient.  The following health maintenance schedule was reviewed with the patient and provided in printed form in the after visit summary:   Health Maintenance   Topic Date Due     ZOSTER VACCINES (2 of 3) 01/03/2008     INFLUENZA VACCINE RULE BASED (1) 08/01/2018     FALL RISK ASSESSMENT  11/01/2018     TD 18+ HE  04/11/2021     ADVANCE DIRECTIVES DISCUSSED WITH PATIENT  12/07/2022     PNEUMOCOCCAL POLYSACCHARIDE VACCINE AGE 65 AND OVER  Completed     PNEUMOCOCCAL CONJUGATE VACCINE FOR ADULTS (PCV13 OR PREVNAR)  Completed        Subjective:   Chief Complaint: Antoni Stoll is an 83 y.o. male here for an Annual Wellness visit.   HPI: Retired , lives independently.  Good support network from friends.  Functioning well on his own.    Ambulates regularly at the [facility] 5 days a week.  Good exertional ability.    He is never smoked.  No cough or increasing shortness of breath.    Hypertension well-controlled on current medications.    Bypass in 2013.  Stress echo  December 2000 16- and normal heart valves and normal ejection fraction.  No history of MI.  On Lipitor and aspirin.  No epigastric pain or history of GI bleeding or melena.    LDL 47 and HDL 30 9 December 2017.    CPAP machine is working well.  No significant daytime sleepiness.    Mood and affect has been good.    He is continues to have significant spinal stenosis symptoms and residual right foot L4 numbness.  2015 MRI she does show severe lumbar 3-5 stenosis.  He has chronic neck facet arthropathy and C7-T1 moderate foraminal stenosis on 2015 MRI.    He does have chronic postnasal drip rhinitis and sinusitis but well controlled with Claritin and nasal sprays.    No headache complaints.  Swallowing well.    He did trip 1 month ago and is dog got tangled in the lesion his feet, but no injury.  No other falls.    He confirms full CODE STATUS.    He has developed a skin breakdown in his gluteal crease from more prolonged sitting.  He has been using some antibacterial ointment he does feel its healing.  No pain associated with that.    Review of Systems:    Please see above.  The rest of the review of systems are negative for all systems.    Patient Care Team:  Sarthak Romano MD as PCP - General     Patient Active Problem List   Diagnosis     Glaucoma     Cataract     Spinal Stenosis     Inguinal Hernia     Hypercholesterolemia     Essential hypertension     Coronary Artery Disease     Sleep Apnea     Skin Condition     Seborrheic Keratosis     Colon cancer (H)     Anemia     BPH (benign prostatic hyperplasia)     Spinal stenosis     History of colon cancer     Medication monitoring encounter     Past Medical History:   Diagnosis Date     Anemia      Cancer (H)     colon     Carotid artery occlusion      Coronary artery disease      High cholesterol      Hyperlipidemia      Hypertension      Left inguinal hernia      Low back pain      Sleep apnea, obstructive     CPAP     Spinal stenosis     lumbar      Past  Surgical History:   Procedure Laterality Date     ARTERIAL BYPASS SURGERY       BLEPHAROPLASTY Bilateral      CARDIAC CATHETERIZATION  06/03/2013     CATARACT EXTRACTION Bilateral      CORONARY ARTERY BYPASS GRAFT  06/28/2013    5-vessel     CORONARY ARTERY BYPASS GRAFT  2013    5 vessel     HERNIA REPAIR      right inguinal     VARICOSE VEIN SURGERY Left       Family History   Problem Relation Age of Onset     Coronary artery disease Mother      Colon cancer Father      Coronary artery disease Father       Social History     Socioeconomic History     Marital status: Single     Spouse name: Not on file     Number of children: Not on file     Years of education: Not on file     Highest education level: Not on file   Social Needs     Financial resource strain: Not on file     Food insecurity - worry: Not on file     Food insecurity - inability: Not on file     Transportation needs - medical: Not on file     Transportation needs - non-medical: Not on file   Occupational History     Not on file   Tobacco Use     Smoking status: Never Smoker     Smokeless tobacco: Never Used   Substance and Sexual Activity     Alcohol use: Yes     Comment: 5 drinks weekly     Drug use: No     Sexual activity: Not on file   Other Topics Concern     Not on file   Social History Narrative     Not on file      Current Outpatient Medications   Medication Sig Dispense Refill     acetaminophen (TYLENOL) 500 MG tablet Take 1,000 mg by mouth 2 (two) times a day.        aspirin 325 MG tablet Take 325 mg by mouth daily.       atenolol (TENORMIN) 25 MG tablet TAKE 1 TABLET BY MOUTH DAILY 90 tablet 2     atorvastatin (LIPITOR) 20 MG tablet TAKE 1 TABLET BY MOUTH DAILY 90 tablet 3     B2/VITS A,C,E/LUT/ZEAXANTH/MIN (ICAPS ORAL) Take 1 capsule by mouth daily.       calcium polycarbophil (FIBERCON) 625 mg tablet Take 1,250 mg by mouth daily.       calcium polycarbophil (FIBERCON) 625 mg tablet Take 1,250 mg by mouth daily.       cholecalciferol, vitamin  "D3, 1,000 unit tablet Take 1,000 Units by mouth daily.       fluticasone (FLONASE) 50 mcg/actuation nasal spray Apply 1 spray into each nostril daily.       gabapentin (NEURONTIN) 300 MG capsule TAKE 1 TO 2 CAPSULES BY MOUTH EVERY EVENING AS NEEDED FOR NERVE PAIN 180 capsule 1     glucosamine-chondroitin 500-400 mg cap Take 1 capsule by mouth 2 (two) times a day.       ipratropium (ATROVENT) 0.03 % nasal spray USE 2 SPRAYS IN EACH NOSTRIL THREE TIMES DAILY AS NEEDED (Patient taking differently: USE 2 SPRAYS IN EACH NOSTRIL DAILY AS NEEDED) 30 mL 8     lisinopril (PRINIVIL,ZESTRIL) 2.5 MG tablet TAKE 1 TABLET BY MOUTH EVERY DAY 90 tablet 2     loratadine (CLARITIN) 10 mg tablet Take 10 mg by mouth daily.       saw palmetto fruit 450 mg cap Take 1 capsule by mouth 2 (two) times a day.       tamsulosin (FLOMAX) 0.4 mg Cp24 Take 0.4 mg by mouth at bedtime.       No current facility-administered medications for this visit.       Objective:   Vital Signs:   Visit Vitals  /68   Pulse 63   Ht 5' 5\" (1.651 m)   Wt 151 lb 12.8 oz (68.9 kg)   SpO2 97%   BMI 25.26 kg/m         VisionScreening:  No exam data present     PHYSICAL EXAM  Alert and oriented x3.  He had difficulty with 2 out of the 3 words for word recall.  Clock face drawing normal.  Otherwise normal interaction and mental status today.  Pupils and irises equal and reactive.  Extraocular muscles intact.  External ears and nose exam is normal.  Tympanic members are normal.  Pharynx is normal without pharyngitis or exudate.  No leukoplakia.  Remaining teeth in good condition.  No cervical or supraclavicular or inguinal adenopathy.  No JVD and no carotid bruits.  No thyromegaly or nodularity.  Lungs clear to auscultation with normal respiratory excursion.  Moderate scoliosis.  Minimal kyphosis.  Heart is regular without murmur rub or gallop.  +1 pedal pulses bilaterally and no ankle edema.  Abdomen is mildly overweight, moderate ventral hernia.  No " hepatosplenomegaly or pulsatile mass.  Moderate left-sided inguinal hernia unchanged.  Testicles normal bilaterally and otherwise normal external.    His gluteal crease shows some mild epidermal breakdown but no secondary infection and no ulcer to the dermis.  Rectal exam shows a stable prostate with slight irregularity to the right lobe which is unchanged but no nodule.  Gait within normal limits.  Moderate hammer toes, feet in good condition.  Skin exam without suspicious lesions noted.    Assessment Results 12/10/2018   Activities of Daily Living No help needed   Instrumental Activities of Daily Living No help needed   Get Up and Go Score -   Mini Cog Total Score 3   Some recent data might be hidden     A Mini-Cog score of 0-2 suggests the possibility of dementia, score of 3-5 suggests no dementia    Identified Health Risks:     The patient was counseled and encouraged to consider modifying their diet and eating habits. He was provided with information on recommended healthy diet options.  The patient was provided with written information regarding signs of hearing loss.  Patient's advanced directive was discussed and I am comfortable with the patient's wishes.    Additional time spent with discussion of worsening spinal stenosis symptoms and arm paresthesias symptoms.  Discussion of MRI possibility, but we did agree to wait on the MRI.  This was in addition to his health maintenance physical exam.

## 2021-06-22 NOTE — TELEPHONE ENCOUNTER
Refill Approved    Rx renewed per Medication Renewal Policy.   Medication was last renewed on 1/4/2018.  Last OV 12/10/2018.    Tayler Hinkle, [facility] Triage/Med Refill 1/7/2019     Requested Prescriptions   Pending Prescriptions Disp Refills     atorvastatin (LIPITOR) 20 MG tablet [Pharmacy Med Name: ATORVASTATIN 20MG TABLETS] 90 tablet 0     Sig: TAKE 1 TABLET BY MOUTH DAILY    Statins Refill Protocol (Hmg CoA Reductase Inhibitors) Passed - 1/7/2019  3:58 AM       Passed - PCP or prescribing provider visit in past 12 months     Last office visit with prescriber/PCP: 6/7/2018 Sarthak Romano MD OR same dept: Visit date not found OR same specialty: 6/7/2018 Sarthak Romano MD  Last physical: 12/10/2018 Last MTM visit: Visit date not found   Next visit within 3 mo: Visit date not found  Next physical within 3 mo: Visit date not found  Prescriber OR PCP: Sarthak Romano MD  Last diagnosis associated with med order: 1. Hypercholesterolemia  - atorvastatin (LIPITOR) 20 MG tablet [Pharmacy Med Name: ATORVASTATIN 20MG TABLETS]; TAKE 1 TABLET BY MOUTH DAILY  Dispense: 90 tablet; Refill: 0    If protocol passes may refill for 12 months if within 3 months of last provider visit (or a total of 15 months).

## 2021-06-23 ENCOUNTER — HOSPITAL ENCOUNTER (OUTPATIENT)
Dept: RADIOLOGY | Facility: HOSPITAL | Age: 86
Discharge: HOME | End: 2021-06-23
Attending: SURGERY
Payer: MEDICARE

## 2021-06-23 DIAGNOSIS — M54.16 LUMBAR RADICULOPATHY: ICD-10-CM

## 2021-06-23 NOTE — TELEPHONE ENCOUNTER
Called patient back to address questions. Pt just wanted to know a little more why he was to have an echocardiogram after he just had the stress echocardiogram. We had discussed this yesterday but went over again that the stress echo did not give complete imaging of his valves in his heart and one of his heart valves may have some back flow of blood. He verbalized understanding. Echo scheduled for 1/28/19. -[facility]

## 2021-06-23 NOTE — TELEPHONE ENCOUNTER
----- Message from Kan Gonsalves sent at 1/22/2019  2:03 PM CST -----  Contact: Pt  General phone call:    Caller: Pt    Primary cardiologist: Dr Guevara    Detailed reason for call: Pt wanted another call to go over ECHO STRESS results - he was ordered the traditional ECHO COMP to assess possible mitral valve regurgitation -- Pt wanted to know more about this -  Please call when possible    New or active symptoms? Na    Best phone number: home    Best time to contact: any    Ok to leave a detailed message? Yes    Device? n    Additional Info:

## 2021-06-23 NOTE — PROGRESS NOTES
Notes recorded by Mars Guevara MD (Ted) on 1/21/2019 at 1:44 PM CST  No ischemia but echo suggestive of potentially significant mitral regurgitation.  Should we should get complete echo study to assess mitral regurgitation      Called patient; agreeable to test. Order placed. -[facility]

## 2021-06-24 ENCOUNTER — COMMUNICATION - HEALTHEAST (OUTPATIENT)
Dept: INTERNAL MEDICINE | Facility: CLINIC | Age: 86
End: 2021-06-24

## 2021-06-24 ENCOUNTER — HOSPITAL ENCOUNTER (OUTPATIENT)
Dept: CARDIOLOGY | Facility: HOSPITAL | Age: 86
Discharge: HOME | End: 2021-06-24
Attending: INTERNAL MEDICINE
Payer: MEDICARE

## 2021-06-24 DIAGNOSIS — R06.02 SHORT OF BREATH ON EXERTION: ICD-10-CM

## 2021-06-24 LAB
AORTIC ROOT: 3.4 CM
AORTIC VALVE MEAN VELOCITY: 100 CM/S
AR DECEL SLOPE: 1390 MM/S2
AR PEAK VELOCITY: 317 CM/S
ASCENDING AORTA: 3.1 CM
AV DIMENSIONLESS INDEX VTI: 0.8
AV MEAN GRADIENT: 5 MMHG
AV PEAK GRADIENT: 10.4 MMHG
AV REGURGITANT PEAK GRADIENT: 40.2 MMHG
AV REGURGITATION PRESSURE HALF TIME: 668 MS
AV VALVE AREA: 2.5 CM2
AV VELOCITY RATIO: 0.7
BSA FOR ECHO PROCEDURE: 1.74 M2
CV BLOOD PRESSURE: ABNORMAL MMHG
CV ECHO HEIGHT: 66 IN
CV ECHO WEIGHT: 143 LBS
DOP CALC AO PEAK VEL: 161 CM/S
DOP CALC AO VTI: 30.3 CM
DOP CALC LVOT AREA: 3.14 CM2
DOP CALC LVOT DIAMETER: 2 CM
DOP CALC LVOT PEAK VEL: 117 CM/S
DOP CALC LVOT STROKE VOLUME: 76.6 CM3
DOP CALC MV VTI: 23.3 CM
DOP CALCLVOT PEAK VEL VTI: 24.4 CM
EJECTION FRACTION: 76 % (ref 55–75)
FRACTIONAL SHORTENING: 53.7 % (ref 28–44)
INTERVENTRICULAR SEPTUM IN END DIASTOLE: 1.2 CM (ref 0.6–1)
IVS/PW RATIO: 1.3
LA AREA 1: 16.8 CM2
LA AREA 2: 17.9 CM2
LEFT ATRIUM LENGTH: 4.94 CM
LEFT ATRIUM SIZE: 4.3 CM
LEFT ATRIUM TO AORTIC ROOT RATIO: 1.26 NO UNITS
LEFT ATRIUM VOLUME INDEX: 29.7 ML/M2
LEFT ATRIUM VOLUME: 51.7 ML
LEFT VENTRICLE CARDIAC INDEX: 3 L/MIN/M2
LEFT VENTRICLE CARDIAC OUTPUT: 5.1 L/MIN
LEFT VENTRICLE DIASTOLIC VOLUME INDEX: 29.3 CM3/M2 (ref 34–74)
LEFT VENTRICLE DIASTOLIC VOLUME: 51 CM3 (ref 62–150)
LEFT VENTRICLE HEART RATE: 67 BPM
LEFT VENTRICLE MASS INDEX: 81.3 G/M2
LEFT VENTRICLE SYSTOLIC VOLUME INDEX: 6.9 CM3/M2 (ref 11–31)
LEFT VENTRICLE SYSTOLIC VOLUME: 12 CM3 (ref 21–61)
LEFT VENTRICULAR INTERNAL DIMENSION IN DIASTOLE: 4.1 CM (ref 4.2–5.8)
LEFT VENTRICULAR INTERNAL DIMENSION IN SYSTOLE: 1.9 CM (ref 2.5–4)
LEFT VENTRICULAR MASS: 141.5 G
LEFT VENTRICULAR OUTFLOW TRACT MEAN GRADIENT: 3 MMHG
LEFT VENTRICULAR OUTFLOW TRACT MEAN VELOCITY: 78.6 CM/S
LEFT VENTRICULAR OUTFLOW TRACT PEAK GRADIENT: 5 MMHG
LEFT VENTRICULAR POSTERIOR WALL IN END DIASTOLE: 0.9 CM (ref 0.6–1)
LV STROKE VOLUME INDEX: 44 ML/M2
MITRAL VALVE E/A RATIO: 0.9
MITRAL VALVE MEAN INFLOW VELOCITY: 37.1 CM/S
MITRAL VALVE PEAK VELOCITY: 77.2 CM/S
MV AREA VTI: 3.29 CM2
MV AVERAGE E/E' RATIO: 9.5 CM/S
MV DECELERATION TIME: 180 MS
MV E'TISSUE VEL-LAT: 6.73 CM/S
MV E'TISSUE VEL-MED: 5.65 CM/S
MV LATERAL E/E' RATIO: 8.7
MV MEAN GRADIENT: 1 MMHG
MV MEDIAL E/E' RATIO: 10.4
MV PEAK A VELOCITY: 64.2 CM/S
MV PEAK E VELOCITY: 58.7 CM/S
MV PEAK GRADIENT: 2.4 MMHG
MV VALVE AREA BY CONTINUITY EQUATION: 3.3 CM2
NUC REST DIASTOLIC VOLUME INDEX: 2288 LBS
NUC REST SYSTOLIC VOLUME INDEX: 66 IN
PR MAX PG: 5 MMHG
PR PEAK VELOCITY: 107 CM/S
PV ACCELERATION TIME: 56 MS
TRICUSPID REGURGITATION PEAK PRESSURE GRADIENT: 34.3 MMHG
TRICUSPID VALVE ANULAR PLANE SYSTOLIC EXCURSION: 1.6 CM
TRICUSPID VALVE PEAK REGURGITANT VELOCITY: 293 CM/S

## 2021-06-24 ASSESSMENT — MIFFLIN-ST. JEOR: SCORE: 1281.39

## 2021-06-24 NOTE — TELEPHONE ENCOUNTER
Refill Approved    Rx renewed per Medication Renewal Policy. Medication was last renewed on 6/5/18.    Janet Cole, [facility] Triage/Med Refill 2/22/2019     Requested Prescriptions   Pending Prescriptions Disp Refills     lisinopril (PRINIVIL,ZESTRIL) 2.5 MG tablet [Pharmacy Med Name: LISINOPRIL 2.5MG TABLETS] 90 tablet 0     Sig: TAKE 1 TABLET BY MOUTH EVERY DAY    Ace Inhibitors Refill Protocol Passed - 2/21/2019  3:46 PM       Passed - PCP or prescribing provider visit in past 12 months      Last office visit with prescriber/PCP: 6/7/2018 Sarthak Romano MD OR same dept: Visit date not found OR same specialty: 6/7/2018 Sarthak Romano MD  Last physical: 12/10/2018 Last MTM visit: Visit date not found   Next visit within 3 mo: Visit date not found  Next physical within 3 mo: Visit date not found  Prescriber OR PCP: Sarthak Romano MD  Last diagnosis associated with med order: There are no diagnoses linked to this encounter.  If protocol passes may refill for 12 months if within 3 months of last provider visit (or a total of 15 months).            Passed - Serum Potassium in past 12 months    Lab Results   Component Value Date    Potassium 4.6 12/10/2018            Passed - Blood pressure filed in past 12 months    BP Readings from Last 1 Encounters:   01/28/19 120/60            Passed - Serum Creatinine in past 12 months    Creatinine   Date Value Ref Range Status   12/10/2018 1.07 0.70 - 1.30 mg/dL Final

## 2021-06-25 NOTE — PROGRESS NOTES
[facility] Follow Up Note    Antoni Stoll   86 y.o. male    Date of Visit: 2021    Chief Complaint   Patient presents with     Follow-up     Subjective  Wily is an 86-year-old male here for follow-up on increasing shortness of breath with exertion.  Patient gained weight with worsening kyphosis over the winter.    He does have sleep apnea but was compliant with CPAP but he was getting grumpy faces on his machine.  He was thinking it was leaking, he would tighten the mask and it would improve temporarily.    He did get a new mask for his CPAP.  He is now getting smiley faces every night.  He does state that the machine sometimes states he is only using it for 90 minutes, but he states he is using it the whole night.    He denies any worsening daytime sleepiness but no improved energy levels are improved shortness of breath either.    [place] his niece who  of ovarian cancer this spring.    He is still going to travel over the next 3 weeks for family.    Patient is having significantly worsening spinal stenosis pain with some intermittent right leg pain when he is sitting too long.  He did stumble and fall last week without injury.    He did meet with the spine clinic and is now on Lyrica instead of gabapentin.  He has been referred to the neurosurgeon to possibly consider decompressive surgery.    I did review the MRI from 2020 with severe multilevel spinal stenosis.    No leg numbness or weakness currently.  He was able to stand and ambulate across room without difficulty today.    No new cough.  No hemoptysis or swallowing problems.    No chest pain or chest pressure.  Coronary bypass in .  No history of MI.    2021 - stress test.    2019 heart echo reviewed with no aortic stenosis, no pulmonary hypertension just mild mitral regurgitation normal ejection fraction.     chest CT scan showed some upper lobe scarring and small  chest cavity from previous CABG.    He is never smoked and no history of asthma.    Patient has not noticed any progressive bradycardia.  He has not been monitoring his heart rate.    He has not checked his blood pressure on his own but denies any orthostasis.  Blood pressure was mildly high in February at 152/85.  Normal today.    On lisinopril 2.5 mg a day.  No increasing lower extremity edema, no edema.    He takes Tylenol twice a day for his severe lumbar stenosis.  Also previous right rotator cuff tear and shoulder pain.  He is plan to see the orthopedic clinic when he returns end of June for a cortisone shot.    He has a ventral hernia after his hemicolectomy but he is not having significant pain from that.  December 2020 hemoglobin was 13.9.  September 2020 colonoscopy negative.    On Flomax.    He was able to find a gym and walk on the treadmill 5 days a week [place].  He does not always walking normally during his travel days and he was traveling quite a bit back-and-forth this spring.    Patient has had some increased right ear congestion with some decreased hearing but not pain.  He is getting his hearing aids fixed.  Has been using some earwax drops but has not been getting any earwax out.  No fever.    Some gluteal intertrigo, he is finished with the steroid cream and now using Butt paste.    PMHx:    Past Medical History:   Diagnosis Date     Anemia      Cancer (H)     colon     Carotid artery occlusion      Cataract     Created by Conversion      Coronary artery disease      High cholesterol      History of colon cancer 8/13/2018     Hyperlipidemia      Hypertension      Left inguinal hernia      Low back pain      Sleep apnea, obstructive     CPAP     Spinal stenosis     lumbar     SSS (sick sinus syndrome) (H) 12/9/2019     PSHx:    Past Surgical History:   Procedure Laterality Date     ARTERIAL BYPASS SURGERY       BLEPHAROPLASTY Bilateral      CARDIAC CATHETERIZATION  06/03/2013      CATARACT EXTRACTION Bilateral      CORONARY ARTERY BYPASS GRAFT  06/28/2013    5-vessel     CORONARY ARTERY BYPASS GRAFT  2013    5 vessel     HERNIA REPAIR      right inguinal     VARICOSE VEIN SURGERY Left      Immunizations:   Immunization History   Administered Date(s) Administered     COVID-19,PF,Pfizer 04/16/2021, 05/18/2021     Influenza L5x3-57, 01/18/2010     Influenza high dose,seasonal,PF, 65+ yrs 10/13/2015, 10/18/2016, 11/01/2017, 12/10/2018, 12/11/2019     Influenza, inj, historic,unspecified 10/23/2007, 10/29/2009, 10/11/2010, 11/08/2011     Influenza, seasonal,quad inj 6-35 mos 11/29/2012, 10/25/2013, 11/19/2014     Influenza,quad,high Dose,PF, 65yr + 11/30/2020     Pneumo Conj 13-V (2010&after) 04/09/2015     Pneumo Polysac 23-V 10/03/1999, 03/06/2007     Td, Adult, Absorbed 03/11/1992, 10/24/2000, 04/11/2011     ZOSTER, LIVE 11/08/2007       ROS A comprehensive review of systems was performed and was otherwise negative    Medications, allergies, and problem list were reviewed and updated    Exam  /70   Pulse 64   Wt 143 lb 12.8 oz (65.2 kg)   BMI 23.21 kg/m    Lungs clear to auscultation but with reduced respiratory excursion with small chest cavity and severe kyphosis.  No crackles or wheezing or dullness.  Heart is regular without murmur rub or gallop and there is no ankle edema.  Abdomen nontender.  Able to climb up on exam table.  The right tympanic membrane does appear somewhat congested with increased pressure but no inflammation.  There is no cerumen.  The left tympanic membrane is normal.    Assessment/Plan  1. Short of breath on exertion  Increased dyspnea on exertion.  I am suspicious for progressive pulmonary hypertension with possible dysfunction with his CPAP machine over the winter.  He is also gained weight and had worsening kyphosis with restrictive chest wall physiology.    He now has fixed his CPAP machine and got a new mask which does seem to be working better.  There  was some question on the recording of how long the CPAP was working at night, I directed him to contact his CPAP company with questions on that.    Repeat heart echo to evaluate for development of pulmonary hypertension or other cardiac changes.    No chest pain and negative stress test January 2021, not suspicious for ischemic condition.    Borderline low heart rate in the 60s but denies orthostasis or fainting type spells.  Monitor for worsening bradycardia.    No new cough, non-smoker.  We will have him check a chest x-ray today.      - Echo Complete; Future  - HM2(CBC w/o Differential)  - XR Chest 2 Views; Future    2. Essential hypertension  Improved control from previous.  Continue on current lisinopril 2.5 mg a day.    3. Obstructive sleep apnea syndrome  As above.  Compliant with CPAP.    4. Atherosclerosis of native coronary artery of native heart with angina pectoris (H)  No chest pain.  Negative stress test January 2021.  Lipitor and aspirin.    5. Fatigue, unspecified type  Worsening sleep apnea and chest restriction suspected, but check TSH.  - Thyroid Stimulating Hormone (TSH)    6. Encounter for therapeutic drug monitoring    - Comprehensive Metabolic Panel    7. Ear congestion, right  I suspect his airplane travel over the past couple of months has contributed to his right ear congestion.  He is on Astelin and Flonase nasal spray.  He denies sinusitis or significant sinus pressure.  He denies a right earache.  Continue on nose sprays.  He was warned about risk of ear infection and to seek medical attention immediately if increasing ear pain.    He was told to stop the earwax drops as he has no earwax now.    8. Spinal stenosis of lumbar region, unspecified whether neurogenic claudication present  Worsening symptoms of right leg radiculopathy with prolonged sitting.  Gait adequate today without foot drop.  Known severe spinal stenosis.  Follow-up with his neurosurgeon.  If significant worsening  symptoms he may need to consider surgery but would be at high risk with above issues.    Now changed to Lyrica.    Continue regular daily walking with treadmill.    Ventral abdominal hernia after hemicolectomy.  Not causing pain, relatively small and reducible.  Patient was counseled not to consider surgery for that.    Patient has completed his COVID-19 vaccine.    BPH controlled with Flomax    Right rotator cuff tear with shoulder pain, he plans to see the orthopedic clinic for cortisone shot when he returns.    Gluteal intertrigo, better after steroid treatment.  Now using Butt paste    Return in about 5 weeks (around 7/1/2021) for Recheck.   Patient Instructions   Schedule a heart echo as soon as you get back to [place].    See me for follow-up end of June or early July.    You do not have any earwax in your ear canal.  You do have some increased pressure in your right middle ear.  Continue your nose sprays.  If you do develop significant increasing right ear pain, get seen right away to get evaluated for an ear infection.    Contact your CPAP company about questions on your CPAP machine.    Sarthak Romano MD        Current Outpatient Medications   Medication Sig Dispense Refill     acetaminophen (TYLENOL) 500 MG tablet Take 1,000 mg by mouth daily .             aspirin 325 MG tablet Take 325 mg by mouth daily.       atorvastatin (LIPITOR) 20 MG tablet Take 1 tablet (20 mg total) by mouth daily. 90 tablet 3     B2/VITS A,C,E/LUT/ZEAXANTH/MIN (ICAPS ORAL) Take 1 capsule by mouth daily.       calcium polycarbophil (FIBERCON) 625 mg tablet Take 1,250 mg by mouth daily.       cholecalciferol, vitamin D3, 1,000 unit tablet Take 1,000 Units by mouth daily.       fluticasone (FLONASE) 50 mcg/actuation nasal spray Apply 1 spray into each nostril daily.       glucosamine-chondroitin 500-400 mg cap Take 1 capsule by mouth 2 (two) times a day.       ipratropium (ATROVENT) 0.03 % nasal spray USE 2 SPRAYS IN EACH NOSTRIL  THREE TIMES DAILY AS NEEDED 30 mL 6     lisinopriL (PRINIVIL,ZESTRIL) 2.5 MG tablet TAKE 1 TABLET BY MOUTH EVERY DAY 90 tablet 2     loratadine (CLARITIN) 10 mg tablet Take 10 mg by mouth daily.       pregabalin (LYRICA) 25 MG capsule Take 1 capsule (25 mg total) by mouth 2 (two) times a day. 1 tab at bedtime x 5 days.  Then may increase to 1 cap  Twice daily. 60 capsule 2     saw palmetto fruit 450 mg cap Take 1 capsule by mouth 2 (two) times a day.       tamsulosin (FLOMAX) 0.4 mg Cp24 Take 0.4 mg by mouth at bedtime.       No current facility-administered medications for this visit.      No Known Allergies  Social History     Tobacco Use     Smoking status: Never Smoker     Smokeless tobacco: Never Used   Substance Use Topics     Alcohol use: Yes     Comment: 5 drinks weekly     Drug use: No

## 2021-06-26 NOTE — PROGRESS NOTES
Assessment/Plan:      Diagnoses and all orders for this visit:    Complete tear of right rotator cuff, unspecified whether traumatic  -     OPS US Large Joint Injection Unilateral; Standing  -     OPS US Large Joint Injection Unilateral    Shoulder effusion, right  -     OPS US Large Joint Injection Unilateral; Standing  -     OPS US Large Joint Injection Unilateral    Other orders  -     lidocaine (PF) 10 mg/mL (1 %) injection (XYLOCAINE-MPF)  -     ropivacaine 5 mg/mL (0.5 %) injection (NAROPIN); Refill: 0  -     methylPREDNISolone acetate injection (DEPO-MEDROL)        Assessment: Pleasant 86 y.o. male  with history of colon cancer, hyperlipidemia, hypertension, carotid artery occlusion, coronary artery disease with:        1.  Persistent right shoulder pain with any movement.  He has glenohumeral joint effusion with complete rotator cuff tear of the supraspinatus with retraction.  Not a surgical candidate..  He also has mild glenohumeral joint osteoarthritis all contributing to his significant pain.  Glenohumeral join injection today.          Discussion:    1.  Right glenohumeral joint injection under ultrasound guidance today.  Patient to proceed.  Please see attached procedure note.    2. Follow  Up in 2 weeks      It was our pleasure caring for your patient today, if there any questions or concerns please do not hesitate to contact us.      Subjective:   Patient ID: Antoni Stoll is a 86 y.o. male.    History of Present Illness: Patient presents for right glenohumeral joint injection at the request of Dr. Orlin Mir.  He has right shoulder pain with movement.  Pain is a 1/10 without movement 5/10 with movement.       MRI: Broad-based full-thickness retracted supraspinatus tendon tear with subacromial subdeltoid bursitis biceps tenosynovitis mild glenohumeral joint osteoarthritis.    Review of Systems: No recent illnesses, antibiotics, vaccinations.  No fevers chills sweats.           Past Medical  History:   Diagnosis Date     Anemia      Cancer (H)     colon     Carotid artery occlusion      Cataract     Created by Conversion      Coronary artery disease      High cholesterol      History of colon cancer 8/13/2018     Hyperlipidemia      Hypertension      Left inguinal hernia      Low back pain      Sleep apnea, obstructive     CPAP     Spinal stenosis     lumbar     SSS (sick sinus syndrome) (H) 12/9/2019       The following portions of the patient's history were reviewed and updated as appropriate: allergies, current medications, past family history, past medical history, past social history, past surgical history and problem list.           Objective:   Physical Exam:    Vitals:    06/22/21 1220   BP:    Pulse: 60   Temp:    SpO2: 97%     There is no height or weight on file to calculate BMI.      General: Alert and oriented with normal affect. Attention, knowledge, memory, and language are intact. No acute distress.   Eyes: Sclerae are clear.  Respirations: Unlabored.  .  Skin: No rashes seen about the right shoulder.    Gait:  Nonantalgic  Decreased internal rotation of the right shoulder.       Procedure Note:    After discussing the risks and benefits of a right glenohumeral joint injection under ultrasound guidance, informed consent was obtained. The patient and physician agreed on the injection site prior to the procedure.  A verbal timeout was done prior to the procedure.  With the patient seated, from posterior lateral approach, The right shoulder region was surveyed with the ultrasound unit to identify the target.  The skin was marked and prepped with chloraprep.  The skin was then anesthetized with a skin wheal followed by infiltration with 0.5 mL of 1% lidocaine.  Under direct ultrasound visualization, a 25-gauge 2 inch needle was used to inject 3 milliliters of injectate containing 2 milliliters of 0.5% ropivacaine and 1 milliliter containing 40 mg of depo-medrol after aspiration was negative  for heme. The patient tolerated the procedure well and there no immediate complications.    Preporcedure pain: 5/10  Poste procedure pain: 0/10

## 2021-06-26 NOTE — PATIENT INSTRUCTIONS - HE
Please follow up two to four weeks post procedure with Dr Mir to evaluate your plan of care.       DISCHARGE INSTRUCTIONS    During office hours (8:00 a.m.- 4:00 p.m.) questions or concerns may be answered  by calling [facility] Nurses at  [phone].  Messages received after hours will be returned the following business day.      In the case of an emergency, please dial 911 or seek assistance at the nearest Emergency Room/Urgent Care facility.     All Patients:    ? You may experience an increase in your symptoms for the first 2 days (It may take anywhere between 2 days- 2 weeks for the steroid to have maximum effect).    ? You may use ice on the injection site, as frequently as 20 minutes each hour if needed.    ? You may take your pain medicine.    ? You may continue taking your regular medication after your injection.     ? You may shower. No swimming, tub bath or hot tub for 48 hours.  You may remove your bandaid/bandage as soon as you are home.    ? You may resume light activities, as tolerated.    ? Resume your usual diet as tolerated.      POSSIBLE STEROID SIDE EFFECTS (If steroid/cortisone was used for your procedure)    -If you experience these symptoms, it should only last for a short period      Swelling of the legs                Skin redness (flushing)       Mouth (oral) irritation     Blood sugar (glucose) levels              Sweats                      Mood changes    Headache    Sleeplessness    Weakened immune system for up to 14 days, which could increase the risk of keeley the COVID-19 virus and/or experiencing more severe symptoms of the disease, if exposed.    Decreased effectiveness of the flu vaccine if given within 2 weeks of the steroid.         POSSIBLE PROCEDURE SIDE EFFECTS  -Call the Spine Center if you are concerned    Increased Pain             Increased numbness/tingling        Nausea/Vomiting            Bruising/bleeding at site        Redness or swelling                                                 Difficulty walking        Weakness             Fever greater than 100.5

## 2021-06-26 NOTE — PATIENT INSTRUCTIONS - HE
Schedule a heart echo as soon as you get back to [place].    See me for follow-up end of June or early July.    You do not have any earwax in your ear canal.  You do have some increased pressure in your right middle ear.  Continue your nose sprays.  If you do develop significant increasing right ear pain, get seen right away to get evaluated for an ear infection.    Contact your CPAP company about questions on your CPAP machine.

## 2021-06-28 NOTE — PROGRESS NOTES
Progress Notes by Desi Mobley PT at 1/24/2020  1:00 PM     Author: Desi Mobley PT Service: -- Author Type: Physical Therapist    Filed: 1/24/2020  4:14 PM Encounter Date: 1/24/2020 Status: Attested    : Desi Mobley PT (Physical Therapist) Cosigner: Orlin Mir DO at 1/27/2020  7:51 AM    Attestation signed by Orlin Mir DO at 1/27/2020  7:51 AM    Follow the therapist's recommendation                    [facility] Certification Request    January 24, 2020      Patient: Antoni Stoll  MR Number: [id number]  YOB: 1935  Date of Visit: 1/24/2020      Dear Dr. Orlin Mir:    Thank you for this referral.   We are seeing Antoni Stoll for Physical Therapy of cervical radiculopathy.  I would also like to treat the patient's shoulder as well as he had + impingement signs and weakness in his rotator cuff.    Medicare and/or Medicaid requires physician review and approval of the treatment plan. Please review the plan of care and verify that you agree with the therapy plan of care by co-signing this note.      Plan of Care  Authorization / Certification Start Date: 01/24/20  Authorization / Certification End Date: 04/24/20  Authorization / Certification Number of Visits: 12  Communication with: Referral Source  Patient Related Instruction: Nature of Condition;Treatment plan and rationale;Self Care instruction;Basis of treatment;Body mechanics;Precautions;Next steps;Expected outcome;Posture  Times per Week: 1  Number of Weeks: 12  Number of Visits: 12  Discharge Planning: when goals are met or plateau of progress  Precautions / Restrictions : none  Therapeutic Exercise: ROM;Stretching;Strengthening  Neuromuscular Reeducation: kinesio tape;posture;balance/proprioception;TNE;core  Manual Therapy: soft tissue mobilization;myofascial release;joint mobilization;muscle energy;strain counterstrain;other  Manual Therapy: neural  mobilization  Modalities: traction;electrical stimulation;TENS;ultrasound;cold pack;hot pack;other  Modalities: check precautions first  Gait Training: as needed  Equipment: theraband      Goals:  Pt. will demonstrate/verbalize independence in self-management of condition in : 12 weeks  Pt. will be independent with home exercise program in : 12 weeks  Pt. will have improved quality of sleep: Comment;with less pain;in 12 weeks  Comment:: the patient will be able to fall asleep with 50% less pain so that he can fall asleep fster   Patient will reach / maintain arm movement: forward;overhead;for home chores;for dressing;for sport/recreation;with full ROM;with less pain;with less difficulty;Comment  Comment: patient will not have a painful arc with reaching forward and up    Pt will: be able to lift light weights overhead in exercise class and will be able to return to putting things into the cupboard with only slight increase in pain.        If you have any questions or concerns, please don't hesitate to call.    Sincerely,      Desi Mobley, PT        Physician recommendation:     ___ Follow therapist's recommendation        ___ Modify therapy      *Physician co-signature indicates they certify the need for these services furnished within this plan and while under their care.  [facility]   Cervical Thoracic Initial Evaluation    Patient Name: Antoni Stoll  Date of evaluation: 1/24/2020  Referral Diagnosis: Cervical radicular pain  Referring provider: Orlin Mir, DO  Visit Diagnosis:     ICD-10-CM    1. Cervicalgia M54.2    2. Cervical radiculitis M54.12    3. Pain in joint of right shoulder M25.511        Assessment:    The patient had a fall on his right shoulder after his visit with Dr. Mir.  He is currently presenting with neck upper trap and upper arm pain and will occasionally have numbness and tingling in his hand.  He also presents with weak shoulder ER and painful  arc as well as positive impingement signs in his right shoulder.  He has significantly poor posture.  And also reports dizziness with turning his head with walking.  Pt. is appropriate for skilled PT intervention as outlined in the Plan of Care (POC).  Pt. is a good candidate for skilled PT services to improve pain levels and function.  Skilled Physical Therapy needed to increase ROM, increase strength, decrease pain and improve functional status.      Goals:  Pt. will demonstrate/verbalize independence in self-management of condition in : 12 weeks  Pt. will be independent with home exercise program in : 12 weeks  Pt. will have improved quality of sleep: Comment;with less pain;in 12 weeks  Comment:: the patient will be able to fall asleep with 50% less pain so that he can fall asleep fster   Patient will reach / maintain arm movement: forward;overhead;for home chores;for dressing;for sport/recreation;with full ROM;with less pain;with less difficulty;Comment  Comment: patient will not have a painful arc with reaching forward and up    Goals were set in collaboration with the patient.    Patient's expectations/goals are realistic.    Barriers to Learning or Achieving Goals:  No Barriers.       Plan / Patient Instructions:        Plan of Care:   Authorization / Certification Start Date: 01/24/20  Authorization / Certification End Date: 04/24/20  Authorization / Certification Number of Visits: 12  Communication with: Referral Source  Patient Related Instruction: Nature of Condition;Treatment plan and rationale;Self Care instruction;Basis of treatment;Body mechanics;Precautions;Next steps;Expected outcome;Posture  Times per Week: 1  Number of Weeks: 12  Number of Visits: 12  Discharge Planning: when goals are met or plateau of progress  Precautions / Restrictions : none  Therapeutic Exercise: ROM;Stretching;Strengthening  Neuromuscular Reeducation: kinesio tape;posture;balance/proprioception;TNE;core  Manual Therapy:  soft tissue mobilization;myofascial release;joint mobilization;muscle energy;strain counterstrain;other  Manual Therapy: neural mobilization  Modalities: traction;electrical stimulation;TENS;ultrasound;cold pack;hot pack;other  Modalities: check precautions first  Gait Training: as needed  Equipment: Base[employer]      Plan for next visit: assess response to HEP.  Assess balance and gait.  Assess spine mobility throughout the spine.  Assess S/C and A/C joints.   Look at nerve gliding look at dural mobility.  Check cervical spine roots.  Go over posture and body mechanics.  Assess shoulder motion and GH joint mobility.      Subjective:           History of Present Illness:    Antoni is a 84 y.o. male who presents to therapy today with complaints of pain started 1 year ago for unknown reason.  Hard to fall asleep throbbing and goes from shoulder to elbow.  Average pain is around a 5/10 pain pain anterior upper shoulder.  And will spread more laterally and up into the upper trap and some in the bicep region and will go down to the elbow anteriorly.  Mild neck pain constantly and with doing something at the counter in the kitchen like cutting vegetables it will increase to 5-7/10 pain.   .Have had neck trouble for 20 years and have lower stenois problems  In his low back.  Neck pain has gradually gotten worse.  Previously it would come and go now it is constant.  Some dull pain in the arm all the time 1-2/10 pain and the worst pain 7/10 pain and t right now pain 4/10 pain in the shoulder and in the neck 3-4/10 pain.  Constant pan but worse with chopping for the neck and or working at the counter for the neck and for the shoulder arm pain he is unable to determine what makes it worse.  Standing doesn't make the arm worse.  Laying down makes it worse.     Symptoms are constant and getting worse. He reports  an episodic  history of similar symptoms just in the neck it has never been in the shoulder previously..       Pain  description: throbbing  Tingling in his fingers on the right more than the left also the thumb.  Have noticed this with driving.    Functional limitations are described as occurring with:   lifting weights     lmost impossible to lift suitcase into overhead bin.  Reaching forward causes pain need to adjust his shoulder to be able to do it and will sometimes need the other arm to do it.  Pain and weakness but more pain that limits him from lifting weights in the class.  Pain in the arm is new for him this round.      Patient reports benefit from:  anti-inflammatory extra strength and gabapentin.    Haven't done any exercises specifically for it.         Objective:      Note: Items left blank indicates the item was not performed or not indicated at the time of the evaluation.    Patient Outcome Measures :    Neck Disability Score in %: 26     Scores range from 0-100%, where a score of 0% represents minimal pain and maximal function. The minmal clinically important difference is a score reduction of 10%.    Cervical Thoracic Examination  1. Cervicalgia     2. Cervical radiculitis     3. Pain in joint of right shoulder       Involved side: Right  Posture Observation:      General sitting posture is  forward head rounded shoulder increased thoracic kyphosis.  General standing posture is forward head rounded shoudlers and increased kyphosis.  Shoulder/Elbow ROM  Date:      Shoulder and Elbow ROM ( )   AROM in degrees AROM in degrees AROM in degrees    Right Left Right Left Right Left   Shoulder Flexion (0-180 ) Painful arc range 151, 5-6/10 pain 157       Shoulder Abduction (0-180 ) 140 pain at 90 degrees 160       Shoulder Extension (0-60 )         Shoulder ER (0-90 ) 75 3-4/10 pain in the shoulder 65 slight symptoms       Shoulder IR (0-70 ) t10 pain in the shoulder 3-4/10 pain T8       Shoulder IR/EXT         Elbow Flexion (150 )         Elbow Extension (0 )          PROM in degrees PROM in degrees PROM in degrees     Right Left Right Left Right Left   Shoulder Flexion (0-180 )         Shoulder Abduction (0-180 )         Shoulder Extension (0-60 )         Shoulder ER (0-90 )         Shoulder IR (0-70 )         Elbow Flexion (150 )         Elbow Extension (0 )               Left sidegliding cervical spie and right shoulder higher than the left and the spine sitting   c curve from lumbar spine on up apex on the right at T12 also with right rotation of the spine and then neutral at T1 and C7 and then left rotation with a curve the opposite way in the cervical spine.      Cervical ROM:  At the shoulder start neck pain 1/10 pain/numbness, shoulder 1/10 pain 1/4 the way down the shoulder and no tingling in the hand  Date: 1/24/20     *Indicate scale AROM AROM AROM   Cervical Flexion 2 fingers to the chest     Cervical Extension 35      Right Left Right Left Right Left   Cervical Sidebending 4 fingers to the shoulder 5 fingers to the shoulder       Cervical Rotation 47  tightness 47 tightness       Cervical Protraction      Cervical Retraction      Thoracic Flexion      Thoracic Extension      Thoracic Sidebending         Thoracic Rotation           Strength     Date:      Cervical Myotomes/5 Right Left Right Left Right Left   Cervical Flexion (C1-2)         Cervical Sidebending (C3)         Shoulder Elevation (C4) Shoulder flexion 5-/5 2-3/10 pain 5-/5  1-2/10 pain       Shoulder Abduction (C5) 5/5 5/5       Elbow Flexion (C6) 5/5  Slight pain 5/5       Elbow Extension (C7) 5/5 5/5       Wrist Flexion (C7) 5/5 5/5       Wrist Extension (C6) 5-/5 5/5       Thumb abduction (C8) 5/5 5/5       Finger Abduction (T1) 5/5 5/5       Shoulder ER 4/5 3-4/10 pain on the right on the left 4+/51/10 pain  Shoulder IR 5/5 bilaterally  Shoulder abduction 5/5 bilaterally 1/10 pain on the right    Sensation   With palpation normal throughout      Reflex Testing  Cervical Dermatomes Right Left UE Reflexes Right Left   Back of the Head (C2)   Biceps  (C5-6)     Supraclavicular Fossa (C3)   Brachioradialis (C5-6)     AC Joint (C4)   Triceps (C7-8)     Lateral Biceps (C5)   Hoffmanns test     Palmar Thumb (C6)   LE Reflexes     Palmar 3rd Finger (C7)   Patellar (L3-4)     Palmar 5th Finger (C8)   Achilles (S1-2)     Ulnar Forearm (T1)   Babinski Response       Standing right rotated SI on up and T12 last one rotated and then T6 on up is left rotated.  The patient continues to have the right hip higher than the left in standing but not as significant of a curve when standing but continued increased thoracic kyphosis.   General listening to the left lateral shoulder    Flexibility:    Palpation:    Passive Mobility-Joint Integrity: Not tested.    Cervical Special Tests   + neers and  +leo erwin test on the right negative on the left.  Increased pain on the right with pulling on the shoulder but increased strength with shoulder distraction and ER MMT.    Cervical Special Tests Right Left UE Nerve Mobility Right Left   Cervical compression Slight increase on the right upper cervical spine Slight increase on the right upper trap. Median nerve     Cervical distraction No change nochange Ulnar nerve     Spurlings test   Radial nerve     Shoulder abduction sign   Thoracic outlet     Deep neck flexor endurance test   Marvel     Upper cervical rotation   Adsons     Sharper-Amrik   Cervical rotation lateral flexion     Alar ligament test WNL WNL Other:     Other:   Other:       Vertebrobasilar aa wnl    Notice dizziness with turning his head when walking.      UE Screen: see shoulder ROM and MMT above    Treatment Today   Pain after treatment to day 2-3/10 pain in the neck posteriorly and upper trap and lateral shoulder.  Some tingling in his left posterior wrist after laying down and then getting up.    TREATMENT MINUTES COMMENTS   Evaluation 30    Self-care/ Home management     Manual therapy 15 Manual nerve mobilization dural mobilizations from rectus capitus  posterior minor to sacral base as well as cervical roots   Neuromuscular Re-education     Therapeutic Activity     Therapeutic Exercises 15 Cervical retraction, scapular retraction, shoulder AAROM flexion on the wall   Gait training     Modality__________________                Total 60    Blank areas are intentional and mean the treatment did not include these items.     PT Evaluation Code: (Please list factors)  Patient History/Comorbidities: history of colon cancer and spinal stenosis  Examination: cervical radiculopathy and also shoulder pain  Clinical Presentation: stable  Clinical Decision Making: low    Patient History/  Comorbidities Examination  (body structures and functions, activity limitations, and/or participation restrictions) Clinical Presentation Clinical Decision Making (Complexity)   No documented Comorbidities or personal factors 1-2 Elements Stable and/or uncomplicated Low   1-2 documented comorbidities or personal factor 3 Elements Evolving clinical presentation with changing characteristics Moderate   3-4 documented comorbidities or personal factors 4 or more Unstable and unpredictable High              Desi Mobley PT  1/24/2020  1:20 PM

## 2021-06-28 NOTE — PROGRESS NOTES
Progress Notes by Desi Mobley PT at 3/6/2020 10:00 AM     Author: Desi Mobley PT Service: -- Author Type: Physical Therapist    Filed: 3/27/2020 11:25 AM Encounter Date: 3/6/2020 Status: Attested Addendum    : Desi Mobley PT (Physical Therapist)    Related Notes: Original Note by Desi Mobley PT (Physical Therapist) filed at 3/27/2020 11:14 AM    Cosigner: Orlin Mir DO at 3/27/2020 11:25 AM    Attestation signed by Orlin Mir DO at 3/27/2020 11:25 AM    Follow the therapist's recommendation                 [facility] Re-Certification Request    March 27, 2020      Patient: Antoni Stoll  MR Number: [id number]  YOB: 1935  Date of Visit: 3/6/2020  Onset Date:  1/19  Date of Eval: 1/24/20    Dear Dr. Orlin Mir,   :    As you may recall, we have been seeing Antoni Stoll for Physical Therapy of M54.12 (ICD-10-CM) - Cervical radicular pain, spinal stenosis L3-4,4,5 spondylosis with L3-4,5 anterolisthesis, and shoulder pain  .    For therapy to continue, Medicare and/or Medicaid requires periodic physician review of the treatment plan. Please review the summary of the patient's progress and our plan for continued therapy, and verify  that you agree therapy should continue by entering certification dates at the bottom of this note and co-signing this note.    Plan of Care  Authorization / Certification Start Date: 04/24/20  Authorization / Certification End Date: 07/24/20  Authorization / Certification Number of Visits: 10  Communication with: Referral Source  Patient Related Instruction: Nature of Condition;Treatment plan and rationale;Self Care instruction;Basis of treatment;Body mechanics;Posture;Precautions;Next steps;Expected outcome  Times per Week: 1  Number of Weeks: 10  Number of Visits: 6-10  Discharge Planning: when goals  are met or plateau in progress  Precautions / Restrictions : history of colon cancer,  spinal stenosis  Therapeutic Exercise: ROM;Stretching;Strengthening  Neuromuscular Reeducation: kinesio tape;posture;balance/proprioception;TNE;core  Manual Therapy: soft tissue mobilization;myofascial release;joint mobilization;muscle energy  Modalities: TENS  Gait Training: level and uneven ground  Equipment: theraband      Goal  Pt. will demonstrate/verbalize independence in self-management of condition in : 6 weeks  Pt. will be independent with home exercise program in : 6 weeks  Pt. will have improved quality of sleep: Comment;with less pain;in 12 weeks  Comment:: the patient will be able to fall asleep with 50% less pain so that he can fall asleep fster   Patient will reach / maintain arm movement: forward;overhead;for home chores;for dressing;for sport/recreation;with full ROM;with less pain;with less difficulty;Comment  Comment: patient will not have a painful arc with reaching forward and up    Pt will: be able to tolerate standing for 20minutes without needing to sit due to increase in pain, within 10 weeks.  Pt will: improve KOSTA score by 6 points or more to decrease impact back pain is having on activities of daily living, within 10 weeks.        If you have any questions or concerns, please don't hesitate to call.    Sincerely,      Desi Mobley, PT        Physician recommendation:     ___ Follow therapist's recommendation        ___ Modify therapy      *Physician co-signature indicates they certify the need for these services furnished within this plan and while under their care.        [facility] Daily Progress     Patient Name: Antoni Stoll  Date: 3/27/2020  Visit #: 6  PTA visit #:  0  Referral Diagnosis:M54.12 (ICD-10-CM) - Cervical radicular pain, spinal stenosis L3-4,4,5 spondylosis with L3-4,5 anterolisthesis  Referring provider: Sarthak Romano MD, Dr. Todd Holter  Visit Diagnosis:     ICD-10-CM    1. Chronic bilateral low back pain with right-sided sciatica  M54.41      G89.29    2. Numbness of right foot  R20.0    3. Spinal stenosis of lumbar region, unspecified whether neurogenic claudication present  M48.061    4. Cervicalgia  M54.2    5. Cervical radiculitis  M54.12    6. Cervical radicular pain  M54.12    7. Pain in joint of right shoulder  M25.511        Initial Eval : Antoni Stoll is a 84 y.o. male who presents to therapy today with chief complaints of low back pain and numbness in the right foot. Low back pain has been present for 10+ years, foot numbness started and has gotten worse over the past year.  Pain symptoms are worse with standing for long and walking for long.  Patient is at risk of falling, and has had two recent falls. Pt demo's signs and sx consistent with chronic low back pain and foot numbness and tingling likely due to lumbar spinal stenosis L3-4/4-5, spondylosis with anterolisthesis. Treatment was initiated focusing on core strengthening and mobility. Patient is motivated and willing to participate in physical therapy treatment.  Assessment:   Tightness in the tibial nerve manually lower 1/3 of the lower leg and into the medial ankle and bottom of the foot.  Did neural glides to help with this.  MFR, leg pull to bilateral lower legs  As well as right upper extremity from the elbow on up.  The patient had tightness in the anterior pectorals on the right as well as the biceps on the right.  Did manual therapy to the bursa on the right as well as bursal massage ( traction with IR, and ER of the shoulder.  The patient was instructed in home manual traction with IR and ER gently as well as stretching the pectoralis muscles and biceps. Anterior fascial line stretching, wall circles, shoulders in various stages of flexion and drop the scapula down as able to.  Patient has increased pain with lowering the arms in flexion and has decreased pain with help keeping the scapula down and posterior tilted.    HEP/POC compliance is  good .  Patient demonstrates  understanding/independence with home program.  Patient is benefitting from skilled physical therapy and is making steady progress toward functional goals.    Goal Status:  Pt. will demonstrate/verbalize independence in self-management of condition in : 6 weeks  Pt. will be independent with home exercise program in : 6 weeks  Pt. will have improved quality of sleep: Comment;with less pain;in 12 weeks  Comment:: the patient will be able to fall asleep with 50% less pain so that he can fall asleep fster   Patient will reach / maintain arm movement: forward;overhead;for home chores;for dressing;for sport/recreation;with full ROM;with less pain;with less difficulty;Comment  Comment: patient will not have a painful arc with reaching forward and up    Pt will: be able to tolerate standing for 20minutes without needing to sit due to increase in pain, within 10 weeks.  Pt will: improve KOSTA score by 6 points or more to decrease impact back pain is having on activities of daily living, within 10 weeks.      Plan / Patient Education:     Continue with initial plan of care.  Progress with home program as tolerated.  Plan for next visit: as of right now two different episodes of care, should we combine and just stick with one of us or have you do neck and me back?, come up with POC  Subjective:      Pain in low back: 2/10 pain  Neck pain 2/10 pain lower cervical sp[ine  Right arm pain 3-4/10 pain  Patient reports that his pain does not seem to have changed much. Numbness in the foot is still present has not varied at all stocking . After manual therapy to the shoulder the pain is decreased for one day.  The patient has a history of a bypass surgery with numbness on the medial side of the lower leg from that surgery.  The patient has a hernia in his anterior stomach that has not yet been operated on.     improved neck/arm symptoms for 2 days then worse again. Has been doing exercises two times a day when he can.   Right arm pain  is the worst when first going to bed at night    Objective:   T5 neutral rotation but still convex to the right . lefft hip higher   rotation starts at L1 up to neutral at T5 but convex to the right throughout and then   Left rotation at C3  The patient was instructed in how to do abdominal strengthening with holding his muscles together and then tightening them he is to let his MD know if there is a problem with his hernia.        Balance Testing:  NBOS: 30 sec, Tandem: 15 right, 16 left  Single leg stance: 10 sec left 5 sec right     Slump:negative right and left  SLR: negative right and left    Exercises:  Exercise #1: scapular retraction verbsl cues to drop the shoulder blades  Comment #1: gently off and on throughout the day  Exercise #2: cervical retraction patient needs to do slight flexion with this to make his head neutral with his spine  Comment #2: gently off and on throughout the day  Exercise #3: standing shoulder flexion with AAROM trying to relax and drop the scapula down and back during this, verbal review  Comment #3: holding as tolerated  Exercise #4: shoulder IR and ER isometrics, progressed to IR and ER with Exband as tolerated patient not quite able to do ER with band without pain so is to wait until he is able to do this without pain before adding this in.  Comment #4: 5-10 seconds 5-10 reps 1-2 times per day.  Exercise #5: Pelvic Tilt/TA activation x10 needing cuing to perform correcting  Comment #5: clamshell x10 progressed adding band  Exercise #6: bridge x10, added marching jesus  Comment #6: seated slump nerve glide x20 (educated on option for SLR slump as well)  Exercise #7: LTR x20 jesus  Exercise #8: Wall circles (hula hoop)  Slowly keeping your hips facing forward and your arms straight slowly bring your pelvis around in a [place] clockwise and counterclockwise 10 reps each no pain just stretching.  Exercise #9: Elbows and forearms on the wall one leg back and then stay there until it  loosens up.  Let the stomach come forward.  Exercise #10: Wall circles (hula hoop)  Slowly keeping your hips facing forward and your arms straight slowly bring your pelvis around in a [place] clockwise and counterclockwise 10 reps each no pain just stretching.  Exercise #11: Elbows on the wall stanng with the feet even try to pull your shoulder blades down and back.  Do this in various positions working toward the spot where you have the most difficulty.  Exercise #12: Elbows and forearms on the wall one leg back and then stay there until it loosens up.  Let the stomach come forward.  Exercise #13: pectoralis muscle stretch one arm low middle and high  Exercise #14: biceps stretch hand in the doorway  Exercise #15:  home manual traction with IR and ER gently         Treatment Today     TREATMENT MINUTES COMMENTS   Evaluation     Self-care/ Home management     Manual therapy 35 Tightness in the tibial nerve manually lower 1/3 of the lower leg and into the medial ankle and bottom of the foot.  Did neural glides to help with this.  MFR, leg pull to bilateral lower legs  As well as right upper extremity from the elbow on up.  Did manual therapy to the anterior pectorals on the right as well as the biceps on the right.  Did manual therapy to the bursa on the right as well as bursal massage ( traction with IR, and ER of the shoulder.    Neuromuscular Re-education     Therapeutic Activity     Therapeutic Exercises 25 See flow sheet   Gait training     Modality__________________                Total 55    Blank areas are intentional and mean the treatment did not include these items.       Desi NavarrofderMyrna  3/27/2020

## 2021-06-30 NOTE — PROGRESS NOTES
Progress Notes by Mars Guevara MD (Ted) at 12/9/2020 11:30 AM     Author: Mars Guevara MD (Ted) Service: -- Author Type: Physician    Filed: 12/9/2020 12:16 PM Encounter Date: 12/9/2020 Status: Signed    : Mars Guevara MD (Ted) (Physician)           Cardiology Progress Note    Assessment:  Coronary artery disease status post coronary artery bypass graft surgery in 2013  Exertional dyspnea, mild, chronic, progressive, unclear etiology  History of mild sinus bradycardia, resolved after discontinuation of atenolol  Hypercholesterolemia, good control  Hypertension, good control  Spinal stenosis  Colon cancer, status post partial colectomy      Plan:  Pharmacological stress test to rule out progression of coronary artery disease causing shortness of breath    He does not appear to be fluid overloaded.  He does not have any heart rhythm abnormalities that would explain shortness of breath.    Routine follow-up in 1 year    Subjective:   This is 85 y.o. male who comes in today for follow-up visit.  He denies chest pain but continues to experience shortness of breath.  He feels that it may be slightly worse than last year.  He walks dogs couple times a week and used to go to gym until he became closed.  He has not had weight gain, PND, orthopnea.  Last year he had abnormal Holter monitor showing chronotropic incompetence.  Dr. Garay advised discontinuation of atenolol.  He did not recommend pacemaker.  The patient did not notice any improvement of shortness of breath after discontinuation of atenolol.  He has been checking his heart rate and blood pressure at home.  Blood pressure remains under good control and heart rate is now in 60s and 70s.    Review of Systems:   General: WNL  Eyes: WNL  Ears/Nose/Throat: WNL  Lungs: Shortness of Breath  Heart: Shortness of Breath with activity  Stomach: WNL  Bladder: WNL  Muscle/Joints: Muscle Weakness, Muscle Pain  Skin:  "WNL  Nervous System: Falls, Daytime Sleepiness, Loss of Balance  Mental Health: WNL     Blood: WNL    Objective:   /74 (Patient Site: Left Arm, Patient Position: Sitting, Cuff Size: Adult Regular)   Pulse 85   Resp 14   Ht 5' 5\" (1.651 m)   Wt 147 lb (66.7 kg)   SpO2 98%   BMI 24.46 kg/m    Physical Exam:  GENERAL: no distress  NECK: No JVD  LUNGS: Clear to auscultation.  CARDIAC: regular rhythm, S1 & S2 normal.  No heaves, thrills, gallops or murmurs.  ABDOMEN: flat, negative hepatosplenomegaly, soft and non-tender.  EXTREMITIES: No evidence of cyanosis, clubbing or edema.    Current Outpatient Medications   Medication Sig Dispense Refill   ? acetaminophen (TYLENOL) 500 MG tablet Take 1,000 mg by mouth daily .           ? aspirin 325 MG tablet Take 325 mg by mouth daily.     ? atorvastatin (LIPITOR) 20 MG tablet Take 1 tablet (20 mg total) by mouth daily. 90 tablet 3   ? B2/VITS A,C,E/LUT/ZEAXANTH/MIN (ICAPS ORAL) Take 1 capsule by mouth daily.     ? calcium polycarbophil (FIBERCON) 625 mg tablet Take 1,250 mg by mouth daily.     ? cholecalciferol, vitamin D3, 1,000 unit tablet Take 1,000 Units by mouth daily.     ? fluticasone (FLONASE) 50 mcg/actuation nasal spray Apply 1 spray into each nostril daily.     ? gabapentin (NEURONTIN) 100 MG capsule 1 cap (100mg) in the morning. 30 capsule 2   ? gabapentin (NEURONTIN) 300 MG capsule TAKE 1 TO 2 CAPSULES BY MOUTH EVERY EVENING AS NEEDED FOR NERVE PAIN (Patient taking differently: TAKE  2 CAPSULES BY MOUTH EVERY EVENING AS NEEDED FOR NERVE PAIN) 180 capsule 3   ? glucosamine-chondroitin 500-400 mg cap Take 1 capsule by mouth 2 (two) times a day.     ? ipratropium (ATROVENT) 0.03 % nasal spray USE 2 SPRAYS IN EACH NOSTRIL THREE TIMES DAILY AS NEEDED 30 mL 6   ? lisinopriL (PRINIVIL,ZESTRIL) 2.5 MG tablet TAKE 1 TABLET BY MOUTH EVERY DAY 90 tablet 2   ? loratadine (CLARITIN) 10 mg tablet Take 10 mg by mouth daily.     ? saw palmetto fruit 450 mg cap Take 1 " capsule by mouth 2 (two) times a day.     ? tamsulosin (FLOMAX) 0.4 mg Cp24 Take 0.4 mg by mouth at bedtime.       No current facility-administered medications for this visit.        Cardiographics:    Holter: January 2020    Borderline Holter monitor tracing with evidence of sinus bradycardia and some tendency towards chronotropic incompetence.  This is further supported by the patient being short of breath while climbing steps with a heart rate of only 67 bpm.  Further clinical correlation may be warranted.    Indication for study: Sick sinus syndrome: The patient does manifest some degree of chronotropic incompetence and symptom correlation is present and he may be a candidate for device-based therapy.    No sustained atrial or ventricular tachyarrhythmia was demonstrated.    No profound bradycardia or pauses.         Stress echo: January 2019  1. Normal stress echocardiogram without evidence of stress induced ischemia.   2. Normal resting LV systolic performance with an ejection fraction of 55-60%. There is normal improvement in left ventricular systolic performance with a peak ejection fraction of 70-75%.  3. No ECG evidence of ischemia.  4. No anginal chest pain reported with exercise.  5. Good functional capacity for age.        Echo: January 2019    Mild left atrial enlargement    Left ventricle ejection fraction is normal. The calculated left ventricular ejection fraction is 70% without wall motion abnormality.    Normal right ventricular size with decreased systolic function.    Aortic valve sclerosis with trace insufficiency    Mild mitral and tricuspid regurgitation. No evidence of pulmonary hypertension    When compared to the previous study dated 1/21/2019, degree of mitral regurgitation appears only mild on the current study.    Normal central venous pressure    Lab Results:       Lab Results   Component Value Date    CHOL 96 12/11/2019    CHOL 100 12/10/2018    CHOL 93 12/07/2017     Lab Results    Component Value Date    HDL 40 12/11/2019    HDL 43 12/10/2018    HDL 39 (L) 12/07/2017     Lab Results   Component Value Date    LDLCALC 48 12/11/2019    LDLCALC 48 12/10/2018    LDLCALC 47 12/07/2017     Lab Results   Component Value Date    TRIG 39 12/11/2019    TRIG 45 12/10/2018    TRIG 33 12/07/2017     No results found for: BNP    Mars (Dewey)  MD Paola

## 2021-07-01 ENCOUNTER — HOSPITAL ENCOUNTER (OUTPATIENT)
Dept: MRI IMAGING | Facility: HOSPITAL | Age: 86
Discharge: HOME | End: 2021-07-01
Attending: SURGERY
Payer: MEDICARE

## 2021-07-01 ENCOUNTER — HOSPITAL ENCOUNTER (OUTPATIENT)
Dept: RADIOLOGY | Facility: HOSPITAL | Age: 86
Discharge: HOME | End: 2021-07-01
Attending: SURGERY
Payer: MEDICARE

## 2021-07-01 DIAGNOSIS — M48.061 SPINAL STENOSIS OF LUMBAR REGION, UNSPECIFIED WHETHER NEUROGENIC CLAUDICATION PRESENT: ICD-10-CM

## 2021-07-03 NOTE — ADDENDUM NOTE
Addendum Note by Elyssa Goodwin RN at 3/13/2020  3:33 PM     Author: Elyssa Goodwin RN Service: -- Author Type: Registered Nurse    Filed: 3/13/2020  3:33 PM Encounter Date: 3/13/2020 Status: Signed    : Elyssa Goodwin RN (Registered Nurse)    Addended by: ELYSSA GOODWIN on: 3/13/2020 03:33 PM        Modules accepted: Orders

## 2021-07-04 NOTE — LETTER
Letter by Sarthak Romano MD at      Author: Sarthak Romano MD Service: -- Author Type: --    Filed:  Encounter Date: 5/28/2021 Status: (Other)         Antoni Stoll  [address] [facility] [address]             May 28, 2021         Dear Mr. Stoll,    Below are the results from your recent visit:    Resulted Orders   Comprehensive Metabolic Panel   Result Value Ref Range    Sodium 140 136 - 145 mmol/L    Potassium 4.7 3.5 - 5.0 mmol/L    Chloride 104 98 - 107 mmol/L    CO2 25 22 - 31 mmol/L    Anion Gap, Calculation 11 5 - 18 mmol/L    Glucose 104 70 - 125 mg/dL    BUN 26 8 - 28 mg/dL    Creatinine 1.04 0.70 - 1.30 mg/dL    GFR MDRD Af Amer >60 >60 mL/min/1.73m2    GFR MDRD Non Af Amer >60 >60 mL/min/1.73m2    Bilirubin, Total 0.3 0.0 - 1.0 mg/dL    Calcium 8.9 8.5 - 10.5 mg/dL    Protein, Total 7.3 6.0 - 8.0 g/dL    Albumin 4.3 3.5 - 5.0 g/dL    Alkaline Phosphatase 72 45 - 120 U/L    AST 18 0 - 40 U/L    ALT 12 0 - 45 U/L    Narrative    Fasting Glucose reference range is 70-99 mg/dL per  American Diabetes Association (ADA) guidelines.   HM2(CBC w/o Differential)   Result Value Ref Range    WBC 6.3 4.0 - 11.0 thou/uL    RBC 3.89 (L) 4.40 - 6.20 mill/uL    Hemoglobin 12.5 (L) 14.0 - 18.0 g/dL    Hematocrit 38.3 (L) 40.0 - 54.0 %    MCV 99 80 - 100 fL    MCH 32.1 27.0 - 34.0 pg    MCHC 32.6 32.0 - 36.0 g/dL    RDW 13.4 11.0 - 14.5 %    Platelets 228 140 - 440 thou/uL    MPV 9.0 7.0 - 10.0 fL   Thyroid Stimulating Hormone (TSH)   Result Value Ref Range    TSH 0.79 0.30 - 5.00 uIU/mL       Thyroid test is normal.    Kidney and liver tests are normal.  Blood sugar is normal.    Mildly lower hemoglobin but within your baseline range.    Labs look okay.  No change in treatment plan.    Please call with questions or contact us using [employer].    Sincerely,        Electronically signed by Sarthak Romano MD

## 2021-07-04 NOTE — LETTER
Letter by Sarthak Romano MD at      Author: Sarthak Romano MD Service: -- Author Type: --    Filed:  Encounter Date: 6/24/2021 Status: (Other)         Antoni Stoll  [address] [facility] [address]             June 24, 2021         Dear Mr. Stoll,    Below are the results from your recent visit:    Resulted Orders   Echo Complete   Result Value Ref Range    LV volume diastolic 51 (!) 62.0 - 150.0 cm3    LV volume systolic 12 (!) 21.0 - 61.0 cm3    HR 67 bpm    IVSd 1.2 (!) 0.6 - 1.0 cm    LVIDd 4.1 (!) 4.2 - 5.8 cm    LVIDs 1.9 (!) 2.5 - 4.0 cm    LVOT diam 2 cm    LVOT mean gradient 3 mmHg    LVOT peak VTI 24.4 cm    LVOT mean nikolay 78.6 cm/s    LVOT peak nikolay 117 cm/s    LVOT peak gradient 5 mmHg    LV PWd 0.9 0.6 - 1.0 cm    MV E' lat nikolay 6.73 cm/s    MV E' med nikolay 5.65 cm/s    AR decel slope 1,390 mm/s2    AR p 1/2 time 668 ms    AR peak nikolay 317 cm/s    AV peak nikolay 161 cm/s    AV mean nikolay 100 cm/s    AV mean gradient 5 mmHg    AV VTI 30.3 cm    AO root 3.4 cm    AO ascending 3.1 cm    LA size 4.3 cm    LA/AO root ratio 1.26 no units    MV decel time 180 ms    MV peak A nikolay 64.2 cm/s    MV peak E nikolay 58.7 cm/s    MV mean nikolay 37.1 cm/s    MV mean gradient 1 mmHg    MV VTI 23.3 cm    MV peak Velocity 77.2 cm/s    WI peak nikolay 107 cm/s    WI peak gradient 5 mmHg    PV accel time 56 ms    TR peak nikolay 293 cm/s    LA area 2 17.9 cm2    LA area 1 16.8 cm2    LA length 4.94 cm    TAPSE 1.6 cm    BSA 1.74 m2    Hieght 66 in    Weight 2,288 lbs    /58 mmHg    IVS/PW ratio 1.3     TR peak gradent 34.3 mmHg    LV FS 53.7 28.0 - 44.0 %    Echo LVEF calculated 76 55 - 75 %    LA volume 51.7 mL    LV mass 141.5 g    AV area 2.5 cm2    AV DIM IND nikolay 0.7     MV area cont eq 3.3 cm2    MV E/A Ratio 0.9     LVOT area 3.14 cm2    LVOT SV 76.6 cm3    AV peak gradient 10.4 mmHg    MV peak gradient 2.4 mmHg    LV systolic volume index 6.9 11.0 - 31.0 cm3/m2    LV diastolic volume index 29.3 34.0 - 74.0 cm3/m2    LA  volume index 29.7 mL/m2    LV mass index 81.3 g/m2    LV SVi 44.0 ml/m2    MV med E/e' ratio 10.4     MV lat E/e' ratio 8.7     LV CO 5.1 l/min    LV Ci 3.0 l/min/m2    Height 66.0 in    Weight 143 lbs    MV Avg E/e' Ratio 9.5 cm/s    AR peak gradient 40.2 mmHg    AV DIM IND VTI 0.8     MVA VTI 3.29 cm2    Narrative      When compared to the previous study dated 1/28/2019, there is mild   aortic and pulmonic regurgitation as well as moderate tricuspid   regurgitation. There has been interval improvement in RV function    Left ventricle ejection fraction is normal. The calculated left   ventricular ejection fraction is 76%.    Normal right ventricular size and systolic function.    Mild aortic regurgitation.    Moderate tricuspid valve regurgitation.    Mild pulmonic regurgitation.          Your heart function appears good.  Normal ejection fraction.  You may have some element of diastolic dysfunction, but it was indeterminant on this study.  Significant pulmonary hypertension was not noted on this heart echo.  Moderate tricuspid regurgitation was noted.    No change in treatment plan at this time.    Please call with questions or contact us using [employer].    Sincerely,        Electronically signed by Sarthak Romano MD

## 2021-07-04 NOTE — LETTER
Letter by Sarthak Romano MD at      Author: Sarthak Romano MD Service: -- Author Type: --    Filed:  Encounter Date: 5/28/2021 Status: (Other)         Antoni Stoll  [address]  [facility] [address]             May 28, 2021         Dear Mr. Stoll,    Below are the results from your recent visit:    Resulted Orders   XR Chest 2 Views    Narrative    EXAM: XR CHEST 2 VIEWS  LOCATION: [facility]  DATE/TIME: 5/27/2021 4:35 PM    INDICATION: Shortness of breath  COMPARISON: 07/01/2013      Impression    Heart size and pulmonary vascularity normal. Prior median sternotomy and CABG. The lungs are clear. Scoliosis and degenerative changes of the spine.       Lungs are clear on chest x-ray.    Please call with questions or contact us using [employer].    Sincerely,        Electronically signed by Sarthak Romano MD

## 2021-07-04 NOTE — TELEPHONE ENCOUNTER
Telephone Encounter by Uyen Vega at 5/27/2021  9:12 AM     Author: Uyen Vega Service: -- Author Type: --    Filed: 5/27/2021  9:12 AM Encounter Date: 5/21/2021 Status: Signed    : Uyen Vega APPROVED:    Approval start date: 02/23/2021  Approval end date:  05/24/2022    Pharmacy has been notified of approval and will contact patient when medication is ready for pickup.

## 2021-07-06 VITALS — HEIGHT: 66 IN | WEIGHT: 145 LBS | BODY MASS INDEX: 23.3 KG/M2

## 2021-07-06 VITALS
BODY MASS INDEX: 23.93 KG/M2 | WEIGHT: 143.8 LBS | HEART RATE: 64 BPM | SYSTOLIC BLOOD PRESSURE: 138 MMHG | DIASTOLIC BLOOD PRESSURE: 70 MMHG

## 2021-07-06 VITALS — WEIGHT: 143 LBS | BODY MASS INDEX: 22.98 KG/M2 | HEIGHT: 66 IN

## 2021-07-07 ENCOUNTER — HOSPITAL ENCOUNTER (OUTPATIENT)
Dept: PHYSICAL MEDICINE AND REHAB | Facility: CLINIC | Age: 86
Discharge: HOME | End: 2021-07-07
Attending: PHYSICAL MEDICINE & REHABILITATION
Payer: MEDICARE

## 2021-07-07 DIAGNOSIS — M75.121 COMPLETE TEAR OF RIGHT ROTATOR CUFF, UNSPECIFIED WHETHER TRAUMATIC: ICD-10-CM

## 2021-07-07 DIAGNOSIS — M43.16 SPONDYLOLISTHESIS OF LUMBAR REGION: ICD-10-CM

## 2021-07-07 DIAGNOSIS — G56.01 CARPAL TUNNEL SYNDROME OF RIGHT WRIST: ICD-10-CM

## 2021-07-07 DIAGNOSIS — M48.062 SPINAL STENOSIS OF LUMBAR REGION WITH NEUROGENIC CLAUDICATION: ICD-10-CM

## 2021-07-07 DIAGNOSIS — M25.411 SHOULDER EFFUSION, RIGHT: ICD-10-CM

## 2021-07-07 NOTE — PROGRESS NOTES
Progress Notes by Orlin Mir DO at 7/7/2021  1:00 PM     Author: Orlin Mir DO Service: -- Author Type: Physician    Filed: 7/7/2021  1:43 PM Date of Service: 7/7/2021  1:00 PM Status: Signed    : Orlin Mir DO (Physician)       Assessment/Plan:      Diagnoses and all orders for this visit:    Complete tear of right rotator cuff, unspecified whether traumatic    Shoulder effusion, right    Spinal stenosis of lumbar region with neurogenic claudication    Spondylolisthesis of lumbar region    Carpal tunnel syndrome of right wrist        Assessment: Pleasant 86 y.o. male  with history of colon cancer, hyperlipidemia, hypertension, carotid artery occlusion, coronary artery disease with:        1.    percent improvement of right shoulder pain with any movement.  He has glenohumeral joint effusion with complete rotator cuff tear of the supraspinatus with retraction.  Not a surgical candidate..  He also has mild glenohumeral joint osteoarthritis all contributing to his significant pain.   Around 50 percent improvement following a right shoulder glenohumeral joint injection.  Pleased with his progress.     2.  Persistent chronic lumbar spine pain waxing and waning.  He continues to have right lower extremity pain and paresthesias related to severe spinal stenosis L4-5 and slightly less at L3-4  with L4-5 spondylolisthesis.  There is tortuosity of the nerve roots.  Pain persist despite physical therapy and gabapentin. bilateral L5-S1 transforaminal epidural steroid injection did offer some relief but only 40 to 50% and that benefit slowly diminished over 1 month..  He continues to have numbness and tingling in his right foot.  He was seen by Dr. Kenny at [facility] orth[facility].  He would be a surgical candidate but holding off as long as possible.  No weakness as of yet.    Right leg numbness is progressive as is some urinary leakage.     3.    Intermittent right arm and hand paresthesias all  fingers involved.    Consistent with carpal tunnel syndrome.  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     Not currently wearing the brace.  As he is not sure it was that helpful.         Discussion:    1.  With regards to his right shoulder pain, I recommend avoiding positions of pain for him and continue to manage his symptoms with relative rest and glenohumeral joint injections and he agrees.  He is currently doing fairly well.    2.  He will continue to see Dr. Maya with regards to his lumbar spine pain and spinal stenosis.  He does have some urinary leakage and his new MRI was reviewed with him today showing the severe spinal stenosis at L4-5 spondylolisthesis and lumbar scoliosis not significantly changed from prior MRI.    3.  With regards to the right hand paresthesias intermittently, we discussed options of injections along with surgical evaluation.  It is not that bothersome for him at this time and was encouraged to wear his carpal tunnel brace at night and can try it when he is driving.    4.  Follow-up with me as needed if symptoms return.    It was our pleasure caring for your patient today, if there any questions or concerns please do not hesitate to contact us.      Subjective:   Patient ID: Antoni Stoll is a 86 y.o. male.    History of Present Illness: Patient presents for right shoulder pain follow-up after glenohumeral joint injection also has been following with neurosurgery for lumbar spinal stenosis and pain along with having some right hand paresthesias.    His right shoulder pain is improved following glenohumeral joint injection.  Over 50 percent improvement for him still has some pain with activities such as reaching internal rotation and flexion of the right shoulder but overall manageable with activity modifications.  His shoulder is a 4/10 at worst 1/10 today and at best.  No side effects from injection.  He feels it was more effective than other  injections in the past.    Continues to have low back pain.  Lumbar stenosis.  Recently had new MRI.  Has been seen by Dr. Maya.  She is contemplating surgical options for him.  He has progressive right leg numbness and tingling of the leg with some progressive urinary urgency and incontinence at times which is more urinary leakage.  New MRI was reviewed with him.  Taking Lyrica.  And Tylenol.    Also has numbness and tingling intermittently right hand most activities such as driving.  Tried carpal tunnel braces in the past which she did not feel were that effective and has not been wearing it.  Driving is the worst for him.  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    Imaging: I personally reviewed MRI lumbar spine images and report with the patient.  He has spondylolisthesis L4 and L5 with severe spinal stenosis and severe stenosis at L3-4 lumbar scoliotic curve apex at L1.      Review of Systems: Patient complains of numbness and tingling right foot and leg and right hand.  Weakness of the right leg and some urinary leakage.  Does tend to fall.  Denies bowel incontinence, headache, swallowing issues, fevers and weight loss.       Past Medical History:   Diagnosis Date   ? Anemia    ? Cancer (H)     colon   ? Carotid artery occlusion    ? Cataract     Created by Conversion    ? Coronary artery disease    ? High cholesterol    ? History of colon cancer 8/13/2018   ? Hyperlipidemia    ? Hypertension    ? Left inguinal hernia    ? Low back pain    ? Sleep apnea, obstructive     CPAP   ? Spinal stenosis     lumbar   ? SSS (sick sinus syndrome) (H) 12/9/2019       The following portions of the patient's history were reviewed and updated as appropriate: allergies, current medications, past family history, past medical history, past social history, past surgical history and problem list.           Objective:   Physical Exam:    Vitals:    07/07/21 1311   BP:  147/70   Pulse: 60     There is no height or weight on file to calculate BMI.      General: Alert and oriented with normal affect. Attention, knowledge, memory, and language are intact. No acute distress.   Eyes: Sclerae are clear.  Respirations: Unlabored. CV: No lower extremity edema.  Skin: No rashes seen.    Decreased range of motion right shoulder internal rotation and flexion   Sensation is intact to light touch throughout the upper  extremities.  Reflexes are   negative Hoffmans. 0 patellar and Achilles with downgoing toes.    Manual muscle testing reveals:  Right /Left out of 5     5/5 elbow flexors  5/5 elbow extensors  5/5 wrist extensors  5/5 interosseus  5/5 finger flexors     5/5 ankle plantar flexors  5/5 ankle dorsiflexors  5/5    ankle evertors

## 2021-07-07 NOTE — PATIENT INSTRUCTIONS - HE
Patient Instructions by Orlin Mir DO at 7/7/2021  1:00 PM     Author: Orlin Mir DO Service: -- Author Type: Physician    Filed: 7/7/2021  1:32 PM Date of Service: 7/7/2021  1:00 PM Status: Signed    : Orlin Mir DO (Physician)       1. You can try wearing the carpal tunnel brace at night and when you drive.    2. Continue to see Dr Maya for your low back and you can talk to her about your right wrist if it worsens

## 2021-07-14 ENCOUNTER — OFFICE VISIT (OUTPATIENT)
Dept: NEUROSURGERY | Facility: CLINIC | Age: 86
End: 2021-07-14
Payer: MEDICARE

## 2021-07-14 VITALS
SYSTOLIC BLOOD PRESSURE: 127 MMHG | HEIGHT: 66 IN | DIASTOLIC BLOOD PRESSURE: 65 MMHG | OXYGEN SATURATION: 98 % | BODY MASS INDEX: 22.66 KG/M2 | HEART RATE: 68 BPM | WEIGHT: 141 LBS

## 2021-07-14 DIAGNOSIS — M48.061 SPINAL STENOSIS OF LUMBAR REGION, UNSPECIFIED WHETHER NEUROGENIC CLAUDICATION PRESENT: Primary | ICD-10-CM

## 2021-07-14 PROBLEM — C18.9 COLON CANCER (H): Status: RESOLVED | Noted: 2017-09-13 | Resolved: 2021-01-11

## 2021-07-14 PROBLEM — Z85.038 HISTORY OF COLON CANCER: Status: RESOLVED | Noted: 2018-08-13 | Resolved: 2021-01-11

## 2021-07-14 PROBLEM — I49.5 SSS (SICK SINUS SYNDROME) (H): Status: RESOLVED | Noted: 2019-12-09 | Resolved: 2021-01-11

## 2021-07-14 PROCEDURE — 99214 OFFICE O/P EST MOD 30 MIN: CPT | Performed by: SURGERY

## 2021-07-14 ASSESSMENT — MIFFLIN-ST. JEOR: SCORE: 1272.32

## 2021-07-14 NOTE — PROGRESS NOTES
NEUROSURGERY FOLLOWUP  NOTE  Antoni Stoll comes today in f/u.  Patient is a 85 yo male who presents with chronic low back and leg pain. Also complains of neck pain as well as right foot numbness and urinary urgency. Last MRI of lumbar spine was from January 2020. This showed severe spinal canal stenosis at lumbar 4-5 and lumbar 3-4 with mild foraminal narrowing at both of these levels as well as severe left neuroforaminal stenosis at lumbar 1-2. Patient also has a dextro convex curvature of his thoracolumbar during at least 40 degrees. He also has 2 mm of dynamic motion at lumbar 4-5 anterolisthesis. Discussed with patient updating MRI of his lumbar spine since his been over a year and a half since his last films and given his lumbar scoliosis recommend updating MRI and scoliosis x-rays for possible surgical planning.He returns in f/u.    He continues to have leg and back pain which is worsened with standing and walking. He will also have pain with sitting. Leg numbness and tingling constant in right leg and is diffuse.  Urinary urgency with incontinence. If cannot get to the bathroom on time will have incontinence. No bowel incontinence. Still able to do most of his ADLs. Keeps active with walking   Does better holding on with a treadmill then outdoor walking.  Updated MRI of his lumbar spine shows severe stenosis L3-5. He also has a significant 43 degree thoracolumbar dextro convex curvature with an apex at L1. His scoliosis films shows positive sagittal imbalance.  We discussed given his deformity a portion of his back and leg pain is likely due to his deformity and would not be improved with a lumbar decompression. A lumbar decompression at L3-5 would hopefully improve his urinary symptoms as well as his leg and back pain that is claudicating in nature. He is not sure if he would like to proceed with surgery. We discussed risks and benefits in detail. He will contact the office if he wishes to proceed.      I  spent more than 20 minutes in this apt, examining the pt, reviewing the scans, reviewing notes from chart, discussing treatment options with risks and benefits and coordinating care.     Jessica Maya MD      CC:     Sarthak Romano  [address] [place] [address]

## 2021-07-14 NOTE — NURSING NOTE
Antoni is here to review MRI and x-ray results. He states symptoms are unchanged since last seen by Dr. Maya. He c/o LBP that radiates into both hips. He has weakness on and off in right leg and numbness in right foot and ankle.   Pancho,DAVID

## 2021-07-14 NOTE — LETTER
7/14/2021         RE: Antoni Stoll  [address]  [place] [address]        Dear Colleague,    Thank you for referring your patient, Antoni Stoll, to the [facility]IC [facility]. Please see a copy of my visit note below.    NEUROSURGERY FOLLOWUP  NOTE  Antoni Stoll comes today in f/u.  Patient is a 87 yo male who presents with chronic low back and leg pain. Also complains of neck pain as well as right foot numbness and urinary urgency. Last MRI of lumbar spine was from January 2020. This showed severe spinal canal stenosis at lumbar 4-5 and lumbar 3-4 with mild foraminal narrowing at both of these levels as well as severe left neuroforaminal stenosis at lumbar 1-2. Patient also has a dextro convex curvature of his thoracolumbar during at least 40 degrees. He also has 2 mm of dynamic motion at lumbar 4-5 anterolisthesis. Discussed with patient updating MRI of his lumbar spine since his been over a year and a half since his last films and given his lumbar scoliosis recommend updating MRI and scoliosis x-rays for possible surgical planning.He returns in f/u.    He continues to have leg and back pain which is worsened with standing and walking. He will also have pain with sitting. Leg numbness and tingling constant in right leg and is diffuse.  Urinary urgency with incontinence. If cannot get to the bathroom on time will have incontinence. No bowel incontinence. Still able to do most of his ADLs. Keeps active with walking   Does better holding on with a treadmill then outdoor walking.  Updated MRI of his lumbar spine shows severe stenosis L3-5. He also has a significant 43 degree thoracolumbar dextro convex curvature with an apex at L1. His scoliosis films shows positive sagittal imbalance.  We discussed given his deformity a portion of his back and leg pain is likely due to his deformity and would not be improved with a lumbar decompression. A lumbar decompression at L3-5  would hopefully improve his urinary symptoms as well as his leg and back pain that is claudicating in nature. He is not sure if he would like to proceed with surgery. We discussed risks and benefits in detail. He will contact the office if he wishes to proceed.      I spent more than 20 minutes in this apt, examining the pt, reviewing the scans, reviewing notes from chart, discussing treatment options with risks and benefits and coordinating care.     Jessica Maya MD      CC:     Sarthak Romano  [address] [place] [address] [facility] [address]        Again, thank you for allowing me to participate in the care of your patient.        Sincerely,        Jessica Maya MD

## 2021-08-06 ENCOUNTER — OFFICE VISIT (OUTPATIENT)
Dept: OTOLARYNGOLOGY | Facility: CLINIC | Age: 86
End: 2021-08-06
Payer: MEDICARE

## 2021-08-06 DIAGNOSIS — J31.0 CHRONIC RHINITIS: ICD-10-CM

## 2021-08-06 DIAGNOSIS — H92.12 OTORRHEA, LEFT: Primary | ICD-10-CM

## 2021-08-06 DIAGNOSIS — H61.22 IMPACTED CERUMEN OF LEFT EAR: ICD-10-CM

## 2021-08-06 PROCEDURE — 69210 REMOVE IMPACTED EAR WAX UNI: CPT | Performed by: OTOLARYNGOLOGY

## 2021-08-06 PROCEDURE — 99203 OFFICE O/P NEW LOW 30 MIN: CPT | Mod: 25 | Performed by: OTOLARYNGOLOGY

## 2021-08-06 RX ORDER — AZELASTINE 1 MG/ML
SPRAY, METERED NASAL
Qty: 30 ML | Refills: 11 | Status: SHIPPED | OUTPATIENT
Start: 2021-08-06 | End: 2022-09-14

## 2021-08-06 NOTE — LETTER
8/6/2021         RE: Antoni Stoll  [address]  [place] [address]        Dear Colleague,    Thank you for referring your patient, Antoni Stoll, to the [facility]. Please see a copy of my visit note below.    CHIEF COMPLAINT:  Ear Concern      HISTORY OF PRESENT ILLNESS    Antoni was seen at the behest of [facility] for left ear discharge.  Patient states he has had intermittent ear drainage from both the ears over the last several months.  He does use hearing aids and they are hearing aids from time to time.  He does also admit to instrumenting the ears and rinsing ears with a bulb syringe.  No dizziness or vertigo.  No complaints of hearing loss he does complain of chronic rhinitis and is used ipratropium nasal spray and Flonase nasal spray without significant benefit    4. Drainage from left ear  I did not see any inflammation he denies pain.  He does have an effusion, this may be some liquefied wax, and he has been using Q-tips and does have hearing aids.  Patient was told to get evaluated right away if increasing pain or evidence of inflammation, I will have ENT evaluate your as soon as possible.  - Ambulatory referral to ENT     REVIEW OF SYSTEMS    Review of Systems as per HPI and PMHx, otherwise 10 system review system are negative.     Patient has no known allergies.     There were no vitals taken for this visit.    HEAD: Normal appearance and symmetry:  No cutaneous lesions.      NECK:  supple     EARS: normal TM, EACs    Both external auditory canals are intact and healthy.  Both tympanic membranes are healthy.  There is some crusting and inspissated cerumen along the left external auditory canal that is removed with a straight pick and alligator forceps.     NOSE:     Dorsum:   straight  Septum:  deviated left  Mucosa:  moist  Inferior turbinates:  wnl        ORAL CAVITY/OROPHARYNX:     Lips:  Normal.  Tongue: normal, midline  Mucosa:   no lesions  Tonsils:  1+      NECK:  Trachea:  midline.              Thyroid:  normal              Adenopathy:  none        NEURO:   Alert and Oriented     GAIT AND STATION:  normal     RESPIRATORY:   Symmetry and Respiratory effort     PSYCH:  Normal mood and affect     SKIN:   warm and dry         IMPRESSION:    Encounter Diagnoses   Name Primary?     Otorrhea, left Yes     Chronic rhinitis      Impacted cerumen of left ear           RECOMMENDATIONS:      Status post debridement of removal of inspissated impacted cerumen from left external auditory canal..  Patient be given a trial of Astelin nasal spray.  Return in 6 months for ear cleaning      Again, thank you for allowing me to participate in the care of your patient.        Sincerely,        Ez Portillo MD

## 2021-08-06 NOTE — PROGRESS NOTES
CHIEF COMPLAINT:  Ear Concern      HISTORY OF PRESENT ILLNESS    Antoni was seen at the behest of [facility] for left ear discharge.  Patient states he has had intermittent ear drainage from both the ears over the last several months.  He does use hearing aids and they are hearing aids from time to time.  He does also admit to instrumenting the ears and rinsing ears with a bulb syringe.  No dizziness or vertigo.  No complaints of hearing loss he does complain of chronic rhinitis and is used ipratropium nasal spray and Flonase nasal spray without significant benefit    4. Drainage from left ear  I did not see any inflammation he denies pain.  He does have an effusion, this may be some liquefied wax, and he has been using Q-tips and does have hearing aids.  Patient was told to get evaluated right away if increasing pain or evidence of inflammation, I will have ENT evaluate your as soon as possible.  - Ambulatory referral to ENT     REVIEW OF SYSTEMS    Review of Systems as per HPI and PMHx, otherwise 10 system review system are negative.     Patient has no known allergies.     There were no vitals taken for this visit.    HEAD: Normal appearance and symmetry:  No cutaneous lesions.      NECK:  supple     EARS: normal TM, EACs    Both external auditory canals are intact and healthy.  Both tympanic membranes are healthy.  There is some crusting and inspissated cerumen along the left external auditory canal that is removed with a straight pick and alligator forceps.     NOSE:     Dorsum:   straight  Septum:  deviated left  Mucosa:  moist  Inferior turbinates:  wnl        ORAL CAVITY/OROPHARYNX:     Lips:  Normal.  Tongue: normal, midline  Mucosa:   no lesions  Tonsils:  1+     NECK:  Trachea:  midline.              Thyroid:  normal              Adenopathy:  none        NEURO:   Alert and Oriented     GAIT AND STATION:  normal     RESPIRATORY:   Symmetry and Respiratory effort     PSYCH:  Normal mood and affect     SKIN:    warm and dry         IMPRESSION:    Encounter Diagnoses   Name Primary?     Otorrhea, left Yes     Chronic rhinitis      Impacted cerumen of left ear           RECOMMENDATIONS:      Status post debridement of removal of inspissated impacted cerumen from left external auditory canal..  Patient be given a trial of Astelin nasal spray.  Return in 6 months for ear cleaning

## 2021-08-09 ENCOUNTER — TRANSFERRED RECORDS (OUTPATIENT)
Dept: HEALTH INFORMATION MANAGEMENT | Facility: CLINIC | Age: 86
End: 2021-08-09

## 2021-08-31 DIAGNOSIS — M48.062 SPINAL STENOSIS OF LUMBAR REGION WITH NEUROGENIC CLAUDICATION: ICD-10-CM

## 2021-08-31 DIAGNOSIS — M43.16 SPONDYLOLISTHESIS OF LUMBAR REGION: ICD-10-CM

## 2021-09-01 RX ORDER — PREGABALIN 25 MG/1
CAPSULE ORAL
Qty: 60 CAPSULE | Refills: 3 | Status: SHIPPED | OUTPATIENT
Start: 2021-09-01 | End: 2022-01-05

## 2021-10-11 ENCOUNTER — HEALTH MAINTENANCE LETTER (OUTPATIENT)
Age: 86
End: 2021-10-11

## 2021-11-30 DIAGNOSIS — I10 ESSENTIAL HYPERTENSION: ICD-10-CM

## 2021-11-30 RX ORDER — LISINOPRIL 2.5 MG/1
TABLET ORAL
Qty: 90 TABLET | Refills: 2 | Status: SHIPPED | OUTPATIENT
Start: 2021-11-30 | End: 2022-08-23

## 2021-11-30 NOTE — TELEPHONE ENCOUNTER
Refill Request  Medication name: Pending Prescriptions:                       Disp   Refills    lisinopril (ZESTRIL) 2.5 MG tablet        90 tab*2          Who prescribed the medication: Juan Antonio  Last refill on medication: 09/02/21  Requested Pharmacy: [facility]  Last appointment with PCP: 07/01/21  Next appointment: Appointment scheduled for 12/15/21

## 2021-12-06 ENCOUNTER — TELEPHONE (OUTPATIENT)
Dept: INTERNAL MEDICINE | Facility: CLINIC | Age: 86
End: 2021-12-06
Payer: MEDICARE

## 2021-12-06 DIAGNOSIS — R05.9 COUGH: Primary | ICD-10-CM

## 2021-12-06 NOTE — TELEPHONE ENCOUNTER
Contact patient.  I did order a Covid test.  I have heard from other patients that is becoming difficult to schedule test.  Let him know about the home test that he can also get from local pharmacy, if available.  He should isolate until he knows the results of his Covid test.  If he does develop severe shortness of breath or chest pain, go to the emergency room, otherwise you can request a virtual visit with available provider if he wishes.

## 2021-12-06 NOTE — TELEPHONE ENCOUNTER
Reason for Call: Request for an order or referral:    Order or referral being requested: order for covid test    Date needed: as soon as possible    Has the patient been seen by the PCP for this problem? unknown    Additional comments: Patient states he has covid symptoms and would like to have PCP place orders for him to get a covid test. Patient states he can't get in on his mychart to request a virtual visit.    Phone number Patient can be reached at:  Cell number on file:    Telephone Information:   Mobile [phone]       Best Time:  any    Can we leave a detailed message on this number?  YES    Call taken on 12/6/2021 at 10:50 AM by Paola Gonzales

## 2021-12-09 ENCOUNTER — NURSE TRIAGE (OUTPATIENT)
Dept: NURSING | Facility: CLINIC | Age: 86
End: 2021-12-09

## 2021-12-09 ENCOUNTER — TELEPHONE (OUTPATIENT)
Dept: FAMILY MEDICINE | Facility: CLINIC | Age: 86
End: 2021-12-09

## 2021-12-09 ENCOUNTER — LAB (OUTPATIENT)
Dept: FAMILY MEDICINE | Facility: CLINIC | Age: 86
End: 2021-12-09
Payer: MEDICARE

## 2021-12-09 DIAGNOSIS — R05.9 COUGH: ICD-10-CM

## 2021-12-09 LAB — SARS-COV-2 RNA RESP QL NAA+PROBE: POSITIVE

## 2021-12-09 PROCEDURE — U0003 INFECTIOUS AGENT DETECTION BY NUCLEIC ACID (DNA OR RNA); SEVERE ACUTE RESPIRATORY SYNDROME CORONAVIRUS 2 (SARS-COV-2) (CORONAVIRUS DISEASE [COVID-19]), AMPLIFIED PROBE TECHNIQUE, MAKING USE OF HIGH THROUGHPUT TECHNOLOGIES AS DESCRIBED BY CMS-2020-01-R: HCPCS

## 2021-12-09 PROCEDURE — U0005 INFEC AGEN DETEC AMPLI PROBE: HCPCS

## 2021-12-10 NOTE — TELEPHONE ENCOUNTER
"-Coronavirus (COVID-19) Notification    Caller Name (Patient, parent, daughter/son, grandparent, etc)  Antoni    Reason for call  Notify of Positive Coronavirus (COVID-19) lab results, assess symptoms,  review [facility] recommendations    Lab Result    Lab test:  2019-nCoV rRt-PCR or SARS-CoV-2 PCR    Oropharyngeal AND/OR nasopharyngeal swabs is POSITIVE for 2019-nCoV RNA/SARS-COV-2 PCR (COVID-19 virus)    RN Recommendations/Instructions per [facility] Coronavirus COVID-19 recommendations    Brief introduction script  Introduce self then review script:  \"I am calling on behalf of [facility].  We were notified that your Coronavirus test (COVID-19) for was POSITIVE for the virus.  I have some information to relay to you but first I wanted to mention that the [place] Dept of Health will be contacting you shortly [it's possible MD already called Patient] to talk to you more about how you are feeling and other people you have had contact with who might now also have the virus.  Also, [facility] is Partnering with the [facility] for Covid-19 research, you may be contacted directly by research staff.\"    Assessment (Inquire about Patient's current symptoms)   Assessment   Current Symptoms at time of phone call: (if no symptoms, document No symptoms] Cough, nasal congestion   Symptoms onset (if applicable) 12/3/21     If at time of call, Patients symptoms hare worsened, the Patient should contact 911 or have someone drive them to Emergency Dept promptly:      If Patient calling 911, inform 911 personal that you have tested positive for the Coronavirus (COVID-19).  Place mask on and await 911 to arrive.    If Emergency Dept, If possible, please have another adult drive you to the Emergency Dept but you need to wear mask when in contact with other people.      Monoclonal Antibody Administration    You may be eligible to receive a new treatment with a monoclonal antibody for preventing " "hospitalization in patients at high risk for complications from COVID-19.   This medication is still experimental and available on a limited basis; it is given through an IV and must be given at an infusion center. Please note that not all people who are eligible will receive the medication since it is in limited supply.     Are you interested in being considered for this medication?  No.   Does the patient fit the criteria: Patient declined    If patient qualifies based on above criteria:  \"You will be contacted if you are selected to receive this treatment in the next 1-2 business days.   This is time sensitive and if you are not selected in the next 1-2 business days, you will not receive the medication.  If you do not receive a call to schedule, you have not been selected.\"      Review information with Patient    Your result was positive. This means you have COVID-19 (coronavirus).  We have sent you a letter that reviews the information that I'll be reviewing with you now.    How can I protect others?    If you have symptoms: stay home and away from others (self-isolate) until:    You've had no fever--and no medicine that reduces fever--for 1 full day (24 hours). And       Your other symptoms have gotten better. For example, your cough or breathing has improved. And     At least 10 days have passed since your symptoms started. (If you've been told by a doctor that you have a weak immune system, wait 20 days.)     If you don't have symptoms: Stay home and away from others (self-isolate) until at least 10 days have passed since your first positive COVID-19 test. (Date test collected)    During this time:    Stay in your own room, including for meals. Use your own bathroom if you can.    Stay away from others in your home. No hugging, kissing or shaking hands. No visitors.     Don't go to work, school or anywhere else.     Clean  high touch  surfaces often (doorknobs, counters, handles, etc.). Use a household " cleaning spray or wipes. You'll find a full list on the EPA website at www.epa.gov/pesticide-registration/list-n-disinfectants-use-against-sars-cov-2.     Cover your mouth and nose with a mask, tissue or other face covering to avoid spreading germs.    Wash your hands and face often with soap and water.    Make a list of people you have been in close contact with recently, even if either of you wore a face covering.   ; Start your list from 2 days before you became ill or had a positive test.  ; Include anyone that was within 6 feet of you for a cumulative total of 15 minutes or more in 24 hours. (Example: if you sat next to Jack for 5 minutes in the morning and 10 minutes in the afternoon, then you were in close contact for 15 minutes total that day. Jack would be added to your list.)    A public health worker will call or text you. It is important that you answer. They will ask you questions about possible exposures to COVID-19, such as people you have been in direct contact with and places you have visited.    Tell the people on your list that you have COVID-19; they should stay away from others for 14 days starting from the last time they were in contact with you (unless you are told something different from a public health worker).     Caregivers in these groups are at risk for severe illness due to COVID-19:  o People 65 years and older  o People who live in a nursing home or long-term care facility  o People with chronic disease (lung, heart, cancer, diabetes, kidney, liver, immunologic)  o People who have a weakened immune system, including those who:  - Are in cancer treatment  - Take medicine that weakens the immune system, such as corticosteroids  - Had a bone marrow or organ transplant  - Have an immune deficiency  - Have poorly controlled HIV or AIDS  - Are obese (body mass index of 40 or higher)  - Smoke regularly    Caregivers should wear gloves while washing dishes, handling laundry and cleaning  bedrooms and bathrooms.    Wash and dry laundry with special caution. Don't shake dirty laundry, and use the warmest water setting you can.    If you have a weakened immune system, ask your doctor about other actions you should take.    For more tips, go to www.cdc.gov/coronavirus/2019-ncov/downloads/10Things.pdf.    You should not go back to work until you meet the guidelines above for ending your home isolation. You don't need to be retested for COVID-19 before going back to work--studies show that you won't spread the virus if it's been at least 10 days since your symptoms started (or 20 days, if you have a weak immune system).    Employers: This document serves as formal notice of your employee's medical guidelines for going back to work. They must meet the above guidelines before going back to work in person.    How can I take care of myself?    1. Get lots of rest. Drink extra fluids (unless a doctor has told you not to).    2. Take Tylenol (acetaminophen) for fever or pain. If you have liver or kidney problems, ask your family doctor if it's okay to take Tylenol.     Take either:     650 mg (two 325 mg pills) every 4 to 6 hours, or     1,000 mg (two 500 mg pills) every 8 hours as needed.     Note: Don't take more than 3,000 mg in one day. Acetaminophen is found in many medicines (both prescribed and over-the-counter medicines). Read all labels to be sure you don't take too much.    For children, check the Tylenol bottle for the right dose (based on their age or weight).    3. If you have other health problems (like cancer, heart failure, an organ transplant or severe kidney disease): Call your specialty clinic if you don't feel better in the next 2 days.    4. Know when to call 911: Emergency warning signs include:    Trouble breathing or shortness of breath    Pain or pressure in the chest that doesn't go away    Feeling confused like you haven't felt before, or not being able to wake up    Bluish-colored  lips or face    5. Sign up for [employer]. We know it's scary to hear that you have COVID-19. We want to track your symptoms to make sure you're okay over the next 2 weeks. Please look for an email from [employer]--this is a free, online program that we'll use to keep in touch. To sign up, follow the link in the email. Learn more at [email or web address].    Where can I get more information?    [facility]: [email or web address]    Coronavirus Basics: [email or web address]./diseases/coronavirus/basics.html    What to Do If You're Sick: www.cdc.gov/coronavirus/2019-ncov/about/steps-when-sick.html    Ending Home Isolation: www.cdc.gov/coronavirus/2019-ncov/hcp/disposition-in-home-patients.html     Caring for Someone with COVID-19: www.cdc.gov/coronavirus/2019-ncov/if-you-are-sick/care-for-someone.html     [facility] clinical trials (COVID-19 research studies): clinicalaffairs.[facility]-clinical-trials     A Positive COVID-19 letter will be sent via [employer] or the mail. (Exception, no letters sent to Presurgerical/Preprocedure Patients)    Elina Pearson RN

## 2021-12-10 NOTE — TELEPHONE ENCOUNTER
Coronavirus (COVID-19) Notification    Reason for call  Notify of POSITIVE  COVID-19 lab result, assess symptoms,  review [facility] recommendations    Lab Result   Lab test for 2019-nCoV rRt-PCR or SARS-COV-2 PCR  Oropharyngeal AND/OR nasopharyngeal swabs were POSITIVE for 2019-nCoV RNA [OR] SARS-COV-2 RNA (COVID-19) RNA     We have been unable to reach Patient by phone at this time to notify of their Positive COVID-19 result.  Left voicemail message requesting a call back to [phone] [facility] for results.        POSITIVE COVID-19 Letter sent.    Tisha Hathaway

## 2021-12-13 ENCOUNTER — TELEPHONE (OUTPATIENT)
Dept: EMERGENCY MEDICINE | Facility: CLINIC | Age: 86
End: 2021-12-13
Payer: MEDICARE

## 2021-12-13 NOTE — TELEPHONE ENCOUNTER
Patient called in requesting which strain of Covid he had in his prior positive Covid test. Patient was informed we do not test for strains on all samples and was encouraged to check with Sarthka Romano on what options he may have.

## 2021-12-20 DIAGNOSIS — E78.00 PURE HYPERCHOLESTEROLEMIA: ICD-10-CM

## 2021-12-20 RX ORDER — ATORVASTATIN CALCIUM 20 MG/1
20 TABLET, FILM COATED ORAL DAILY
Qty: 90 TABLET | Refills: 3 | Status: SHIPPED | OUTPATIENT
Start: 2021-12-20 | End: 2022-12-21

## 2021-12-20 NOTE — TELEPHONE ENCOUNTER
Reason for Call:  Medication or medication refill: atorvastatin (LIPITOR) 20 MG tablet    Do you use a [facility]? NO  Name of the pharmacy and phone number for the current request:  [facility] [address] @ [address] in [place], [phone]    Name of the medication requested:     atorvastatin (LIPITOR) 20 MG tablet 90 tablet 3 12/8/2020  No   Sig - Route: [ATORVASTATIN (LIPITOR) 20 MG TABLET] Take 1 tablet (20 mg total) by mouth daily. - Oral     Can we leave a detailed message on this number? YES    Phone number patient can be reached at: Cell number on file:    Telephone Information:   Mobile [phone]       Best Time: any    Call taken on 12/20/2021 at 1:37 PM by Paola Gonzales

## 2021-12-20 NOTE — TELEPHONE ENCOUNTER
Medication:   Disp Refills Start End JAZMYNE   atorvastatin (LIPITOR) 20 MG tablet 90 tablet 3 12/8/2020  No   Sig - Route: [ATORVASTATIN (LIPITOR) 20 MG TABLET] Take 1 tablet (20 mg total) by mouth daily. - Oral       Pharmacy:  [id number] - [place], [place] - [address]  [phone]    Last OV w/PCP:  7/01/2021

## 2022-01-05 ENCOUNTER — TELEPHONE (OUTPATIENT)
Dept: PHYSICAL MEDICINE AND REHAB | Facility: CLINIC | Age: 87
End: 2022-01-05
Payer: MEDICARE

## 2022-01-05 DIAGNOSIS — M48.062 SPINAL STENOSIS OF LUMBAR REGION WITH NEUROGENIC CLAUDICATION: ICD-10-CM

## 2022-01-05 DIAGNOSIS — M43.16 SPONDYLOLISTHESIS OF LUMBAR REGION: ICD-10-CM

## 2022-01-05 RX ORDER — PREGABALIN 25 MG/1
CAPSULE ORAL
Qty: 60 CAPSULE | Refills: 1 | Status: SHIPPED | OUTPATIENT
Start: 2022-01-05 | End: 2022-03-09

## 2022-01-06 NOTE — TELEPHONE ENCOUNTER
Patient had called and left message on schedulers' line stating his Pregabalin was denied and that needs approval from Dr. Mir. Returned his call to cell twice this AM and x 1 to home phone to confirm how he is taking it. Also then to explain that a PA is needed and that will be submitted. Left message to return call.

## 2022-01-07 NOTE — TELEPHONE ENCOUNTER
Patient returned call, Reports he is taking Pregabalin 25 mg 1 capsule BID. He does find it helpful. He states he did get a call from his pharmacy that his Rx was ready.

## 2022-01-16 ASSESSMENT — ACTIVITIES OF DAILY LIVING (ADL): CURRENT_FUNCTION: NO ASSISTANCE NEEDED

## 2022-01-17 ENCOUNTER — OFFICE VISIT (OUTPATIENT)
Dept: CARDIOLOGY | Facility: CLINIC | Age: 87
End: 2022-01-17
Payer: MEDICARE

## 2022-01-17 VITALS
DIASTOLIC BLOOD PRESSURE: 60 MMHG | RESPIRATION RATE: 16 BRPM | WEIGHT: 146 LBS | SYSTOLIC BLOOD PRESSURE: 148 MMHG | HEART RATE: 68 BPM | BODY MASS INDEX: 23.57 KG/M2

## 2022-01-17 DIAGNOSIS — R06.09 DYSPNEA ON EXERTION: Primary | ICD-10-CM

## 2022-01-17 LAB — BNP SERPL-MCNC: 168 PG/ML (ref 0–93)

## 2022-01-17 PROCEDURE — 36415 COLL VENOUS BLD VENIPUNCTURE: CPT | Performed by: INTERNAL MEDICINE

## 2022-01-17 PROCEDURE — 99214 OFFICE O/P EST MOD 30 MIN: CPT | Performed by: INTERNAL MEDICINE

## 2022-01-17 PROCEDURE — 83880 ASSAY OF NATRIURETIC PEPTIDE: CPT | Performed by: INTERNAL MEDICINE

## 2022-01-17 RX ORDER — LATANOPROST 50 UG/ML
1 SOLUTION/ DROPS OPHTHALMIC AT BEDTIME
Status: ON HOLD | COMMUNITY
Start: 2021-11-30

## 2022-01-17 NOTE — LETTER
1/17/2022    Sarthak EARL Romano MD  [address] [place] [address]    RE: Antoni Stoll       Dear Colleague,     I had the pleasure of seeing Antoni Stoll in the [facility].      Cardiology Progress Note     Assessment:    Coronary artery disease status post coronary artery bypass graft surgery in 2013, no angina  Chronic exertional dyspnea, unclear etiology possibly related to diastolic dysfunction, no overt fluid overload  History of mild sinus bradycardia, resolved after discontinuation of atenolol  Hypercholesterolemia, good control  Hypertension, good control  Spinal stenosis  Colon cancer, status post partial colectomy    Plan:  BNP    Continue current cardiac medications.  Further adjustments if BNP elevated    Follow-up with cardiology in 1 year    Subjective:   This is 86 year old male who comes in today for follow-up visit.  He denies chest pains.  He continues to experience mild exertional dyspnea.  He has not had PND, orthopnea, pedal edema.  He denies heart palpitations or syncope.    Review of Systems:   Negative other than history of present illness    Objective:   BP (!) 148/60 (BP Location: Left arm, Patient Position: Sitting, Cuff Size: Adult Regular)   Pulse 68   Resp 16   Wt 66.2 kg (146 lb)   BMI 23.57 kg/m    Physical Exam:  GENERAL: no distress  NECK: No JVD  LUNGS: Clear to auscultation.  CARDIAC: regular rhythm, S1 & S2 normal.  No heaves, thrills, gallops or murmurs.  ABDOMEN: flat, negative hepatosplenomegaly, soft and non-tender.  EXTREMITIES: No evidence of cyanosis, clubbing or edema.    Current Outpatient Medications   Medication Sig Dispense Refill     acetaminophen (TYLENOL) 500 MG tablet [ACETAMINOPHEN (TYLENOL) 500 MG TABLET] Take 1,000 mg by mouth daily .             aspirin 325 MG tablet [ASPIRIN 325 MG TABLET] Take 325 mg by mouth daily.       atorvastatin (LIPITOR) 20 MG tablet Take 1 tablet (20 mg) by mouth daily 90 tablet 3     azelastine  (ASTELIN) 0.1 % nasal spray 2 sprays each nostril 1-2x daily as needed for nasal congestion (use nightly for first 2 week) 30 mL 11     B2/VITS A,C,E/LUT/ZEAXANTH/MIN (ICAPS ORAL) [B2/VITS A,C,E/LUT/ZEAXANTH/MIN (ICAPS ORAL)] Take 1 capsule by mouth daily.       calcium polycarbophil (FIBERCON) 625 mg tablet [CALCIUM POLYCARBOPHIL (FIBERCON) 625 MG TABLET] Take 1,250 mg by mouth daily.       cholecalciferol, vitamin D3, 1,000 unit tablet [CHOLECALCIFEROL, VITAMIN D3, 1,000 UNIT TABLET] Take 1,000 Units by mouth daily.       glucosamine-chondroitin 500-400 mg cap [GLUCOSAMINE-CHONDROITIN 500-400 MG CAP] Take 1 capsule by mouth 2 (two) times a day.       latanoprost (XALATAN) 0.005 % ophthalmic solution Place 1 drop into both eyes At Bedtime       lisinopril (ZESTRIL) 2.5 MG tablet [LISINOPRIL (PRINIVIL,ZESTRIL) 2.5 MG TABLET] TAKE 1 TABLET BY MOUTH EVERY DAY 90 tablet 2     loratadine (CLARITIN) 10 mg tablet [LORATADINE (CLARITIN) 10 MG TABLET] Take 10 mg by mouth daily.       pregabalin (LYRICA) 25 MG capsule TAKE 1 CAPSULE BY MOUTH AT BEDTIME FOR 5 DAYS. MAY INCREASE TO 1 CAPSULE BY MOUTH 2 TIMES DAILY 60 capsule 1     saw palmetto fruit 450 mg cap [SAW PALMETTO FRUIT 450 MG CAP] Take 1 capsule by mouth 2 (two) times a day.       tamsulosin (FLOMAX) 0.4 mg Cp24 [TAMSULOSIN (FLOMAX) 0.4 MG CP24] Take 0.4 mg by mouth at bedtime.         Cardiographics:    Holter: January 2020    Borderline Holter monitor tracing with evidence of sinus bradycardia and some tendency towards chronotropic incompetence.  This is further supported by the patient being short of breath while climbing steps with a heart rate of only 67 bpm.  Further clinical correlation may be warranted.    Indication for study: Sick sinus syndrome: The patient does manifest some degree of chronotropic incompetence and symptom correlation is present and he may be a candidate for device-based therapy.    No sustained atrial or ventricular tachyarrhythmia was  demonstrated.    No profound bradycardia or pauses.           Stress echo: January 2019  1. Normal stress echocardiogram without evidence of stress induced ischemia.   2. Normal resting LV systolic performance with an ejection fraction of 55-60%. There is normal improvement in left ventricular systolic performance with a peak ejection fraction of 70-75%.  3. No ECG evidence of ischemia.  4. No anginal chest pain reported with exercise.  5. Good functional capacity for age.        Echo: January 2019    Mild left atrial enlargement    Left ventricle ejection fraction is normal. The calculated left ventricular ejection fraction is 70% without wall motion abnormality.    Normal right ventricular size with decreased systolic function.    Aortic valve sclerosis with trace insufficiency    Mild mitral and tricuspid regurgitation. No evidence of pulmonary hypertension    When compared to the previous study dated 1/21/2019, degree of mitral regurgitation appears only mild on the current study.    Normal central venous pressure     June 2021    When compared to the previous study dated 1/28/2019, there is mild aortic and pulmonic regurgitation as well as moderate tricuspid regurgitation. There has been interval improvement in RV function    Left ventricle ejection fraction is normal. The calculated left ventricular ejection fraction is 76%.    Normal right ventricular size and systolic function.    Mild aortic regurgitation.    Moderate tricuspid valve regurgitation.    Mild pulmonic regurgitation.       Lab Results    Chemistry/lipid CBC Cardiac Enzymes/BNP/TSH/INR   Recent Labs   Lab Test 12/14/20  1042   CHOL 98   HDL 44   LDL 46   TRIG 38     Recent Labs   Lab Test 12/14/20  1042 12/11/19  1003 12/10/18  0914   LDL 46 48 48     Recent Labs   Lab Test 05/27/21  1627      POTASSIUM 4.7   CHLORIDE 104   CO2 25      BUN 26   CR 1.04   GFRESTIMATED >60   GEGE 8.9     Recent Labs   Lab Test 05/27/21  1627  12/14/20  1042 12/11/19  1003   CR 1.04 1.10 1.24     No results for input(s): A1C in the last 18290 hours.       Recent Labs   Lab Test 05/27/21  1627   WBC 6.3   HGB 12.5*   HCT 38.3*   MCV 99        Recent Labs   Lab Test 05/27/21  1627 12/14/20  1042 12/11/19  1003   HGB 12.5* 13.9* 13.5*    No results for input(s): TROPONINI in the last 98736 hours.  No results for input(s): BNP, NTBNPI, NTBNP in the last 32775 hours.  Recent Labs   Lab Test 05/27/21  1627   TSH 0.79     No results for input(s): INR in the last 13857 hours.                 Thank you for allowing me to participate in the care of your patient.        Sincerely,     Dewey Guevara MD   [facility]    cc: Dewey Guevara MD  [address]  [address]  [place] [address]

## 2022-01-17 NOTE — PROGRESS NOTES
Cardiology Progress Note     Assessment:    Coronary artery disease status post coronary artery bypass graft surgery in 2013, no angina  Chronic exertional dyspnea, unclear etiology possibly related to diastolic dysfunction, no overt fluid overload  History of mild sinus bradycardia, resolved after discontinuation of atenolol  Hypercholesterolemia, good control  Hypertension, good control  Spinal stenosis  Colon cancer, status post partial colectomy    Plan:  BNP    Continue current cardiac medications.  Further adjustments if BNP elevated    Follow-up with cardiology in 1 year    Subjective:   This is 86 year old male who comes in today for follow-up visit.  He denies chest pains.  He continues to experience mild exertional dyspnea.  He has not had PND, orthopnea, pedal edema.  He denies heart palpitations or syncope.    Review of Systems:   Negative other than history of present illness    Objective:   BP (!) 148/60 (BP Location: Left arm, Patient Position: Sitting, Cuff Size: Adult Regular)   Pulse 68   Resp 16   Wt 66.2 kg (146 lb)   BMI 23.57 kg/m    Physical Exam:  GENERAL: no distress  NECK: No JVD  LUNGS: Clear to auscultation.  CARDIAC: regular rhythm, S1 & S2 normal.  No heaves, thrills, gallops or murmurs.  ABDOMEN: flat, negative hepatosplenomegaly, soft and non-tender.  EXTREMITIES: No evidence of cyanosis, clubbing or edema.    Current Outpatient Medications   Medication Sig Dispense Refill     acetaminophen (TYLENOL) 500 MG tablet [ACETAMINOPHEN (TYLENOL) 500 MG TABLET] Take 1,000 mg by mouth daily .             aspirin 325 MG tablet [ASPIRIN 325 MG TABLET] Take 325 mg by mouth daily.       atorvastatin (LIPITOR) 20 MG tablet Take 1 tablet (20 mg) by mouth daily 90 tablet 3     azelastine (ASTELIN) 0.1 % nasal spray 2 sprays each nostril 1-2x daily as needed for nasal congestion (use nightly for first 2 week) 30 mL 11     B2/VITS A,C,E/LUT/ZEAXANTH/MIN (ICAPS ORAL) [B2/VITS  A,C,E/LUT/ZEAXANTH/MIN (ICAPS ORAL)] Take 1 capsule by mouth daily.       calcium polycarbophil (FIBERCON) 625 mg tablet [CALCIUM POLYCARBOPHIL (FIBERCON) 625 MG TABLET] Take 1,250 mg by mouth daily.       cholecalciferol, vitamin D3, 1,000 unit tablet [CHOLECALCIFEROL, VITAMIN D3, 1,000 UNIT TABLET] Take 1,000 Units by mouth daily.       glucosamine-chondroitin 500-400 mg cap [GLUCOSAMINE-CHONDROITIN 500-400 MG CAP] Take 1 capsule by mouth 2 (two) times a day.       latanoprost (XALATAN) 0.005 % ophthalmic solution Place 1 drop into both eyes At Bedtime       lisinopril (ZESTRIL) 2.5 MG tablet [LISINOPRIL (PRINIVIL,ZESTRIL) 2.5 MG TABLET] TAKE 1 TABLET BY MOUTH EVERY DAY 90 tablet 2     loratadine (CLARITIN) 10 mg tablet [LORATADINE (CLARITIN) 10 MG TABLET] Take 10 mg by mouth daily.       pregabalin (LYRICA) 25 MG capsule TAKE 1 CAPSULE BY MOUTH AT BEDTIME FOR 5 DAYS. MAY INCREASE TO 1 CAPSULE BY MOUTH 2 TIMES DAILY 60 capsule 1     saw palmetto fruit 450 mg cap [SAW PALMETTO FRUIT 450 MG CAP] Take 1 capsule by mouth 2 (two) times a day.       tamsulosin (FLOMAX) 0.4 mg Cp24 [TAMSULOSIN (FLOMAX) 0.4 MG CP24] Take 0.4 mg by mouth at bedtime.         Cardiographics:    Holter: January 2020    Borderline Holter monitor tracing with evidence of sinus bradycardia and some tendency towards chronotropic incompetence.  This is further supported by the patient being short of breath while climbing steps with a heart rate of only 67 bpm.  Further clinical correlation may be warranted.    Indication for study: Sick sinus syndrome: The patient does manifest some degree of chronotropic incompetence and symptom correlation is present and he may be a candidate for device-based therapy.    No sustained atrial or ventricular tachyarrhythmia was demonstrated.    No profound bradycardia or pauses.           Stress echo: January 2019  1. Normal stress echocardiogram without evidence of stress induced ischemia.   2. Normal resting LV  systolic performance with an ejection fraction of 55-60%. There is normal improvement in left ventricular systolic performance with a peak ejection fraction of 70-75%.  3. No ECG evidence of ischemia.  4. No anginal chest pain reported with exercise.  5. Good functional capacity for age.        Echo: January 2019    Mild left atrial enlargement    Left ventricle ejection fraction is normal. The calculated left ventricular ejection fraction is 70% without wall motion abnormality.    Normal right ventricular size with decreased systolic function.    Aortic valve sclerosis with trace insufficiency    Mild mitral and tricuspid regurgitation. No evidence of pulmonary hypertension    When compared to the previous study dated 1/21/2019, degree of mitral regurgitation appears only mild on the current study.    Normal central venous pressure     June 2021    When compared to the previous study dated 1/28/2019, there is mild aortic and pulmonic regurgitation as well as moderate tricuspid regurgitation. There has been interval improvement in RV function    Left ventricle ejection fraction is normal. The calculated left ventricular ejection fraction is 76%.    Normal right ventricular size and systolic function.    Mild aortic regurgitation.    Moderate tricuspid valve regurgitation.    Mild pulmonic regurgitation.       Lab Results    Chemistry/lipid CBC Cardiac Enzymes/BNP/TSH/INR   Recent Labs   Lab Test 12/14/20  1042   CHOL 98   HDL 44   LDL 46   TRIG 38     Recent Labs   Lab Test 12/14/20  1042 12/11/19  1003 12/10/18  0914   LDL 46 48 48     Recent Labs   Lab Test 05/27/21  1627      POTASSIUM 4.7   CHLORIDE 104   CO2 25      BUN 26   CR 1.04   GFRESTIMATED >60   GEGE 8.9     Recent Labs   Lab Test 05/27/21  1627 12/14/20  1042 12/11/19  1003   CR 1.04 1.10 1.24     No results for input(s): A1C in the last 40830 hours.       Recent Labs   Lab Test 05/27/21  1627   WBC 6.3   HGB 12.5*   HCT 38.3*   MCV 99         Recent Labs   Lab Test 05/27/21  1627 12/14/20  1042 12/11/19  1003   HGB 12.5* 13.9* 13.5*    No results for input(s): TROPONINI in the last 19051 hours.  No results for input(s): BNP, NTBNPI, NTBNP in the last 79896 hours.  Recent Labs   Lab Test 05/27/21  1627   TSH 0.79     No results for input(s): INR in the last 55523 hours.

## 2022-01-17 NOTE — PATIENT INSTRUCTIONS
Antoni Stoll,    It was a pleasure to see you today at the [facility].     My recommendations after this visit include:    Check bnp for dyspnea/fluid retention    JANET Guevara MD, FACC, AMEI

## 2022-01-18 ENCOUNTER — OFFICE VISIT (OUTPATIENT)
Dept: INTERNAL MEDICINE | Facility: CLINIC | Age: 87
End: 2022-01-18
Payer: MEDICARE

## 2022-01-18 VITALS
BODY MASS INDEX: 23.99 KG/M2 | HEIGHT: 65 IN | WEIGHT: 144 LBS | SYSTOLIC BLOOD PRESSURE: 152 MMHG | DIASTOLIC BLOOD PRESSURE: 70 MMHG | HEART RATE: 71 BPM | OXYGEN SATURATION: 94 %

## 2022-01-18 DIAGNOSIS — I25.10 CORONARY ARTERY DISEASE DUE TO CALCIFIED CORONARY LESION: ICD-10-CM

## 2022-01-18 DIAGNOSIS — Z86.0100 HISTORY OF COLONIC POLYPS: ICD-10-CM

## 2022-01-18 DIAGNOSIS — K40.90 INGUINAL HERNIA, LEFT: ICD-10-CM

## 2022-01-18 DIAGNOSIS — Z51.81 ENCOUNTER FOR THERAPEUTIC DRUG MONITORING: ICD-10-CM

## 2022-01-18 DIAGNOSIS — G47.33 OBSTRUCTIVE SLEEP APNEA ON CPAP: ICD-10-CM

## 2022-01-18 DIAGNOSIS — I10 ESSENTIAL HYPERTENSION: ICD-10-CM

## 2022-01-18 DIAGNOSIS — Z85.828 HISTORY OF BASAL CELL CARCINOMA OF SKIN: ICD-10-CM

## 2022-01-18 DIAGNOSIS — Z00.00 ENCOUNTER FOR WELLNESS EXAMINATION IN ADULT: Primary | ICD-10-CM

## 2022-01-18 DIAGNOSIS — M48.061 SPINAL STENOSIS OF LUMBAR REGION, UNSPECIFIED WHETHER NEUROGENIC CLAUDICATION PRESENT: ICD-10-CM

## 2022-01-18 DIAGNOSIS — I25.84 CORONARY ARTERY DISEASE DUE TO CALCIFIED CORONARY LESION: ICD-10-CM

## 2022-01-18 LAB
ALBUMIN SERPL-MCNC: 4.1 G/DL (ref 3.5–5)
ALP SERPL-CCNC: 68 U/L (ref 45–120)
ALT SERPL W P-5'-P-CCNC: 11 U/L (ref 0–45)
ANION GAP SERPL CALCULATED.3IONS-SCNC: 12 MMOL/L (ref 5–18)
AST SERPL W P-5'-P-CCNC: 20 U/L (ref 0–40)
BILIRUB SERPL-MCNC: 0.6 MG/DL (ref 0–1)
BUN SERPL-MCNC: 15 MG/DL (ref 8–28)
CALCIUM SERPL-MCNC: 9.4 MG/DL (ref 8.5–10.5)
CHLORIDE BLD-SCNC: 104 MMOL/L (ref 98–107)
CHOLEST SERPL-MCNC: 101 MG/DL
CO2 SERPL-SCNC: 24 MMOL/L (ref 22–31)
CREAT SERPL-MCNC: 1.05 MG/DL (ref 0.7–1.3)
ERYTHROCYTE [DISTWIDTH] IN BLOOD BY AUTOMATED COUNT: 13.5 % (ref 10–15)
FASTING STATUS PATIENT QL REPORTED: YES
GFR SERPL CREATININE-BSD FRML MDRD: 70 ML/MIN/1.73M2
GLUCOSE BLD-MCNC: 101 MG/DL (ref 70–125)
HCT VFR BLD AUTO: 38.3 % (ref 40–53)
HDLC SERPL-MCNC: 51 MG/DL
HGB BLD-MCNC: 12.4 G/DL (ref 13.3–17.7)
LDLC SERPL CALC-MCNC: 41 MG/DL
MCH RBC QN AUTO: 32.2 PG (ref 26.5–33)
MCHC RBC AUTO-ENTMCNC: 32.4 G/DL (ref 31.5–36.5)
MCV RBC AUTO: 100 FL (ref 78–100)
PLATELET # BLD AUTO: 212 10E3/UL (ref 150–450)
POTASSIUM BLD-SCNC: 4.8 MMOL/L (ref 3.5–5)
PROT SERPL-MCNC: 7.2 G/DL (ref 6–8)
RBC # BLD AUTO: 3.85 10E6/UL (ref 4.4–5.9)
SODIUM SERPL-SCNC: 140 MMOL/L (ref 136–145)
TRIGL SERPL-MCNC: 46 MG/DL
WBC # BLD AUTO: 6.9 10E3/UL (ref 4–11)

## 2022-01-18 PROCEDURE — 80061 LIPID PANEL: CPT | Performed by: INTERNAL MEDICINE

## 2022-01-18 PROCEDURE — 85027 COMPLETE CBC AUTOMATED: CPT | Performed by: INTERNAL MEDICINE

## 2022-01-18 PROCEDURE — 80053 COMPREHEN METABOLIC PANEL: CPT | Performed by: INTERNAL MEDICINE

## 2022-01-18 PROCEDURE — G0439 PPPS, SUBSEQ VISIT: HCPCS | Performed by: INTERNAL MEDICINE

## 2022-01-18 PROCEDURE — 36415 COLL VENOUS BLD VENIPUNCTURE: CPT | Performed by: INTERNAL MEDICINE

## 2022-01-18 ASSESSMENT — ACTIVITIES OF DAILY LIVING (ADL): CURRENT_FUNCTION: NO ASSISTANCE NEEDED

## 2022-01-18 ASSESSMENT — MIFFLIN-ST. JEOR: SCORE: 1262.12

## 2022-01-18 NOTE — PATIENT INSTRUCTIONS
Continue daily walking and back exercises.    Sure your CPAP machine is working the best that it can.  Continue to use your CPAP every night.    If you do have increasing shortness of breath or leg swelling, see me right away for further evaluation.    Make an appointment with the general surgeon to discuss plan for surgical repair of your groin hernia.    Follow-up in May for dermatology checkup as planned.    See me in 6 months for a routine blood pressure and general checkup, or sooner if preop needed for hernia surgery.

## 2022-01-18 NOTE — PROGRESS NOTES
SUBJECTIVE:   Antoni Stoll is a 86 year old male who presents for Preventive Visit.  Retired , lives independently.    His main issue is severe kyphoscoliosis with severe spinal stenosis from L3-5.  He just saw the spine clinic and neurosurgery last summer.  No plans for decompressive surgery at this time.  He continues with treadmill exercise at least 5 days a week.  No leg radicular pain at this time.  July 2021 MRI without significant change but severe L3-5 stenosis.  Some worsening right foot numbness.  Still on Lyrica.  No falls.    She still has dyspnea on exertion of mild to moderate severity but stable.  He did see cardiology yesterday.  Cardiac status stable.  Blood pressure 148/60.  BNP measured yesterday was 168.  He does not have lower extremity edema.    Blood pressure at home is usually on the low side, sometimes down to a systolic of 100, sometimes systolic up to 140s.  He denies significant orthostatic type dizziness.  Mild occasional unsteadiness type dizziness when turning or walking.  But no falls.    On lisinopril 2.5 mg a day.  Blood pressure was 130/70 last July.    No chest pain or chest pressure.  January 2021 negative stress test.  Coronary bypass in 2013, no history of MI.  June 2021 echo with ejection fraction 76%, mild aortic insufficiency, moderate tricuspid regurgitation but no pulmonary hypertension.    Occasional leg cramps.  No new generalized myalgias.  Still on Lipitor 20 mg.  No epigastric pain or blood in stool or melena on full dose aspirin.    CPAP machine is working well now.  There was some mask issues and he has some new equipment now.  He wears it all night every night.  No palpitations or history of atrial fibrillation.    History of some postnasal drip type cough but no complaints of cough currently.  Negative chest x-ray in May of this last year.  He is never smoked.    BPH stable on Flomax and saw palmetto.  He did see urology last August.  Patient reports  "that the PSA was normal at that time.    Glaucoma, back on drops.  Has an appointment in 2 weeks with ophthalmology.    History of basal cell cancer, no change in skin or new lesions.  He has his yearly follow-up scheduled in May.    History of a right hemicolectomy over 5 years ago for high-grade dysplasia polyp.  Also family history of colon cancer with father.  September 2020 colonoscopy negative, he was given a 5-year follow-up, though there was some discussion about possibly considering a 3-year follow-up, but unclear given his age.    Chronic left inguinal hernia relatively stable, some mild discomfort but he does wish to consider having that fixed later this year after COVID improves.  Also umbilical hernia which is stable.    Gluteal intertrigo, still intermittent irritation.  No abdominal pain and bowels are normal without blood in the stool.    Patient has been advised of split billing requirements and indicates understanding: Yes   Are you in the first 12 months of your Medicare coverage?  No    Healthy Habits:     In general, how would you rate your overall health?  Good    Frequency of exercise:  4-5 days/week    Duration of exercise:  Greater than 60 minutes    Do you usually eat at least 4 servings of fruit and vegetables a day, include whole grains    & fiber and avoid regularly eating high fat or \"junk\" foods?  Yes    Taking medications regularly:  Yes    Medication side effects:  Lightheadedness    Ability to successfully perform activities of daily living:  No assistance needed    Home Safety:  No safety concerns identified    Hearing Impairment:  No hearing concerns    In the past 6 months, have you been bothered by leaking of urine?  No    In general, how would you rate your overall mental or emotional health?  Excellent      PHQ-2 Total Score: 0    Additional concerns today:  Yes    Do you feel safe in your environment? Yes    Have you ever done Advance Care Planning? (For example, a Health " Directive, POLST, or a discussion with a medical provider or your loved ones about your wishes): Yes, patient states has an Advance Care Planning document and will bring a copy to the clinic.       Fall risk  Fallen 2 or more times in the past year?: No  Any fall with injury in the past year?: No    Cognitive Screening   1) Repeat 3 items (Leader, Season, Table)    2) Clock draw: NORMAL  3) 3 item recall: Recalls 3 objects  Results: 3 items recalled: COGNITIVE IMPAIRMENT LESS LIKELY    Mini-CogTM Copyright S [employer]. Licensed by the author for use in [facility]; reprinted with permission (ajay@[facility]). All rights reserved.      Do you have sleep apnea, excessive snoring or daytime drowsiness?: yes    Reviewed and updated as needed this visit by clinical staff  Tobacco  Allergies  Meds             Reviewed and updated as needed this visit by Provider               Social History     Tobacco Use     Smoking status: Never Smoker     Smokeless tobacco: Never Used   Substance Use Topics     Alcohol use: Yes     Comment: Alcoholic Drinks/day: 5 drinks weekly         Alcohol Use 1/16/2022   Prescreen: >3 drinks/day or >7 drinks/week? No         Current providers sharing in care for this patient include:   Patient Care Team:  Sarthak Romano MD as PCP - General  Sarthak Romano MD as Assigned PCP  Dewey Guevara MD as Assigned Heart and Vascular Provider  Jessica Maya MD as Assigned Neuroscience Provider  Ez Portillo MD as Assigned Surgical Provider    The following health maintenance items are reviewed in Epic and correct as of today:  Health Maintenance Due   Topic Date Due     ANNUAL REVIEW OF  ORDERS  Never done     ZOSTER IMMUNIZATION (2 of 3) 01/03/2008     DTAP/TDAP/TD IMMUNIZATION (1 - Tdap) 04/12/2011     MEDICARE ANNUAL WELLNESS VISIT  12/14/2021     FALL RISK ASSESSMENT  12/14/2021     Lab work is in process  Patient Active Problem List   Diagnosis     Unspecified glaucoma      Inguinal Hernia     Hypercholesterolemia     Essential hypertension     Atherosclerosis of native coronary artery of native heart with angina pectoris (H)     Sleep Apnea     Anemia     BPH (benign prostatic hyperplasia)     Spinal stenosis     Dyspnea on exertion     Past Surgical History:   Procedure Laterality Date     ARTERIAL BYPASS SURGERY       BLEPHAROPLASTY Bilateral      BYPASS GRAFT ARTERY CORONARY  06/28/2013    5-vessel     BYPASS GRAFT ARTERY CORONARY  2013    5 vessel     CARDIAC CATHETERIZATION  06/03/2013     CATARACT EXTRACTION Bilateral      HERNIA REPAIR      right inguinal     STRIP VEIN Left        Social History     Tobacco Use     Smoking status: Never Smoker     Smokeless tobacco: Never Used   Substance Use Topics     Alcohol use: Yes     Comment: Alcoholic Drinks/day: 5 drinks weekly     Family History   Problem Relation Age of Onset     Coronary Artery Disease Mother      Colon Cancer Father      Coronary Artery Disease Father          Current Outpatient Medications   Medication Sig Dispense Refill     acetaminophen (TYLENOL) 500 MG tablet [ACETAMINOPHEN (TYLENOL) 500 MG TABLET] Take 1,000 mg by mouth daily .             aspirin 325 MG tablet [ASPIRIN 325 MG TABLET] Take 325 mg by mouth daily.       atorvastatin (LIPITOR) 20 MG tablet Take 1 tablet (20 mg) by mouth daily 90 tablet 3     azelastine (ASTELIN) 0.1 % nasal spray 2 sprays each nostril 1-2x daily as needed for nasal congestion (use nightly for first 2 week) 30 mL 11     B2/VITS A,C,E/LUT/ZEAXANTH/MIN (ICAPS ORAL) [B2/VITS A,C,E/LUT/ZEAXANTH/MIN (ICAPS ORAL)] Take 1 capsule by mouth daily.       calcium polycarbophil (FIBERCON) 625 mg tablet [CALCIUM POLYCARBOPHIL (FIBERCON) 625 MG TABLET] Take 1,250 mg by mouth daily.       cholecalciferol, vitamin D3, 1,000 unit tablet [CHOLECALCIFEROL, VITAMIN D3, 1,000 UNIT TABLET] Take 1,000 Units by mouth daily.       glucosamine-chondroitin 500-400 mg cap [GLUCOSAMINE-CHONDROITIN 500-400  "MG CAP] Take 1 capsule by mouth 2 (two) times a day.       latanoprost (XALATAN) 0.005 % ophthalmic solution Place 1 drop into both eyes At Bedtime       lisinopril (ZESTRIL) 2.5 MG tablet [LISINOPRIL (PRINIVIL,ZESTRIL) 2.5 MG TABLET] TAKE 1 TABLET BY MOUTH EVERY DAY 90 tablet 2     pregabalin (LYRICA) 25 MG capsule TAKE 1 CAPSULE BY MOUTH AT BEDTIME FOR 5 DAYS. MAY INCREASE TO 1 CAPSULE BY MOUTH 2 TIMES DAILY 60 capsule 1     saw palmetto fruit 450 mg cap [SAW PALMETTO FRUIT 450 MG CAP] Take 1 capsule by mouth 2 (two) times a day.       tamsulosin (FLOMAX) 0.4 mg Cp24 [TAMSULOSIN (FLOMAX) 0.4 MG CP24] Take 0.4 mg by mouth at bedtime.       No Known Allergies        Review of Systems  Constitutional, HEENT, cardiovascular, pulmonary, GI, , musculoskeletal, neuro, skin, endocrine and psych systems are negative, except as otherwise noted.    OBJECTIVE:   BP (!) 152/70 (BP Location: Right arm, Patient Position: Sitting, Cuff Size: Adult Regular)   Pulse 71   Ht 1.638 m (5' 4.5\")   Wt 65.3 kg (144 lb)   SpO2 94%   BMI 24.34 kg/m   Estimated body mass index is 24.34 kg/m  as calculated from the following:    Height as of this encounter: 1.638 m (5' 4.5\").    Weight as of this encounter: 65.3 kg (144 lb).  Physical Exam  Alert and oriented x3.  Normal clock face drawing and word recall.  Able to climb up on exam table.  He does have moderate kyphosis with severe scoliosis.  Pupils and irises equal and reactive.  Extraocular muscles intact.  No jaundice or conjunctivitis.  External ears and nose exam is normal.  Minimal cerumen.  Normal tympanic membranes.  No cervical or supraclavicular or axillary adenopathy.  No JVD and no carotid bruits.  No thyromegaly or nodularity.  Lungs are clear to auscultation although there is some reduced respiratory excursion with the kyphoscoliosis.  No crackles or wheezing.  Heart is regular without murmur rub or gallop.  +1 pedal pulses and no ankle edema.  Abdomen is nonobese " nontender no pedal splenomegaly.  Lower ventral hernia of moderate severity.  No pulsatile abdominal mass.  Left inguinal hernia of moderate size, not reducible.  Not tender.  Skin exam without suspicious skin lesions to inspection and palpation.  Gluteal crease did show some kissing intertrigo of mild severity, without significant skin breakdown, mild erythema.  ASSESSMENT / PLAN:   (Z00.00) Encounter for wellness examination in adult  (primary encounter diagnosis)  Comment: Patient confirmed full CODE STATUS wishes  Plan: Full Code    Immunizations reviewed.  Previous Zostavax.  He is due for a tetanus shot of cut or puncture wound.  Otherwise up-to-date on immunizations        Patient instructions:  Continue daily walking and back exercises.    Sure your CPAP machine is working the best that it can.  Continue to use your CPAP every night.    If you do have increasing shortness of breath or leg swelling, see me right away for further evaluation.    Make an appointment with the general surgeon to discuss plan for surgical repair of your groin hernia.    Follow-up in May for dermatology checkup as planned.    See me in 6 months for a routine blood pressure and general checkup, or sooner if preop needed for hernia surgery.      (Z86.010) History of colonic polyps  Comment: Bowels are normal.  History of high-grade polyp and family history.  But negative colonoscopy September 2020.  We will evaluate his physical condition September 2023 to consider a 3-year follow-up colonoscopy if he wishes.  Plan:     (I25.10,  I25.84) Coronary artery disease due to calcified coronary lesion  Comment: No chest pain.  Negative stress test January 2021.    Continue atorvastatin and aspirin, no evidence of toxicity at this time.  Plan: Lipid Profile        Goal LDL less than 100, but not planning further increase of atorvastatin with age    (G47.33,  Z99.89) Obstructive sleep apnea on CPAP  Comment: Compliant with CPAP.  You can check  with sleep clinic about further adjustments and mask if needed.  Plan: I suspect he has some element of diastolic dysfunction with dyspnea on exertion, exacerbated with his kyphoscoliosis with some reduced respiratory excursion.    If worsening dyspnea exertion or lower extremity edema, patient was told to follow-up in clinic for further evaluation.  Increasing BNP yesterday likely reflective of some diastolic dysfunction with his sleep apnea.    (I10) Essential hypertension  Comment: Blood pressure is usually on the low side at home.  He denies significant orthostasis.  I will keep him on just low-dose lisinopril 2.5 mg a day.  Just intermittent higher blood pressure spikes, usually short-lived.  Plan:     (M48.061) Spinal stenosis of lumbar region, unspecified whether neurogenic claudication present  Comment: Severe.  Not planning surgery at this time.  Plan: I stressed the importance of daily walking and exercise.  Try to increase that is much as able.    (Z85.828) History of basal cell carcinoma of skin  Comment: Yearly follow-up with dermatology in May  Plan: No new skin lesions identified today    (Z51.81) Encounter for therapeutic drug monitoring  Comment:   Plan: CBC with platelets, Comprehensive metabolic         panel            (K40.90) Inguinal hernia, left  Comment: Patient does wish to have a referral to a general surgeon for hernia repair  Plan: Adult General Surg Referral        I will see him for preoperative he does wish to go ahead with surgery    Addendum: I received word that patient wanted to go ahead with surgery on Monday.  As of the date of this physical, patient was stable for anesthesia.  Okay for anesthesia.    Gluteal intertrigo, we discussed use of Desitin or over-the-counter diaper rash type products.    History of glaucoma on drops, with ophthalmology and was just seen earlier this month    BPH controlled on Flomax and saw palmetto.  No further PSA checks with age, although he did  "have a PSA check last summer at urology.      Patient has been advised of split billing requirements and indicates understanding: No  COUNSELING:  Reviewed preventive health counseling, as reflected in patient instructions       Regular exercise       Healthy diet/nutrition       Colon cancer screening    Estimated body mass index is 24.34 kg/m  as calculated from the following:    Height as of this encounter: 1.638 m (5' 4.5\").    Weight as of this encounter: 65.3 kg (144 lb).        He reports that he has never smoked. He has never used smokeless tobacco.      Appropriate preventive services were discussed with this patient, including applicable screening as appropriate for cardiovascular disease, diabetes, osteopenia/osteoporosis, and glaucoma.  As appropriate for age/gender, discussed screening for colorectal cancer, prostate cancer, breast cancer, and cervical cancer. Checklist reviewing preventive services available has been given to the patient.    Reviewed patients plan of care and provided an AVS. The Basic Care Plan (routine screening as documented in Health Maintenance) for Antoni meets the Care Plan requirement. This Care Plan has been established and reviewed with the Patient.    Counseling Resources:  ATP IV Guidelines  Pooled Cohorts Equation Calculator  Breast Cancer Risk Calculator  Breast Cancer: Medication to Reduce Risk  FRAX Risk Assessment  ICSI Preventive Guidelines  Dietary Guidelines for Americans, 2010  [employer]'s MyPlate  ASA Prophylaxis  Lung CA Screening    Sarthak Romano MD  [facility]    Identified Health Risks:  "

## 2022-01-19 ENCOUNTER — TELEPHONE (OUTPATIENT)
Dept: CARDIOLOGY | Facility: CLINIC | Age: 87
End: 2022-01-19
Payer: MEDICARE

## 2022-01-19 DIAGNOSIS — R06.09 DYSPNEA ON EXERTION: Primary | ICD-10-CM

## 2022-01-19 RX ORDER — FUROSEMIDE 20 MG
20 TABLET ORAL DAILY
Qty: 7 TABLET | Refills: 0 | Status: ON HOLD | OUTPATIENT
Start: 2022-01-19 | End: 2022-02-14

## 2022-01-19 NOTE — TELEPHONE ENCOUNTER
----- Message from Dewey Guevara MD sent at 1/17/2022  4:06 PM CST -----  Borderline elevated, uncertain significance.  Could try Lasix 20 mg daily for 1 week to see if shortness of breath improves.  He should call us at the end of the week to report if shortness of breath resolved        ==Called patient and updated on borderline elevated BNP results. He will take the furosemide 20 mg daily in the AM for 7 days and call back writer with an update on how this helped his symptoms. Medication education provided. -[facility] [place]

## 2022-01-28 NOTE — TELEPHONE ENCOUNTER
Received a call back from Wily. He states that the 7 day course of Furosemide did not really make any difference in his shortness of breath. He has taken his last dose and was only given 7 tablets. Will pass along to [facility]. He also wanted to share that he will be having inguinal hernia surgery at some point in the near future with Dr. Gaxiola. Will update. -[facility] [place]      [place]. Guevara,  Above update on Wily after 7-day course of Furosemide- no change in his symptom of shortness of breath. Let me know if you have any additional recommendations.   Thanks,  Mal

## 2022-01-31 NOTE — TELEPHONE ENCOUNTER
Rec'd return call from patient - informed him of Dr. Guevara's response - patient verbalized understanding after questions addressed and agreed to follow-up with PCP to address need for further evaluation of SOB - patient will plan to follow-up in 1yr, sooner if needed and confirmed Dr. Guevara was informed of pending hernia repair surgery.  mg

## 2022-01-31 NOTE — TELEPHONE ENCOUNTER
Left msg for patient requesting call back for response/recommendations - follow-up pending in 1yr.  mg

## 2022-02-07 ENCOUNTER — OFFICE VISIT (OUTPATIENT)
Dept: OTOLARYNGOLOGY | Facility: CLINIC | Age: 87
End: 2022-02-07
Payer: MEDICARE

## 2022-02-07 DIAGNOSIS — H90.3 SENSORINEURAL HEARING LOSS, BILATERAL: ICD-10-CM

## 2022-02-07 DIAGNOSIS — Z97.4 WEARS HEARING AID IN BOTH EARS: ICD-10-CM

## 2022-02-07 DIAGNOSIS — H61.23 BILATERAL IMPACTED CERUMEN: Primary | ICD-10-CM

## 2022-02-07 DIAGNOSIS — J30.0 VASOMOTOR RHINITIS: ICD-10-CM

## 2022-02-07 DIAGNOSIS — R09.82 PND (POST-NASAL DRIP): ICD-10-CM

## 2022-02-07 PROCEDURE — 99213 OFFICE O/P EST LOW 20 MIN: CPT | Mod: 25 | Performed by: OTOLARYNGOLOGY

## 2022-02-07 PROCEDURE — 69210 REMOVE IMPACTED EAR WAX UNI: CPT | Performed by: OTOLARYNGOLOGY

## 2022-02-07 NOTE — LETTER
2/7/2022         RE: Antoni Stoll  [address]  [facility] [address]        Dear Colleague,    Thank you for referring your patient, Antoni Stoll, to the [facility]. Please see a copy of my visit note below.    CHIEF COMPLAINT:  Recheck      HISTORY OF PRESENT ILLNESS    Wily was seen in follow up for era cleaning.  No change in hearing.  Wears hearing bilaterally.  Still with PND but with some improvement on astelin nasal spray.  Not bothersome enough to be interested in any type of office or OR procedure.              REVIEW OF SYSTEMS    Review of Systems: a 10-system review is reviewed at this encounter.  See scanned document.     Patient has no known allergies.     PHYSICAL EXAM:        HEAD: Normal appearance and symmetry:  No cutaneous lesions.      EARS:   Cerumen impactions noted bilaterally    CERUMEN IMPACTION REMOVAL    After obtaining verbal consent, and using the binocular microscope.  Cerumen impaction(s) were removed from the affected ear canal(s)  using a wire loops and/or suction.  The patient tolerated the procedure well without incident.         NOSE:    Dorsum:   straight       ORAL CAVITY/OROPHARYNX:    Lips:  Normal.     NECK:  Trachea:  midline       NEURO:   Alert and Oriented    GAIT AND STATION:  normal     RESPIRATORY:   Symmetry and Respiratory effort    PSYCH:   normal mood and affect    SKIN:  warm and dry         IMPRESSION:   Encounter Diagnoses   Name Primary?     Bilateral impacted cerumen Yes     Wears hearing aid in both ears      Sensorineural hearing loss, bilateral               RECOMMENDATIONS:    Return 6 months with hearing test/ear cleaning.       Again, thank you for allowing me to participate in the care of your patient.        Sincerely,        Ez Portillo MD

## 2022-02-07 NOTE — PROGRESS NOTES
CHIEF COMPLAINT:  Recheck      HISTORY OF PRESENT ILLNESS    Wily was seen in follow up for era cleaning.  No change in hearing.  Wears hearing bilaterally.  Still with PND but with some improvement on astelin nasal spray.  Not bothersome enough to be interested in any type of office or OR procedure.              REVIEW OF SYSTEMS    Review of Systems: a 10-system review is reviewed at this encounter.  See scanned document.     Patient has no known allergies.     PHYSICAL EXAM:        HEAD: Normal appearance and symmetry:  No cutaneous lesions.      EARS:   Cerumen impactions noted bilaterally    CERUMEN IMPACTION REMOVAL    After obtaining verbal consent, and using the binocular microscope.  Cerumen impaction(s) were removed from the affected ear canal(s)  using a wire loops and/or suction.  The patient tolerated the procedure well without incident.         NOSE:    Dorsum:   straight       ORAL CAVITY/OROPHARYNX:    Lips:  Normal.     NECK:  Trachea:  midline       NEURO:   Alert and Oriented    GAIT AND STATION:  normal     RESPIRATORY:   Symmetry and Respiratory effort    PSYCH:   normal mood and affect    SKIN:  warm and dry         IMPRESSION:   Encounter Diagnoses   Name Primary?     Bilateral impacted cerumen Yes     Wears hearing aid in both ears      Sensorineural hearing loss, bilateral               RECOMMENDATIONS:    Return 6 months with hearing test/ear cleaning.   Astelin/ipratropium spray daily as needed

## 2022-02-11 ENCOUNTER — TELEPHONE (OUTPATIENT)
Dept: INTERNAL MEDICINE | Facility: CLINIC | Age: 87
End: 2022-02-11
Payer: MEDICARE

## 2022-02-11 NOTE — TELEPHONE ENCOUNTER
Reason for Call:  Other call back    Detailed comments: nAnabel from [facility] Surgery Pre Op Dept is wondering if Dr Romano is willing to addend the most recent visit to a pre op - ok for anesthesia, pt has surgery on Monday. Please advise.    Phone Number Patient can be reached at: Home number on file [phone] (home)    Best Time: any    Can we leave a detailed message on this number? Not Applicable    Call taken on 2/11/2022 at 9:30 AM by Ruperto Walker

## 2022-02-11 NOTE — TELEPHONE ENCOUNTER
I did place addendum on physical exam last month that patient was okay for anesthesia at that time.

## 2022-02-14 ENCOUNTER — ANESTHESIA EVENT (OUTPATIENT)
Dept: SURGERY | Facility: HOSPITAL | Age: 87
End: 2022-02-14
Payer: MEDICARE

## 2022-02-14 ENCOUNTER — DOCUMENTATION ONLY (OUTPATIENT)
Dept: OTHER | Facility: CLINIC | Age: 87
End: 2022-02-14
Payer: MEDICARE

## 2022-02-14 ENCOUNTER — ANESTHESIA (OUTPATIENT)
Dept: SURGERY | Facility: HOSPITAL | Age: 87
End: 2022-02-14
Payer: MEDICARE

## 2022-02-14 ENCOUNTER — HOSPITAL ENCOUNTER (OUTPATIENT)
Facility: HOSPITAL | Age: 87
Discharge: HOME | End: 2022-02-14
Attending: SURGERY | Admitting: SURGERY
Payer: MEDICARE

## 2022-02-14 VITALS
RESPIRATION RATE: 16 BRPM | DIASTOLIC BLOOD PRESSURE: 79 MMHG | TEMPERATURE: 99.2 F | BODY MASS INDEX: 24.56 KG/M2 | SYSTOLIC BLOOD PRESSURE: 178 MMHG | WEIGHT: 145.3 LBS | OXYGEN SATURATION: 99 % | HEART RATE: 63 BPM

## 2022-02-14 DIAGNOSIS — K40.30 NON-RECURRENT UNILATERAL INGUINAL HERNIA WITH OBSTRUCTION WITHOUT GANGRENE: Primary | ICD-10-CM

## 2022-02-14 PROCEDURE — 258N000003 HC RX IP 258 OP 636: Performed by: ANESTHESIOLOGY

## 2022-02-14 PROCEDURE — 250N000011 HC RX IP 250 OP 636: Performed by: NURSE ANESTHETIST, CERTIFIED REGISTERED

## 2022-02-14 PROCEDURE — 250N000009 HC RX 250: Performed by: SURGERY

## 2022-02-14 PROCEDURE — 710N000009 HC RECOVERY PHASE 1, LEVEL 1, PER MIN: Performed by: SURGERY

## 2022-02-14 PROCEDURE — 250N000009 HC RX 250: Performed by: NURSE ANESTHETIST, CERTIFIED REGISTERED

## 2022-02-14 PROCEDURE — 250N000011 HC RX IP 250 OP 636: Performed by: SURGERY

## 2022-02-14 PROCEDURE — 370N000017 HC ANESTHESIA TECHNICAL FEE, PER MIN: Performed by: SURGERY

## 2022-02-14 PROCEDURE — 272N000001 HC OR GENERAL SUPPLY STERILE: Performed by: SURGERY

## 2022-02-14 PROCEDURE — C1781 MESH (IMPLANTABLE): HCPCS | Performed by: SURGERY

## 2022-02-14 PROCEDURE — 999N000141 HC STATISTIC PRE-PROCEDURE NURSING ASSESSMENT: Performed by: SURGERY

## 2022-02-14 PROCEDURE — 710N000012 HC RECOVERY PHASE 2, PER MINUTE: Performed by: SURGERY

## 2022-02-14 PROCEDURE — 250N000025 HC SEVOFLURANE, PER MIN: Performed by: SURGERY

## 2022-02-14 PROCEDURE — 360N000075 HC SURGERY LEVEL 2, PER MIN: Performed by: SURGERY

## 2022-02-14 PROCEDURE — 250N000013 HC RX MED GY IP 250 OP 250 PS 637: Performed by: ANESTHESIOLOGY

## 2022-02-14 DEVICE — BARD MESH, 2" X 4" (5 CM X 10 CM)
Type: IMPLANTABLE DEVICE | Site: ABDOMEN | Status: FUNCTIONAL
Brand: BARD

## 2022-02-14 RX ORDER — LIDOCAINE HYDROCHLORIDE 20 MG/ML
INJECTION, SOLUTION INFILTRATION; PERINEURAL PRN
Status: DISCONTINUED | OUTPATIENT
Start: 2022-02-14 | End: 2022-02-14

## 2022-02-14 RX ORDER — NALOXONE HYDROCHLORIDE 1 MG/ML
0.4 INJECTION INTRAMUSCULAR; INTRAVENOUS; SUBCUTANEOUS
Status: DISCONTINUED | OUTPATIENT
Start: 2022-02-14 | End: 2022-02-14 | Stop reason: HOSPADM

## 2022-02-14 RX ORDER — FENTANYL CITRATE 50 UG/ML
25 INJECTION, SOLUTION INTRAMUSCULAR; INTRAVENOUS EVERY 5 MIN PRN
Status: DISCONTINUED | OUTPATIENT
Start: 2022-02-14 | End: 2022-02-14 | Stop reason: HOSPADM

## 2022-02-14 RX ORDER — ONDANSETRON 2 MG/ML
INJECTION INTRAMUSCULAR; INTRAVENOUS PRN
Status: DISCONTINUED | OUTPATIENT
Start: 2022-02-14 | End: 2022-02-14

## 2022-02-14 RX ORDER — ONDANSETRON 4 MG/1
4 TABLET, ORALLY DISINTEGRATING ORAL EVERY 30 MIN PRN
Status: DISCONTINUED | OUTPATIENT
Start: 2022-02-14 | End: 2022-02-14 | Stop reason: HOSPADM

## 2022-02-14 RX ORDER — DEXAMETHASONE SODIUM PHOSPHATE 10 MG/ML
INJECTION, SOLUTION INTRAMUSCULAR; INTRAVENOUS PRN
Status: DISCONTINUED | OUTPATIENT
Start: 2022-02-14 | End: 2022-02-14

## 2022-02-14 RX ORDER — LIDOCAINE 40 MG/G
CREAM TOPICAL
Status: DISCONTINUED | OUTPATIENT
Start: 2022-02-14 | End: 2022-02-14 | Stop reason: HOSPADM

## 2022-02-14 RX ORDER — LIDOCAINE HYDROCHLORIDE AND EPINEPHRINE 10; 10 MG/ML; UG/ML
INJECTION, SOLUTION INFILTRATION; PERINEURAL PRN
Status: DISCONTINUED | OUTPATIENT
Start: 2022-02-14 | End: 2022-02-14 | Stop reason: HOSPADM

## 2022-02-14 RX ORDER — PROPOFOL 10 MG/ML
INJECTION, EMULSION INTRAVENOUS PRN
Status: DISCONTINUED | OUTPATIENT
Start: 2022-02-14 | End: 2022-02-14

## 2022-02-14 RX ORDER — NALOXONE HYDROCHLORIDE 1 MG/ML
0.2 INJECTION INTRAMUSCULAR; INTRAVENOUS; SUBCUTANEOUS
Status: DISCONTINUED | OUTPATIENT
Start: 2022-02-14 | End: 2022-02-14 | Stop reason: HOSPADM

## 2022-02-14 RX ORDER — CEFAZOLIN SODIUM 2 G/100ML
2 INJECTION, SOLUTION INTRAVENOUS SEE ADMIN INSTRUCTIONS
Status: DISCONTINUED | OUTPATIENT
Start: 2022-02-14 | End: 2022-02-14 | Stop reason: HOSPADM

## 2022-02-14 RX ORDER — OXYCODONE HYDROCHLORIDE 5 MG/1
5 TABLET ORAL EVERY 4 HOURS PRN
Status: DISCONTINUED | OUTPATIENT
Start: 2022-02-14 | End: 2022-02-14 | Stop reason: HOSPADM

## 2022-02-14 RX ORDER — MEPERIDINE HYDROCHLORIDE 25 MG/ML
12.5 INJECTION INTRAMUSCULAR; INTRAVENOUS; SUBCUTANEOUS
Status: DISCONTINUED | OUTPATIENT
Start: 2022-02-14 | End: 2022-02-14 | Stop reason: HOSPADM

## 2022-02-14 RX ORDER — ONDANSETRON 2 MG/ML
4 INJECTION INTRAMUSCULAR; INTRAVENOUS EVERY 30 MIN PRN
Status: DISCONTINUED | OUTPATIENT
Start: 2022-02-14 | End: 2022-02-14 | Stop reason: HOSPADM

## 2022-02-14 RX ORDER — CEFAZOLIN SODIUM 2 G/100ML
2 INJECTION, SOLUTION INTRAVENOUS
Status: COMPLETED | OUTPATIENT
Start: 2022-02-14 | End: 2022-02-14

## 2022-02-14 RX ORDER — FENTANYL CITRATE 50 UG/ML
INJECTION, SOLUTION INTRAMUSCULAR; INTRAVENOUS PRN
Status: DISCONTINUED | OUTPATIENT
Start: 2022-02-14 | End: 2022-02-14

## 2022-02-14 RX ORDER — FENTANYL CITRATE 50 UG/ML
25 INJECTION, SOLUTION INTRAMUSCULAR; INTRAVENOUS
Status: DISCONTINUED | OUTPATIENT
Start: 2022-02-14 | End: 2022-02-14 | Stop reason: HOSPADM

## 2022-02-14 RX ORDER — HYDROCODONE BITARTRATE AND ACETAMINOPHEN 5; 325 MG/1; MG/1
1-2 TABLET ORAL EVERY 4 HOURS PRN
Qty: 20 TABLET | Refills: 0 | Status: SHIPPED | OUTPATIENT
Start: 2022-02-14 | End: 2023-01-18

## 2022-02-14 RX ORDER — SODIUM CHLORIDE, SODIUM LACTATE, POTASSIUM CHLORIDE, CALCIUM CHLORIDE 600; 310; 30; 20 MG/100ML; MG/100ML; MG/100ML; MG/100ML
INJECTION, SOLUTION INTRAVENOUS CONTINUOUS
Status: DISCONTINUED | OUTPATIENT
Start: 2022-02-14 | End: 2022-02-14 | Stop reason: HOSPADM

## 2022-02-14 RX ADMIN — PROPOFOL 130 MG: 10 INJECTION, EMULSION INTRAVENOUS at 11:58

## 2022-02-14 RX ADMIN — OXYCODONE HYDROCHLORIDE 5 MG: 5 TABLET ORAL at 13:30

## 2022-02-14 RX ADMIN — SODIUM CHLORIDE, POTASSIUM CHLORIDE, SODIUM LACTATE AND CALCIUM CHLORIDE: 600; 310; 30; 20 INJECTION, SOLUTION INTRAVENOUS at 11:28

## 2022-02-14 RX ADMIN — PROPOFOL 50 MG: 10 INJECTION, EMULSION INTRAVENOUS at 12:12

## 2022-02-14 RX ADMIN — ONDANSETRON 4 MG: 2 INJECTION INTRAMUSCULAR; INTRAVENOUS at 12:46

## 2022-02-14 RX ADMIN — FENTANYL CITRATE 50 MCG: 50 INJECTION, SOLUTION INTRAMUSCULAR; INTRAVENOUS at 12:12

## 2022-02-14 RX ADMIN — LIDOCAINE HYDROCHLORIDE 60 MG: 20 INJECTION, SOLUTION INFILTRATION; PERINEURAL at 11:58

## 2022-02-14 RX ADMIN — FENTANYL CITRATE 50 MCG: 50 INJECTION, SOLUTION INTRAMUSCULAR; INTRAVENOUS at 11:58

## 2022-02-14 RX ADMIN — CEFAZOLIN SODIUM 2 G: 2 INJECTION, SOLUTION INTRAVENOUS at 12:07

## 2022-02-14 RX ADMIN — DEXAMETHASONE SODIUM PHOSPHATE 10 MG: 10 INJECTION, SOLUTION INTRAMUSCULAR; INTRAVENOUS at 11:58

## 2022-02-14 NOTE — OP NOTE
OPERATIVE REPORT    Antoni Stoll   [facility]  Medical Record #:  [id number]  YOB: 1935  Age:  86 year old    PROCEDURE DATE:  2/14/2022    PREOPERATIVE DIAGNOSIS: Incarcerated left inguinal hernia    POSTOPERATIVE DIAGNOSIS: Incarcerated left indirect inguinal hernia    PROCEDURE: Incarcerated left indirect inguinal hernia repair with sac ligation and reduction of contents with preperitoneal Marlex mesh repair    OPERATING SURGEON:  Jack Naqvi MD    ASSISTANT: Technician    ANESTHESIA: General    DESCRIPTION OF PROCEDURE: With the patient supine position and general anesthesia having reviewed the risk benefits complications of surgical invention with him abdomen is prepped in usual sterile fashion.  Left lower quadrant transverse incision is made external oblique aponeurosis opened direction of the fibers and large incarcerated indirect inguinal hernia identified isolated this hernia sac is open contents reduced hernia sac suture ligated with 3-0 Vicryl.  Preperitoneal space entered large portion Marlex mesh placed secured sequentially with running interrupted 0 Prolene suture.  The wound is irrigated and closed in external bleak layer with running 3-0 Vicryl after returning cord structures and nerve to normal in time position.  The skin is closed with running 3-0 Vicryl subcuticular suture and Dermabond.  Estimated blood loss was minimal there were no complications and the patient tolerated procedure well.  Sponge needle was are correct x2.    EBL:  [unfilled]    SPECIMENS:  * No specimens in log *    Jack naqvi md  [facility]

## 2022-02-14 NOTE — ANESTHESIA PREPROCEDURE EVALUATION
Anesthesia Pre-Procedure Evaluation    Patient: Antoni Stoll   MRN: [id number] : 1935        Preoperative Diagnosis: Incarcerated left inguinal hernia [K40.30]    Procedure : Procedure(s):  INCARCERATED LEFT INGUINAL HERNIA REPAIR WITH MESH          Past Medical History:   Diagnosis Date     Anemia      Cancer (H)     colon     Carotid artery occlusion      Coronary artery disease      Glaucoma      High cholesterol      History of colon cancer 2018     Hyperlipidemia      Hypertension      Left inguinal hernia      Low back pain      Mild aortic insufficiency      Moderate tricuspid regurgitation      Postnasal drip      Sleep apnea, obstructive     CPAP     Spinal stenosis     lumbar     SSS (sick sinus syndrome) (H) 2019     Umbilical hernia      Unspecified cataract     Created by Conversion       Past Surgical History:   Procedure Laterality Date     ARTERIAL BYPASS SURGERY       BLEPHAROPLASTY Bilateral      BYPASS GRAFT ARTERY CORONARY  2013    5-vessel     BYPASS GRAFT ARTERY CORONARY      5 vessel     CARDIAC CATHETERIZATION  2013     CATARACT EXTRACTION Bilateral      COLON SURGERY Right      HERNIA REPAIR      right inguinal     STRIP VEIN Left       No Known Allergies   Social History     Tobacco Use     Smoking status: Never Smoker     Smokeless tobacco: Never Used   Substance Use Topics     Alcohol use: Yes     Comment: Alcoholic Drinks/day: 5 drinks weekly      Wt Readings from Last 1 Encounters:   22 65.9 kg (145 lb 4.8 oz)        Anesthesia Evaluation            ROS/MED HX  ENT/Pulmonary:     (+) sleep apnea,     Neurologic:  - neg neurologic ROS     Cardiovascular:     (+) Dyslipidemia hypertension--CAD angina---VILLA.     METS/Exercise Tolerance: >4 METS    Hematologic:       Musculoskeletal:   (+) arthritis,     GI/Hepatic:  - neg GI/hepatic ROS     Renal/Genitourinary:  - neg Renal ROS     Endo:  - neg endo ROS     Psychiatric/Substance Use:  - neg  psychiatric ROS     Infectious Disease:  - neg infectious disease ROS     Malignancy:  - neg malignancy ROS     Other:            Physical Exam    Airway        Mallampati: III   TM distance: > 3 FB   Neck ROM: limited   Mouth opening: > 3 cm    Respiratory Devices and Support         Dental       (+) chipped      Cardiovascular   cardiovascular exam normal          Pulmonary   pulmonary exam normal                OUTSIDE LABS:  CBC:   Lab Results   Component Value Date    WBC 6.9 01/18/2022    WBC 6.3 05/27/2021    HGB 12.4 (L) 01/18/2022    HGB 12.5 (L) 05/27/2021    HCT 38.3 (L) 01/18/2022    HCT 38.3 (L) 05/27/2021     01/18/2022     05/27/2021     BMP:   Lab Results   Component Value Date     01/18/2022     05/27/2021    POTASSIUM 4.8 01/18/2022    POTASSIUM 4.7 05/27/2021    CHLORIDE 104 01/18/2022    CHLORIDE 104 05/27/2021    CO2 24 01/18/2022    CO2 25 05/27/2021    BUN 15 01/18/2022    BUN 26 05/27/2021    CR 1.05 01/18/2022    CR 1.04 05/27/2021     01/18/2022     05/27/2021     COAGS: No results found for: PTT, INR, FIBR  POC: No results found for: BGM, HCG, HCGS  HEPATIC:   Lab Results   Component Value Date    ALBUMIN 4.1 01/18/2022    PROTTOTAL 7.2 01/18/2022    ALT 11 01/18/2022    AST 20 01/18/2022    ALKPHOS 68 01/18/2022    BILITOTAL 0.6 01/18/2022     OTHER:   Lab Results   Component Value Date    A1C 6.1 06/03/2013    GEGE 9.4 01/18/2022    TSH 0.79 05/27/2021       Anesthesia Plan    ASA Status:  4   NPO Status:  NPO Appropriate    Anesthesia Type: General.     - Airway: LMA   Induction: Propofol, Intravenous.   Maintenance: Balanced.        Consents    Anesthesia Plan(s) and associated risks, benefits, and realistic alternatives discussed. Questions answered and patient/representative(s) expressed understanding.    - Discussed:     - Discussed with:  Patient      - Extended Intubation/Ventilatory Support Discussed: No.      - Patient is DNR/DNI Status: No     Use of blood products discussed: No .     Postoperative Care    Pain management: IV analgesics, Multi-modal analgesia.   PONV prophylaxis: Ondansetron (or other 5HT-3), Dexamethasone or Solumedrol     Comments:    Other Comments: ga lma    Ponv ppx            Arely Espinoza MD

## 2022-02-14 NOTE — ANESTHESIA PROCEDURE NOTES
Airway       Patient location during procedure: OR  Staff -        Anesthesiologist:  Aditi Tejeda MD       CRNA: Lyane Yepez APRN CRNA       Performed By: CRNA  Consent for Airway        Urgency: elective  Indications and Patient Condition       Indications for airway management: bree-procedural       Induction type:intravenous       Mask difficulty assessment: 1 - vent by mask    Final Airway Details       Final airway type: supraglottic airway    Supraglottic Airway Details        Type: LMA       Brand: Ambu AuraGain       LMA size: 4    Post intubation assessment        Placement verified by: capnometry and equal breath sounds        Number of attempts at approach: 1       Number of other approaches attempted: 0       Secured with: silk tape       Ease of procedure: easy       Dentition: Intact and Unchanged

## 2022-02-15 NOTE — ANESTHESIA POSTPROCEDURE EVALUATION
Patient: Antoni Stoll    Procedure: Procedure(s):  INCARCERATED LEFT INGUINAL HERNIA REPAIR WITH MESH       Diagnosis:Incarcerated left inguinal hernia [K40.30]  Diagnosis Additional Information: No value filed.    Anesthesia Type:  General    Note:     Postop Pain Control: Uneventful            Sign Out: Well controlled pain   PONV: No   Neuro/Psych: Uneventful            Sign Out: Acceptable/Baseline neuro status   Airway/Respiratory: Uneventful            Sign Out: Acceptable/Baseline resp. status   CV/Hemodynamics: Uneventful            Sign Out: Acceptable CV status; No obvious hypovolemia; No obvious fluid overload   Other NRE: NONE   DID A NON-ROUTINE EVENT OCCUR? No           Last vitals:  Vitals Value Taken Time   /76 02/14/22 1340   Temp 37.3  C (99.2  F) 02/14/22 1330   Pulse 68 02/14/22 1344   Resp 24 02/14/22 1343   SpO2 95 % 02/14/22 1345   Vitals shown include unvalidated device data.    Electronically Signed By: Cricket Miramontes MD  February 15, 2022  10:58 AM

## 2022-03-07 DIAGNOSIS — M43.16 SPONDYLOLISTHESIS OF LUMBAR REGION: ICD-10-CM

## 2022-03-07 DIAGNOSIS — M48.062 SPINAL STENOSIS OF LUMBAR REGION WITH NEUROGENIC CLAUDICATION: ICD-10-CM

## 2022-03-09 RX ORDER — PREGABALIN 25 MG/1
CAPSULE ORAL
Qty: 60 CAPSULE | Refills: 1 | Status: SHIPPED | OUTPATIENT
Start: 2022-03-09 | End: 2022-05-06

## 2022-03-17 ENCOUNTER — DOCUMENTATION ONLY (OUTPATIENT)
Dept: OTHER | Facility: CLINIC | Age: 87
End: 2022-03-17
Payer: MEDICARE

## 2022-05-06 DIAGNOSIS — M48.062 SPINAL STENOSIS OF LUMBAR REGION WITH NEUROGENIC CLAUDICATION: ICD-10-CM

## 2022-05-06 DIAGNOSIS — M43.16 SPONDYLOLISTHESIS OF LUMBAR REGION: ICD-10-CM

## 2022-05-06 RX ORDER — PREGABALIN 25 MG/1
CAPSULE ORAL
Qty: 60 CAPSULE | Refills: 1 | Status: SHIPPED | OUTPATIENT
Start: 2022-05-06 | End: 2022-07-05

## 2022-07-05 DIAGNOSIS — M43.16 SPONDYLOLISTHESIS OF LUMBAR REGION: ICD-10-CM

## 2022-07-05 DIAGNOSIS — M48.062 SPINAL STENOSIS OF LUMBAR REGION WITH NEUROGENIC CLAUDICATION: ICD-10-CM

## 2022-07-05 RX ORDER — PREGABALIN 25 MG/1
25 CAPSULE ORAL 2 TIMES DAILY
Qty: 60 CAPSULE | Refills: 0 | Status: SHIPPED | OUTPATIENT
Start: 2022-07-05 | End: 2022-07-07

## 2022-07-05 NOTE — TELEPHONE ENCOUNTER
I can prescribe Lyrica to his pharmacy in [place] and they can transfer the prescription.  I am not sure if I can send Lyrica, controlled substance, directly to [place].

## 2022-07-05 NOTE — TELEPHONE ENCOUNTER
I called and spoke with pt. Need to verify if he is requesting refill on Lyrica to be sent to [place]. Per pt, he is currently in [place] and would like refill on Lyrica sent to [place]. He is coming back to [place] in a couple weeks. Informed pt his last visit was 7/7/2021. We are only 2 days before his 1 year gume so I highly recommend he schedule a F/U with Dr. Mir when he returns. Pt verbally understood and will schedule a F/U with Dr. Mir once he get back.

## 2022-07-05 NOTE — TELEPHONE ENCOUNTER
Pt was informed about Dr. Mir message. He verbally understood and will call the [facility] in [place] to transfer to [place]. Informed pt Dr. Mir gave him 1 month supply so again to schedule a F/U appt when he get back to [place]. Pt verbally understood.

## 2022-07-06 NOTE — TELEPHONE ENCOUNTER
"Call received from pt reporting that the [place] [facility] was unable to transfer the prescription because it is a controlled substance. He was advised to have the clinic contact [place] [facility]. Called and spoke with Luisana who also spoke with pharmacist.     They report [place] prescription should be cancelled and new prescription can be electronically sent to the [place] pharmacy. \"Please just write in the comment - patient travels between [place] and [place]\".     "

## 2022-07-07 RX ORDER — PREGABALIN 25 MG/1
25 CAPSULE ORAL 2 TIMES DAILY
Qty: 60 CAPSULE | Refills: 0 | Status: SHIPPED | OUTPATIENT
Start: 2022-07-07 | End: 2022-08-09

## 2022-07-07 NOTE — TELEPHONE ENCOUNTER
Informed pt that Dr. Mir sent a new order of Lyrica to [facility]. I did go ahead and cancel the order at the [facility] today. It was ready for  in [place] but I told them pt is in [place] now. They will unlink the [place] order so [place] can fill it for him there. For pt to call me back if he has any other issues with picking up his script in [place].

## 2022-07-07 NOTE — TELEPHONE ENCOUNTER
Called [facility] to cancel lyrica order sent. Informed pharmacist we sent new order to [facility] since pt is there now.

## 2022-07-19 ENCOUNTER — LAB (OUTPATIENT)
Dept: FAMILY MEDICINE | Facility: CLINIC | Age: 87
End: 2022-07-19
Attending: FAMILY MEDICINE
Payer: MEDICARE

## 2022-07-19 DIAGNOSIS — Z20.822 SUSPECTED 2019 NOVEL CORONAVIRUS INFECTION: ICD-10-CM

## 2022-07-19 LAB — SARS-COV-2 RNA RESP QL NAA+PROBE: NEGATIVE

## 2022-07-19 PROCEDURE — U0003 INFECTIOUS AGENT DETECTION BY NUCLEIC ACID (DNA OR RNA); SEVERE ACUTE RESPIRATORY SYNDROME CORONAVIRUS 2 (SARS-COV-2) (CORONAVIRUS DISEASE [COVID-19]), AMPLIFIED PROBE TECHNIQUE, MAKING USE OF HIGH THROUGHPUT TECHNOLOGIES AS DESCRIBED BY CMS-2020-01-R: HCPCS

## 2022-07-19 PROCEDURE — U0005 INFEC AGEN DETEC AMPLI PROBE: HCPCS

## 2022-07-25 ENCOUNTER — OFFICE VISIT (OUTPATIENT)
Dept: INTERNAL MEDICINE | Facility: CLINIC | Age: 87
End: 2022-07-25
Payer: MEDICARE

## 2022-07-25 VITALS
BODY MASS INDEX: 23.49 KG/M2 | OXYGEN SATURATION: 97 % | WEIGHT: 139 LBS | HEART RATE: 77 BPM | SYSTOLIC BLOOD PRESSURE: 138 MMHG | DIASTOLIC BLOOD PRESSURE: 80 MMHG

## 2022-07-25 DIAGNOSIS — I10 ESSENTIAL HYPERTENSION: Primary | ICD-10-CM

## 2022-07-25 DIAGNOSIS — Z85.828 HISTORY OF BASAL CELL CARCINOMA (BCC) OF SKIN: ICD-10-CM

## 2022-07-25 DIAGNOSIS — G47.33 OBSTRUCTIVE SLEEP APNEA ON CPAP: ICD-10-CM

## 2022-07-25 DIAGNOSIS — I25.10 CORONARY ARTERY DISEASE DUE TO CALCIFIED CORONARY LESION: ICD-10-CM

## 2022-07-25 DIAGNOSIS — H40.9 GLAUCOMA, UNSPECIFIED GLAUCOMA TYPE, UNSPECIFIED LATERALITY: ICD-10-CM

## 2022-07-25 DIAGNOSIS — Z51.81 ENCOUNTER FOR THERAPEUTIC DRUG MONITORING: ICD-10-CM

## 2022-07-25 DIAGNOSIS — R07.89 CHEST WALL PAIN: ICD-10-CM

## 2022-07-25 DIAGNOSIS — I25.84 CORONARY ARTERY DISEASE DUE TO CALCIFIED CORONARY LESION: ICD-10-CM

## 2022-07-25 DIAGNOSIS — Z86.0100 HISTORY OF COLONIC POLYPS: ICD-10-CM

## 2022-07-25 DIAGNOSIS — B37.2 CANDIDAL INTERTRIGO: ICD-10-CM

## 2022-07-25 DIAGNOSIS — Z23 NEED FOR COVID-19 VACCINE: ICD-10-CM

## 2022-07-25 LAB
ALBUMIN SERPL BCG-MCNC: 4.1 G/DL (ref 3.5–5.2)
ALP SERPL-CCNC: 65 U/L (ref 40–129)
ALT SERPL W P-5'-P-CCNC: 11 U/L (ref 10–50)
ANION GAP SERPL CALCULATED.3IONS-SCNC: 12 MMOL/L (ref 7–15)
AST SERPL W P-5'-P-CCNC: 21 U/L (ref 10–50)
BILIRUB SERPL-MCNC: 0.3 MG/DL
BUN SERPL-MCNC: 20.3 MG/DL (ref 8–23)
CALCIUM SERPL-MCNC: 9.3 MG/DL (ref 8.8–10.2)
CHLORIDE SERPL-SCNC: 106 MMOL/L (ref 98–107)
CREAT SERPL-MCNC: 1 MG/DL (ref 0.67–1.17)
DEPRECATED HCO3 PLAS-SCNC: 24 MMOL/L (ref 22–29)
GFR SERPL CREATININE-BSD FRML MDRD: 74 ML/MIN/1.73M2
GLUCOSE SERPL-MCNC: 97 MG/DL (ref 70–99)
POTASSIUM SERPL-SCNC: 4.2 MMOL/L (ref 3.4–5.3)
PROT SERPL-MCNC: 7.1 G/DL (ref 6.4–8.3)
SODIUM SERPL-SCNC: 142 MMOL/L (ref 136–145)

## 2022-07-25 PROCEDURE — 91305 COVID-19,PF,PFIZER (12+ YRS): CPT | Performed by: INTERNAL MEDICINE

## 2022-07-25 PROCEDURE — 0054A COVID-19,PF,PFIZER (12+ YRS): CPT | Performed by: INTERNAL MEDICINE

## 2022-07-25 PROCEDURE — 36415 COLL VENOUS BLD VENIPUNCTURE: CPT | Performed by: INTERNAL MEDICINE

## 2022-07-25 PROCEDURE — 99214 OFFICE O/P EST MOD 30 MIN: CPT | Mod: 25 | Performed by: INTERNAL MEDICINE

## 2022-07-25 PROCEDURE — 80053 COMPREHEN METABOLIC PANEL: CPT | Performed by: INTERNAL MEDICINE

## 2022-07-25 RX ORDER — CLOTRIMAZOLE AND BETAMETHASONE DIPROPIONATE 10; .64 MG/G; MG/G
CREAM TOPICAL 2 TIMES DAILY
Qty: 45 G | Refills: 0 | Status: SHIPPED | OUTPATIENT
Start: 2022-07-25 | End: 2023-01-18

## 2022-07-25 NOTE — PATIENT INSTRUCTIONS
Use the antifungal and mild steroid cream for your groin and gluteal crease skin breakdown.  Try to avoid prolonged sitting.  Try to keep open to air is much as possible.  You can use Desitin or other diaper rash barrier products in the gluteal crease area.    No change in other medication at this time.    Up with me in 1 to 2 weeks to review your skin condition in your groin and rear end as well as review of her other medical issues.    If chest pain worsens or occurs when you are not taking a deep breath, or you develop worsening shortness of breath, get evaluated right away.    You could consider a tetanus booster in the future.    See the sleep clinic in regards to your CPAP machine function, as planned.

## 2022-07-25 NOTE — PROGRESS NOTES
Antoni Stoll   87 year old male    Date of Visit: 7/25/2022    Chief Complaint   Patient presents with     6 month  follow up      SOB, GI stomach bug recovering from last week, balance issues, upper thigh and hip pain, cream for bed sores      Subjective  87-year-old retired  here for follow-up of multiple medical issues.    He has severe kyphoscoliosis and severe spinal stenosis at L3-5 with chronic back pain.  Sees a spine clinic tomorrow.  Has seen neurosurgery but not felt to be a surgical candidate.  He is having some increasing intermittent upper thigh pain.  Does take Lyrica.  Tylenol as needed.    His blood pressure has been well controlled although not checking it lately.  No orthostasis.  Still on lisinopril 2.5 mg a day.    Normal creatinine in January of this year.    No edema.    Obstructive sleep apnea with his kyphoscoliosis.  He still wearing his CPAP machine nightly but gets a :-( on his machine.  He still feels it is working well and benefits with less daytime sleepiness.  He has a plan to discuss this with the sleep clinic soon.    No palpitations or history of atrial fibrillation.    He has some mild dyspnea on exertion which is stable.  Denies worsening shortness of breath.  No new cough.    He did have a febrile illness with GI symptoms last week, but resolved as of 3 days ago.  He did have some nausea and vomiting with that early last week.  He thought it was the stomach flu.  He did have a negative COVID test last week.    He had his left inguinal hernia repaired in February, that has healed well.    He has groin and gluteal intertrigo which is been acting up recently.  He has been using some over-the-counter butt paste type barrier cream but without benefit.  He does use Lotrisone in the past and requesting a refill.    Patient also complains of sharp left anterior chest pain when taking a deep breath in the midclavicular line that started this morning when he got out of  bed.  Its been slowly improving.  It just hurts him when he takes a deep breath.  Does not hurt in between breaths.  It is localized in between the eighth and ninth rib in the mid clavicular line.  No shingles rash or injury or bruise.    Stable exertional ability.  Stable dyspnea on exertion.  January 2021 he had a negative stress test.    He had a coronary bypass back in 2013 but no history of MI.    June 2021 heart echo with ejection fraction 76%, mild aortic insufficiency but no pulmonary hypertension noted.    Patient has a past history of glaucoma, he missed his appoint with ophthalmology last week, did reschedule.  He states his vision is stable.    He does have a history basal cell cancer with recurrence, he had to reschedule his dermatology appointment from last week as well.    Patient has an ingrown right thumb issue with some discoloration of the thumb but with minimal pain.    Patient had a right hemicolectomy more than 5 years ago for high-grade dysplasia on polyp.  Colonoscopy September 2020 was negative but 5-year versus 3-year follow-up.  Family history of colon cancer as well.    PMHx:    Past Medical History:   Diagnosis Date     Anemia      Cancer (H)     colon     Carotid artery occlusion      Coronary artery disease      Glaucoma      High cholesterol      History of colon cancer 8/13/2018     Hyperlipidemia      Hypertension      Left inguinal hernia      Low back pain      Mild aortic insufficiency      Moderate tricuspid regurgitation      Postnasal drip      Sleep apnea, obstructive     CPAP     Spinal stenosis     lumbar     SSS (sick sinus syndrome) (H) 12/9/2019     Umbilical hernia      Unspecified cataract     Created by Conversion      PSHx:    Past Surgical History:   Procedure Laterality Date     ARTERIAL BYPASS SURGERY       BLEPHAROPLASTY Bilateral      BYPASS GRAFT ARTERY CORONARY  06/28/2013    5-vessel     BYPASS GRAFT ARTERY CORONARY  2013    5 vessel     CARDIAC  CATHETERIZATION  06/03/2013     CATARACT EXTRACTION Bilateral      COLON SURGERY Right      HERNIA REPAIR      right inguinal     HERNIORRHAPHY INGUINAL Left 2/14/2022    Procedure: INCARCERATED LEFT INGUINAL HERNIA REPAIR WITH MESH;  Surgeon: Jack Naqvi MD;  Location: [facility] - [place] OR     STRIP VEIN Left      Immunizations:   Immunization History   Administered Date(s) Administered     COVID-19,PF,Pfizer (12+ Yrs) 04/16/2021, 05/18/2021, 01/11/2022     FLU 6-35 months 10/11/2010     FLUAD(HD)65+ QUAD 11/05/2021     Flu, Unspecified 10/23/2007, 10/29/2009, 10/11/2010, 11/08/2011     Influenza (H1N1) 01/18/2010     Influenza (High Dose) 3 valent vaccine 10/13/2015, 10/18/2016, 11/01/2017, 12/10/2018, 12/11/2019     Influenza (IIV3) PF 10/23/2007, 10/29/2009, 11/08/2011, 11/29/2012, 10/25/2013, 11/19/2014     Influenza Vaccine, 6+MO IM (QUADRIVALENT W/PRESERVATIVES) 11/29/2012, 10/25/2013, 11/19/2014     Influenza, Quad, High Dose, Pf, 65yr+ (Fluzone HD) 11/30/2020     Pneumo Conj 13-V (2010&after) 04/09/2015     Pneumococcal 23 valent 10/03/1999, 03/06/2007     Td (Adult), Adsorbed 03/11/1992, 10/24/2000, 04/11/2011     Zoster vaccine, live 11/08/2007       ROS A comprehensive review of systems was performed and was otherwise negative    Medications, allergies, and problem list were reviewed and updated    Exam  /73   Pulse 77   Wt 63 kg (139 lb)   SpO2 97%   BMI 23.49 kg/m    Lungs are clear to auscultation with some reduced respiratory excursion with a severe kyphoscoliosis.  He does have recreation of the left mid clavicular chest pain with palpation in between his eighth and ninth rib in the midclavicular line on the left.  There is no crepitus.  Bones are stable.  Just mild pain to palpations.  Heart is regular without murmur gallop or rub.  Abdomen is nontender, no epigastric tenderness.  No ankle edema.  His right fingernail shows some evidence of ingrown green type material but does not appear  to be a pigmented type lesion.  Does not appear inflamed or erythema.    His groin does show some severe candidal intertrigo and severe gluteal crease intertrigo with cracking of the skin but no secondary cellulitis.  Patient can stand and ambulate.    Assessment/Plan  1. Essential hypertension  Controlled.  Continue current lisinopril 2.5 mg a day    2. Obstructive sleep apnea on CPAP  Compliant with CPAP and he feels is benefiting with reduced daytime sleepiness.  But the CPAP machine is still giving a sensor that it is not adequate control, he will contact the sleep clinic to see if there are any leaks or issues with the CPAP mask.    Underlying severe kyphoscoliosis    3. Coronary artery disease due to calcified coronary lesion  Has been asymptomatic.  Stable dyspnea on exertion with negative stress test last year and normal ejection fraction last year.    Left anterior chest wall pain only with deep breath consistent with chest wall strain with a severe kyphoscoliosis    Continue aspirin and atorvastatin 20 mg, excellent cholesterol levels in January    4. Chest wall pain  As above.  Patient was offered an EKG today but he declined.  Patient was told to follow-up for any worsening symptoms.    5. History of colonic polyps  Anticipate a 3-year follow-up colonoscopy September 2023    6. Glaucoma, unspecified glaucoma type, unspecified laterality  Patient follow-up with ophthalmology, controlled on drops    7. History of basal cell carcinoma (BCC) of skin  Patient has rescheduled his dermatology appointment.    The right thumb appears to be an ingrown material underneath the thumbnail, but does not appear infected or inflamed.  Does not appear to be a pigmented lesion.  Will follow.    8. Encounter for therapeutic drug monitoring    - Comprehensive metabolic panel    9. Candidal intertrigo  Severe intertrigo.  He did want to restart with Lotrisone.  I did discuss the barrier cream to continue to use and avoid  prolonged sitting.    Follow-up in 1 to 2 weeks to reevaluate this condition  - clotrimazole-betamethasone (LOTRISONE) 1-0.05 % external cream; Apply topically 2 times daily Apply to affected areas in the gluteal crease and groin twice a day  Dispense: 45 g; Refill: 0    10. Need for COVID-19 vaccine  Patient did not want to get the booster shot today.  - COVID-19,PF,PFIZER (12+ Yrs GRAY LABEL)    I did discuss that he could consider a tetanus and Shingrix vaccine in the future.    Successful surgical repair of left inguinal hernia in February of this year.    GI symptoms last week consistent with the stomach flu or GI infectious illness, essentially resolved at this time.      Return in about 2 weeks (around 8/8/2022) for Follow up.   Patient Instructions   Use the antifungal and mild steroid cream for your groin and gluteal crease skin breakdown.  Try to avoid prolonged sitting.  Try to keep open to air is much as possible.  You can use Desitin or other diaper rash barrier products in the gluteal crease area.    No change in other medication at this time.    Up with me in 1 to 2 weeks to review your skin condition in your groin and rear end as well as review of her other medical issues.    If chest pain worsens or occurs when you are not taking a deep breath, or you develop worsening shortness of breath, get evaluated right away.    You could consider a tetanus booster in the future.    See the sleep clinic in regards to your CPAP machine function, as planned.    Sarthak Romano MD, MD        Current Outpatient Medications   Medication Sig Dispense Refill     acetaminophen (TYLENOL) 500 MG tablet [ACETAMINOPHEN (TYLENOL) 500 MG TABLET] Take 1,000 mg by mouth daily .             aspirin 325 MG tablet [ASPIRIN 325 MG TABLET] Take 325 mg by mouth daily.       atorvastatin (LIPITOR) 20 MG tablet Take 1 tablet (20 mg) by mouth daily 90 tablet 3     azelastine (ASTELIN) 0.1 % nasal spray 2 sprays each nostril 1-2x daily  as needed for nasal congestion (use nightly for first 2 week) 30 mL 11     B2/VITS A,C,E/LUT/ZEAXANTH/MIN (ICAPS ORAL) [B2/VITS A,C,E/LUT/ZEAXANTH/MIN (ICAPS ORAL)] Take 1 capsule by mouth daily.       calcium polycarbophil (FIBERCON) 625 mg tablet [CALCIUM POLYCARBOPHIL (FIBERCON) 625 MG TABLET] Take 1,250 mg by mouth daily.       cholecalciferol, vitamin D3, 1,000 unit tablet [CHOLECALCIFEROL, VITAMIN D3, 1,000 UNIT TABLET] Take 1,000 Units by mouth daily.       clotrimazole-betamethasone (LOTRISONE) 1-0.05 % external cream Apply topically 2 times daily Apply to affected areas in the gluteal crease and groin twice a day 45 g 0     glucosamine-chondroitin 500-400 mg cap [GLUCOSAMINE-CHONDROITIN 500-400 MG CAP] Take 1 capsule by mouth 2 (two) times a day.       HYDROcodone-acetaminophen (NORCO) 5-325 MG tablet Take 1-2 tablets by mouth every 4 hours as needed for moderate to severe pain 20 tablet 0     latanoprost (XALATAN) 0.005 % ophthalmic solution Place 1 drop into both eyes At Bedtime       lisinopril (ZESTRIL) 2.5 MG tablet [LISINOPRIL (PRINIVIL,ZESTRIL) 2.5 MG TABLET] TAKE 1 TABLET BY MOUTH EVERY DAY 90 tablet 2     pregabalin (LYRICA) 25 MG capsule Take 1 capsule (25 mg) by mouth 2 times daily 60 capsule 0     saw palmetto fruit 450 mg cap [SAW PALMETTO FRUIT 450 MG CAP] Take 1 capsule by mouth 2 (two) times a day.       tamsulosin (FLOMAX) 0.4 mg Cp24 [TAMSULOSIN (FLOMAX) 0.4 MG CP24] Take 0.4 mg by mouth at bedtime.       No Known Allergies  Social History     Tobacco Use     Smoking status: Never Smoker     Smokeless tobacco: Never Used   Substance Use Topics     Alcohol use: Yes     Comment: Alcoholic Drinks/day: 5 drinks weekly     Drug use: No             Subjective   Wily is a 87 year old, presenting for the following health issues:  6 month  follow up  (SOB, GI stomach bug recovering from last week, balance issues, upper thigh and hip pain, cream for bed sores )      HPI     Stomach bug last week,  balance issues, high and hip pain, bed sores     Taking Medication as prescribed: yes    Side Effects:  None    Medication Helping Symptoms:  not applicable        Review of Systems         Objective    There were no vitals taken for this visit.  There is no height or weight on file to calculate BMI.  Physical Exam                       .  ..

## 2022-07-26 ENCOUNTER — OFFICE VISIT (OUTPATIENT)
Dept: PHYSICAL MEDICINE AND REHAB | Facility: CLINIC | Age: 87
End: 2022-07-26
Payer: MEDICARE

## 2022-07-26 VITALS
HEART RATE: 80 BPM | BODY MASS INDEX: 23.66 KG/M2 | DIASTOLIC BLOOD PRESSURE: 74 MMHG | WEIGHT: 140 LBS | SYSTOLIC BLOOD PRESSURE: 145 MMHG

## 2022-07-26 DIAGNOSIS — G89.29 CHRONIC RIGHT SHOULDER PAIN: ICD-10-CM

## 2022-07-26 DIAGNOSIS — M25.551 HIP PAIN, RIGHT: ICD-10-CM

## 2022-07-26 DIAGNOSIS — M25.511 CHRONIC RIGHT SHOULDER PAIN: ICD-10-CM

## 2022-07-26 DIAGNOSIS — M43.16 SPONDYLOLISTHESIS OF LUMBAR REGION: Primary | ICD-10-CM

## 2022-07-26 DIAGNOSIS — M75.121 COMPLETE TEAR OF RIGHT ROTATOR CUFF, UNSPECIFIED WHETHER TRAUMATIC: ICD-10-CM

## 2022-07-26 DIAGNOSIS — M48.062 SPINAL STENOSIS OF LUMBAR REGION WITH NEUROGENIC CLAUDICATION: ICD-10-CM

## 2022-07-26 PROCEDURE — 99214 OFFICE O/P EST MOD 30 MIN: CPT | Performed by: PHYSICAL MEDICINE & REHABILITATION

## 2022-07-26 NOTE — PROGRESS NOTES
Assessment/Plan:      Antoni was seen today for back pain.    Diagnoses and all orders for this visit:    Spondylolisthesis of lumbar region  -     MR Lumbar Spine w/o Contrast; Future    Spinal stenosis of lumbar region with neurogenic claudication  -     MR Lumbar Spine w/o Contrast; Future    Hip pain, right  -     XR Hip Right 2-3 Views; Future    Chronic right shoulder pain  -     PAIN US Large Joint Injection Unilateral; Future    Complete tear of right rotator cuff, unspecified whether traumatic  -     PAIN US Large Joint Injection Unilateral; Future         Assessment: Pleasant 87 year old male  with history of colon cancer, hyperlipidemia, hypertension, carotid artery occlusion, coronary artery disease with:       1.  Worsening of chronic lumbar spine pain with bilateral lower extremity claudication symptoms.    He has right greater than left lower extremity pain and paresthesias related to severe spinal stenosis L4-5 and slightly less at L3-4  with L4-5 spondylolisthesis.  There is tortuosity of the nerve roots.  Pain persist despite physical therapy, medications and lumbar epidurals. bilateral L5-S1 transforaminal epidural steroid injection did offer some relief but only 40 to 50% and that benefit slowly diminished over 1 month. He continues to have numbness and tingling in his right foot.  He was seen by Dr. Kenny at [facility] orth[facility].  He would be a surgical candidate but holding off as long as possible.  Continues with no weakness may be some equivocal of the right great toe extensors.  Overall the symptoms are worsening however with new/worsening right greater trochanter/proximal L5.    2.  Right hip pain.  This is most consistent with radicular pain but does have slight decreased internal rotation of the right hip versus left on exam.      3.  Worsening right shoulder pain.  Has had a complete tear of the rotator cuff supraspinatus with retraction of the glenohumeral joint effusion in the past  which responded well to glenohumeral joint injection for at least 6 months with pain is worsened over the past few months.      Discussion:    1.  I discussed the diagnosis and treatment options.  We discussed the options of further imaging which are needed potential injections.    2.  MRI lumbar spine evaluate for progression of the spondylolisthesis as well as spinal stenosis.    3.  Plain films of the right hip to evaluate for osteoarthritis.    4.  He responded well to right glenohumeral joint injection 1 year ago.  Recommend right glenohumeral joint injection under ultrasound guidance with Dr. iMr.  This is been ordered.    5.  Follow-up with me to review his MRI and x-ray prior to injection.      It was our pleasure caring for your patient today, if there any questions or concerns please do not hesitate to contact us.      Subjective:   Patient ID: Antoni Stoll is a 87 year old male.    History of Present Illness: Patient presents for follow-up of lumbar spine and bilateral lower extremity radicular pain with spinal stenosis and right shoulder pain.  I have not seen him for about 1 year.  Has had worsening pain in the low back with new pain in the right gluteal region to the greater trochanter as well as pain in the thighs anterior and posterior with numbness from the calves distally bilaterally.  His symptoms are worsening worse with any standing better with sitting.  He is finding it difficult to walk due to the pain especially the first few steps.  Pain is a 7/10 at worst 3/10 today 2/10 at best.  Taking pregabalin and Tylenol.  Has not had any recent imaging other than imaging 1 year ago.    Also continues to have right shoulder pain.  Last injection was in June of last year did very well with glenohumeral joint injection under ultrasound.  Tells me the pain is come back over the past several months he had 6 months of very good relief of his symptoms.  Any movement of the right arm causes  pain.      Imaging: MRI report and images were personally reviewed and discussed with the patient.  A plastic model was utilized during the discussion.  MRI of the lumbar spine personally reviewed.  This reveals grade 1-2 L4-5 spondylolisthesis with severe spinal stenosis tortuosity of the nerve roots.  Also has severe spinal stenosis L3-4.     MRI of the right shoulder was reviewed.  This reveals broad-based full-thickness retracted supraspinatus tear involving the entire supraspinatus slight loss of muscle bulk with subacromial subdeltoid bursitis and mild glenohumeral joint degenerative changes with effusion.    Review of Systems: Pertinent positives: Numbness tingling weakness in the legs.  Pertinent negatives:   No bowel or bladder incontinence.  No urinary retention.  No fevers, unintentional weight loss, balance changes, headaches, frequent falling, difficulty swallowing, or coordination difficulties.  All others reviewed are negative.    Past Medical History:   Diagnosis Date     Anemia      Cancer (H)     colon     Carotid artery occlusion      Coronary artery disease      Glaucoma      High cholesterol      History of colon cancer 8/13/2018     Hyperlipidemia      Hypertension      Left inguinal hernia      Low back pain      Mild aortic insufficiency      Moderate tricuspid regurgitation      Postnasal drip      Sleep apnea, obstructive     CPAP     Spinal stenosis     lumbar     SSS (sick sinus syndrome) (H) 12/9/2019     Umbilical hernia      Unspecified cataract     Created by Conversion        The following portions of the patient's history were reviewed and updated as appropriate: allergies, current medications, past family history, past medical history, past social history, past surgical history and problem list.           Objective:   Physical Exam:    BP (!) 145/74   Pulse 80   Wt 140 lb (63.5 kg)   BMI 23.66 kg/m    Body mass index is 23.66 kg/m .      General: Alert and oriented with normal  affect. Attention, knowledge, memory, and language are intact. No acute distress.   Eyes: Sclerae are clear.  Respirations: Unlabored. CV: No lower extremity edema.  Skin: No rashes seen.    Gait:  Nonantalgic, mildly shuffling flexed at the waist.  Decreased range of motion right shoulder in abduction past 90 degrees.  Decreased internal rotation.  No tenderness over the right shoulder.  Sensation is intact to light touch throughout the upper and lower extremities.  Reflexes are   negative Hoffmans.0 patellar and Achilles      Manual muscle testing reveals:  Right /Left out of 5     5/5 elbow flexors  5/5 elbow extensors  5/5 wrist extensors  5/5 interosseus  5/5 finger flexors  5/5 hip flexors  5/5 knee flexors  5/5 knee extensors  5/5 ankle plantar flexors  5/5 ankle dorsiflexors  5 minus /5  EHL

## 2022-07-26 NOTE — PATIENT INSTRUCTIONS
An MRI and xray was ordered for you today.  You will be contacted by scheduling within 3 days.    If you are not contacted, please call Radiology at [phone].    2. A right shoulder injection has been ordered today. Please schedule this injection at least  2 weeks from now to allow time for insurance prior authorization. On the day of your injection, you cannot be sick or taking antibiotics. If you become sick and are prescribed, please call the clinic so your injection can be rescheduled for once you have completed your antibiotics. You will need to bring a  with you for your injection. If you have any questions or concerns prior to your injection, please do not hesitate to call the nurse navigation line at [phone].

## 2022-07-26 NOTE — LETTER
7/26/2022         RE: Antoni Stoll  [address]  [facility] [address]        Dear Colleague,    Thank you for referring your patient, Antoni Stoll, to the [facility]. Please see a copy of my visit note below.    Assessment/Plan:      Antoni was seen today for back pain.    Diagnoses and all orders for this visit:    Spondylolisthesis of lumbar region  -     MR Lumbar Spine w/o Contrast; Future    Spinal stenosis of lumbar region with neurogenic claudication  -     MR Lumbar Spine w/o Contrast; Future    Hip pain, right  -     XR Hip Right 2-3 Views; Future    Chronic right shoulder pain  -     PAIN US Large Joint Injection Unilateral; Future    Complete tear of right rotator cuff, unspecified whether traumatic  -     PAIN US Large Joint Injection Unilateral; Future         Assessment: Pleasant 87 year old male  with history of colon cancer, hyperlipidemia, hypertension, carotid artery occlusion, coronary artery disease with:       1.  Worsening of chronic lumbar spine pain with bilateral lower extremity claudication symptoms.    He has right greater than left lower extremity pain and paresthesias related to severe spinal stenosis L4-5 and slightly less at L3-4  with L4-5 spondylolisthesis.  There is tortuosity of the nerve roots.  Pain persist despite physical therapy, medications and lumbar epidurals. bilateral L5-S1 transforaminal epidural steroid injection did offer some relief but only 40 to 50% and that benefit slowly diminished over 1 month. He continues to have numbness and tingling in his right foot.  He was seen by Dr. Kenny at [facility].  He would be a surgical candidate but holding off as long as possible.  Continues with no weakness may be some equivocal of the right great toe extensors.  Overall the symptoms are worsening however with new/worsening right greater trochanter/proximal L5.    2.  Right hip pain.  This is most consistent with  radicular pain but does have slight decreased internal rotation of the right hip versus left on exam.      3.  Worsening right shoulder pain.  Has had a complete tear of the rotator cuff supraspinatus with retraction of the glenohumeral joint effusion in the past which responded well to glenohumeral joint injection for at least 6 months with pain is worsened over the past few months.      Discussion:    1.  I discussed the diagnosis and treatment options.  We discussed the options of further imaging which are needed potential injections.    2.  MRI lumbar spine evaluate for progression of the spondylolisthesis as well as spinal stenosis.    3.  Plain films of the right hip to evaluate for osteoarthritis.    4.  He responded well to right glenohumeral joint injection 1 year ago.  Recommend right glenohumeral joint injection under ultrasound guidance with Dr. Mir.  This is been ordered.    5.  Follow-up with me to review his MRI and x-ray prior to injection.      It was our pleasure caring for your patient today, if there any questions or concerns please do not hesitate to contact us.      Subjective:   Patient ID: Antoni Stoll is a 87 year old male.    History of Present Illness: Patient presents for follow-up of lumbar spine and bilateral lower extremity radicular pain with spinal stenosis and right shoulder pain.  I have not seen him for about 1 year.  Has had worsening pain in the low back with new pain in the right gluteal region to the greater trochanter as well as pain in the thighs anterior and posterior with numbness from the calves distally bilaterally.  His symptoms are worsening worse with any standing better with sitting.  He is finding it difficult to walk due to the pain especially the first few steps.  Pain is a 7/10 at worst 3/10 today 2/10 at best.  Taking pregabalin and Tylenol.  Has not had any recent imaging other than imaging 1 year ago.    Also continues to have right shoulder pain.  Last  injection was in June of last year did very well with glenohumeral joint injection under ultrasound.  Tells me the pain is come back over the past several months he had 6 months of very good relief of his symptoms.  Any movement of the right arm causes pain.      Imaging: MRI report and images were personally reviewed and discussed with the patient.  A plastic model was utilized during the discussion.  MRI of the lumbar spine personally reviewed.  This reveals grade 1-2 L4-5 spondylolisthesis with severe spinal stenosis tortuosity of the nerve roots.  Also has severe spinal stenosis L3-4.     MRI of the right shoulder was reviewed.  This reveals broad-based full-thickness retracted supraspinatus tear involving the entire supraspinatus slight loss of muscle bulk with subacromial subdeltoid bursitis and mild glenohumeral joint degenerative changes with effusion.    Review of Systems: Pertinent positives: Numbness tingling weakness in the legs.  Pertinent negatives:   No bowel or bladder incontinence.  No urinary retention.  No fevers, unintentional weight loss, balance changes, headaches, frequent falling, difficulty swallowing, or coordination difficulties.  All others reviewed are negative.    Past Medical History:   Diagnosis Date     Anemia      Cancer (H)     colon     Carotid artery occlusion      Coronary artery disease      Glaucoma      High cholesterol      History of colon cancer 8/13/2018     Hyperlipidemia      Hypertension      Left inguinal hernia      Low back pain      Mild aortic insufficiency      Moderate tricuspid regurgitation      Postnasal drip      Sleep apnea, obstructive     CPAP     Spinal stenosis     lumbar     SSS (sick sinus syndrome) (H) 12/9/2019     Umbilical hernia      Unspecified cataract     Created by Conversion        The following portions of the patient's history were reviewed and updated as appropriate: allergies, current medications, past family history, past medical  history, past social history, past surgical history and problem list.           Objective:   Physical Exam:    BP (!) 145/74   Pulse 80   Wt 140 lb (63.5 kg)   BMI 23.66 kg/m    Body mass index is 23.66 kg/m .      General: Alert and oriented with normal affect. Attention, knowledge, memory, and language are intact. No acute distress.   Eyes: Sclerae are clear.  Respirations: Unlabored. CV: No lower extremity edema.  Skin: No rashes seen.    Gait:  Nonantalgic, mildly shuffling flexed at the waist.  Decreased range of motion right shoulder in abduction past 90 degrees.  Decreased internal rotation.  No tenderness over the right shoulder.  Sensation is intact to light touch throughout the upper and lower extremities.  Reflexes are   negative Hoffmans.0 patellar and Achilles      Manual muscle testing reveals:  Right /Left out of 5     5/5 elbow flexors  5/5 elbow extensors  5/5 wrist extensors  5/5 interosseus  5/5 finger flexors  5/5 hip flexors  5/5 knee flexors  5/5 knee extensors  5/5 ankle plantar flexors  5/5 ankle dorsiflexors  5 minus /5  EHL        Again, thank you for allowing me to participate in the care of your patient.        Sincerely,        Orlin Mir, DO

## 2022-08-08 ENCOUNTER — TELEPHONE (OUTPATIENT)
Dept: PHYSICAL MEDICINE AND REHAB | Facility: CLINIC | Age: 87
End: 2022-08-08

## 2022-08-08 DIAGNOSIS — M43.16 SPONDYLOLISTHESIS OF LUMBAR REGION: ICD-10-CM

## 2022-08-08 DIAGNOSIS — M48.062 SPINAL STENOSIS OF LUMBAR REGION WITH NEUROGENIC CLAUDICATION: ICD-10-CM

## 2022-08-08 NOTE — TELEPHONE ENCOUNTER
"PSP:  Dr. Mir  Last clinic visit:  7/26/2022  Reason for call: Pregabalin refill  Clinical information:  Call placed to pt. Pt inquiring about Pregabalin refill. He takes Pregabalin 25 mg 2 times daily. Pharmacy verified. Order prepped.   Advice given to patient: Informed pt his request will be sent to provider for review/approval.   Provider to address: Please refill if appropriate to [facility] in [place] . Unable to prep order as it states \"order already prepped by Dr. Mir\".       "

## 2022-08-08 NOTE — TELEPHONE ENCOUNTER
[facility] Call Center    Phone Message    May a detailed message be left on voicemail: no     Reason for Call: Other: No answer @ [facility]. Please c/b patient to discuss med refill. Please call after 10:30am

## 2022-08-09 ENCOUNTER — OFFICE VISIT (OUTPATIENT)
Dept: INTERNAL MEDICINE | Facility: CLINIC | Age: 87
End: 2022-08-09
Payer: MEDICARE

## 2022-08-09 VITALS
SYSTOLIC BLOOD PRESSURE: 112 MMHG | BODY MASS INDEX: 23.66 KG/M2 | HEART RATE: 71 BPM | OXYGEN SATURATION: 96 % | WEIGHT: 140 LBS | DIASTOLIC BLOOD PRESSURE: 56 MMHG

## 2022-08-09 DIAGNOSIS — B37.2 CANDIDAL INTERTRIGO: Primary | ICD-10-CM

## 2022-08-09 DIAGNOSIS — G47.33 OBSTRUCTIVE SLEEP APNEA ON CPAP: ICD-10-CM

## 2022-08-09 DIAGNOSIS — Z23 NEED FOR DIPHTHERIA-TETANUS-PERTUSSIS (TDAP) VACCINE: ICD-10-CM

## 2022-08-09 DIAGNOSIS — I10 ESSENTIAL HYPERTENSION: ICD-10-CM

## 2022-08-09 PROCEDURE — 99214 OFFICE O/P EST MOD 30 MIN: CPT | Performed by: INTERNAL MEDICINE

## 2022-08-09 RX ORDER — PREGABALIN 25 MG/1
25 CAPSULE ORAL 2 TIMES DAILY
Qty: 60 CAPSULE | Refills: 2 | Status: SHIPPED | OUTPATIENT
Start: 2022-08-09 | End: 2022-11-03

## 2022-08-09 RX ORDER — NYSTATIN 100000 [USP'U]/G
POWDER TOPICAL
Qty: 60 G | Refills: 3 | Status: SHIPPED | OUTPATIENT
Start: 2022-08-09 | End: 2024-10-03

## 2022-08-09 NOTE — PATIENT INSTRUCTIONS
You can use the nystatin powder in your groin area if your rash flares up again.  If the rash becomes severe despite the nystatin powder, you can contact me for another treatment course of the Lotrisone cream.    Continue with your CPAP.  Get the supplies you need.  If you need adjustment of your mask or different types of supplies, you will need to follow-up with the sleep clinic to determine if any changes in your equipment are needed.    Current tetanus shot today is good for 10 years unless you have a cut or puncture wound.    See me next winter for your yearly physical exam.

## 2022-08-09 NOTE — PROGRESS NOTES
Antoni Stoll   87 year old male    Date of Visit: 8/9/2022    Chief Complaint   Patient presents with     Follow Up     Rash has improved, no chest pain X 1 week     Subjective  87-year-old male is here for follow-up of groin intertrigo, severe on July 25 visit.  Also here for other medical issues.    He was using a topical barrier cream in his gluteal intertrigo in groin with significant flare of the candidal intertrigo.  I gave him Lotrisone cream until completely cleared up now.  He is just finishing up with that Lotrisone cream now.    He has obstructive sleep apnea, but compliant with CPAP.  He needed some new equipment for CPAP including restarting a chinstrap and getting a new nasal mask.  He has met with his medical supply company, but needed a new order, and is not in contact with his sleep clinic at this time.  He feels the CPAP is working okay but does need a chinstrap.  Patient was warned that he may need to follow-up with the sleep clinic if he needs adjustment to his CPAP or further evaluation on that.    Severe kyphosis with severe spinal stenosis.  Remains regular with ambulation.  Does physical therapy.  No flare currently.  He is planning some imaging coming up for the spine with his spine clinic.    Stable BPH on Flomax.    Hypertension controlled on lisinopril 2.5 mg a day.    He had some left chest wall pain on July 25, that slowly resolved, consistent with left 8-9 chest wall pain.    Coronary bypass in 2013.  No ischemic type chest pain.        PMHx:    Past Medical History:   Diagnosis Date     Anemia      Cancer (H)     colon     Carotid artery occlusion      Coronary artery disease      Glaucoma      High cholesterol      History of colon cancer 8/13/2018     Hyperlipidemia      Hypertension      Left inguinal hernia      Low back pain      Mild aortic insufficiency      Moderate tricuspid regurgitation      Postnasal drip      Sleep apnea, obstructive     CPAP     Spinal stenosis      lumbar     SSS (sick sinus syndrome) (H) 12/9/2019     Umbilical hernia      Unspecified cataract     Created by Conversion      PSHx:    Past Surgical History:   Procedure Laterality Date     ARTERIAL BYPASS SURGERY       BLEPHAROPLASTY Bilateral      BYPASS GRAFT ARTERY CORONARY  06/28/2013    5-vessel     BYPASS GRAFT ARTERY CORONARY  2013    5 vessel     CARDIAC CATHETERIZATION  06/03/2013     CATARACT EXTRACTION Bilateral      COLON SURGERY Right      HERNIA REPAIR      right inguinal     HERNIORRHAPHY INGUINAL Left 2/14/2022    Procedure: INCARCERATED LEFT INGUINAL HERNIA REPAIR WITH MESH;  Surgeon: Jack Naqvi MD;  Location: [facility] [place] [place] OR     STRIP VEIN Left      Immunizations:   Immunization History   Administered Date(s) Administered     COVID-19,PF,Pfizer (12+ Yrs) 04/16/2021, 05/18/2021, 01/11/2022     COVID-19,PF,Pfizer 12+ Yrs (2022 and After) 07/25/2022     FLU 6-35 months 10/11/2010     FLUAD(HD)65+ QUAD 11/05/2021     Flu, Unspecified 10/23/2007, 10/29/2009, 10/11/2010, 11/08/2011     Influenza (H1N1) 01/18/2010     Influenza (High Dose) 3 valent vaccine 10/13/2015, 10/18/2016, 11/01/2017, 12/10/2018, 12/11/2019     Influenza (IIV3) PF 10/23/2007, 10/29/2009, 11/08/2011, 11/29/2012, 10/25/2013, 11/19/2014     Influenza Vaccine, 6+MO IM (QUADRIVALENT W/PRESERVATIVES) 11/29/2012, 10/25/2013, 11/19/2014     Influenza, Quad, High Dose, Pf, 65yr+ (Fluzone HD) 11/30/2020     Pneumo Conj 13-V (2010&after) 04/09/2015     Pneumococcal 23 valent 10/03/1999, 03/06/2007     Td (Adult), Adsorbed 03/11/1992, 10/24/2000, 04/11/2011     Zoster vaccine, live 11/08/2007       ROS A comprehensive review of systems was performed and was otherwise negative    Medications, allergies, and problem list were reviewed and updated    Exam  /56 (BP Location: Right arm, Patient Position: Sitting, Cuff Size: Adult Regular)   Pulse 71   Wt 63.5 kg (140 lb)   SpO2 96%   BMI 23.66 kg/m    Lungs are clear.   Severe kyphoscoliosis.  Able to climb on exam table.  Heart is regular without murmur.    Groin shows almost complete resolution of the intertrigo.  Just some mild postinflammatory skin changes in the groin.  No open areas.    Assessment/Plan  1. Candidal intertrigo  Resolved with Lotrisone.  Patient was warned he cannot use that chronically, but could contact me for a retreatment in the future if severe exacerbation again.    Use nystatin powder for more mild exacerbations.  - nystatin (MYCOSTATIN) 604165 UNIT/GM external powder; Apply to affected areas twice a day if flare of rash  Dispense: 60 g; Refill: 3    2. Obstructive sleep apnea on CPAP  Prescription given for a chinstrap and nasal device.  If further clarification of CPAP supplies are needed or changes to his CPAP, he will need to follow-up with the sleep clinic to discuss that.  - SILVANA APPL DEV, USED WITH PAP DEVICE  - CHIN STRAP, USED WITH PAP DEVICE  - Sleep DME    3. Essential hypertension  Controlled on current lisinopril.  Follow-up for physical exam next winter.    4. Need for diphtheria-tetanus-pertussis (Tdap) vaccine  Patient does do some gardening.  Patient did want to have a Tdap.  When patient was told he may have to pay for the Tdap, he canceled the Tdap and will get it on his own at pharmacy  - TDAP VACCINE (Adacel, Boostrix)  [[id number]]    Coronary artery disease, chest wall pain resolved.  No new cardiac type chest pain.  Continue aspirin and Lipitor.    History of basal cell cancer.  Has dermatology appointment later this week.    Glaucoma on drops.  Just ophthalmology last month.    History of high-grade colon polyp with negative colonoscopy September 2020.  Follow-up colonoscopy plans to be determined.      Return in about 5 months (around 1/20/2023) for Routine preventive.   Patient Instructions   You can use the nystatin powder in your groin area if your rash flares up again.  If the rash becomes severe despite the nystatin powder, you  can contact me for another treatment course of the Lotrisone cream.    Continue with your CPAP.  Get the supplies you need.  If you need adjustment of your mask or different types of supplies, you will need to follow-up with the sleep clinic to determine if any changes in your equipment are needed.    Current tetanus shot today is good for 10 years unless you have a cut or puncture wound.    See me next winter for your yearly physical exam.    Satrhak Romano MD, MD        Current Outpatient Medications   Medication Sig Dispense Refill     acetaminophen (TYLENOL) 500 MG tablet [ACETAMINOPHEN (TYLENOL) 500 MG TABLET] Take 1,000 mg by mouth daily .             aspirin 325 MG tablet [ASPIRIN 325 MG TABLET] Take 325 mg by mouth daily.       atorvastatin (LIPITOR) 20 MG tablet Take 1 tablet (20 mg) by mouth daily 90 tablet 3     azelastine (ASTELIN) 0.1 % nasal spray 2 sprays each nostril 1-2x daily as needed for nasal congestion (use nightly for first 2 week) 30 mL 11     B2/VITS A,C,E/LUT/ZEAXANTH/MIN (ICAPS ORAL) [B2/VITS A,C,E/LUT/ZEAXANTH/MIN (ICAPS ORAL)] Take 1 capsule by mouth daily.       calcium polycarbophil (FIBERCON) 625 mg tablet [CALCIUM POLYCARBOPHIL (FIBERCON) 625 MG TABLET] Take 1,250 mg by mouth daily.       cholecalciferol, vitamin D3, 1,000 unit tablet [CHOLECALCIFEROL, VITAMIN D3, 1,000 UNIT TABLET] Take 1,000 Units by mouth daily.       clotrimazole-betamethasone (LOTRISONE) 1-0.05 % external cream Apply topically 2 times daily Apply to affected areas in the gluteal crease and groin twice a day 45 g 0     glucosamine-chondroitin 500-400 mg cap [GLUCOSAMINE-CHONDROITIN 500-400 MG CAP] Take 1 capsule by mouth 2 (two) times a day.       latanoprost (XALATAN) 0.005 % ophthalmic solution Place 1 drop into both eyes At Bedtime       lisinopril (ZESTRIL) 2.5 MG tablet [LISINOPRIL (PRINIVIL,ZESTRIL) 2.5 MG TABLET] TAKE 1 TABLET BY MOUTH EVERY DAY 90 tablet 2     nystatin (MYCOSTATIN) 627236 UNIT/GM external  powder Apply to affected areas twice a day if flare of rash 60 g 3     pregabalin (LYRICA) 25 MG capsule Take 1 capsule (25 mg) by mouth 2 times daily 60 capsule 2     saw palmetto fruit 450 mg cap [SAW PALMETTO FRUIT 450 MG CAP] Take 1 capsule by mouth 2 (two) times a day.       tamsulosin (FLOMAX) 0.4 mg Cp24 [TAMSULOSIN (FLOMAX) 0.4 MG CP24] Take 0.4 mg by mouth at bedtime.       HYDROcodone-acetaminophen (NORCO) 5-325 MG tablet Take 1-2 tablets by mouth every 4 hours as needed for moderate to severe pain (Patient not taking: Reported on 8/9/2022) 20 tablet 0     No Known Allergies  Social History     Tobacco Use     Smoking status: Never Smoker     Smokeless tobacco: Never Used   Substance Use Topics     Alcohol use: Yes     Comment: Alcoholic Drinks/day: 5 drinks weekly     Drug use: No             Subjective   Wily is a 87 year old, presenting for the following health issues:  Follow Up (Rash has improved, no chest pain X 1 week)      History of Present Illness       Hypertension: He presents for follow up of hypertension.  He does check blood pressure  regularly outside of the clinic. Outside blood pressures have been over 140/90. He follows a low salt diet.               Review of Systems         Objective    /56 (BP Location: Right arm, Patient Position: Sitting, Cuff Size: Adult Regular)   Pulse 71   Wt 63.5 kg (140 lb)   SpO2 96%   BMI 23.66 kg/m    Body mass index is 23.66 kg/m .  Physical Exam                       .  ..

## 2022-08-11 ENCOUNTER — HOSPITAL ENCOUNTER (OUTPATIENT)
Dept: MRI IMAGING | Facility: HOSPITAL | Age: 87
Discharge: HOME | End: 2022-08-11
Attending: PHYSICAL MEDICINE & REHABILITATION
Payer: MEDICARE

## 2022-08-11 ENCOUNTER — HOSPITAL ENCOUNTER (OUTPATIENT)
Dept: GENERAL RADIOLOGY | Facility: HOSPITAL | Age: 87
Discharge: HOME | End: 2022-08-11
Attending: PHYSICAL MEDICINE & REHABILITATION
Payer: MEDICARE

## 2022-08-11 DIAGNOSIS — M48.062 SPINAL STENOSIS OF LUMBAR REGION WITH NEUROGENIC CLAUDICATION: ICD-10-CM

## 2022-08-11 DIAGNOSIS — M43.16 SPONDYLOLISTHESIS OF LUMBAR REGION: ICD-10-CM

## 2022-08-11 DIAGNOSIS — M25.551 HIP PAIN, RIGHT: ICD-10-CM

## 2022-08-11 PROCEDURE — 73502 X-RAY EXAM HIP UNI 2-3 VIEWS: CPT | Mod: FY

## 2022-08-11 PROCEDURE — 72148 MRI LUMBAR SPINE W/O DYE: CPT | Mod: MF

## 2022-08-15 ENCOUNTER — TELEPHONE (OUTPATIENT)
Dept: PHYSICAL MEDICINE AND REHAB | Facility: CLINIC | Age: 87
End: 2022-08-15

## 2022-08-15 NOTE — TELEPHONE ENCOUNTER
----- Message from Orlin Mir DO sent at 8/12/2022  4:33 PM CDT -----  I reviewed the lumbar spine MRI as well as x-rays of your hips.    The lumbar MRI shows severe spinal stenosis with nerve impingement at the L3-4 and L4-5 levels.    Your hip x-rays show only mild degenerative changes of the SI joints and hips.    Based on the degree of spinal stenosis, I would recommend neurosurgical evaluation.  I can place this order for you, or you can wait and discuss this with me at your appointment August 17.

## 2022-08-16 NOTE — TELEPHONE ENCOUNTER
"Patient returning call. Results given and explained. Also explained PSP would recommend he be seen by neurosurgery given the extent of the stenosis. Referral can be placed today or can do it tomorrow at his appointment. \"I will wait to talk to Dr. Mir more tomorrow.\"   "

## 2022-08-17 ENCOUNTER — OFFICE VISIT (OUTPATIENT)
Dept: PHYSICAL MEDICINE AND REHAB | Facility: CLINIC | Age: 87
End: 2022-08-17
Payer: MEDICARE

## 2022-08-17 VITALS — SYSTOLIC BLOOD PRESSURE: 113 MMHG | DIASTOLIC BLOOD PRESSURE: 58 MMHG | HEART RATE: 73 BPM

## 2022-08-17 DIAGNOSIS — M25.511 CHRONIC RIGHT SHOULDER PAIN: ICD-10-CM

## 2022-08-17 DIAGNOSIS — M43.16 SPONDYLOLISTHESIS OF LUMBAR REGION: ICD-10-CM

## 2022-08-17 DIAGNOSIS — G89.29 CHRONIC RIGHT SHOULDER PAIN: ICD-10-CM

## 2022-08-17 DIAGNOSIS — M48.062 SPINAL STENOSIS OF LUMBAR REGION WITH NEUROGENIC CLAUDICATION: Primary | ICD-10-CM

## 2022-08-17 DIAGNOSIS — M25.551 HIP PAIN, RIGHT: ICD-10-CM

## 2022-08-17 PROCEDURE — 99214 OFFICE O/P EST MOD 30 MIN: CPT | Performed by: PHYSICAL MEDICINE & REHABILITATION

## 2022-08-17 ASSESSMENT — PAIN SCALES - GENERAL: PAINLEVEL: MILD PAIN (3)

## 2022-08-17 NOTE — PATIENT INSTRUCTIONS
See Neurosurgery for an opinion regarding the lumbar spinal stenosis  Schedule the right shoulder injection with Dr Mir

## 2022-08-17 NOTE — PROGRESS NOTES
Assessment/Plan:      Antoni was seen today for back pain and hip pain.    Diagnoses and all orders for this visit:    Spinal stenosis of lumbar region with neurogenic claudication  -     Neurosurgery Referral; Future    Spondylolisthesis of lumbar region    Chronic right shoulder pain    Hip pain, right         Assessment: Pleasant 87 year old male  with history of colon cancer, hyperlipidemia, hypertension, carotid artery occlusion, coronary artery disease with:        1.   Persistent worsening of lumbar spine pain with bilateral lower extremity claudication symptoms.    He has right greater than left lower extremity pain and paresthesias related to severe spinal stenosis L4-5 and slightly less at L3-4  with L4-5 spondylolisthesis.  There is tortuosity of the nerve roots.  Pain persist despite physical therapy, medications and lumbar epidurals. bilateral L5-S1 transforaminal epidural steroid injection did offer some relief but only 40 to 50% and that benefit slowly diminished over 1 month. He continues to have numbness and tingling in his right foot.  He was seen by Dr. Kenny at [facility] orthopedics.  He would be a surgical candidate but holding off as long as possible.  Continues with no weakness may be some equivocal of the right great toe extensors.    Symptoms continue to worsen with the lower extremity paresthesias up to the knees can only tolerate maybe 20 minutes of standing.     2.  Right hip pain.  This is most consistent with radicular pain but does have slight decreased internal rotation of the right hip versus left on exam.    Mild osteoarthritis on plain films.    3.  Worsening right shoulder pain.  Has had a complete tear of the rotator cuff supraspinatus with retraction of the glenohumeral joint effusion in the past which responded well to glenohumeral joint injection for at least 6 months with pain is worsened over the past few months.         Discussion:    1. *I discussed the MRI at length  today of the lumbar spine showing continued worsening of the spinal stenosis at L3-4 and L4-5 with critical stenosis at L4-5.  At this point given the worsening lower extremity symptoms I would recommend surgical second opinion.    2.  Recommend neurosurgical evaluation for an opinion regarding the severe spinal stenosis.  He would like a second opinion.  He has been to [facility] orthopedics, Dr. Kenny along with Dr. Mejias and Dr. Maya.  We will refer him to Dr. Robb or Dr. Torres or the [facility] whichever he can get in at his earliest convenience.    3.  He will schedule the right glenohumeral joint injection under ultrasound with Dr iMr    4.  Follow-up at his convenience with right glenohumeral joint injection.      It was our pleasure caring for your patient today, if there any questions or concerns please do not hesitate to contact us.    Over 30 minutes were spent on the date of the encounter performing chart review, patient visit and documentation in addition to any procedure.      Subjective:   Patient ID: Antoni Stoll is a 87 year old male.    History of Present Illness:Patient presents for follow-up of lumbar spine pain after MRI of the lumbar spine was done.  He has worsening symptoms with x-ray lumbar spine worsening bilateral lower extremity paresthesias now worse in the left leg numbness and tingling to the mid calf distally.  Also low back pain into the right gluteal region worse with standing.  Better with sitting.  Pain is a 7/10 at worst 3/10 today/10 at best.  Taking Lyrica has been seen by Dr. Mejias in the past along with Dr. Maya and Dr. Kenny.  He would like another opinion from a different neurosurgeon or orthopedic spine surgeon.  Is open to [facility].    Also continues to have right shoulder pain and would like a right shoulder injection which has been ordered.      Imaging:MRI report and images were personally reviewed and discussed with the  patient.  A plastic model was utilized during the discussion.  MRI of the lumbar spine and plain films of the right hip were reviewed.  Lumbar spine MRI reveals advanced lumbar degenerative changes with right thoracolumbar scoliosis chronic right L4 pars defect.  This along with advanced facet arthropathy contribute to severe/critical spinal stenosis L4-5 and moderate to severe at L3-4.  There is also moderate right and severe left foraminal stenosis L1-2.  I also reviewed the MRI images and compared them to prior MRI which showed progression of the spinal stenosis at L4-5.    Plain films of the right hip are reviewed showing mild degenerative changes of the hips.    Review of Systems: Pertinent positives: Numbness tingling weakness in the legs.  Pertinent negatives:   No bowel or bladder incontinence.  No urinary retention.  No fevers, unintentional weight loss, balance changes, headaches, frequent falling, difficulty swallowing, or coordination difficulties.  All others reviewed are negative.           Past Medical History:   Diagnosis Date     Anemia      Cancer (H)     colon     Carotid artery occlusion      Coronary artery disease      Glaucoma      High cholesterol      History of colon cancer 8/13/2018     Hyperlipidemia      Hypertension      Left inguinal hernia      Low back pain      Mild aortic insufficiency      Moderate tricuspid regurgitation      Postnasal drip      Sleep apnea, obstructive     CPAP     Spinal stenosis     lumbar     SSS (sick sinus syndrome) (H) 12/9/2019     Umbilical hernia      Unspecified cataract     Created by Conversion        The following portions of the patient's history were reviewed and updated as appropriate: allergies, current medications, past family history, past medical history, past social history, past surgical history and problem list.           Objective:   Physical Exam:    /58   Pulse 73   There is no height or weight on file to calculate  BMI.      General: Alert and oriented with normal affect. Attention, knowledge, memory, and language are intact. No acute distress.   Eyes: Sclerae are clear.  Respirations: Unlabored. CV: No lower extremity edema.  Skin: No rashes seen.    Gait:  Nonantalgic  Mild decreased range of motion right hip external rotation  Sensation is intact to light touch throughout the upper and lower extremities.  Reflexes are0 patellar and Achilles with downgoing toes.    Manual muscle testing reveals:  Right /Left out of 5     5/5 knee flexors  5/5 knee extensors  5/5 ankle plantar flexors  5/5 ankle dorsiflexors  5/5  EHL

## 2022-08-17 NOTE — LETTER
8/17/2022         RE: Antoni Stoll  [address]  [place] [address]        Dear Colleague,    Thank you for referring your patient, Antoni Stoll, to the [facility]. Please see a copy of my visit note below.    Assessment/Plan:      Antoni was seen today for back pain and hip pain.    Diagnoses and all orders for this visit:    Spinal stenosis of lumbar region with neurogenic claudication  -     Neurosurgery Referral; Future    Spondylolisthesis of lumbar region    Chronic right shoulder pain    Hip pain, right         Assessment: Pleasant 87 year old male  with history of colon cancer, hyperlipidemia, hypertension, carotid artery occlusion, coronary artery disease with:        1.   Persistent worsening of lumbar spine pain with bilateral lower extremity claudication symptoms.    He has right greater than left lower extremity pain and paresthesias related to severe spinal stenosis L4-5 and slightly less at L3-4  with L4-5 spondylolisthesis.  There is tortuosity of the nerve roots.  Pain persist despite physical therapy, medications and lumbar epidurals. bilateral L5-S1 transforaminal epidural steroid injection did offer some relief but only 40 to 50% and that benefit slowly diminished over 1 month. He continues to have numbness and tingling in his right foot.  He was seen by Dr. Kenny at [facility] orthopedics.  He would be a surgical candidate but holding off as long as possible.  Continues with no weakness may be some equivocal of the right great toe extensors.    Symptoms continue to worsen with the lower extremity paresthesias up to the knees can only tolerate maybe 20 minutes of standing.     2.  Right hip pain.  This is most consistent with radicular pain but does have slight decreased internal rotation of the right hip versus left on exam.    Mild osteoarthritis on plain films.    3.  Worsening right shoulder pain.  Has had a complete tear of the rotator cuff  supraspinatus with retraction of the glenohumeral joint effusion in the past which responded well to glenohumeral joint injection for at least 6 months with pain is worsened over the past few months.         Discussion:    1. *I discussed the MRI at length today of the lumbar spine showing continued worsening of the spinal stenosis at L3-4 and L4-5 with critical stenosis at L4-5.  At this point given the worsening lower extremity symptoms I would recommend surgical second opinion.    2.  Recommend neurosurgical evaluation for an opinion regarding the severe spinal stenosis.  He would like a second opinion.  He has been to [facility] orthopedics, Dr. Kenny along with Dr. Mejias and Dr. Maya.  We will refer him to Dr. Robb or Dr. Torres or the [facility] whichever he can get in at his earliest convenience.    3.  He will schedule the right glenohumeral joint injection under ultrasound with Dr Mir    4.  Follow-up at his convenience with right glenohumeral joint injection.      It was our pleasure caring for your patient today, if there any questions or concerns please do not hesitate to contact us.    Over 30 minutes were spent on the date of the encounter performing chart review, patient visit and documentation in addition to any procedure.      Subjective:   Patient ID: Antoni Stoll is a 87 year old male.    History of Present Illness:Patient presents for follow-up of lumbar spine pain after MRI of the lumbar spine was done.  He has worsening symptoms with x-ray lumbar spine worsening bilateral lower extremity paresthesias now worse in the left leg numbness and tingling to the mid calf distally.  Also low back pain into the right gluteal region worse with standing.  Better with sitting.  Pain is a 7/10 at worst 3/10 today/10 at best.  Taking Lyrica has been seen by Dr. Mejias in the past along with Dr. Maya and Dr. Kenny.  He would like another opinion from a different neurosurgeon or  orthopedic spine surgeon.  Is open to [facility].    Also continues to have right shoulder pain and would like a right shoulder injection which has been ordered.      Imaging:MRI report and images were personally reviewed and discussed with the patient.  A plastic model was utilized during the discussion.  MRI of the lumbar spine and plain films of the right hip were reviewed.  Lumbar spine MRI reveals advanced lumbar degenerative changes with right thoracolumbar scoliosis chronic right L4 pars defect.  This along with advanced facet arthropathy contribute to severe/critical spinal stenosis L4-5 and moderate to severe at L3-4.  There is also moderate right and severe left foraminal stenosis L1-2.  I also reviewed the MRI images and compared them to prior MRI which showed progression of the spinal stenosis at L4-5.    Plain films of the right hip are reviewed showing mild degenerative changes of the hips.    Review of Systems: Pertinent positives: Numbness tingling weakness in the legs.  Pertinent negatives:   No bowel or bladder incontinence.  No urinary retention.  No fevers, unintentional weight loss, balance changes, headaches, frequent falling, difficulty swallowing, or coordination difficulties.  All others reviewed are negative.           Past Medical History:   Diagnosis Date     Anemia      Cancer (H)     colon     Carotid artery occlusion      Coronary artery disease      Glaucoma      High cholesterol      History of colon cancer 8/13/2018     Hyperlipidemia      Hypertension      Left inguinal hernia      Low back pain      Mild aortic insufficiency      Moderate tricuspid regurgitation      Postnasal drip      Sleep apnea, obstructive     CPAP     Spinal stenosis     lumbar     SSS (sick sinus syndrome) (H) 12/9/2019     Umbilical hernia      Unspecified cataract     Created by Conversion        The following portions of the patient's history were reviewed and updated as appropriate:  allergies, current medications, past family history, past medical history, past social history, past surgical history and problem list.           Objective:   Physical Exam:    /58   Pulse 73   There is no height or weight on file to calculate BMI.      General: Alert and oriented with normal affect. Attention, knowledge, memory, and language are intact. No acute distress.   Eyes: Sclerae are clear.  Respirations: Unlabored. CV: No lower extremity edema.  Skin: No rashes seen.    Gait:  Nonantalgic  Mild decreased range of motion right hip external rotation  Sensation is intact to light touch throughout the upper and lower extremities.  Reflexes are0 patellar and Achilles with downgoing toes.    Manual muscle testing reveals:  Right /Left out of 5     5/5 knee flexors  5/5 knee extensors  5/5 ankle plantar flexors  5/5 ankle dorsiflexors  5/5  EHL       Again, thank you for allowing me to participate in the care of your patient.        Sincerely,        Orlin Mir, DO

## 2022-08-19 NOTE — TELEPHONE ENCOUNTER
SPINE PATIENTS - NEW PROTOCOL PREVISIT    RECORDS RECEIVED FROM: Internal   REASON FOR VISIT: Severe stenosis L3-4 and L4-5 - no previous surgery, has had one injection over a year ago.   Date of Appt: 09/08/2022   NOTES (FOR ALL VISITS) STATUS DETAILS   OFFICE NOTE from referring provider Internal 08/17/2022 Dr Mir [facility]    OFFICE NOTE from other specialist N/A    DISCHARGE SUMMARY from hospital N/A    DISCHARGE REPORT from ER N/A    EMG REPORT N/A    MEDICATION LIST N/A    IMAGING  (FOR ALL VISITS)     MRI (HEAD, NECK, SPINE) Internal 08/11/2022 lumbar spine  07/02/2021 lumbar spine   XRAY (SPINE) *NEUROSURGERY* Internal 08/11/2022 RT hip  07/01/2021 complete spine   CT (HEAD, NECK, SPINE) N/A

## 2022-08-22 DIAGNOSIS — I10 ESSENTIAL HYPERTENSION: ICD-10-CM

## 2022-08-23 RX ORDER — LISINOPRIL 2.5 MG/1
TABLET ORAL
Qty: 90 TABLET | Refills: 3 | Status: SHIPPED | OUTPATIENT
Start: 2022-08-23 | End: 2023-08-14

## 2022-08-23 NOTE — TELEPHONE ENCOUNTER
"Last Written Prescription Date:  11/30/21  Last Fill Quantity: 90,  # refills: 2   Last office visit provider:  8/9/22     Requested Prescriptions   Pending Prescriptions Disp Refills     lisinopril (ZESTRIL) 2.5 MG tablet 90 tablet 2     Sig: [LISINOPRIL (PRINIVIL,ZESTRIL) 2.5 MG TABLET] TAKE 1 TABLET BY MOUTH EVERY DAY       ACE Inhibitors (Including Combos) Protocol Passed - 8/23/2022  9:54 AM        Passed - Blood pressure under 140/90 in past 12 months     BP Readings from Last 3 Encounters:   08/17/22 113/58   08/09/22 112/56   07/26/22 (!) 145/74                 Passed - Recent (12 mo) or future (30 days) visit within the authorizing provider's specialty     Patient has had an office visit with the authorizing provider or a provider within the authorizing providers department within the previous 12 mos or has a future within next 30 days. See \"Patient Info\" tab in inbasket, or \"Choose Columns\" in Meds & Orders section of the refill encounter.              Passed - Medication is active on med list        Passed - Patient is age 18 or older        Passed - Normal serum creatinine on file in past 12 months     Recent Labs   Lab Test 07/25/22  1359   CR 1.00       Ok to refill medication if creatinine is low          Passed - Normal serum potassium on file in past 12 months     Recent Labs   Lab Test 07/25/22  1359   POTASSIUM 4.2                  Chris Kurtz RN 08/23/22 9:54 AM  "

## 2022-08-31 ENCOUNTER — TRANSFERRED RECORDS (OUTPATIENT)
Dept: HEALTH INFORMATION MANAGEMENT | Facility: CLINIC | Age: 87
End: 2022-08-31

## 2022-09-02 ENCOUNTER — TELEPHONE (OUTPATIENT)
Dept: OTOLARYNGOLOGY | Facility: CLINIC | Age: 87
End: 2022-09-02

## 2022-09-02 DIAGNOSIS — J31.0 CHRONIC RHINITIS: Primary | ICD-10-CM

## 2022-09-06 RX ORDER — AZELASTINE 1 MG/ML
SPRAY, METERED NASAL
Qty: 30 ML | Refills: 11 | Status: SHIPPED | OUTPATIENT
Start: 2022-09-06 | End: 2023-01-18

## 2022-09-06 NOTE — TELEPHONE ENCOUNTER
Sent pt a refill of Azelastine nasal spray to pt pharmacy.    [facility]      Chyna Cortez RN  [facility]  ENT  [address] [address]  [email or web address]   Office:[phone]  Employed by [facility] [employer] [facility]

## 2022-09-07 ENCOUNTER — RADIOLOGY INJECTION OFFICE VISIT (OUTPATIENT)
Dept: PHYSICAL MEDICINE AND REHAB | Facility: CLINIC | Age: 87
End: 2022-09-07
Attending: PHYSICAL MEDICINE & REHABILITATION
Payer: MEDICARE

## 2022-09-07 VITALS
HEART RATE: 71 BPM | OXYGEN SATURATION: 95 % | DIASTOLIC BLOOD PRESSURE: 78 MMHG | SYSTOLIC BLOOD PRESSURE: 158 MMHG | TEMPERATURE: 98.7 F

## 2022-09-07 DIAGNOSIS — M75.121 COMPLETE TEAR OF RIGHT ROTATOR CUFF, UNSPECIFIED WHETHER TRAUMATIC: ICD-10-CM

## 2022-09-07 DIAGNOSIS — M25.511 CHRONIC RIGHT SHOULDER PAIN: ICD-10-CM

## 2022-09-07 DIAGNOSIS — M54.16 LUMBAR RADICULOPATHY: ICD-10-CM

## 2022-09-07 DIAGNOSIS — G89.29 CHRONIC RIGHT SHOULDER PAIN: ICD-10-CM

## 2022-09-07 DIAGNOSIS — M48.061 SPINAL STENOSIS OF LUMBAR REGION, UNSPECIFIED WHETHER NEUROGENIC CLAUDICATION PRESENT: Primary | ICD-10-CM

## 2022-09-07 PROCEDURE — 20611 DRAIN/INJ JOINT/BURSA W/US: CPT | Mod: RT | Performed by: PHYSICAL MEDICINE & REHABILITATION

## 2022-09-07 RX ORDER — LIDOCAINE HYDROCHLORIDE 20 MG/ML
INJECTION, SOLUTION EPIDURAL; INFILTRATION; INTRACAUDAL; PERINEURAL
Status: COMPLETED | OUTPATIENT
Start: 2022-09-07 | End: 2022-09-07

## 2022-09-07 RX ORDER — METHYLPREDNISOLONE ACETATE 40 MG/ML
INJECTION, SUSPENSION INTRA-ARTICULAR; INTRALESIONAL; INTRAMUSCULAR; SOFT TISSUE
Status: COMPLETED | OUTPATIENT
Start: 2022-09-07 | End: 2022-09-07

## 2022-09-07 RX ORDER — LIDOCAINE HYDROCHLORIDE 10 MG/ML
INJECTION, SOLUTION EPIDURAL; INFILTRATION; INTRACAUDAL; PERINEURAL
Status: COMPLETED | OUTPATIENT
Start: 2022-09-07 | End: 2022-09-07

## 2022-09-07 RX ADMIN — LIDOCAINE HYDROCHLORIDE 1 ML: 10 INJECTION, SOLUTION EPIDURAL; INFILTRATION; INTRACAUDAL; PERINEURAL at 16:01

## 2022-09-07 RX ADMIN — LIDOCAINE HYDROCHLORIDE 2 ML: 20 INJECTION, SOLUTION EPIDURAL; INFILTRATION; INTRACAUDAL; PERINEURAL at 16:03

## 2022-09-07 RX ADMIN — METHYLPREDNISOLONE ACETATE 40 MG: 40 INJECTION, SUSPENSION INTRA-ARTICULAR; INTRALESIONAL; INTRAMUSCULAR; SOFT TISSUE at 16:02

## 2022-09-07 ASSESSMENT — PAIN SCALES - GENERAL: PAINLEVEL: MILD PAIN (3)

## 2022-09-07 NOTE — PATIENT INSTRUCTIONS
Tiesha Corrales, DO                                                    Rosario Hays, LUTHER Mir,  DO                                                                                SUGEY Hobbs, DO                                                                                       DISCHARGE INSTRUCTIONS  During office hours (8:00 a.m.- 4:30 p.m.) questions or concerns may be answered  by calling Spine Navigation Nurses at [phone]. If you experience any problems after hours please call [phone] and you will be connected to [facility].     All Patients:  You may experience an increase in your symptoms or have some soreness from the injection for the first 2 days (It may take anywhere between 2 days- 2 weeks for the steroid to have maximum effect).  You may use ice on the injection site, as frequently as 20 minutes each hour if needed.  You may continue taking your regular medication.  You may shower. No swimming, tub bath or hot tub for 48 hours.  You may remove your   bandaid/bandage as soon as you are home.  You may resume light activities, as tolerated unless otherwise directed.  Resume your usual diet as tolerated.      POSSIBLE STEROID SIDE EFFECTS   (If steroid/cortisone was used for your procedure)  -If you experience these symptoms, it should only last for a short period  Swelling of the legs        Skin redness (flushing)  Mouth (oral) irritation   Blood sugar (glucose) levels      Sweats                          Mood changes  Severe headache                  Sleeplessness            POSSIBLE PROCEDURE SIDE EFFECTS  -Call the Spine Center if you are concerned  Increased Pain      Bruising/bleeding at site                  Redness or swelling  Fever greater than 100.5            Diffuse rash            THESE INSTRUCTIONS HAVE BEEN EXPLAINED TO THE PATIENT AND THE PATIENT/PATIENT REPRESENTATITVE HAS VERBALIZED UNDERSTANDING.  A COPY OF THE  INSTRUCTIONS HAVE BEEN GIVEN TO THE PATIENT/PATIENT REPRESENTATIVE.

## 2022-09-07 NOTE — PROGRESS NOTES
Assessment/Plan:      Antoni was seen today for injections.    Diagnoses and all orders for this visit:    Complete tear of right rotator cuff, unspecified whether traumatic  -     PAIN US Large Joint Injection Unilateral  -     lidocaine (PF) (XYLOCAINE) 1 % injection  -     methylPREDNISolone (DEPO-MEDROL) injection  -     lidocaine (PF) (XYLOCAINE) 2 % injection  -     TN ARTHROCENTESIS ASPIR&/INJ MAJOR JT/BURSA W/US    Chronic right shoulder pain  -     TN ARTHROCENTESIS ASPIR&/INJ MAJOR JT/BURSA W/US         Assessment: Pleasant 87 year old male with history of colon cancer, hyperlipidemia, hypertension, carotid artery occlusion, coronary artery disease with:       1. Worsening right shoulder pain.  Has had a complete tear of the rotator cuff supraspinatus with retraction of the glenohumeral joint effusion in the past which responded well to glenohumeral joint injection for at least 6 months with pain is worsened over the past few months.      Discussion:    1.  Patient presents for right glenohumeral joint injection under ultrasound.  Would like to proceed.  Please see attached procedure note.      It was our pleasure caring for your patient today, if there any questions or concerns please do not hesitate to contact us.      Subjective:   Patient ID: Antoni Stoll is a 87 year old male.    History of Present Illness:  Patient presents at the request of Dr. Orlin Mir for right glenohumeral joint injection under ultrasound guidance.  Patient has right shoulder pain..  Pain with movement.  Pain is a 3/10 today.  Has anterior shoulder pain.  Right glenohumeral joint injection 6/22/2021 offered significant relief for at least 6 months.           Review of Systems: *Denies fevers, chills, sweats, recent illnesses or antibiotics.         Past Medical History:   Diagnosis Date     Anemia      Cancer (H)     colon     Carotid artery occlusion      Coronary artery disease      Glaucoma      High cholesterol       History of colon cancer 8/13/2018     Hyperlipidemia      Hypertension      Left inguinal hernia      Low back pain      Mild aortic insufficiency      Moderate tricuspid regurgitation      Postnasal drip      Sleep apnea, obstructive     CPAP     Spinal stenosis     lumbar     SSS (sick sinus syndrome) (H) 12/9/2019     Umbilical hernia      Unspecified cataract     Created by Conversion        The following portions of the patient's history were reviewed and updated as appropriate: allergies, current medications, past family history, past medical history, past social history, past surgical history and problem list.           Objective:   Physical Exam:    BP (!) 158/78 (BP Location: Right arm, Patient Position: Sitting, Cuff Size: Adult Regular)   Pulse 71   Temp 98.7  F (37.1  C) (Oral)   SpO2 95%   There is no height or weight on file to calculate BMI.      General: Alert and oriented with normal affect. Attention, knowledge, memory, and language are intact. No acute distress.   Eyes: Sclerae are clear.  Respirations: Unlabored.  Skin: No rashes seen about the right shoulder.    Gait:  Nonantalgic  Sensation is intact to light touch throughout the upper  extremities.          Procedure Note:    After discussing the risks and benefits of a rightglenohumeral joint injection under ultrasound guidance, informed consent was obtained. The patient and physician agreed on the injection site prior to the procedure.  A verbal timeout was done prior to the procedure.  With the patient seated, from posterior lateral approach, The right shoulder region was surveyed with the ultrasound unit to identify the target.  The skin was marked and prepped with chloraprep.  The skin was then anesthetized with a skin wheal followed by infiltration with 1 mL of 1% lidocaine.  Under direct ultrasound visualization, a 25-gauge 2 inch needle was used to inject 3 milliliters of injectate containing 2 milliliters of 0.5% ropivacaine and 1  milliliter containing 40 mg of depo-medrol after aspiration was negative for heme. The patient tolerated the procedure well and there no immediate complications.    Preporcedure pain: 3/10  Poste procedure pain: 0/10

## 2022-09-08 ENCOUNTER — PRE VISIT (OUTPATIENT)
Dept: NEUROSURGERY | Facility: CLINIC | Age: 87
End: 2022-09-08

## 2022-09-08 ENCOUNTER — OFFICE VISIT (OUTPATIENT)
Dept: NEUROSURGERY | Facility: CLINIC | Age: 87
End: 2022-09-08
Attending: PHYSICAL MEDICINE & REHABILITATION

## 2022-09-08 ENCOUNTER — ANCILLARY PROCEDURE (OUTPATIENT)
Dept: GENERAL RADIOLOGY | Facility: CLINIC | Age: 87
End: 2022-09-08
Attending: STUDENT IN AN ORGANIZED HEALTH CARE EDUCATION/TRAINING PROGRAM
Payer: MEDICARE

## 2022-09-08 VITALS
DIASTOLIC BLOOD PRESSURE: 74 MMHG | SYSTOLIC BLOOD PRESSURE: 149 MMHG | WEIGHT: 140 LBS | HEIGHT: 65 IN | BODY MASS INDEX: 23.32 KG/M2

## 2022-09-08 DIAGNOSIS — M54.16 LUMBAR RADICULOPATHY: ICD-10-CM

## 2022-09-08 DIAGNOSIS — M48.062 SPINAL STENOSIS OF LUMBAR REGION WITH NEUROGENIC CLAUDICATION: ICD-10-CM

## 2022-09-08 DIAGNOSIS — M48.061 SPINAL STENOSIS OF LUMBAR REGION, UNSPECIFIED WHETHER NEUROGENIC CLAUDICATION PRESENT: ICD-10-CM

## 2022-09-08 PROCEDURE — 99214 OFFICE O/P EST MOD 30 MIN: CPT | Performed by: STUDENT IN AN ORGANIZED HEALTH CARE EDUCATION/TRAINING PROGRAM

## 2022-09-08 PROCEDURE — 72120 X-RAY BEND ONLY L-S SPINE: CPT | Mod: XS | Performed by: STUDENT IN AN ORGANIZED HEALTH CARE EDUCATION/TRAINING PROGRAM

## 2022-09-08 ASSESSMENT — PAIN SCALES - GENERAL: PAINLEVEL: MODERATE PAIN (4)

## 2022-09-08 NOTE — LETTER
9/8/2022         RE: Antoni Stoll  [address]  [facility] [address]        Dear Colleague,    Thank you for referring your patient, Antoni Stoll, to the [facility]UR[facility] [place]. Please see a copy of my visit note below.    HPI:  87-year-old male with low back pain going on for the past 10 to 15 years.  Pain starts in the low back and he has had radiation to the bilateral lower extremities in the past but denies any pain radiating to the legs currently.  He has seen multiple spine surgeons in the past with options for surgery but has been holding off as the pain has been tolerable and been managed conservatively to this point.  He has done physical therapy which he said only helped slightly.  This was done 3 to 4 years ago.  He is also done an epidural steroid injection 1 to 2 years ago and states that this did not help much at that time.  He is taking Lyrica and Tylenol for the pain right now.  He denies any significant bladder control issues at this point.  He recently saw the urologist and has some slow stream but overall can control his bladder function fine.  He does note numbness in his bilateral feet which started in the toes and is now working its way up the ankles.  Current Outpatient Medications   Medication     acetaminophen (TYLENOL) 500 MG tablet     aspirin 325 MG tablet     atorvastatin (LIPITOR) 20 MG tablet     B2/VITS A,C,E/LUT/ZEAXANTH/MIN (ICAPS ORAL)     calcium polycarbophil (FIBERCON) 625 mg tablet     cholecalciferol, vitamin D3, 1,000 unit tablet     glucosamine-chondroitin 500-400 mg cap     latanoprost (XALATAN) 0.005 % ophthalmic solution     lisinopril (ZESTRIL) 2.5 MG tablet     nystatin (MYCOSTATIN) 393410 UNIT/GM external powder     pregabalin (LYRICA) 25 MG capsule     saw palmetto fruit 450 mg cap     tamsulosin (FLOMAX) 0.4 mg Cp24     azelastine (ASTELIN) 0.1 % nasal spray     azelastine (ASTELIN) 0.1 % nasal spray      "clotrimazole-betamethasone (LOTRISONE) 1-0.05 % external cream     HYDROcodone-acetaminophen (NORCO) 5-325 MG tablet     No current facility-administered medications for this visit.      Physical Exam:  Vital signs:      BP: (!) 149/74             Height: 163.8 cm (5' 4.5\") Weight: 63.5 kg (140 lb)  Estimated body mass index is 23.66 kg/m  as calculated from the following:    Height as of this encounter: 1.638 m (5' 4.5\").    Weight as of this encounter: 63.5 kg (140 lb).   He has full strength in his bilateral upper and lower extremities.  Sensation is intact light touch throughout with the exception of the medial right lower extremity which is due to a venous graft for his bypass surgery.  His patella and ankle reflexes are 1+ bilaterally.  No clonus.  Results Reviewed:  I personally viewed the images of an MRI of the lumbar spine showing significant central canal stenosis at L3-4 and L4-5 due to spondylolisthesis at both levels greater at L4-5 and L3-4.  There is also significant spondylosis at these levels contributing to the central canal stenosis.  No other areas of central canal stenosis although spondylosis contributes to foraminal stenosis throughout the lumbar spine.  I personally reviewed scoliosis films showing the following parameters:  LL 50* (L4-S1 25*) TK 94* PI 49* PT 35* C7-S1 SB 3.8cm Dextroscoliosis 54* [place] at L1-2    Assessment:  87-year-old male with significant thoracic kyphosis with thoracolumbar scoliosis and low back pain.  Right now I believe his low back pain is possibly due to a couple of reasons.  He has spinal listhesis at L3-4 and L4-5 causing significant central canal stenosis.  These do not show any evidence of instability though and without significant leg pain may not be the cause of his low back pain.  It does not follow a sitting relief pattern consistent with neurogenic claudication as well.  He also has significant thoracic kyphosis which is causing an increase in his pelvic " tilt in order to maintain horizontal gaze.  This could be putting significant stress on his low back causing his low back pain.  Plan:  We discussed surgical and conservative management options going forward.  For his surgery this may be due to the spondylolisthesis which could be treated with a decompression at L3-4 and L4-5 however outcomes with decompression only without fusion are usually temporary.  Given his significant kyphoscoliosis any fusion would need to be a multilevel large procedure.  In order to correct his high pelvic tilt the surgery would need to be from his low cervical spine down to his pelvis.  Given his controllable symptoms without any other conservative management at this point I highly recommend against surgery right now.  I would recommend maximal conservative management including other options first injections as well.  We did discuss this is my interpretation of his imaging and symptoms and another opinion may lead to a different option.  He is welcome to another opinion at any point if he would like.  He can follow-up with myself as needed going forward.    Shaji Torres MD       Again, thank you for allowing me to participate in the care of your patient.        Sincerely,        Shaji Torres MD

## 2022-09-08 NOTE — PROGRESS NOTES
"HPI:  87-year-old male with low back pain going on for the past 10 to 15 years.  Pain starts in the low back and he has had radiation to the bilateral lower extremities in the past but denies any pain radiating to the legs currently.  He has seen multiple spine surgeons in the past with options for surgery but has been holding off as the pain has been tolerable and been managed conservatively to this point.  He has done physical therapy which he said only helped slightly.  This was done 3 to 4 years ago.  He is also done an epidural steroid injection 1 to 2 years ago and states that this did not help much at that time.  He is taking Lyrica and Tylenol for the pain right now.  He denies any significant bladder control issues at this point.  He recently saw the urologist and has some slow stream but overall can control his bladder function fine.  He does note numbness in his bilateral feet which started in the toes and is now working its way up the ankles.  Current Outpatient Medications   Medication     acetaminophen (TYLENOL) 500 MG tablet     aspirin 325 MG tablet     atorvastatin (LIPITOR) 20 MG tablet     B2/VITS A,C,E/LUT/ZEAXANTH/MIN (ICAPS ORAL)     calcium polycarbophil (FIBERCON) 625 mg tablet     cholecalciferol, vitamin D3, 1,000 unit tablet     glucosamine-chondroitin 500-400 mg cap     latanoprost (XALATAN) 0.005 % ophthalmic solution     lisinopril (ZESTRIL) 2.5 MG tablet     nystatin (MYCOSTATIN) [id number] UNIT/GM external powder     pregabalin (LYRICA) 25 MG capsule     saw palmetto fruit 450 mg cap     tamsulosin (FLOMAX) 0.4 mg Cp24     azelastine (ASTELIN) 0.1 % nasal spray     azelastine (ASTELIN) 0.1 % nasal spray     clotrimazole-betamethasone (LOTRISONE) 1-0.05 % external cream     HYDROcodone-acetaminophen (NORCO) 5-325 MG tablet     No current facility-administered medications for this visit.      Physical Exam:  Vital signs:      BP: (!) 149/74             Height: 163.8 cm (5' 4.5\") Weight: " "63.5 kg (140 lb)  Estimated body mass index is 23.66 kg/m  as calculated from the following:    Height as of this encounter: 1.638 m (5' 4.5\").    Weight as of this encounter: 63.5 kg (140 lb).   He has full strength in his bilateral upper and lower extremities.  Sensation is intact light touch throughout with the exception of the medial right lower extremity which is due to a venous graft for his bypass surgery.  His patella and ankle reflexes are 1+ bilaterally.  No clonus.  Results Reviewed:  I personally viewed the images of an MRI of the lumbar spine showing significant central canal stenosis at L3-4 and L4-5 due to spondylolisthesis at both levels greater at L4-5 and L3-4.  There is also significant spondylosis at these levels contributing to the central canal stenosis.  No other areas of central canal stenosis although spondylosis contributes to foraminal stenosis throughout the lumbar spine.  I personally reviewed scoliosis films showing the following parameters:  LL 50* (L4-S1 25*) TK 94* PI 49* PT 35* C7-S1 SB 3.8cm Dextroscoliosis 54* [place] at L1-2    Assessment:  87-year-old male with significant thoracic kyphosis with thoracolumbar scoliosis and low back pain.  Right now I believe his low back pain is possibly due to a couple of reasons.  He has spinal listhesis at L3-4 and L4-5 causing significant central canal stenosis.  These do not show any evidence of instability though and without significant leg pain may not be the cause of his low back pain.  It does not follow a sitting relief pattern consistent with neurogenic claudication as well.  He also has significant thoracic kyphosis which is causing an increase in his pelvic tilt in order to maintain horizontal gaze.  This could be putting significant stress on his low back causing his low back pain.  Plan:  We discussed surgical and conservative management options going forward.  For his surgery this may be due to the spondylolisthesis which could be " treated with a decompression at L3-4 and L4-5 however outcomes with decompression only without fusion are usually temporary.  Given his significant kyphoscoliosis any fusion would need to be a multilevel large procedure.  In order to correct his high pelvic tilt the surgery would need to be from his low cervical spine down to his pelvis.  Given his controllable symptoms without any other conservative management at this point I highly recommend against surgery right now.  I would recommend maximal conservative management including other options first injections as well.  We did discuss this is my interpretation of his imaging and symptoms and another opinion may lead to a different option.  He is welcome to another opinion at any point if he would like.  He can follow-up with myself as needed going forward.    Shaji Torres MD

## 2022-09-08 NOTE — NURSING NOTE
"Antoni Stoll's goals for this visit include:   Chief Complaint   Patient presents with     New Patient     Severe stenosis L3-4 and L4-5 - no previous surgery, has had one injection over   a year ago.  XR's ordered       He requests these members of his care team be copied on today's visit information: yes    PCP: Sarthak Romano    Referring Provider:  Orlin Mir DO  [address] [facility] [address]    BP (!) 149/74 (BP Location: Right arm, Patient Position: Sitting, Cuff Size: Adult Regular)   Ht 1.638 m (5' 4.5\")   Wt 63.5 kg (140 lb)   BMI 23.66 kg/m      Do you need any medication refills at today's visit? No  SHANIA Jimenez., CMA ([facility])      "

## 2022-09-13 ENCOUNTER — OFFICE VISIT (OUTPATIENT)
Dept: AUDIOLOGY | Facility: CLINIC | Age: 87
End: 2022-09-13
Payer: MEDICARE

## 2022-09-13 DIAGNOSIS — H93.13 TINNITUS OF BOTH EARS: ICD-10-CM

## 2022-09-13 DIAGNOSIS — H90.A22 SENSORINEURAL HEARING LOSS (SNHL) OF LEFT EAR WITH RESTRICTED HEARING OF RIGHT EAR: Primary | ICD-10-CM

## 2022-09-13 DIAGNOSIS — Z97.4 WEARS HEARING AID IN BOTH EARS: ICD-10-CM

## 2022-09-13 DIAGNOSIS — Z97.4 WEARS HEARING AID IN BOTH EARS: Primary | ICD-10-CM

## 2022-09-13 PROCEDURE — 92550 TYMPANOMETRY & REFLEX THRESH: CPT | Performed by: AUDIOLOGIST

## 2022-09-13 PROCEDURE — 92557 COMPREHENSIVE HEARING TEST: CPT | Performed by: AUDIOLOGIST

## 2022-09-14 ENCOUNTER — OFFICE VISIT (OUTPATIENT)
Dept: OTOLARYNGOLOGY | Facility: CLINIC | Age: 87
End: 2022-09-14
Payer: MEDICARE

## 2022-09-14 DIAGNOSIS — J31.0 CHRONIC RHINITIS: ICD-10-CM

## 2022-09-14 DIAGNOSIS — H69.93 NEGATIVE MIDDLE EAR PRESSURE OF BOTH EARS: ICD-10-CM

## 2022-09-14 DIAGNOSIS — H90.3 SENSORINEURAL HEARING LOSS (SNHL) OF BOTH EARS: Primary | ICD-10-CM

## 2022-09-14 DIAGNOSIS — Z97.4 WEARS HEARING AID IN BOTH EARS: ICD-10-CM

## 2022-09-14 DIAGNOSIS — H69.93 DYSFUNCTION OF BOTH EUSTACHIAN TUBES: ICD-10-CM

## 2022-09-14 PROCEDURE — 99214 OFFICE O/P EST MOD 30 MIN: CPT | Performed by: OTOLARYNGOLOGY

## 2022-09-14 RX ORDER — AZELASTINE 1 MG/ML
SPRAY, METERED NASAL
Qty: 30 ML | Refills: 11 | Status: SHIPPED | OUTPATIENT
Start: 2022-09-14 | End: 2023-01-18

## 2022-09-14 NOTE — PROGRESS NOTES
CHIEF COMPLAINT: Patient presents with:  Follow Up: Audio review          HISTORY OF PRESENT ILLNESS    Wily was seen at the behest of [facility] for hearing loss    AUDIOLOGY NOTE:    Dr. Portillo 9-14-22HX: Hx otorrhea 8-6-21; 6 mos f/u w/audiogram. Has likely custom amplification, fit elsewhere; did not bring to  appt. Does not feel it s helpful. Endorsed bilat. tinnitus, not pulsatile. Denied otalgia, otorrhea, recent illness, noise exposure, dizziness.  Results: Clear canals, bilat. Borderline normal/mild SNHL,s loping to severe SNHL, bilat., for 250-8000 Hz. Excellent word rec ability in  quiet for R ear; good ability for L ear. Normal tymps, bilat.; slight neg. ME pressure in R ear. Present 1 KHz acoustic reflexes, bilat. Rec: f/u  w/ENT as scheduled 9-14-22; retest annually to monitor, or per med. mgmt./pt. concern. Wear hearing protection consistently in noise to  preserve residual hearing sensitivity and to minimize the effects of tinnitus. Return to place of hearing aid purchase with today s results to  determine if reprogramming is indicated.     REVIEW OF SYSTEMS    Review of Systems as per HPI and PMHx, otherwise 10 system review system are negative.     Patient has no known allergies.     There were no vitals taken for this visit.    HEAD: Normal appearance and symmetry:  No cutaneous lesions.      NECK:  supple     EARS: normal TM, EACs    EYES:  EOMI    CN VII/XII:  intact     NOSE:     Dorsum:   straight  Septum:  midline  Mucosa:  moist        ORAL CAVITY/OROPHARYNX:     Lips:  Normal.  Tongue: normal, midline  Mucosa:   no lesions     NECK:  Trachea:  midline.              Thyroid:  normal              Adenopathy:  none        NEURO:   Alert and Oriented     GAIT AND STATION:  normal     RESPIRATORY:   Symmetry and Respiratory effort     PSYCH:  Normal mood and affect     SKIN:   warm and dry         IMPRESSION:    Encounter Diagnoses   Name Primary?     Sensorineural hearing loss (SNHL) of both  ears Yes     Wears hearing aid in both ears      Chronic rhinitis      Dysfunction of both eustachian tubes      Negative middle ear pressure of both ears           RECOMMENDATIONS:    Astelin nasal spray as directed  TMJ precautions  Return annually for hearing test

## 2022-09-14 NOTE — LETTER
9/14/2022         RE: Antoni Stoll  [address]  [facility] [address]        Dear Colleague,    Thank you for referring your patient, Antoni Stoll, to the [facility]. Please see a copy of my visit note below.    CHIEF COMPLAINT: Patient presents with:  Follow Up: Audio review          HISTORY OF PRESENT ILLNESS    Wily was seen at the behest of Sarthakzackery Romano MD for hearing loss    AUDIOLOGY NOTE:    Dr. Portillo 9-14-22HX: Hx otorrhea 8-6-21; 6 mos f/u w/audiogram. Has likely custom amplification, fit elsewhere; did not bring to  appt. Does not feel it s helpful. Endorsed bilat. tinnitus, not pulsatile. Denied otalgia, otorrhea, recent illness, noise exposure, dizziness.  Results: Clear canals, bilat. Borderline normal/mild SNHL,s loping to severe SNHL, bilat., for 250-8000 Hz. Excellent word rec ability in  quiet for R ear; good ability for L ear. Normal tymps, bilat.; slight neg. ME pressure in R ear. Present 1 KHz acoustic reflexes, bilat. Rec: f/u  w/ENT as scheduled 9-14-22; retest annually to monitor, or per med. mgmt./pt. concern. Wear hearing protection consistently in noise to  preserve residual hearing sensitivity and to minimize the effects of tinnitus. Return to place of hearing aid purchase with today s results to  determine if reprogramming is indicated.     REVIEW OF SYSTEMS    Review of Systems as per HPI and PMHx, otherwise 10 system review system are negative.     Patient has no known allergies.     There were no vitals taken for this visit.    HEAD: Normal appearance and symmetry:  No cutaneous lesions.      NECK:  supple     EARS: normal TM, EACs    EYES:  EOMI    CN VII/XII:  intact     NOSE:     Dorsum:   straight  Septum:  midline  Mucosa:  moist        ORAL CAVITY/OROPHARYNX:     Lips:  Normal.  Tongue: normal, midline  Mucosa:   no lesions     NECK:  Trachea:  midline.              Thyroid:  normal              Adenopathy:  none        NEURO:   Alert and  Oriented     GAIT AND STATION:  normal     RESPIRATORY:   Symmetry and Respiratory effort     PSYCH:  Normal mood and affect     SKIN:   warm and dry         IMPRESSION:    Encounter Diagnoses   Name Primary?     Sensorineural hearing loss (SNHL) of both ears Yes     Wears hearing aid in both ears      Chronic rhinitis      Dysfunction of both eustachian tubes      Negative middle ear pressure of both ears           RECOMMENDATIONS:    Astelin nasal spray as directed  TMJ precautions  Return annually for hearing test      Again, thank you for allowing me to participate in the care of your patient.        Sincerely,        Ez Portillo MD

## 2022-09-25 ENCOUNTER — HEALTH MAINTENANCE LETTER (OUTPATIENT)
Age: 87
End: 2022-09-25

## 2022-11-02 DIAGNOSIS — M48.062 SPINAL STENOSIS OF LUMBAR REGION WITH NEUROGENIC CLAUDICATION: ICD-10-CM

## 2022-11-02 DIAGNOSIS — M43.16 SPONDYLOLISTHESIS OF LUMBAR REGION: ICD-10-CM

## 2022-11-03 RX ORDER — PREGABALIN 25 MG/1
CAPSULE ORAL
Qty: 60 CAPSULE | Refills: 2 | Status: SHIPPED | OUTPATIENT
Start: 2022-11-03 | End: 2023-02-03

## 2022-11-15 ENCOUNTER — OFFICE VISIT (OUTPATIENT)
Dept: NEUROLOGY | Facility: CLINIC | Age: 87
End: 2022-11-15
Attending: STUDENT IN AN ORGANIZED HEALTH CARE EDUCATION/TRAINING PROGRAM
Payer: MEDICARE

## 2022-11-15 DIAGNOSIS — M48.062 SPINAL STENOSIS OF LUMBAR REGION WITH NEUROGENIC CLAUDICATION: Primary | ICD-10-CM

## 2022-11-15 PROCEDURE — 95886 MUSC TEST DONE W/N TEST COMP: CPT | Mod: RT | Performed by: PSYCHIATRY & NEUROLOGY

## 2022-11-15 PROCEDURE — 95910 NRV CNDJ TEST 7-8 STUDIES: CPT | Performed by: PSYCHIATRY & NEUROLOGY

## 2022-11-15 NOTE — PROCEDURES
[facility]     [facility]ly [facility]  [facility], [address]  [place] [address]  Tel: [phone]  Fax: [phone]          Full Name: Antoni Sotll Gender: Male  Patient ID: [id number] YOB: 1935      Visit Date: 11/15/2022 13:30  Age: 87 Years 5 Months Old  Interpreted By: Antoni Harp M.D.   Ref Dr.: Shaji Torres MD, Orlin Aviles MD  Tech: ST   Height: 5 feet 6 inch  Reason for referral: Evaluate bilateral lowers. c/o tingling, numbness in both legs/feet > 3 years. Right > Left.       Motor NCS      Nerve / Sites Lat Amp Dist Obey    ms mV cm m/s   R Peroneal - EDB      Ankle 6.61 0.4 8       Fib head 14.11 0.4 29 39      Pop fossa 16.98 0.4 11 38   L Peroneal - EDB      Ankle 6.25 1.1 8       Fib head 13.70 0.9 28.5 38      Pop fossa 16.51 0.9 11 39   R Tibial - AH      Ankle 6.09 5.2 838       Pop fossa 15.63 4.9 37.5 39   L Tibial - AH      Ankle 5.42 4.3 8       Pop fossa 15.16 3.5 38 39       F  Wave      Nerve Fmin    ms   R Tibial - AH 60.83   L Tibial - AH 60.21       Sensory NCS      Nerve / Sites Pk Lat Amp.1-2 Dist    ms  V cm   R Sural - Ankle (Calf)      Calf 4.01 5.5 14   L Sural - Ankle (Calf)      Calf 3.65 3.4 14   R Superficial peroneal - Ankle      Lat leg NR NR 12   L Superficial peroneal - Ankle      Lat leg NR NR 12       EMG Summary Table     Spontaneous MUAP Rcmt Note   Muscle Fib PSW Fasc IA # Amp Dur PPP Rate Type   R. Gluteus medius None None None N N N N N N N   R. Gluteus wade None None None N N N N N N N   R. L3 paraspinal None None None N N N N N N N   R. L4 paraspinal 1+ 1+ None N N N N N N N   R. L5 paraspinal 1+ 1+ None N N N N N N N   R. S1 paraspinal 1+ 1+ None N N N N N N N   R. Adductor ronald None None None N N N N N N N   R. Quadriceps None None None N N N N N N N   R. Tibialis anterior Occ Occ None N N N N N N N   R. Peroneus longus Occ Occ None N N N N N N N   R. Tibialis posterior  Occ Occ None N N N N N N N   R. Gastrocnemius (Medial head) None None None N N N N N N N   R. Gastrocnemius (Lateral head) Occ Occ None N N N N N N N   L. Adductor ronald None None None N N N N N N N   L. Quadriceps None None None N N N N N N N   L. Tibialis anterior Occ Occ None N N N N N N N   L. Peroneus longus Occ Occ None N N N N N N N   L. Tibialis posterior Occ Occ None N N N N N N N   L. Gastrocnemius (Medial head) None None None N N N N N N N   L. Gastrocnemius (Lateral head) Occ Occ None N N N N N N N       Right peroneal motor conduction study low CMAP amplitude, borderline slowing velocity  Right tibial motor Study borderline slowed conduction velocity  Right F-wave latency prolonged for height    Left peroneal motor Study borderline slowed conduction velocity  Left tibial motor Study borderline slowed conduction velocity  Left tibial F wave latency prolonged    Right sural snap potential normal  Left sural snap potential normal  Right superficial peroneal snap potential absent  Left superficial peroneal snap potential absent    Needle examination right lower extremity, mild paraspinal denervation L4-L5 and S1,                                                                     Mild irritability anterior tibial/peroneus longus/posterior tibial/gastrocnemius, normal motor units.    Needle examination left lower extremity, mild irritability and tibial/peroneus longus/posterior tibial/gastrocnemius, normal motor units    Impression    1.  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    2.  Some proximal paraspinal denervation with mild distal insertional activity without significant chronic motor unit changes and slowed F-wave latencies could be consistent with:       Bilateral mild L5-S1 motor radiculopathies, mild in degree electrophysiologically without significant motor unit  changes.

## 2022-12-05 ENCOUNTER — TELEPHONE (OUTPATIENT)
Dept: PHYSICAL MEDICINE AND REHAB | Facility: CLINIC | Age: 87
End: 2022-12-05

## 2022-12-05 NOTE — TELEPHONE ENCOUNTER
PSP: Orlin Mir DO   Last clinic visit:  9/7/22   Reason for call: EMG results done per Dr. Harp  Clinical information:  Patient also reports he experienced intense right lower back pain making it difficult to move and walk. Wondering if PSP could assist with that.  Advice given to patient: Explained that as he is having new back pain, he should be evaluated. The EMG results are scanned into his chart. PSP can review this at the appointment. PSP with an opening tomorrow. Transferred to scheduling to assist in getting scheduled.   Provider to address: NAN

## 2022-12-06 ENCOUNTER — HOSPITAL ENCOUNTER (OUTPATIENT)
Dept: RADIOLOGY | Facility: HOSPITAL | Age: 87
Discharge: HOME | End: 2022-12-06
Attending: PHYSICAL MEDICINE & REHABILITATION | Admitting: PHYSICAL MEDICINE & REHABILITATION
Payer: MEDICARE

## 2022-12-06 ENCOUNTER — OFFICE VISIT (OUTPATIENT)
Dept: PHYSICAL MEDICINE AND REHAB | Facility: CLINIC | Age: 87
End: 2022-12-06
Payer: MEDICARE

## 2022-12-06 VITALS
SYSTOLIC BLOOD PRESSURE: 179 MMHG | BODY MASS INDEX: 24.34 KG/M2 | WEIGHT: 144 LBS | HEART RATE: 71 BPM | DIASTOLIC BLOOD PRESSURE: 78 MMHG

## 2022-12-06 DIAGNOSIS — M48.062 SPINAL STENOSIS OF LUMBAR REGION WITH NEUROGENIC CLAUDICATION: ICD-10-CM

## 2022-12-06 DIAGNOSIS — G89.29 CHRONIC RIGHT SHOULDER PAIN: ICD-10-CM

## 2022-12-06 DIAGNOSIS — M54.50 LUMBAR SPINE PAIN: Primary | ICD-10-CM

## 2022-12-06 DIAGNOSIS — M25.511 CHRONIC RIGHT SHOULDER PAIN: ICD-10-CM

## 2022-12-06 DIAGNOSIS — M43.16 SPONDYLOLISTHESIS OF LUMBAR REGION: ICD-10-CM

## 2022-12-06 DIAGNOSIS — M75.121 COMPLETE TEAR OF RIGHT ROTATOR CUFF, UNSPECIFIED WHETHER TRAUMATIC: ICD-10-CM

## 2022-12-06 DIAGNOSIS — M54.50 LUMBAR SPINE PAIN: ICD-10-CM

## 2022-12-06 PROCEDURE — 99214 OFFICE O/P EST MOD 30 MIN: CPT | Performed by: PHYSICAL MEDICINE & REHABILITATION

## 2022-12-06 PROCEDURE — 72100 X-RAY EXAM L-S SPINE 2/3 VWS: CPT

## 2022-12-06 RX ORDER — METHYLPREDNISOLONE 4 MG
TABLET, DOSE PACK ORAL
Qty: 21 TABLET | Refills: 0 | Status: SHIPPED | OUTPATIENT
Start: 2022-12-06 | End: 2023-01-18

## 2022-12-06 ASSESSMENT — PAIN SCALES - GENERAL: PAINLEVEL: SEVERE PAIN (7)

## 2022-12-06 NOTE — PROGRESS NOTES
Assessment/Plan:      Antoni was seen today for back pain.    Diagnoses and all orders for this visit:    Lumbar spine pain  -     methylPREDNISolone (MEDROL DOSEPAK) 4 MG tablet therapy pack; Follow Package Directions  -     XR Lumbar Spine 2/3 Views; Future    Spondylolisthesis of lumbar region  -     methylPREDNISolone (MEDROL DOSEPAK) 4 MG tablet therapy pack; Follow Package Directions  -     XR Lumbar Spine 2/3 Views; Future    Spinal stenosis of lumbar region with neurogenic claudication    Complete tear of right rotator cuff, unspecified whether traumatic    Chronic right shoulder pain         Assessment: Pleasant 87 year old male with history of colon cancer, hyperlipidemia, hypertension, carotid artery occlusion, coronary artery disease with:        1.    Acute right-sided lumbar spine pain into the upper gluteal region for the past 3 days after carrying some flowers.  I suspect he has lumbar compression fracture or potentially sacral insufficiency fracture.  No change in his chronic radicular symptoms.       2.  Chronic lumbar spine pain with bilateral lower extremity claudication symptoms.    He has right greater than left lower extremity pain and paresthesias related to severe spinal stenosis L4-5 and slightly less at L3-4  with L4-5 spondylolisthesis.  There is tortuosity of the nerve roots.  Pain persist despite physical therapy, medications and lumbar epidurals. bilateral L5-S1 transforaminal epidural steroid injection did offer some relief but only 40 to 50% and that benefit slowly diminished over 1 month. He continues to have numbness and tingling in his right foot.  He was seen by Dr. Kenny at [facility] orthopedics.  He would be a surgical candidate but holding off as long as possible.  Continues with no weakness may be some equivocal of the right great toe extensors.    Symptoms continue to worsen with the lower extremity paresthesias up to the knees can only tolerate maybe 20 minutes of  standing.  He does have chronic L5-S1 radicular findings on recent EMG and neurology.        3.  Worsening right shoulder pain.  Has had a complete tear of the rotator cuff supraspinatus with retraction of the glenohumeral joint effusion in the past which responded well to glenohumeral joint injection.last injection September 7, 2022.  No improvement following glenohumeral joint injection.    4.  Mild peripheral polyneuropathy and EMG    Discussion:    1.  Main issue today is the severe right-sided acute low back pain.  We discussed compression fracture is on the differential.  This could also be an increase in his radicular pain after traveling given his underlying stenosis that severe at L4-5.    2.  Will obtain plain films of the lumbar spine to evaluate for new fracture.  He does appear to have demineralization on the old plain films from September.  May have osteoporosis as well.    3.  Start Medrol Dosepak for acute pain.  If he has a compression fracture showing up on plain films will have him hold off on taking the Medrol pack and trial calcitonin nasal spray and likely add an LSO.      4.  He has a visit with his primary care provider in January.  I would recommend he discuss evaluation for osteoporosis.    .  Follow-up with me in 2 to 4 weeks.    It was our pleasure caring for your patient today, if there any questions or concerns please do not hesitate to contact us.      Subjective:   Patient ID: Antoni Stoll is a 87 year old male.    History of Present Illness: Patient presents for an evaluation of right-sided low back pain.  This is acute pain over the past 3 to 4 days.  [place] was out [place] for a [place] of [place] as his sister passed away.  He did not do much in the way of activity other than carrying some flowers a couple of days ago.  He got up from the desk that evening and had severe pain in the right low back at the PSIS sacrum and lumbosacral junction.  He has no radiation down the legs but his  pain is quite severe with any standing walking or bending better with sitting.  Pain is a 10/10 or 7/10 today 2/10 at best.  Does not travel down his leg with no new paresthesias or weakness.  He has chronic paresthesias of both feet and has had an EMG neurology recently showing mild sensory peripheral polyneuropathy and chronic L5-S1/mild radicular findings.  Continues to take pregabalin as well as Tylenol and has increased his Tylenol use recently.    His right shoulder pain is persistent no change with the injection at last      Imaging: Lumbar flexion-extension x-rays imaging report personally reviewed from September 2022.  Grade 1 spondylolisthesis L1 and L2-L3 and L4 and L4 on L5 with no instability from flexion to extension.    MRI from August 2022 personally reviewed as well for medical decision-making purposes.  Advanced lumbar degenerative disc disease with scoliosis and multilevel low-grade spondylolisthesis of L4 and L5 L3 and L4 L1 and L2.  There are right L4 pars defect.  Severe spinal stenosis at L4-5 with clumping of the nerve roots as well as L3-4.    Review of Systems: Pertinent positives: Numbness tingling weakness in the legs.  Poor balance.  Denies bowel or bladder incontinence, headaches, swallowing difficulties fevers or weight loss.       Past Medical History:   Diagnosis Date     Anemia      Cancer (H)     colon     Carotid artery occlusion      Coronary artery disease      Glaucoma      High cholesterol      History of colon cancer 8/13/2018     Hyperlipidemia      Hypertension      Left inguinal hernia      Low back pain      Mild aortic insufficiency      Moderate tricuspid regurgitation      Postnasal drip      Sleep apnea, obstructive     CPAP     Spinal stenosis     lumbar     SSS (sick sinus syndrome) (H) 12/9/2019     Umbilical hernia      Unspecified cataract     Created by Conversion        The following portions of the patient's history were reviewed and updated as appropriate:  allergies, current medications, past family history, past medical history, past social history, past surgical history and problem list.           Objective:   Physical Exam:    BP (!) 179/78   Pulse 71   Wt 144 lb (65.3 kg)   BMI 24.34 kg/m    Body mass index is 24.34 kg/m .      General: Alert and oriented with normal affect. Attention, knowledge, memory, and language are intact. No acute distress.      Gait:  Nonantalgic  No significant tenderness lumbar paraspinals or gluteal tissues.  Sensation is intact to light touch throughout the  lower extremities.  Reflexes are  . 2+ right, 1+ left patellar and 0 bilateral Achilles      Manual muscle testing reveals:  Right /Left out of 5     5/5 hip flexors  5/5 knee flexors  5/5 knee extensors  5/5 ankle plantar flexors  5/5 ankle dorsiflexors  5/5  EHL

## 2022-12-06 NOTE — PATIENT INSTRUCTIONS
An xray was ordered for you today.  Please call Radiology at [phone].     Medrol dose pack has been prescribed today.  Please follow directions on package.  Do not take with NSAIDs (ibupreofen or aleve).  I would recommend tylenol (extra strength or extended release/8hour) over the counter as needed.

## 2022-12-06 NOTE — LETTER
12/6/2022         RE: Antoni Stoll  [address]  [place] [place] [facility] [address]        Dear Colleague,    Thank you for referring your patient, Antoni Stoll, to the [facility]. Please see a copy of my visit note below.    Assessment/Plan:      Antoni was seen today for back pain.    Diagnoses and all orders for this visit:    Lumbar spine pain  -     methylPREDNISolone (MEDROL DOSEPAK) 4 MG tablet therapy pack; Follow Package Directions  -     XR Lumbar Spine 2/3 Views; Future    Spondylolisthesis of lumbar region  -     methylPREDNISolone (MEDROL DOSEPAK) 4 MG tablet therapy pack; Follow Package Directions  -     XR Lumbar Spine 2/3 Views; Future    Spinal stenosis of lumbar region with neurogenic claudication    Complete tear of right rotator cuff, unspecified whether traumatic    Chronic right shoulder pain         Assessment: Pleasant 87 year old male with history of colon cancer, hyperlipidemia, hypertension, carotid artery occlusion, coronary artery disease with:        1.    Acute right-sided lumbar spine pain into the upper gluteal region for the past 3 days after carrying some flowers.  I suspect he has lumbar compression fracture or potentially sacral insufficiency fracture.  No change in his chronic radicular symptoms.       2.  Chronic lumbar spine pain with bilateral lower extremity claudication symptoms.    He has right greater than left lower extremity pain and paresthesias related to severe spinal stenosis L4-5 and slightly less at L3-4  with L4-5 spondylolisthesis.  There is tortuosity of the nerve roots.  Pain persist despite physical therapy, medications and lumbar epidurals. bilateral L5-S1 transforaminal epidural steroid injection did offer some relief but only 40 to 50% and that benefit slowly diminished over 1 month. He continues to have numbness and tingling in his right foot.  He was seen by Dr. Kenny at [facility] orth[facility].  He would be a surgical  candidate but holding off as long as possible.  Continues with no weakness may be some equivocal of the right great toe extensors.    Symptoms continue to worsen with the lower extremity paresthesias up to the knees can only tolerate maybe 20 minutes of standing.  He does have chronic L5-S1 radicular findings on recent EMG and neurology.        3.  Worsening right shoulder pain.  Has had a complete tear of the rotator cuff supraspinatus with retraction of the glenohumeral joint effusion in the past which responded well to glenohumeral joint injection.last injection September 7, 2022.  No improvement following glenohumeral joint injection.    4.  Mild peripheral polyneuropathy and EMG    Discussion:    1.  Main issue today is the severe right-sided acute low back pain.  We discussed compression fracture is on the differential.  This could also be an increase in his radicular pain after traveling given his underlying stenosis that severe at L4-5.    2.  Will obtain plain films of the lumbar spine to evaluate for new fracture.  He does appear to have demineralization on the old plain films from September.  May have osteoporosis as well.    3.  Start Medrol Dosepak for acute pain.  If he has a compression fracture showing up on plain films will have him hold off on taking the Medrol pack and trial calcitonin nasal spray and likely add an LSO.      4.  He has a visit with his primary care provider in January.  I would recommend he discuss evaluation for osteoporosis.    .  Follow-up with me in 2 to 4 weeks.    It was our pleasure caring for your patient today, if there any questions or concerns please do not hesitate to contact us.      Subjective:   Patient ID: Antoni Stoll is a 87 year old male.    History of Present Illness: Patient presents for an evaluation of right-sided low back pain.  This is acute pain over the past 3 to 4 days.  He was out east for a couple of weeks as his sister passed away.  He did not do  much in the way of activity other than carrying some flowers a couple of days ago.  He got up from the desk that evening and had severe pain in the right low back at the PSIS sacrum and lumbosacral junction.  He has no radiation down the legs but his pain is quite severe with any standing walking or bending better with sitting.  Pain is a 10/10 or 7/10 today 2/10 at best.  Does not travel down his leg with no new paresthesias or weakness.  He has chronic paresthesias of both feet and has had an EMG neurology recently showing mild sensory peripheral polyneuropathy and chronic L5-S1/mild radicular findings.  Continues to take pregabalin as well as Tylenol and has increased his Tylenol use recently.    His right shoulder pain is persistent no change with the injection at last      Imaging: Lumbar flexion-extension x-rays imaging report personally reviewed from September 2022.  Grade 1 spondylolisthesis L1 and L2-L3 and L4 and L4 on L5 with no instability from flexion to extension.    MRI from August 2022 personally reviewed as well for medical decision-making purposes.  Advanced lumbar degenerative disc disease with scoliosis and multilevel low-grade spondylolisthesis of L4 and L5 L3 and L4 L1 and L2.  There are right L4 pars defect.  Severe spinal stenosis at L4-5 with clumping of the nerve roots as well as L3-4.    Review of Systems: Pertinent positives: Numbness tingling weakness in the legs.  Poor balance.  Denies bowel or bladder incontinence, headaches, swallowing difficulties fevers or weight loss.       Past Medical History:   Diagnosis Date     Anemia      Cancer (H)     colon     Carotid artery occlusion      Coronary artery disease      Glaucoma      High cholesterol      History of colon cancer 8/13/2018     Hyperlipidemia      Hypertension      Left inguinal hernia      Low back pain      Mild aortic insufficiency      Moderate tricuspid regurgitation      Postnasal drip      Sleep apnea, obstructive      CPAP     Spinal stenosis     lumbar     SSS (sick sinus syndrome) (H) 12/9/2019     Umbilical hernia      Unspecified cataract     Created by Conversion        The following portions of the patient's history were reviewed and updated as appropriate: allergies, current medications, past family history, past medical history, past social history, past surgical history and problem list.           Objective:   Physical Exam:    BP (!) 179/78   Pulse 71   Wt 144 lb (65.3 kg)   BMI 24.34 kg/m    Body mass index is 24.34 kg/m .      General: Alert and oriented with normal affect. Attention, knowledge, memory, and language are intact. No acute distress.      Gait:  Nonantalgic  No significant tenderness lumbar paraspinals or gluteal tissues.  Sensation is intact to light touch throughout the  lower extremities.  Reflexes are  . 2+ right, 1+ left patellar and 0 bilateral Achilles      Manual muscle testing reveals:  Right /Left out of 5     5/5 hip flexors  5/5 knee flexors  5/5 knee extensors  5/5 ankle plantar flexors  5/5 ankle dorsiflexors  5/5  EHL        Again, thank you for allowing me to participate in the care of your patient.        Sincerely,        Orlin Mir DO

## 2022-12-20 DIAGNOSIS — E78.00 PURE HYPERCHOLESTEROLEMIA: ICD-10-CM

## 2022-12-21 RX ORDER — ATORVASTATIN CALCIUM 20 MG/1
20 TABLET, FILM COATED ORAL DAILY
Qty: 90 TABLET | Refills: 0 | Status: SHIPPED | OUTPATIENT
Start: 2022-12-21 | End: 2023-03-20

## 2022-12-21 NOTE — TELEPHONE ENCOUNTER
"Last Written Prescription Date:  12/20/21  Last Fill Quantity: 90,  # refills: 3   Last office visit provider:  8/9/22     Requested Prescriptions   Pending Prescriptions Disp Refills     atorvastatin (LIPITOR) 20 MG tablet 90 tablet 3     Sig: Take 1 tablet (20 mg) by mouth daily       Statins Protocol Passed - 12/21/2022 10:32 AM        Passed - LDL on file in past 12 months     Recent Labs   Lab Test 01/18/22  1431   LDL 41             Passed - No abnormal creatine kinase in past 12 months     Recent Labs   Lab Test 06/03/19  0953                   Passed - Recent (12 mo) or future (30 days) visit within the authorizing provider's specialty     Patient has had an office visit with the authorizing provider or a provider within the authorizing providers department within the previous 12 mos or has a future within next 30 days. See \"Patient Info\" tab in inbasket, or \"Choose Columns\" in Meds & Orders section of the refill encounter.              Passed - Medication is active on med list        Passed - Patient is age 18 or older             Chris Kurtz RN 12/21/22 10:34 AM  "

## 2023-01-06 ENCOUNTER — OFFICE VISIT (OUTPATIENT)
Dept: PHYSICAL MEDICINE AND REHAB | Facility: CLINIC | Age: 88
End: 2023-01-06
Payer: MEDICARE

## 2023-01-06 VITALS — DIASTOLIC BLOOD PRESSURE: 63 MMHG | HEART RATE: 77 BPM | SYSTOLIC BLOOD PRESSURE: 136 MMHG

## 2023-01-06 DIAGNOSIS — M43.16 SPONDYLOLISTHESIS OF LUMBAR REGION: ICD-10-CM

## 2023-01-06 DIAGNOSIS — M48.062 SPINAL STENOSIS OF LUMBAR REGION WITH NEUROGENIC CLAUDICATION: ICD-10-CM

## 2023-01-06 DIAGNOSIS — M54.50 LUMBAR SPINE PAIN: Primary | ICD-10-CM

## 2023-01-06 PROCEDURE — 99213 OFFICE O/P EST LOW 20 MIN: CPT | Performed by: PHYSICAL MEDICINE & REHABILITATION

## 2023-01-06 ASSESSMENT — PAIN SCALES - GENERAL: PAINLEVEL: MILD PAIN (3)

## 2023-01-06 NOTE — LETTER
1/6/2023         RE: Antoni Stoll  [address]  [place] [address]        Dear Colleague,    Thank you for referring your patient, Antoni Stoll, to the [facility]UROS[facility]ERY. Please see a copy of my visit note below.    Assessment/Plan:      Antoni was seen today for back pain.    Diagnoses and all orders for this visit:    Lumbar spine pain    Spondylolisthesis of lumbar region    Spinal stenosis of lumbar region with neurogenic claudication         Assessment: Pleasant 87 year old male  with history of colon cancer, hyperlipidemia, hypertension, carotid artery occlusion, coronary artery disease with:        1.    Acute right-sided lumbar spine pain into the upper gluteal region  after carrying some flowers.  No lumbar compression fracture on plain films..  No change in his chronic radicular symptoms.  Significantly improved with Medrol Dosepak.  Pain is now back to baseline        2.  Persistent Chronic lumbar spine pain with bilateral lower extremity claudication symptoms.    He has right greater than left lower extremity pain and paresthesias related to severe spinal stenosis L4-5 and slightly less at L3-4  with L4-5 spondylolisthesis.  There is tortuosity of the nerve roots.  Pain persist despite physical therapy, medications and lumbar epidurals. bilateral L5-S1 transforaminal epidural steroid injection did offer some relief but only 40 to 50% and that benefit slowly diminished over 1 month. He continues to have numbness and tingling in his right foot.  He was seen by Dr. Kenny at [facility] orthopedics and is seeing Dr Torres.  He would be a surgical candidate but holding off as long as possible.  Continues with no weakness may be some equivocal of the right great toe extensors.    Symptoms continue to worsen with the lower extremity paresthesias up to the knees can only tolerate maybe 20 minutes of standing.  He does have chronic L5-S1 radicular findings on recent EMG  and neurology.  Symptoms are tolerable with Lyrica 25 mg twice daily.  Having some drowsiness but it is tolerable         3.  Persistent waxing and waning right shoulder pain.  Has had a complete tear of the rotator cuff supraspinatus with retraction of the glenohumeral joint effusion in the past which responded well to glenohumeral joint injection.last injection September 7, 2022.  No improvement following glenohumeral joint injection.               Discussion:    1.  We discussed his diagnosis and treatment options again today.  His acute pain has resolved and he feels fairly good with that following the Medrol Dosepak.  He does continue to have intermittent shoulder pain as well as lumbar spine pain and we discussed options of further injections although the last injections were not that helpful.  Has failed physical therapy.  Pregabalin is tolerable but he is drowsy and does not want to increase this medication.    2.  He may trial acupuncture if he wishes.  He can explore this on his own.    3.  Continue with Lyrica 25 mg twice a day.      4.  He can follow-up with me as needed.      It was our pleasure caring for your patient today, if there any questions or concerns please do not hesitate to contact us.      Subjective:   Patient ID: Antoni Stoll is a 87 year old male.    History of Present Illness: Patient presents for follow-up of lumbar spine pain and gluteal pain.  Also has some right shoulder pain.  At last visit was given Medrol Dosepak.  After the first day or 2 he had significant improvement in his lumbar spine and right hip pain.  He was able to get up and down impact for his [place] which [place] went on for about 3 weeks.  He had an episode of left hip pain while packing that only lasted 1 night was self-limiting and has been okay since.  He still has his baseline pain worse with standing and walking better with sitting pain is 9/10 at worst 3/10 today 1/10 at best.  Taking pregabalin 25 mg  twice a day along with Tylenol and his pain is tolerable.    He also continues to have intermittent right shoulder pain couple times a day but is self-limiting.      Imaging: I personally reviewed plain films lumbar spine from December 6.  Scoliosis.  No obvious compression fracture or vertebral body height loss.  Scoliosis measures 28.2 degrees retrolisthesis of L2 on L3.  Spondylolisthesis anteriorly of L4 and L5 about 6 mm.    I personally reviewed lumbar spine MRI images again for medical decision-making purposes.  Severe scoliosis.  Severe  spinal stenosis L4-5 with spondylolisthesis severe facet arthropathy.    Review of Systems: Pertinent positives: Paresthesias in the feet weakness of the right shoulder and legs.  Complains of some coordination issues.  Pertinent negatives:  No bowel or bladder incontinence.  No urinary retention.  No fevers, unintentional weight loss,  headaches, frequent falling, difficulty swallowing .  All others reviewed are negative.           Past Medical History:   Diagnosis Date     Anemia      Cancer (H)     colon     Carotid artery occlusion      Coronary artery disease      Glaucoma      High cholesterol      History of colon cancer 8/13/2018     Hyperlipidemia      Hypertension      Left inguinal hernia      Low back pain      Mild aortic insufficiency      Moderate tricuspid regurgitation      Postnasal drip      Sleep apnea, obstructive     CPAP     Spinal stenosis     lumbar     SSS (sick sinus syndrome) (H) 12/9/2019     Umbilical hernia      Unspecified cataract     Created by Conversion        The following portions of the patient's history were reviewed and updated as appropriate: allergies, current medications, past family history, past medical history, past social history, past surgical history and problem list.           Objective:   Physical Exam:    /63   Pulse 77   There is no height or weight on file to calculate BMI.      General: Alert and oriented with  normal affect. Attention, knowledge, memory, and language are intact. No acute distress.    CV: No lower extremity edema.      Sensation is intact to light touch throughout the  lower extremities.  Reflexes are   2+ patellar and 0 Achilles      Manual muscle testing reveals:  Right /Left out of 5     5/5 hip flexors  5/5 knee flexors  5/5 knee extensors  5/5 ankle plantar flexors  5/5 ankle dorsiflexors  5/5  ankle evertors      Again, thank you for allowing me to participate in the care of your patient.        Sincerely,        Orlin Mir, DO

## 2023-01-06 NOTE — PROGRESS NOTES
Assessment/Plan:      Antoni was seen today for back pain.    Diagnoses and all orders for this visit:    Lumbar spine pain    Spondylolisthesis of lumbar region    Spinal stenosis of lumbar region with neurogenic claudication         Assessment: Pleasant 87 year old male  with history of colon cancer, hyperlipidemia, hypertension, carotid artery occlusion, coronary artery disease with:        1.    Acute right-sided lumbar spine pain into the upper gluteal region  after carrying some flowers.  No lumbar compression fracture on plain films..  No change in his chronic radicular symptoms.  Significantly improved with Medrol Dosepak.  Pain is now back to baseline        2.  Persistent Chronic lumbar spine pain with bilateral lower extremity claudication symptoms.    He has right greater than left lower extremity pain and paresthesias related to severe spinal stenosis L4-5 and slightly less at L3-4  with L4-5 spondylolisthesis.  There is tortuosity of the nerve roots.  Pain persist despite physical therapy, medications and lumbar epidurals. bilateral L5-S1 transforaminal epidural steroid injection did offer some relief but only 40 to 50% and that benefit slowly diminished over 1 month. He continues to have numbness and tingling in his right foot.  He was seen by Dr. Kenny at [facility] orth[facility]ics and is seeing Dr Torres.  He would be a surgical candidate but holding off as long as possible.  Continues with no weakness may be some equivocal of the right great toe extensors.    Symptoms continue to worsen with the lower extremity paresthesias up to the knees can only tolerate maybe 20 minutes of standing.  He does have chronic L5-S1 radicular findings on recent EMG and neurology.  Symptoms are tolerable with Lyrica 25 mg twice daily.  Having some drowsiness but it is tolerable         3.  Persistent waxing and waning right shoulder pain.  Has had a complete tear of the rotator cuff supraspinatus with retraction of the  glenohumeral joint effusion in the past which responded well to glenohumeral joint injection.last injection September 7, 2022.  No improvement following glenohumeral joint injection.               Discussion:    1.  We discussed his diagnosis and treatment options again today.  His acute pain has resolved and he feels fairly good with that following the Medrol Dosepak.  He does continue to have intermittent shoulder pain as well as lumbar spine pain and we discussed options of further injections although the last injections were not that helpful.  Has failed physical therapy.  Pregabalin is tolerable but he is drowsy and does not want to increase this medication.    2.  He may trial acupuncture if he wishes.  He can explore this on his own.    3.  Continue with Lyrica 25 mg twice a day.      4.  He can follow-up with me as needed.      It was our pleasure caring for your patient today, if there any questions or concerns please do not hesitate to contact us.      Subjective:   Patient ID: Antoni Stoll is a 87 year old male.    History of Present Illness: Patient presents for follow-up of lumbar spine pain and gluteal pain.  Also has some right shoulder pain.  At last visit was given Medrol Dosepak.  After the first day or 2 he had significant improvement in his lumbar spine and right hip pain.  He was able to get up and down impact [place] went on for about 3 weeks.  He had an episode of left hip pain while packing that only lasted 1 night was self-limiting and has been okay since.  He still has his baseline pain worse with standing and walking better with sitting pain is 9/10 at worst 3/10 today 1/10 at best.  Taking pregabalin 25 mg twice a day along with Tylenol and his pain is tolerable.    He also continues to have intermittent right shoulder pain couple times a day but is self-limiting.      Imaging: I personally reviewed plain films lumbar spine from December 6.  Scoliosis.  No  obvious compression fracture or vertebral body height loss.  Scoliosis measures 28.2 degrees retrolisthesis of L2 on L3.  Spondylolisthesis anteriorly of L4 and L5 about 6 mm.    I personally reviewed lumbar spine MRI images again for medical decision-making purposes.  Severe scoliosis.  Severe  spinal stenosis L4-5 with spondylolisthesis severe facet arthropathy.    Review of Systems: Pertinent positives: Paresthesias in the feet weakness of the right shoulder and legs.  Complains of some coordination issues.  Pertinent negatives:  No bowel or bladder incontinence.  No urinary retention.  No fevers, unintentional weight loss,  headaches, frequent falling, difficulty swallowing .  All others reviewed are negative.           Past Medical History:   Diagnosis Date     Anemia      Cancer (H)     colon     Carotid artery occlusion      Coronary artery disease      Glaucoma      High cholesterol      History of colon cancer 8/13/2018     Hyperlipidemia      Hypertension      Left inguinal hernia      Low back pain      Mild aortic insufficiency      Moderate tricuspid regurgitation      Postnasal drip      Sleep apnea, obstructive     CPAP     Spinal stenosis     lumbar     SSS (sick sinus syndrome) (H) 12/9/2019     Umbilical hernia      Unspecified cataract     Created by Conversion        The following portions of the patient's history were reviewed and updated as appropriate: allergies, current medications, past family history, past medical history, past social history, past surgical history and problem list.           Objective:   Physical Exam:    /63   Pulse 77   There is no height or weight on file to calculate BMI.      General: Alert and oriented with normal affect. Attention, knowledge, memory, and language are intact. No acute distress.    CV: No lower extremity edema.      Sensation is intact to light touch throughout the  lower extremities.  Reflexes are   2+ patellar and 0 Achilles      Manual  muscle testing reveals:  Right /Left out of 5     5/5 hip flexors  5/5 knee flexors  5/5 knee extensors  5/5 ankle plantar flexors  5/5 ankle dorsiflexors  5/5  ankle evertors

## 2023-01-18 ENCOUNTER — OFFICE VISIT (OUTPATIENT)
Dept: CARDIOLOGY | Facility: CLINIC | Age: 88
End: 2023-01-18
Payer: MEDICARE

## 2023-01-18 VITALS
SYSTOLIC BLOOD PRESSURE: 128 MMHG | HEIGHT: 66 IN | BODY MASS INDEX: 23.78 KG/M2 | HEART RATE: 75 BPM | WEIGHT: 148 LBS | DIASTOLIC BLOOD PRESSURE: 62 MMHG | OXYGEN SATURATION: 99 %

## 2023-01-18 DIAGNOSIS — R06.09 DYSPNEA ON EXERTION: Primary | ICD-10-CM

## 2023-01-18 LAB — NT-PROBNP SERPL-MCNC: 414 PG/ML (ref 0–1800)

## 2023-01-18 PROCEDURE — 83880 ASSAY OF NATRIURETIC PEPTIDE: CPT | Performed by: INTERNAL MEDICINE

## 2023-01-18 PROCEDURE — 99214 OFFICE O/P EST MOD 30 MIN: CPT | Performed by: INTERNAL MEDICINE

## 2023-01-18 PROCEDURE — 36415 COLL VENOUS BLD VENIPUNCTURE: CPT | Performed by: INTERNAL MEDICINE

## 2023-01-18 NOTE — PROGRESS NOTES
"    Cardiology Progress Note     Assessment:  Coronary artery disease status post coronary artery bypass graft surgery in 2013, no angina  Chronic exertional dyspnea, without obvious fluid overload/valvular heart abnormalities/arrhythmias  History of mild sinus bradycardia, resolved after discontinuation of atenolol  Hypercholesterolemia, good control  Hypertension, good control  Spinal stenosis  Colon cancer, status post partial colectomy      Plan:  Repeat BNP and echo to reassess dyspnea.  Clinically I do not see any changes on exam today.    Follow-up based on the results of the tests  Subjective:   This is 87 year old male who comes in today for follow-up visit.  He continues to experience exertional dyspnea.  He denies weight gain, PND, orthopnea.  Last year he had similar complaints.  BNP was borderline elevated.  We gave him 1 week of diuretics which did not help with shortness of breath.  We did not continue furosemide.  He has not had any chest pains.  He denies heart palpitations or syncope.    Review of Systems:   Negative other than history of present illness    Objective:   /62   Pulse 75   Ht 1.676 m (5' 6\")   Wt 67.1 kg (148 lb)   SpO2 99%   BMI 23.89 kg/m    Physical Exam:  GENERAL: no distress  NECK: No JVD  LUNGS: Clear to auscultation.  CARDIAC: regular rhythm, S1 & S2 normal.  No heaves, thrills, gallops or murmurs.  ABDOMEN: flat, negative hepatosplenomegaly, soft and non-tender.  EXTREMITIES: No evidence of cyanosis, clubbing or edema.    Current Outpatient Medications   Medication Sig Dispense Refill     acetaminophen (TYLENOL) 500 MG tablet [ACETAMINOPHEN (TYLENOL) 500 MG TABLET] Take 1,000 mg by mouth daily .             aspirin 325 MG tablet [ASPIRIN 325 MG TABLET] Take 325 mg by mouth daily.       atorvastatin (LIPITOR) 20 MG tablet Take 1 tablet (20 mg) by mouth daily 90 tablet 0     B2/VITS A,C,E/LUT/ZEAXANTH/MIN (ICAPS ORAL) [B2/VITS A,C,E/LUT/ZEAXANTH/MIN (ICAPS ORAL)] Take " 1 capsule by mouth daily.       calcium polycarbophil (FIBERCON) 625 mg tablet [CALCIUM POLYCARBOPHIL (FIBERCON) 625 MG TABLET] Take 1,250 mg by mouth daily.       cholecalciferol, vitamin D3, 1,000 unit tablet [CHOLECALCIFEROL, VITAMIN D3, 1,000 UNIT TABLET] Take 1,000 Units by mouth daily.       glucosamine-chondroitin 500-400 mg cap [GLUCOSAMINE-CHONDROITIN 500-400 MG CAP] Take 1 capsule by mouth 2 (two) times a day.       latanoprost (XALATAN) 0.005 % ophthalmic solution Place 1 drop into both eyes At Bedtime       lisinopril (ZESTRIL) 2.5 MG tablet [LISINOPRIL (PRINIVIL,ZESTRIL) 2.5 MG TABLET] TAKE 1 TABLET BY MOUTH EVERY DAY 90 tablet 3     nystatin (MYCOSTATIN) 805277 UNIT/GM external powder Apply to affected areas twice a day if flare of rash 60 g 3     pregabalin (LYRICA) 25 MG capsule TAKE 1 CAPSULE(25 MG) BY MOUTH TWICE DAILY 60 capsule 2     saw palmetto fruit 450 mg cap [SAW PALMETTO FRUIT 450 MG CAP] Take 1 capsule by mouth 2 (two) times a day.       tamsulosin (FLOMAX) 0.4 mg Cp24 [TAMSULOSIN (FLOMAX) 0.4 MG CP24] Take 0.4 mg by mouth at bedtime.         Cardiographics:    Holter: January 2020    Borderline Holter monitor tracing with evidence of sinus bradycardia and some tendency towards chronotropic incompetence.  This is further supported by the patient being short of breath while climbing steps with a heart rate of only 67 bpm.  Further clinical correlation may be warranted.    Indication for study: Sick sinus syndrome: The patient does manifest some degree of chronotropic incompetence and symptom correlation is present and he may be a candidate for device-based therapy.    No sustained atrial or ventricular tachyarrhythmia was demonstrated.    No profound bradycardia or pauses.           Stress echo: January 2019  1. Normal stress echocardiogram without evidence of stress induced ischemia.   2. Normal resting LV systolic performance with an ejection fraction of 55-60%. There is normal  improvement in left ventricular systolic performance with a peak ejection fraction of 70-75%.  3. No ECG evidence of ischemia.  4. No anginal chest pain reported with exercise.  5. Good functional capacity for age.        Echo: January 2019    Mild left atrial enlargement    Left ventricle ejection fraction is normal. The calculated left ventricular ejection fraction is 70% without wall motion abnormality.    Normal right ventricular size with decreased systolic function.    Aortic valve sclerosis with trace insufficiency    Mild mitral and tricuspid regurgitation. No evidence of pulmonary hypertension    When compared to the previous study dated 1/21/2019, degree of mitral regurgitation appears only mild on the current study.    Normal central venous pressure     June 2021    When compared to the previous study dated 1/28/2019, there is mild aortic and pulmonic regurgitation as well as moderate tricuspid regurgitation. There has been interval improvement in RV function    Left ventricle ejection fraction is normal. The calculated left ventricular ejection fraction is 76%.    Normal right ventricular size and systolic function.    Mild aortic regurgitation.    Moderate tricuspid valve regurgitation.    Mild pulmonic regurgitation.        Lab Results    Chemistry/lipid CBC Cardiac Enzymes/BNP/TSH/INR   Recent Labs   Lab Test 01/18/22  1431   CHOL 101   HDL 51   LDL 41   TRIG 46     Recent Labs   Lab Test 01/18/22  1431 12/14/20  1042 12/11/19  1003   LDL 41 46 48     Recent Labs   Lab Test 07/25/22  1359      POTASSIUM 4.2   CHLORIDE 106   CO2 24   GLC 97   BUN 20.3   CR 1.00   GFRESTIMATED 74   GEGE 9.3     Recent Labs   Lab Test 07/25/22  1359 01/18/22  1431 05/27/21  1627   CR 1.00 1.05 1.04     No results for input(s): A1C in the last 73497 hours.       Recent Labs   Lab Test 01/18/22  1431   WBC 6.9   HGB 12.4*   HCT 38.3*           Recent Labs   Lab Test 01/18/22  1431 05/27/21  1627  12/14/20  1042   HGB 12.4* 12.5* 13.9*    No results for input(s): TROPONINI in the last 16812 hours.  Recent Labs   Lab Test 01/17/22  1405   *     Recent Labs   Lab Test 05/27/21  1627   TSH 0.79     No results for input(s): INR in the last 68373 hours.

## 2023-01-18 NOTE — LETTER
"1/18/2023    Sarthak Romano MD  [address] [place] [address]    RE: Antoni Stoll       Dear Colleague,     I had the pleasure of seeing Antoni Stoll in the [facility].      Cardiology Progress Note     Assessment:  Coronary artery disease status post coronary artery bypass graft surgery in 2013, no angina  Chronic exertional dyspnea, without obvious fluid overload/valvular heart abnormalities/arrhythmias  History of mild sinus bradycardia, resolved after discontinuation of atenolol  Hypercholesterolemia, good control  Hypertension, good control  Spinal stenosis  Colon cancer, status post partial colectomy      Plan:  Repeat BNP and echo to reassess dyspnea.  Clinically I do not see any changes on exam today.    Follow-up based on the results of the tests  Subjective:   This is 87 year old male who comes in today for follow-up visit.  He continues to experience exertional dyspnea.  He denies weight gain, PND, orthopnea.  Last year he had similar complaints.  BNP was borderline elevated.  We gave him 1 week of diuretics which did not help with shortness of breath.  We did not continue furosemide.  He has not had any chest pains.  He denies heart palpitations or syncope.    Review of Systems:   Negative other than history of present illness    Objective:   /62   Pulse 75   Ht 1.676 m (5' 6\")   Wt 67.1 kg (148 lb)   SpO2 99%   BMI 23.89 kg/m    Physical Exam:  GENERAL: no distress  NECK: No JVD  LUNGS: Clear to auscultation.  CARDIAC: regular rhythm, S1 & S2 normal.  No heaves, thrills, gallops or murmurs.  ABDOMEN: flat, negative hepatosplenomegaly, soft and non-tender.  EXTREMITIES: No evidence of cyanosis, clubbing or edema.    Current Outpatient Medications   Medication Sig Dispense Refill     acetaminophen (TYLENOL) 500 MG tablet [ACETAMINOPHEN (TYLENOL) 500 MG TABLET] Take 1,000 mg by mouth daily .             aspirin 325 MG tablet [ASPIRIN 325 MG TABLET] Take 325 mg by " mouth daily.       atorvastatin (LIPITOR) 20 MG tablet Take 1 tablet (20 mg) by mouth daily 90 tablet 0     B2/VITS A,C,E/LUT/ZEAXANTH/MIN (ICAPS ORAL) [B2/VITS A,C,E/LUT/ZEAXANTH/MIN (ICAPS ORAL)] Take 1 capsule by mouth daily.       calcium polycarbophil (FIBERCON) 625 mg tablet [CALCIUM POLYCARBOPHIL (FIBERCON) 625 MG TABLET] Take 1,250 mg by mouth daily.       cholecalciferol, vitamin D3, 1,000 unit tablet [CHOLECALCIFEROL, VITAMIN D3, 1,000 UNIT TABLET] Take 1,000 Units by mouth daily.       glucosamine-chondroitin 500-400 mg cap [GLUCOSAMINE-CHONDROITIN 500-400 MG CAP] Take 1 capsule by mouth 2 (two) times a day.       latanoprost (XALATAN) 0.005 % ophthalmic solution Place 1 drop into both eyes At Bedtime       lisinopril (ZESTRIL) 2.5 MG tablet [LISINOPRIL (PRINIVIL,ZESTRIL) 2.5 MG TABLET] TAKE 1 TABLET BY MOUTH EVERY DAY 90 tablet 3     nystatin (MYCOSTATIN) 498867 UNIT/GM external powder Apply to affected areas twice a day if flare of rash 60 g 3     pregabalin (LYRICA) 25 MG capsule TAKE 1 CAPSULE(25 MG) BY MOUTH TWICE DAILY 60 capsule 2     saw palmetto fruit 450 mg cap [SAW PALMETTO FRUIT 450 MG CAP] Take 1 capsule by mouth 2 (two) times a day.       tamsulosin (FLOMAX) 0.4 mg Cp24 [TAMSULOSIN (FLOMAX) 0.4 MG CP24] Take 0.4 mg by mouth at bedtime.         Cardiographics:    Holter: January 2020    Borderline Holter monitor tracing with evidence of sinus bradycardia and some tendency towards chronotropic incompetence.  This is further supported by the patient being short of breath while climbing steps with a heart rate of only 67 bpm.  Further clinical correlation may be warranted.    Indication for study: Sick sinus syndrome: The patient does manifest some degree of chronotropic incompetence and symptom correlation is present and he may be a candidate for device-based therapy.    No sustained atrial or ventricular tachyarrhythmia was demonstrated.    No profound bradycardia or pauses.           Stress  echo: January 2019  1. Normal stress echocardiogram without evidence of stress induced ischemia.   2. Normal resting LV systolic performance with an ejection fraction of 55-60%. There is normal improvement in left ventricular systolic performance with a peak ejection fraction of 70-75%.  3. No ECG evidence of ischemia.  4. No anginal chest pain reported with exercise.  5. Good functional capacity for age.        Echo: January 2019    Mild left atrial enlargement    Left ventricle ejection fraction is normal. The calculated left ventricular ejection fraction is 70% without wall motion abnormality.    Normal right ventricular size with decreased systolic function.    Aortic valve sclerosis with trace insufficiency    Mild mitral and tricuspid regurgitation. No evidence of pulmonary hypertension    When compared to the previous study dated 1/21/2019, degree of mitral regurgitation appears only mild on the current study.    Normal central venous pressure     June 2021    When compared to the previous study dated 1/28/2019, there is mild aortic and pulmonic regurgitation as well as moderate tricuspid regurgitation. There has been interval improvement in RV function    Left ventricle ejection fraction is normal. The calculated left ventricular ejection fraction is 76%.    Normal right ventricular size and systolic function.    Mild aortic regurgitation.    Moderate tricuspid valve regurgitation.    Mild pulmonic regurgitation.        Lab Results    Chemistry/lipid CBC Cardiac Enzymes/BNP/TSH/INR   Recent Labs   Lab Test 01/18/22  1431   CHOL 101   HDL 51   LDL 41   TRIG 46     Recent Labs   Lab Test 01/18/22  1431 12/14/20  1042 12/11/19  1003   LDL 41 46 48     Recent Labs   Lab Test 07/25/22  1359      POTASSIUM 4.2   CHLORIDE 106   CO2 24   GLC 97   BUN 20.3   CR 1.00   GFRESTIMATED 74   GEGE 9.3     Recent Labs   Lab Test 07/25/22  1359 01/18/22  1431 05/27/21  1627   CR 1.00 1.05 1.04     No results for  input(s): A1C in the last 65519 hours.       Recent Labs   Lab Test 01/18/22  1431   WBC 6.9   HGB 12.4*   HCT 38.3*           Recent Labs   Lab Test 01/18/22  1431 05/27/21  1627 12/14/20  1042   HGB 12.4* 12.5* 13.9*    No results for input(s): TROPONINI in the last 78135 hours.  Recent Labs   Lab Test 01/17/22  1405   *     Recent Labs   Lab Test 05/27/21  1627   TSH 0.79     No results for input(s): INR in the last 66348 hours.           Thank you for allowing me to participate in the care of your patient.      Sincerely,     Dewey Guevara MD     [facility]  cc:   Dewey Guevara MD  [address]  [place] [address]

## 2023-01-18 NOTE — PATIENT INSTRUCTIONS
Antoni Stoll,    It was a pleasure to see you today at the [facility].     My recommendations after this visit include:    Check echo and  blood test for fluid retention causing shortness of breath    JANET Guevara MD, FACC, [facility] Services[facility]

## 2023-01-20 ASSESSMENT — ENCOUNTER SYMPTOMS
WEAKNESS: 1
EYE PAIN: 0
COUGH: 0
PALPITATIONS: 0
CHILLS: 0
JOINT SWELLING: 0
PARESTHESIAS: 0
HEADACHES: 0
DIARRHEA: 0
ABDOMINAL PAIN: 0
DIZZINESS: 0
SORE THROAT: 0
HEMATOCHEZIA: 0
NAUSEA: 0
ARTHRALGIAS: 0
CONSTIPATION: 0
NERVOUS/ANXIOUS: 0
FREQUENCY: 0
MYALGIAS: 1
FEVER: 0
HEARTBURN: 0
HEMATURIA: 0
DYSURIA: 0
SHORTNESS OF BREATH: 1

## 2023-01-20 ASSESSMENT — ACTIVITIES OF DAILY LIVING (ADL): CURRENT_FUNCTION: NO ASSISTANCE NEEDED

## 2023-01-25 ENCOUNTER — OFFICE VISIT (OUTPATIENT)
Dept: INTERNAL MEDICINE | Facility: CLINIC | Age: 88
End: 2023-01-25
Payer: MEDICARE

## 2023-01-25 VITALS
SYSTOLIC BLOOD PRESSURE: 120 MMHG | OXYGEN SATURATION: 98 % | HEART RATE: 72 BPM | WEIGHT: 141 LBS | DIASTOLIC BLOOD PRESSURE: 78 MMHG | BODY MASS INDEX: 24.07 KG/M2 | RESPIRATION RATE: 16 BRPM | TEMPERATURE: 98.6 F | HEIGHT: 64 IN

## 2023-01-25 DIAGNOSIS — I25.10 CORONARY ARTERY DISEASE DUE TO CALCIFIED CORONARY LESION: ICD-10-CM

## 2023-01-25 DIAGNOSIS — K40.90 LEFT INGUINAL HERNIA: ICD-10-CM

## 2023-01-25 DIAGNOSIS — H40.9 GLAUCOMA, UNSPECIFIED GLAUCOMA TYPE, UNSPECIFIED LATERALITY: ICD-10-CM

## 2023-01-25 DIAGNOSIS — M48.061 SPINAL STENOSIS OF LUMBAR REGION, UNSPECIFIED WHETHER NEUROGENIC CLAUDICATION PRESENT: ICD-10-CM

## 2023-01-25 DIAGNOSIS — N40.0 BENIGN PROSTATIC HYPERPLASIA, UNSPECIFIED WHETHER LOWER URINARY TRACT SYMPTOMS PRESENT: ICD-10-CM

## 2023-01-25 DIAGNOSIS — I10 ESSENTIAL HYPERTENSION: ICD-10-CM

## 2023-01-25 DIAGNOSIS — Z86.0100 HISTORY OF COLONIC POLYPS: ICD-10-CM

## 2023-01-25 DIAGNOSIS — Z00.00 ENCOUNTER FOR WELLNESS EXAMINATION IN ADULT: Primary | ICD-10-CM

## 2023-01-25 DIAGNOSIS — Z85.828 HISTORY OF BASAL CELL CARCINOMA (BCC) OF SKIN: ICD-10-CM

## 2023-01-25 DIAGNOSIS — Z79.899 ENCOUNTER FOR LONG-TERM (CURRENT) USE OF MEDICATIONS: ICD-10-CM

## 2023-01-25 DIAGNOSIS — Z23 NEED FOR IMMUNIZATION AGAINST INFLUENZA: ICD-10-CM

## 2023-01-25 DIAGNOSIS — G47.33 OBSTRUCTIVE SLEEP APNEA ON CPAP: ICD-10-CM

## 2023-01-25 DIAGNOSIS — I25.84 CORONARY ARTERY DISEASE DUE TO CALCIFIED CORONARY LESION: ICD-10-CM

## 2023-01-25 LAB
ALBUMIN SERPL BCG-MCNC: 4.2 G/DL (ref 3.5–5.2)
ALP SERPL-CCNC: 68 U/L (ref 40–129)
ALT SERPL W P-5'-P-CCNC: 10 U/L (ref 10–50)
ANION GAP SERPL CALCULATED.3IONS-SCNC: 13 MMOL/L (ref 7–15)
AST SERPL W P-5'-P-CCNC: 21 U/L (ref 10–50)
BILIRUB SERPL-MCNC: 0.4 MG/DL
BUN SERPL-MCNC: 19.8 MG/DL (ref 8–23)
CALCIUM SERPL-MCNC: 9.2 MG/DL (ref 8.8–10.2)
CHLORIDE SERPL-SCNC: 103 MMOL/L (ref 98–107)
CHOLEST SERPL-MCNC: 89 MG/DL
CREAT SERPL-MCNC: 1.08 MG/DL (ref 0.67–1.17)
DEPRECATED HCO3 PLAS-SCNC: 24 MMOL/L (ref 22–29)
ERYTHROCYTE [DISTWIDTH] IN BLOOD BY AUTOMATED COUNT: 13.8 % (ref 10–15)
GFR SERPL CREATININE-BSD FRML MDRD: 67 ML/MIN/1.73M2
GLUCOSE SERPL-MCNC: 104 MG/DL (ref 70–99)
HCT VFR BLD AUTO: 38.5 % (ref 40–53)
HDLC SERPL-MCNC: 45 MG/DL
HGB BLD-MCNC: 12.6 G/DL (ref 13.3–17.7)
LDLC SERPL CALC-MCNC: 37 MG/DL
MCH RBC QN AUTO: 32.6 PG (ref 26.5–33)
MCHC RBC AUTO-ENTMCNC: 32.7 G/DL (ref 31.5–36.5)
MCV RBC AUTO: 100 FL (ref 78–100)
NONHDLC SERPL-MCNC: 44 MG/DL
PLATELET # BLD AUTO: 192 10E3/UL (ref 150–450)
POTASSIUM SERPL-SCNC: 4.3 MMOL/L (ref 3.4–5.3)
PROT SERPL-MCNC: 6.9 G/DL (ref 6.4–8.3)
RBC # BLD AUTO: 3.87 10E6/UL (ref 4.4–5.9)
SODIUM SERPL-SCNC: 140 MMOL/L (ref 136–145)
TRIGL SERPL-MCNC: 33 MG/DL
WBC # BLD AUTO: 6.7 10E3/UL (ref 4–11)

## 2023-01-25 PROCEDURE — G0439 PPPS, SUBSEQ VISIT: HCPCS | Performed by: INTERNAL MEDICINE

## 2023-01-25 PROCEDURE — 90662 IIV NO PRSV INCREASED AG IM: CPT | Performed by: INTERNAL MEDICINE

## 2023-01-25 PROCEDURE — G0008 ADMIN INFLUENZA VIRUS VAC: HCPCS | Performed by: INTERNAL MEDICINE

## 2023-01-25 PROCEDURE — 80053 COMPREHEN METABOLIC PANEL: CPT | Performed by: INTERNAL MEDICINE

## 2023-01-25 PROCEDURE — 85027 COMPLETE CBC AUTOMATED: CPT | Performed by: INTERNAL MEDICINE

## 2023-01-25 PROCEDURE — 36415 COLL VENOUS BLD VENIPUNCTURE: CPT | Performed by: INTERNAL MEDICINE

## 2023-01-25 PROCEDURE — 80061 LIPID PANEL: CPT | Performed by: INTERNAL MEDICINE

## 2023-01-25 PROCEDURE — 99214 OFFICE O/P EST MOD 30 MIN: CPT | Mod: 25 | Performed by: INTERNAL MEDICINE

## 2023-01-25 RX ORDER — FLUTICASONE PROPIONATE 50 MCG
1 SPRAY, SUSPENSION (ML) NASAL DAILY PRN
COMMUNITY
Start: 2016-12-01

## 2023-01-25 RX ORDER — FLUCONAZOLE 150 MG/1
TABLET ORAL
COMMUNITY
Start: 2023-01-06 | End: 2023-07-25

## 2023-01-25 RX ORDER — LORATADINE 10 MG/1
10 CAPSULE, LIQUID FILLED ORAL DAILY
COMMUNITY

## 2023-01-25 RX ORDER — FUROSEMIDE 20 MG
TABLET ORAL
COMMUNITY
End: 2023-01-25

## 2023-01-25 RX ORDER — AZELASTINE 1 MG/ML
SPRAY, METERED NASAL
COMMUNITY
Start: 2021-09-06 | End: 2023-07-25

## 2023-01-25 ASSESSMENT — ENCOUNTER SYMPTOMS
MYALGIAS: 1
PARESTHESIAS: 0
PALPITATIONS: 0
WEAKNESS: 1
ABDOMINAL PAIN: 0
DYSURIA: 0
DIARRHEA: 0
HEARTBURN: 0
NERVOUS/ANXIOUS: 0
SHORTNESS OF BREATH: 1
EYE PAIN: 0
DIZZINESS: 0
HEMATURIA: 0
FREQUENCY: 0
FEVER: 0
COUGH: 0
JOINT SWELLING: 0
ARTHRALGIAS: 0
SORE THROAT: 0
NAUSEA: 0
HEMATOCHEZIA: 0
CONSTIPATION: 0
HEADACHES: 0
CHILLS: 0

## 2023-01-25 ASSESSMENT — ACTIVITIES OF DAILY LIVING (ADL): CURRENT_FUNCTION: NO ASSISTANCE NEEDED

## 2023-01-25 NOTE — PROGRESS NOTES
SUBJECTIVE:   Wily is a 87 year old who presents for Preventive Visit.    Retired , lives independently.    He has severe kyphoscoliosis with spinal stenosis.  Intermittent radicular pain, which she had earlier this fall after traveling out used for his sister's .  No radicular pain now.  2021 MRI with severe L3-5 stenosis.    He has an excellent exercise routine with going to the [employer] [facility] Monday through Friday and doing regular back exercises including yoga type stretching.  He does 30 minutes on the treadmill on other days.    He has not had a fall.    He was asking about acupuncture which she has not tried yet.    Patient reports he was treated with oral prednisone, Medrol Dosepak, with his exacerbation of the radicular pain last fall, and that did help reduce his pain at the time.    Patient has sleep apnea with a severe kyphoscoliosis.  He is highly compliant with CPAP.  He did get new supplies for his CPAP.    No palpitations or history of atrial fibrillation.    His dyspnea on exertion remained stable.  He saw cardiology on .  BNP level at that time was checked and was normal range.    No lower extremity edema and he has not been using any furosemide.    No chest pain or chest pressure.  Coronary bypass in .  No history of MI.  Still on aspirin and Lipitor.  No blood in stool or history of GI bleeding.     carotid ultrasound showed torturous carotid arteries but no significant plaque or stenosis.    2021 heart echo showed normal ejection fraction and no pulmonary hypertension, just mild mitral regurgitation.    He has another echo that was ordered, has not been done yet.    2022 LDL 41 on current dose atorvastatin.    He is not having any new generalized myalgias, just his chronic back and leg pain.    History of gluteal intertrigo, that is not a problem currently.  He has used Lotrisone in nystatin powder in the past.    He denies orthostatic  "type dizziness, still is on lisinopril 2.5 mg a day.  Has had problems with lower blood pressures in the past.    BPH, stable.  Saw urology in August.  Still on Flomax.  He reports that his PSA level that was checked last August at urology clinic was normal.    History of high-grade dysplasia and a polyp in the past but his colonoscopy September 2020 was negative.  Family history of colon cancer with father.    His bowels are regular and denies blood in stool now.    Patient does wish to consider 3-year colonoscopy that would be due in September.    Glaucoma is under control and he denies any vision changes or headaches.  He is scheduled for ophthalmology next month.    Past history of basal cell cancer.  Dermatology exam negative last May and has a follow-up appointment this May.    Is treating with fluconazole oral for a finger fungal infection/paronychia, that is stable.  He is following up with urology for that.    Patient had his left inguinal hernia repaired last year.  No recurrent problems there.    Patient has been advised of split billing requirements and indicates understanding: Yes  Are you in the first 12 months of your Medicare coverage?  No    Healthy Habits:     In general, how would you rate your overall health?  Good    Frequency of exercise:  4-5 days/week    Duration of exercise:  Greater than 60 minutes    Do you usually eat at least 4 servings of fruit and vegetables a day, include whole grains    & fiber and avoid regularly eating high fat or \"junk\" foods?  Yes    Taking medications regularly:  Yes    Medication side effects:  No muscle aches, No significant flushing and Lightheadedness    Ability to successfully perform activities of daily living:  No assistance needed    Home Safety:  Throw rugs in the hallway    Hearing Impairment:  Difficulty following a conversation in a noisy restaurant or crowded room, feel that people are mumbling or not speaking clearly, difficulty following dialogue " in the theater, difficult to understand a speaker at a public meeting or Oriental orthodox service, need to ask people to speak up or repeat themselves and difficulty understanding soft or whispered speech    In the past 6 months, have you been bothered by leaking of urine?  No    In general, how would you rate your overall mental or emotional health?  Excellent      PHQ-2 Total Score: 0    Additional concerns today:  Yes      Have you ever done Advance Care Planning? (For example, a Health Directive, POLST, or a discussion with a medical provider or your loved ones about your wishes): Yes, advance care planning is on file.       Fall risk  Fallen 2 or more times in the past year?: No  Any fall with injury in the past year?: No  click delete button to remove this line now  Cognitive Screening   1) Repeat 3 items (Leader, Season, Table)    2) Clock draw: NORMAL  3) 3 item recall: Recalls 3 objects  Results: 3 items recalled: COGNITIVE IMPAIRMENT LESS LIKELY    Mini-CogTM Copyright S [employer]. Licensed by the author for use in [facility]; reprinted with permission (ajay@[facility]). All rights reserved.      Do you have sleep apnea, excessive snoring or daytime drowsiness?: yes    Reviewed and updated as needed this visit by clinical staff   Tobacco  Allergies  Meds              Reviewed and updated as needed this visit by Provider                 Social History     Tobacco Use     Smoking status: Never     Smokeless tobacco: Never   Substance Use Topics     Alcohol use: Yes     Comment: Alcoholic Drinks/day: 5 drinks weekly     If you drink alcohol do you typically have >3 drinks per day or >7 drinks per week? No    Alcohol Use 1/20/2023   Prescreen: >3 drinks/day or >7 drinks/week? No   No flowsheet data found.            Current providers sharing in care for this patient include:   Patient Care Team:  Sarthak Romano MD as PCP - General  Sarthak Romano MD as Assigned PCP  Dewey Guevara MD as Assigned Heart and  Vascular Provider  Ez Portillo MD as Assigned Surgical Provider  Orlin Mir DO as Assigned Neuroscience Provider    The following health maintenance items are reviewed in Epic and correct as of today:  Health Maintenance   Topic Date Due     URINE DRUG SCREEN  Never done     ANNUAL REVIEW OF  ORDERS  Never done     ZOSTER IMMUNIZATION (2 of 3) 01/03/2008     INFLUENZA VACCINE (1) 09/01/2022     COVID-19 Vaccine (5 - Booster for Pfizer series) 09/19/2022     MEDICARE ANNUAL WELLNESS VISIT  01/18/2023     FALL RISK ASSESSMENT  01/25/2024     ADVANCE CARE PLANNING  03/17/2027     DTAP/TDAP/TD IMMUNIZATION (2 - Td or Tdap) 08/26/2032     PHQ-2 (once per calendar year)  Completed     Pneumococcal Vaccine: 65+ Years  Completed     IPV IMMUNIZATION  Aged Out     MENINGITIS IMMUNIZATION  Aged Out     Current Outpatient Medications   Medication Sig Dispense Refill     acetaminophen (TYLENOL) 500 MG tablet [ACETAMINOPHEN (TYLENOL) 500 MG TABLET] Take 1,000 mg by mouth daily .             aspirin 325 MG tablet [ASPIRIN 325 MG TABLET] Take 325 mg by mouth daily.       atorvastatin (LIPITOR) 20 MG tablet Take 1 tablet (20 mg) by mouth daily 90 tablet 0     azelastine (ASTELIN) 0.1 % nasal spray        B2/VITS A,C,E/LUT/ZEAXANTH/MIN (ICAPS ORAL) [B2/VITS A,C,E/LUT/ZEAXANTH/MIN (ICAPS ORAL)] Take 1 capsule by mouth daily.       calcium polycarbophil (FIBERCON) 625 mg tablet [CALCIUM POLYCARBOPHIL (FIBERCON) 625 MG TABLET] Take 1,250 mg by mouth daily.       cholecalciferol, vitamin D3, 1,000 unit tablet [CHOLECALCIFEROL, VITAMIN D3, 1,000 UNIT TABLET] Take 1,000 Units by mouth daily.       fluconazole (DIFLUCAN) 150 MG tablet TAKE 1 TABLET BY MOUTH ONCE WEEKLY FOR 4 WEEKS FOR NAIL FUNGUS       fluticasone (FLONASE) 50 MCG/ACT nasal spray        glucosamine-chondroitin 500-400 mg cap [GLUCOSAMINE-CHONDROITIN 500-400 MG CAP] Take 1 capsule by mouth 2 (two) times a day.       latanoprost (XALATAN) 0.005 % ophthalmic  "solution Place 1 drop into both eyes At Bedtime       lisinopril (ZESTRIL) 2.5 MG tablet [LISINOPRIL (PRINIVIL,ZESTRIL) 2.5 MG TABLET] TAKE 1 TABLET BY MOUTH EVERY DAY 90 tablet 3     loratadine 10 MG capsule loratadine 10 mg capsule   10 mg 1/day       nystatin (MYCOSTATIN) 910350 UNIT/GM external powder Apply to affected areas twice a day if flare of rash 60 g 3     pregabalin (LYRICA) 25 MG capsule TAKE 1 CAPSULE(25 MG) BY MOUTH TWICE DAILY 60 capsule 2     saw palmetto fruit 450 mg cap [SAW PALMETTO FRUIT 450 MG CAP] Take 1 capsule by mouth 2 (two) times a day.       tamsulosin (FLOMAX) 0.4 mg Cp24 [TAMSULOSIN (FLOMAX) 0.4 MG CP24] Take 0.4 mg by mouth at bedtime.       No Known Allergies      Review of Systems   Constitutional: Negative for chills and fever.   HENT: Positive for hearing loss. Negative for congestion, ear pain and sore throat.    Eyes: Negative for pain and visual disturbance.   Respiratory: Positive for shortness of breath. Negative for cough.    Cardiovascular: Negative for chest pain, palpitations and peripheral edema.   Gastrointestinal: Negative for abdominal pain, constipation, diarrhea, heartburn, hematochezia and nausea.   Genitourinary: Positive for impotence. Negative for dysuria, frequency, genital sores, hematuria, penile discharge and urgency.   Musculoskeletal: Positive for myalgias. Negative for arthralgias and joint swelling.   Skin: Negative for rash.   Neurological: Positive for weakness. Negative for dizziness, headaches and paresthesias.   Psychiatric/Behavioral: Negative for mood changes. The patient is not nervous/anxious.      Shortness of breath is no worse than baseline.  Muscle achiness and weakness is also stable.    OBJECTIVE:   /78   Pulse 72   Temp 98.6  F (37  C) (Oral)   Resp 16   Ht 1.613 m (5' 3.5\")   Wt 64 kg (141 lb)   SpO2 98%   BMI 24.59 kg/m   Estimated body mass index is 24.59 kg/m  as calculated from the following:    Height as of this " "encounter: 1.613 m (5' 3.5\").    Weight as of this encounter: 64 kg (141 lb).  Physical Exam  Alert and oriented x3 with good mood and affect.  Clock face drawing and word recall normal.  Still able to climb up on the exam table.  Mildly unsteady gait.  Severe kyphoscoliosis.  No foot drop.    Pupils and irises equal and reactive.  Extraocular muscles intact.  No jaundice or conjunctivitis.  External ears and nose exam is normal.  No significant cerumen, tympanic membranes are normal.  Pharynx is normal.  Teeth in good condition.  No cervical or supraclavicular or axillary adenopathy.  No JVD and no carotid bruits.  No thyromegaly or nodularity.    Lungs are clear to auscultation with somewhat reduced respiratory excursion with severe kyphoscoliosis.  Heart is regular with no murmur rub or gallop.    Abdomen is thin, nontender no hepatosplenomegaly.  No pulsatile abdominal mass.    Skin exam with seborrheic keratosis but no suspicious skin lesions noted.  He declined  exam.    Foot exam shows no ankle edema, +1 pedal pulses.  Some moderate degenerative changes in the toes but no preulcerative calluses on the foot.    ASSESSMENT / PLAN:   (Z00.00) Encounter for wellness examination in adult  (primary encounter diagnosis)  Comment: Patient's main health maintenance issue is his severe kyphoscoliosis with spinal stenosis and sleep apnea associated with this.    I emphasized the importance of the daily exercises and back strengthening exercises, but he is doing that well currently.    Patient is full code  Plan: I did recommend to get the COVID booster vaccine but he did not want it today    (G47.33,  Z99.89) Obstructive sleep apnea on CPAP  Comment: Compliant with current CPAP.  Emphasized the importance of compliance with CPAP  Plan: I discussed the importance of avoiding further weight gain.  We discussed avoiding sweets and simple carbohydrates in diet.  Avoid any sedating medications or alcohol.    (M4.071) " Spinal stenosis of lumbar region, unspecified whether neurogenic claudication present  Comment: Severe.  He has met with neurosurgeons and was told he is not a surgical candidate.  Emphasized the importance of the daily exercises and walking that he is doing, but he is doing quite well with that.    On Lyrica twice a day    He can explore acupuncture as an option.  Plan: Acupuncture Referral            (I25.10,  I25.84) Coronary artery disease due to calcified coronary lesion  Comment: Asymptomatic.  Continue aspirin and atorvastatin.  Plan: Lipid Profile        Goal LDL less than 100    (N40.0) Benign prostatic hyperplasia, unspecified whether lower urinary tract symptoms present  Comment: Stable.  On Flomax.  Saw urology last August, patient reports a normal PSA  Plan: Continue Flomax    (I10) Essential hypertension  Comment: Controlled.  Denies significant orthostasis.  Continue low-dose lisinopril 2.5 mg a day.  Plan:     (H40.9) Glaucoma, unspecified glaucoma type, unspecified laterality  Comment: Controlled.  Follow-up with ophthalmology next month  Plan:     (Z85.828) History of basal cell carcinoma (BCC) of skin  Comment: No evidence of recurrence.  Plan: Follow-up with dermatology in May for yearly skin exam    (Z86.010) History of colonic polyps  Comment: History of high-grade dysplasia with polyp and last colonoscopy was negative.  Patient feels he is in stable medical condition and would consider a follow-up colonoscopy in September of this year.  We can discuss that at next visit.  Plan:     (Z23) Need for immunization against influenza  Comment: Given today  Plan: INFLUENZA VACCINE 65+ (FLUZONE HD)            (K40.90) Left inguinal hernia  Comment: Previous repair  Plan:     (Z79.899) Encounter for long-term (current) use of medications  Comment:   Plan: CBC with platelets, Comprehensive metabolic         panel              Patient has been advised of split billing requirements and indicates  understanding: Yes      COUNSELING:  Reviewed preventive health counseling, as reflected in patient instructions       Regular exercise       Healthy diet/nutrition       Vision screening       Colon cancer screening        He reports that he has never smoked. He has never used smokeless tobacco.      Appropriate preventive services were discussed with this patient, including applicable screening as appropriate for cardiovascular disease, diabetes, osteopenia/osteoporosis, and glaucoma.  As appropriate for age/gender, discussed screening for colorectal cancer, prostate cancer, breast cancer, and cervical cancer. Checklist reviewing preventive services available has been given to the patient.    Reviewed patients plan of care and provided an AVS. The Basic Care Plan (routine screening as documented in Health Maintenance) for Antoni meets the Care Plan requirement. This Care Plan has been established and reviewed with the Patient.          Sarthak Romano MD  [facility]    Identified Health Risks:

## 2023-02-02 DIAGNOSIS — M48.062 SPINAL STENOSIS OF LUMBAR REGION WITH NEUROGENIC CLAUDICATION: ICD-10-CM

## 2023-02-02 DIAGNOSIS — M43.16 SPONDYLOLISTHESIS OF LUMBAR REGION: ICD-10-CM

## 2023-02-03 DIAGNOSIS — M43.16 SPONDYLOLISTHESIS OF LUMBAR REGION: ICD-10-CM

## 2023-02-03 DIAGNOSIS — M48.062 SPINAL STENOSIS OF LUMBAR REGION WITH NEUROGENIC CLAUDICATION: ICD-10-CM

## 2023-02-03 RX ORDER — PREGABALIN 25 MG/1
CAPSULE ORAL
Qty: 60 CAPSULE | Refills: 1 | Status: SHIPPED | OUTPATIENT
Start: 2023-02-03 | End: 2023-02-06

## 2023-02-03 NOTE — TELEPHONE ENCOUNTER
[facility] Call Center    Phone Message    May a detailed message be left on voicemail: yes     Reason for Call: Other: Wily called he needs a refill of pregabalin 25mg called into [facility] [phone]. Please call and let patient know when this has been done and/or if any questions.     Action Taken: Other: [facility]    Travel Screening: Not Applicable

## 2023-02-06 RX ORDER — PREGABALIN 25 MG/1
CAPSULE ORAL
Qty: 60 CAPSULE | Refills: 1 | Status: SHIPPED | OUTPATIENT
Start: 2023-02-06 | End: 2023-04-04

## 2023-02-08 ENCOUNTER — HOSPITAL ENCOUNTER (OUTPATIENT)
Dept: CARDIOLOGY | Facility: HOSPITAL | Age: 88
Discharge: HOME | End: 2023-02-08
Attending: INTERNAL MEDICINE | Admitting: INTERNAL MEDICINE
Payer: MEDICARE

## 2023-02-08 DIAGNOSIS — R06.09 DYSPNEA ON EXERTION: ICD-10-CM

## 2023-02-08 LAB — LVEF ECHO: NORMAL

## 2023-02-08 PROCEDURE — 93306 TTE W/DOPPLER COMPLETE: CPT | Mod: 26 | Performed by: INTERNAL MEDICINE

## 2023-02-08 PROCEDURE — 93306 TTE W/DOPPLER COMPLETE: CPT

## 2023-02-10 ENCOUNTER — MYC MEDICAL ADVICE (OUTPATIENT)
Dept: CARDIOLOGY | Facility: CLINIC | Age: 88
End: 2023-02-10
Payer: MEDICARE

## 2023-02-10 DIAGNOSIS — R06.09 DYSPNEA ON EXERTION: Primary | ICD-10-CM

## 2023-02-13 NOTE — TELEPHONE ENCOUNTER
Dewey Guevara MD Caswell, Mallory J, RN  Phone Number: [phone]     Please refer him to pulmonologist           Previous Messages     ----- Message -----   From: Anahi Daniel, RN   Sent: 2/10/2023   3:04 PM CST   To: Dewey Guevara MD   Subject: Question regarding ECHOCARDIOGRAM COMPLETE       ----- Message from Anahi Daniel RN sent at 2/10/2023  3:04 PM CST -----   Would you recommend pulmonary consult? Wily is requesting a referral.   Thanks!   Mal         ==referral placed. LM with patient that this was done. Also sent [employer] message. -[facility] [place]

## 2023-02-20 DIAGNOSIS — R06.09 DYSPNEA ON EXERTION: Primary | ICD-10-CM

## 2023-03-20 DIAGNOSIS — E78.00 PURE HYPERCHOLESTEROLEMIA: ICD-10-CM

## 2023-03-21 RX ORDER — ATORVASTATIN CALCIUM 20 MG/1
20 TABLET, FILM COATED ORAL DAILY
Qty: 90 TABLET | Refills: 3 | Status: SHIPPED | OUTPATIENT
Start: 2023-03-21 | End: 2024-03-13

## 2023-03-21 NOTE — TELEPHONE ENCOUNTER
"Routing refill request to provider for review/approval because:  Labs not current:  CKT    Last Written Prescription Date:  12/21/22  Last Fill Quantity: 90,  # refills: 0   Last office visit provider:  1/25/23       Requested Prescriptions   Pending Prescriptions Disp Refills     atorvastatin (LIPITOR) 20 MG tablet 90 tablet 3     Sig: Take 1 tablet (20 mg) by mouth daily       Statins Protocol Passed - 3/20/2023  5:09 PM        Passed - LDL on file in past 12 months     Recent Labs   Lab Test 01/25/23  1032   LDL 37             Passed - No abnormal creatine kinase in past 12 months     Recent Labs   Lab Test 06/03/19  0953                   Passed - Recent (12 mo) or future (30 days) visit within the authorizing provider's specialty     Patient has had an office visit with the authorizing provider or a provider within the authorizing providers department within the previous 12 mos or has a future within next 30 days. See \"Patient Info\" tab in inbasket, or \"Choose Columns\" in Meds & Orders section of the refill encounter.              Passed - Medication is active on med list        Passed - Patient is age 18 or older             Krupa Villegas RN 03/20/23 11:36 PM  "

## 2023-03-28 ASSESSMENT — ENCOUNTER SYMPTOMS
CONSTIPATION: 1
RECTAL PAIN: 0
JAUNDICE: 0
HEARTBURN: 0
ABDOMINAL PAIN: 1
BLOATING: 0
BLOOD IN STOOL: 0
NAUSEA: 1
VOMITING: 1
DIARRHEA: 0
BOWEL INCONTINENCE: 0

## 2023-04-03 ENCOUNTER — OFFICE VISIT (OUTPATIENT)
Dept: PALLIATIVE MEDICINE | Facility: OTHER | Age: 88
End: 2023-04-03
Attending: INTERNAL MEDICINE
Payer: MEDICARE

## 2023-04-03 DIAGNOSIS — M48.061 SPINAL STENOSIS OF LUMBAR REGION, UNSPECIFIED WHETHER NEUROGENIC CLAUDICATION PRESENT: ICD-10-CM

## 2023-04-03 PROCEDURE — 97810 ACUP 1/> WO ESTIM 1ST 15 MIN: CPT | Performed by: ACUPUNCTURIST

## 2023-04-03 PROCEDURE — 97811 ACUP 1/> W/O ESTIM EA ADD 15: CPT | Performed by: ACUPUNCTURIST

## 2023-04-03 NOTE — PROGRESS NOTES
ACUPUNCTURIST TREATMENT NOTE      Antoni Stoll, a 87 year old male, is here today for Initial exam. Patient is referred by Juan Antonio.    HPI  Main Complaint: Chronic low back pain  Patient has history of severe kyphoscoliosis with spinal stenosis at L3-L5.  He reports constant pain throughout the day that is 2/10--3/10 constant pain and increases to 5/10-6/10 with excess activity and standing too long. He reports additional symptoms of weakness in (R) hip and (R) leg. History of numbness in (R) foot and (R) lower leg and he reports recent symptoms of numbness in (L) foot and (L) lower leg.  Patient also starting to experience pain in upper back and neck and numbness and tingling in his hands.  Patient has history of a fall and injury to (R) shoulder with torn rotator cuff, he has been advised to forego surgery at this time.  Also symptoms of dyspnea with exertion started over last couple of years.  Patient is very active and exercises 5 days/wk with a structured senior workout plan including strength training, yoga, cardio and in addition he walks for 30 minutes daily.        Past Medical History  Past Medical History Reviewed: Yes   has a past medical history of Anemia, Cancer (H), Carotid artery occlusion, Coronary artery disease, Glaucoma, High cholesterol, History of colon cancer (8/13/2018), Hyperlipidemia, Hypertension, Left inguinal hernia, Low back pain, Mild aortic insufficiency, Moderate tricuspid regurgitation, Postnasal drip, Sleep apnea, obstructive, Spinal stenosis, SSS (sick sinus syndrome) (H) (12/9/2019), Umbilical hernia, and Unspecified cataract.    Objective  Basic Exam Completed:   Constitutional: Well Groomed and Normal Weight and Normal Appearance  Musculoskeletal: Abnormal - scoliosis    TCM Exam Completed: Yes   Limbs/Back: Lower Back    Tongue/Pulse Exam Completed: Yes           Patient Assessment  Patient Type: Pain  Patient Complaint: Chronic low back pain    Acupuncture 4/3/2023    Intervention Reason Pain   Pain Location low back   Pre-session Pain Rating 3       TCM Diagnosis: Qi Stagnation;Blood Stagnation;Qi Deficiency;Blood Deficiency;Yin Deficiency;Kidney Qi Deficiency;Other Melvin Yang Channel obstruction    Treatment Principle: unblock channels, move qi and blood, nourish and stop pain    TCM / Acupuncture Treatment  Acupuncture Points:       Initial insertions: HTJJ T12-L5; BL 31, Cody Rip, BL 23, Tito Kua, GB 30       Second insertions: BL 57, GB 34, BL 60, BL 62, GB 41       Additional insertions: scalp x4    Number of needles inserted: 36  Number of needles removed: 36    Accessory Techniques 4/3/2023   Accessory Techniques TDP Heat Lamp; Topicals; Tuina   TDP Heat Lamp location used low back   Tuina location used low back   Topicals Po Sum On   Topicals location used low back         9/8/2022     2:00 PM   Oswestry Disability Index ([employer]   Ricky Harrison 1980, All rights reserved)   Section 1 - Pain intensity The pain is very mild at the moment.   Section 2 - Personal care (washing, dressing, etc.)  I can look after myself normally without causing additional pain.   Section 3 - Lifting Pain prevents me from lifting heavy weights off the floor but I can manage if they are conveniently positioned, e.g. on a table.   Section 4 - Walking Pain prevents me from walking more than a quarter of a mile.   Section 5 - Sitting I can sit in my favorite chair as long as I like.   Section 6 - Standing Pain prevents me from standing for more than 1 hour.   Section 7 - Sleeping My sleep is never interrupted by pain.   Section 8 - Sex life (if applicable) Not answered/NA   Section 9 - Social life My social life is normal and causes me no additional pain.   Section 10 - Traveling I can travel anywhere but it gives me additional pain.   Oswestry Score (%) 20       Assessment and Plan  Treatment Observations: Patient rested comfortably  Acupuncture Treatment Recommendations: Acupuncture recommended  weekly for 12 treatments for diagnosis: M48.061 (ICD-10-CM) - Spinal stenosis of lumbar region, unspecified whether neurogenic claudication present       It is my recommendation that this patient seek advice from their Primary Care Provider about active symptoms not addressed during this visit. The risks and benefits of acupuncture were reviewed and the patient stated understanding. The patient's questions were answered to their satisfaction. Consent was provided for treatment. We thank you for the referral and opportunity to treat this patient.    Time Spent with Patient:   I spent a total of 30 minutes face-to-face with Antoni Stoll during today's office visit.     Emma Neal L.Ac.   Answers for HPI/ROS submitted by the patient on 3/28/2023  General Symptoms: No  Skin Symptoms: No  HENT Symptoms: No  EYE SYMPTOMS: No  HEART SYMPTOMS: No  LUNG SYMPTOMS: No  INTESTINAL SYMPTOMS: Yes  URINARY SYMPTOMS: No  REPRODUCTIVE SYMPTOMS: No  SKELETAL SYMPTOMS: No  BLOOD SYMPTOMS: No  NERVOUS SYSTEM SYMPTOMS: No  MENTAL HEALTH SYMPTOMS: No  Heart burn or indigestion: No  Nausea: Yes  Vomiting: Yes  Abdominal pain: Yes  Bloating: No  Constipation: Yes  Diarrhea: No  Blood in stool: No  Black stools: No  Rectal or Anal pain: No  Fecal incontinence: No  Yellowing of skin or eyes: No  Vomit with blood: No

## 2023-04-04 DIAGNOSIS — M43.16 SPONDYLOLISTHESIS OF LUMBAR REGION: ICD-10-CM

## 2023-04-04 DIAGNOSIS — M48.062 SPINAL STENOSIS OF LUMBAR REGION WITH NEUROGENIC CLAUDICATION: ICD-10-CM

## 2023-04-04 RX ORDER — PREGABALIN 25 MG/1
CAPSULE ORAL
Qty: 60 CAPSULE | Refills: 1 | Status: SHIPPED | OUTPATIENT
Start: 2023-04-04 | End: 2023-08-03

## 2023-04-07 ASSESSMENT — PAIN SCALES - PAIN ENJOYMENT GENERAL ACTIVITY SCALE (PEG)
INTERFERED_GENERAL_ACTIVITY: 3
AVG_PAIN_PASTWEEK: 3
INTERFERED_ENJOYMENT_LIFE: 1
PEG_TOTALSCORE: 2.33

## 2023-04-10 ENCOUNTER — OFFICE VISIT (OUTPATIENT)
Dept: PALLIATIVE MEDICINE | Facility: OTHER | Age: 88
End: 2023-04-10
Attending: NURSE PRACTITIONER
Payer: MEDICARE

## 2023-04-10 DIAGNOSIS — M48.061 SPINAL STENOSIS OF LUMBAR REGION, UNSPECIFIED WHETHER NEUROGENIC CLAUDICATION PRESENT: Primary | ICD-10-CM

## 2023-04-10 PROCEDURE — 97811 ACUP 1/> W/O ESTIM EA ADD 15: CPT | Performed by: ACUPUNCTURIST

## 2023-04-10 PROCEDURE — 97810 ACUP 1/> WO ESTIM 1ST 15 MIN: CPT | Performed by: ACUPUNCTURIST

## 2023-04-10 NOTE — PROGRESS NOTES
ACUPUNCTURIST TREATMENT NOTE      Antoni Stoll, a 87 year old male, is here today for Follow - Up exam. Patient is referred by Juan Antonio.    ADITYA  Main Complaint: Chronic low back pain  Follow-up: Patient reports relief from back pain for a little more than a full day following acupuncture.  He reports then the pain slowly began to regress to his normal baseline.      Past Medical History  Past Medical History Reviewed: Yes   has a past medical history of Anemia, Cancer (H), Carotid artery occlusion, Coronary artery disease, Glaucoma, High cholesterol, History of colon cancer (8/13/2018), Hyperlipidemia, Hypertension, Left inguinal hernia, Low back pain, Mild aortic insufficiency, Moderate tricuspid regurgitation, Postnasal drip, Sleep apnea, obstructive, Spinal stenosis, SSS (sick sinus syndrome) (H) (12/9/2019), Umbilical hernia, and Unspecified cataract.    Objective  Basic Exam Completed:   No    TCM Exam Completed: Yes   Limbs/Back: Lower Back    Tongue/Pulse Exam Completed: Yes           Patient Assessment  Patient Type: Pain  Patient Complaint: Chronic low back pain    Acupuncture 4/3/2023 4/10/2023   Intervention Reason Pain Pain   Pain Location low back low back   Pre-session Pain Rating 3 3   Post-session Pain Rating - 0       TCM Diagnosis: Qi Stagnation;Blood Stagnation;Qi Deficiency;Blood Deficiency;Yin Deficiency;Kidney Qi Deficiency;Other Melvin Yang Channel obstruction    Treatment Principle: unblock channels, move qi and blood, nourish and stop pain    TCM / Acupuncture Treatment  Acupuncture Points:       Initial insertions: scalp x5, Ling Gu, Da Ewa, SI 3, TW 5, LI 10, HTJJ at T12 and L5, BL 21, BL 23, BL 25, Cody Rip, Tito Capps       Second insertions: BL 57, GB 34, Sp 6, BL 60, BL 62, GB 41                    Accessory Techniques 4/3/2023 4/10/2023   Accessory Techniques TDP Heat Lamp; Topicals; Tuina TDP Heat Lamp; Tuina; Topicals   TDP Heat Lamp location used low back low back   Tuina location used  low back low back   Topicals Po Sum On Po Sum On   Topicals location used low back low back         9/8/2022     2:00 PM   Oswestry Disability Index (KOSTA   Ricky Hunter 1980, All rights reserved)   Section 1 - Pain intensity The pain is very mild at the moment.   Section 2 - Personal care (washing, dressing, etc.)  I can look after myself normally without causing additional pain.   Section 3 - Lifting Pain prevents me from lifting heavy weights off the floor but I can manage if they are conveniently positioned, e.g. on a table.   Section 4 - Walking Pain prevents me from walking more than a quarter of a mile.   Section 5 - Sitting I can sit in my favorite chair as long as I like.   Section 6 - Standing Pain prevents me from standing for more than 1 hour.   Section 7 - Sleeping My sleep is never interrupted by pain.   Section 8 - Sex life (if applicable) Not answered/NA   Section 9 - Social life My social life is normal and causes me no additional pain.   Section 10 - Traveling I can travel anywhere but it gives me additional pain.   Oswestry Score (%) 20       Assessment and Plan  Treatment Observations: Patient rested comfortably  Acupuncture Treatment Recommendations: Acupuncture recommended weekly for 12 treatments for diagnosis: M48.061 (ICD-10-CM) - Spinal stenosis of lumbar region, unspecified whether neurogenic claudication present       It is my recommendation that this patient seek advice from their Primary Care Provider about active symptoms not addressed during this visit. The risks and benefits of acupuncture were reviewed and the patient stated understanding. The patient's questions were answered to their satisfaction. Consent was provided for treatment. We thank you for the referral and opportunity to treat this patient.    Time Spent with Patient:   I spent a total of 30 minutes face-to-face with Antoni Stoll during today's office visit.     Emma Neal L.Ac.

## 2023-04-11 ENCOUNTER — TELEPHONE (OUTPATIENT)
Dept: OTOLARYNGOLOGY | Facility: CLINIC | Age: 88
End: 2023-04-11
Payer: MEDICARE

## 2023-04-11 DIAGNOSIS — J31.0 CHRONIC RHINITIS: Primary | ICD-10-CM

## 2023-04-11 NOTE — TELEPHONE ENCOUNTER
[facility] Call Center    Phone Message    May a detailed message be left on voicemail: yes     Reason for Call: Other: Per pt he is looking for a refill on his nasal spray. Please call to discuss. Thank you     Action Taken: Message routed to:  [facility] ([facility] ENT    Travel Screening: Not Applicable

## 2023-04-13 RX ORDER — AZELASTINE 1 MG/ML
SPRAY, METERED NASAL
Qty: 30 ML | Refills: 11 | Status: SHIPPED | OUTPATIENT
Start: 2023-04-13 | End: 2024-10-03

## 2023-04-13 NOTE — TELEPHONE ENCOUNTER
Called pt and left a VM that a refill of the Astelin nasal spray has been sent to pharmacy.  Sent refill Per Dr. Portillo.    [facility]      Chyna Cortez RN  [facility]  ENT  [address] [address]  [email or web address]   Office:[phone]  Employed by [facility]

## 2023-04-13 NOTE — TELEPHONE ENCOUNTER
Called pt and left a MV to call back about what nasal spray he was looking for to refill.    [facility]      Chyna Cortez RN  [facility]  ENT  [address] [address]  [email or web address]   Office:[phone]  Employed by [facility] [employer]

## 2023-04-13 NOTE — TELEPHONE ENCOUNTER
Pt called back about the prescription and advised it is for the Azelastine. Please call pt back with any other questions. Thank you.

## 2023-04-16 ASSESSMENT — PAIN SCALES - PAIN ENJOYMENT GENERAL ACTIVITY SCALE (PEG)
INTERFERED_GENERAL_ACTIVITY: 1
AVG_PAIN_PASTWEEK: 2
INTERFERED_ENJOYMENT_LIFE: 0 - DOES NOT INTERFERE
INTERFERED_ENJOYMENT_LIFE: 0
PEG_TOTALSCORE: 1

## 2023-04-17 ENCOUNTER — OFFICE VISIT (OUTPATIENT)
Dept: PALLIATIVE MEDICINE | Facility: OTHER | Age: 88
End: 2023-04-17
Attending: NURSE PRACTITIONER
Payer: MEDICARE

## 2023-04-17 DIAGNOSIS — M48.061 SPINAL STENOSIS OF LUMBAR REGION, UNSPECIFIED WHETHER NEUROGENIC CLAUDICATION PRESENT: Primary | ICD-10-CM

## 2023-04-17 PROCEDURE — 97810 ACUP 1/> WO ESTIM 1ST 15 MIN: CPT | Performed by: ACUPUNCTURIST

## 2023-04-17 PROCEDURE — 97811 ACUP 1/> W/O ESTIM EA ADD 15: CPT | Performed by: ACUPUNCTURIST

## 2023-04-17 NOTE — PROGRESS NOTES
ACUPUNCTURIST TREATMENT NOTE      Antoni Stoll, a 87 year old male, is here today for Follow - Up exam. Patient is referred by Juan Antonio.    ADITYA  Main Complaint: Chronic low back pain  Follow-up: Patient reports that his back pain remained improved post last treatment and he felt so good that he did some excessive yard work and this activity exacerbated his pain. He reports that his pain increased for 1 day and then declined to his baseline.  He notes this is improvement that his pain decreased on its own.    Past Medical History  Past Medical History Reviewed: Yes   has a past medical history of Anemia, Cancer (H), Carotid artery occlusion, Coronary artery disease, Glaucoma, High cholesterol, History of colon cancer (8/13/2018), Hyperlipidemia, Hypertension, Left inguinal hernia, Low back pain, Mild aortic insufficiency, Moderate tricuspid regurgitation, Postnasal drip, Sleep apnea, obstructive, Spinal stenosis, SSS (sick sinus syndrome) (H) (12/9/2019), Umbilical hernia, and Unspecified cataract.    Objective  Basic Exam Completed:   No    TCM Exam Completed: Yes   Limbs/Back: Lower Back    Tongue/Pulse Exam Completed: Yes           Patient Assessment  Patient Type: Pain  Patient Complaint: Chronic low back pain    Acupuncture 4/10/2023 4/17/2023   Intervention Reason Pain Pain   Pain Location low back low back   Pre-session Pain Rating 3 3   Post-session Pain Rating 0 0       TCM Diagnosis: Qi Stagnation;Blood Stagnation;Qi Deficiency;Blood Deficiency;Yin Deficiency;Kidney Qi Deficiency;Other Melvin Yang Channel obstruction    Treatment Principle: unblock channels, move qi and blood, nourish and stop pain    TCM / Acupuncture Treatment  Acupuncture Points:       Initial insertions: scalp x5, HTJJ T10-L5; BL 31, Cody Rip, Tito Capps, glute medius MP       Second insertions: BL 57, GB 34, BL 62, GB 41            Number of needles inserted: 35  Number of needles removed: 35    Accessory Techniques 4/10/2023 4/17/2023    Accessory Techniques TDP Heat Lamp; Tuina; Topicals TDP Heat Lamp; Tuina; Topicals   TDP Heat Lamp location used low back low back   Tuina location used low back low back   Topicals Po Sum On Po Sum On   Topicals location used low back low back         9/8/2022     2:00 PM   Oswestry Disability Index (KOSTA   Ricky Hunter 1980, All rights reserved)   Section 1 - Pain intensity The pain is very mild at the moment.   Section 2 - Personal care (washing, dressing, etc.)  I can look after myself normally without causing additional pain.   Section 3 - Lifting Pain prevents me from lifting heavy weights off the floor but I can manage if they are conveniently positioned, e.g. on a table.   Section 4 - Walking Pain prevents me from walking more than a quarter of a mile.   Section 5 - Sitting I can sit in my favorite chair as long as I like.   Section 6 - Standing Pain prevents me from standing for more than 1 hour.   Section 7 - Sleeping My sleep is never interrupted by pain.   Section 8 - Sex life (if applicable) Not answered/NA   Section 9 - Social life My social life is normal and causes me no additional pain.   Section 10 - Traveling I can travel anywhere but it gives me additional pain.   Oswestry Score (%) 20       Assessment and Plan  Treatment Observations: Patient rested comfortably  Acupuncture Treatment Recommendations: Acupuncture recommended weekly for 12 treatments for diagnosis: M48.061 (ICD-10-CM) - Spinal stenosis of lumbar region, unspecified whether neurogenic claudication present       It is my recommendation that this patient seek advice from their Primary Care Provider about active symptoms not addressed during this visit. The risks and benefits of acupuncture were reviewed and the patient stated understanding. The patient's questions were answered to their satisfaction. Consent was provided for treatment. We thank you for the referral and opportunity to treat this patient.    Time Spent with  Patient:   I spent a total of 30 minutes face-to-face with Antoni Stoll during today's office visit.     Emma Neal L.Ac.

## 2023-04-18 ASSESSMENT — PAIN SCALES - PAIN ENJOYMENT GENERAL ACTIVITY SCALE (PEG)
INTERFERED_ENJOYMENT_LIFE: 0 - DOES NOT INTERFERE
INTERFERED_ENJOYMENT_LIFE: 0
PEG_TOTALSCORE: 4.33
INTERFERED_ENJOYMENT_LIFE: 0
INTERFERED_ENJOYMENT_LIFE: 0 - DOES NOT INTERFERE
INTERFERED_GENERAL_ACTIVITY: 10
INTERFERED_GENERAL_ACTIVITY: 10
AVG_PAIN_PASTWEEK: 3
INTERFERED_GENERAL_ACTIVITY: 10 - COMPLETELY INTERFERES
INTERFERED_GENERAL_ACTIVITY: 10 - COMPLETELY INTERFERES
PEG_TOTALSCORE: 4.33
AVG_PAIN_PASTWEEK: 3

## 2023-04-19 ENCOUNTER — OFFICE VISIT (OUTPATIENT)
Dept: PALLIATIVE MEDICINE | Facility: OTHER | Age: 88
End: 2023-04-19
Payer: MEDICARE

## 2023-04-19 DIAGNOSIS — M48.061 SPINAL STENOSIS OF LUMBAR REGION, UNSPECIFIED WHETHER NEUROGENIC CLAUDICATION PRESENT: Primary | ICD-10-CM

## 2023-04-19 PROCEDURE — 97810 ACUP 1/> WO ESTIM 1ST 15 MIN: CPT | Performed by: ACUPUNCTURIST

## 2023-04-19 PROCEDURE — 97811 ACUP 1/> W/O ESTIM EA ADD 15: CPT | Performed by: ACUPUNCTURIST

## 2023-04-19 NOTE — PROGRESS NOTES
ACUPUNCTURIST TREATMENT NOTE      Antoni Stoll, a 87 year old male, is here today for Follow - Up exam. Patient is referred by Juan Antonio.    HPI  Main Complaint: Chronic pain of low back and neck.  Secondary Complaints: Numbness of soles of feet    Past Medical History  Past Medical History Reviewed: No   has a past medical history of Anemia, Cancer (H), Carotid artery occlusion, Coronary artery disease, Glaucoma, High cholesterol, History of colon cancer (8/13/2018), Hyperlipidemia, Hypertension, Left inguinal hernia, Low back pain, Mild aortic insufficiency, Moderate tricuspid regurgitation, Postnasal drip, Sleep apnea, obstructive, Spinal stenosis, SSS (sick sinus syndrome) (H) (12/9/2019), Umbilical hernia, and Unspecified cataract.    Objective  Basic Exam Completed:   Lower Extremity (ies): Normal    TCM Exam Completed: Yes   Limbs/Back: Lower Back    Tongue/Pulse Exam Completed: No    Patient Assessment  Patient Type: Pain  Patient Complaint: Chronic pain of neck and low back, numbness of feet    Acupuncture 4/17/2023 4/19/2023   Intervention Reason Pain Pain; Pain 2; Neuropathy   Pain Location low back low back   Pre-session Pain Rating 3 3   Post-session Pain Rating 0 2   Pain 2 Location - Neck   Pre-session Pain 2 Rating - 3   Post-session Pain 2 Rating - 2   Neuropathy Location - Feet   Pre-session Neuropathy rating - 4   Post-session Neuropathy rating - 2       TCM Diagnosis: Qi Stagnation;Blood Stagnation;Qi Deficiency;Blood Deficiency;Yin Deficiency;Kidney Qi Deficiency;Other Melvin Yang Channel obstruction    Treatment Principle: unblock channels, move qi and blood, nourish and stop pain    TCM / Acupuncture Treatment  Acupuncture Points:       Initial insertions: Thom @ L2-5, Cody seposito, Bl 31, 32, 54       Second insertions: Gb 20, 21, Bailao, Kid 1, 3, Sp 6            Number of needles inserted: 28  Number of needles removed: 28    Accessory Techniques 4/17/2023 4/19/2023   Accessory Techniques TDP  Heat Lamp; Tuina; Topicals TDP Heat Lamp; Tuina; Topicals   TDP Heat Lamp location used low back low back   Tuina location used low back -   Topicals Po Sum On -   Topicals location used low back -         9/8/2022     2:00 PM   Oswestry Disability Index (KOSTA   Ricky Hunter 1980, All rights reserved)   Section 1 - Pain intensity The pain is very mild at the moment.   Section 2 - Personal care (washing, dressing, etc.)  I can look after myself normally without causing additional pain.   Section 3 - Lifting Pain prevents me from lifting heavy weights off the floor but I can manage if they are conveniently positioned, e.g. on a table.   Section 4 - Walking Pain prevents me from walking more than a quarter of a mile.   Section 5 - Sitting I can sit in my favorite chair as long as I like.   Section 6 - Standing Pain prevents me from standing for more than 1 hour.   Section 7 - Sleeping My sleep is never interrupted by pain.   Section 8 - Sex life (if applicable) Not answered/NA   Section 9 - Social life My social life is normal and causes me no additional pain.   Section 10 - Traveling I can travel anywhere but it gives me additional pain.   Oswestry Score (%) 20       Assessment and Plan  Treatment Observations:    Acupuncture Treatment Recommendations: Acupuncture recommended weekly for 12 treatments for diagnosis: M48.061 (ICD-10-CM) - Spinal stenosis of lumbar region, unspecified whether neurogenic claudication present       It is my recommendation that this patient seek advice from their Primary Care Provider about active symptoms not addressed during this visit. The risks and benefits of acupuncture were reviewed and the patient stated understanding. The patient's questions were answered to their satisfaction. Consent was provided for treatment. We thank you for the referral and opportunity to treat this patient.    Time Spent with Patient:   I spent a total of 30 minutes face-to-face with Antoni Stoll during  today's office visit.     Nikolas Taveras

## 2023-04-24 ENCOUNTER — OFFICE VISIT (OUTPATIENT)
Dept: PALLIATIVE MEDICINE | Facility: OTHER | Age: 88
End: 2023-04-24
Attending: NURSE PRACTITIONER
Payer: MEDICARE

## 2023-04-24 DIAGNOSIS — M48.061 SPINAL STENOSIS OF LUMBAR REGION, UNSPECIFIED WHETHER NEUROGENIC CLAUDICATION PRESENT: Primary | ICD-10-CM

## 2023-04-24 PROCEDURE — 97811 ACUP 1/> W/O ESTIM EA ADD 15: CPT | Performed by: ACUPUNCTURIST

## 2023-04-24 PROCEDURE — 97810 ACUP 1/> WO ESTIM 1ST 15 MIN: CPT | Performed by: ACUPUNCTURIST

## 2023-04-24 NOTE — PROGRESS NOTES
ACUPUNCTURIST TREATMENT NOTE      Anotni Stoll, a 87 year old male, is here today for Follow - Up exam. Patient is referred by Juan Antonio.    HPI  Main Complaint: Chronic pain of neck and low back, numbness of feet  Follow-up: Patient reports that he noticed significant improvement in (L) leg and foot and moderate improvement in (R) leg and foot.  He reports that he did not notice any change or improvement with his back pain or neck pain that lasted after last treatment.      Past Medical History  Past Medical History Reviewed: Yes   has a past medical history of Anemia, Cancer (H), Carotid artery occlusion, Coronary artery disease, Glaucoma, High cholesterol, History of colon cancer (8/13/2018), Hyperlipidemia, Hypertension, Left inguinal hernia, Low back pain, Mild aortic insufficiency, Moderate tricuspid regurgitation, Postnasal drip, Sleep apnea, obstructive, Spinal stenosis, SSS (sick sinus syndrome) (H) (12/9/2019), Umbilical hernia, and Unspecified cataract.    Objective  Basic Exam Completed:   No    TCM Exam Completed: Yes   Limbs/Back: Bilateral Lower Extremity and Lower Back    Tongue/Pulse Exam Completed: Yes           Patient Assessment  Patient Type:  Pain  Patient Complaint:   Chronic pain of neck and low back, numbness of feet    Acupuncture 4/19/2023 4/24/2023   Intervention Reason Pain; Pain 2; Neuropathy Pain 2; Neuropathy   Pain Location low back low back   Pre-session Pain Rating 3 3   Post-session Pain Rating 2 2   Pain 2 Location Neck neck   Pre-session Pain 2 Rating 3 3   Post-session Pain 2 Rating 2 2   Neuropathy Location Feet Feet   Pre-session Neuropathy rating 4 3   Post-session Neuropathy rating 2 2       TCM Diagnosis:     Qi Stagnation;Blood Stagnation;Qi Deficiency;Blood Deficiency;Yin Deficiency;Kidney Qi Deficiency;Other;Melvin Beckman Channel Obstruction    Treatment Principle:   unblock channels, move qi and blood, nourish and stop pain    TCM / Acupuncture Treatment  Acupuncture  Points:        Initial: Du 20, GB 21, Bailao,  HTJJ T12, L2, L5, BL 23, Cody Rip, BL 31, BL 32   Secondary: GB 34, Sp 6, BL 60, BL 63, GB 41                            Accessory Techniques 4/19/2023 4/24/2023   Accessory Techniques TDP Heat Lamp; Tuina; Topicals TDP Heat Lamp; Tuina; Topicals   TDP Heat Lamp location used low back low back   Tuina location used - low back and neck   Topicals - Po Sum On   Topicals location used - low back and neck         9/8/2022     2:00 PM   Oswestry Disability Index (KOSTA   Ricky Harrison 1980, All rights reserved)   Section 1 - Pain intensity The pain is very mild at the moment.   Section 2 - Personal care (washing, dressing, etc.)  I can look after myself normally without causing additional pain.   Section 3 - Lifting Pain prevents me from lifting heavy weights off the floor but I can manage if they are conveniently positioned, e.g. on a table.   Section 4 - Walking Pain prevents me from walking more than a quarter of a mile.   Section 5 - Sitting I can sit in my favorite chair as long as I like.   Section 6 - Standing Pain prevents me from standing for more than 1 hour.   Section 7 - Sleeping My sleep is never interrupted by pain.   Section 8 - Sex life (if applicable) Not answered/NA   Section 9 - Social life My social life is normal and causes me no additional pain.   Section 10 - Traveling I can travel anywhere but it gives me additional pain.   Oswestry Score (%) 20       Assessment and Plan  Treatment Observations:  Patient rested comfortably during treatment  Acupuncture Treatment Recommendations:   Acupuncture recommended weekly for 12 treatments for diagnosis: M48.061 (ICD-10-CM) - Spinal stenosis of lumbar region, unspecified whether neurogenic claudication present       It is my recommendation that this patient seek advice from their Primary Care Provider about active symptoms not addressed during this visit. The risks and benefits of acupuncture were reviewed and  the patient stated understanding. The patient's questions were answered to their satisfaction. Consent was provided for treatment. We thank you for the referral and opportunity to treat this patient.    Time Spent with Patient:   I spent a total of 30 minutes face-to-face with Antoni Stoll during today's office visit.     Emma Neal L.Ac.

## 2023-05-03 ENCOUNTER — OFFICE VISIT (OUTPATIENT)
Dept: PULMONOLOGY | Facility: CLINIC | Age: 88
End: 2023-05-03
Payer: MEDICARE

## 2023-05-03 ENCOUNTER — ALLIED HEALTH/NURSE VISIT (OUTPATIENT)
Dept: PULMONOLOGY | Facility: CLINIC | Age: 88
End: 2023-05-03
Payer: MEDICARE

## 2023-05-03 VITALS
HEIGHT: 64 IN | DIASTOLIC BLOOD PRESSURE: 78 MMHG | BODY MASS INDEX: 23.6 KG/M2 | HEART RATE: 65 BPM | WEIGHT: 138.2 LBS | SYSTOLIC BLOOD PRESSURE: 120 MMHG | OXYGEN SATURATION: 97 %

## 2023-05-03 DIAGNOSIS — R06.09 DYSPNEA ON EXERTION: ICD-10-CM

## 2023-05-03 LAB — HGB BLD-MCNC: 11.3 G/DL

## 2023-05-03 PROCEDURE — 94375 RESPIRATORY FLOW VOLUME LOOP: CPT | Performed by: INTERNAL MEDICINE

## 2023-05-03 PROCEDURE — 99204 OFFICE O/P NEW MOD 45 MIN: CPT | Performed by: INTERNAL MEDICINE

## 2023-05-03 PROCEDURE — 94729 DIFFUSING CAPACITY: CPT | Performed by: INTERNAL MEDICINE

## 2023-05-03 PROCEDURE — 85018 HEMOGLOBIN: CPT

## 2023-05-03 PROCEDURE — 94726 PLETHYSMOGRAPHY LUNG VOLUMES: CPT | Performed by: INTERNAL MEDICINE

## 2023-05-03 RX ORDER — ALBUTEROL SULFATE 90 UG/1
2 AEROSOL, METERED RESPIRATORY (INHALATION) EVERY 6 HOURS PRN
Qty: 18 G | Refills: 11 | Status: SHIPPED | OUTPATIENT
Start: 2023-05-03 | End: 2023-09-26

## 2023-05-03 NOTE — PROGRESS NOTES
"Pulmonary Clinic Consult Note  Date of Service: 05/03/2023    Reason for Consultation: Dyspnea on exertion    History:     HPI: Patient is an 87-year-old male with past medical history significant for CAD, sick sinus syndrome, colon cancer status postresection, hypertension, hyperlipidemia, valvular heart disease who was referred here for evaluation of shortness of breath.    Patient notes that he has been having shortness of breath over the past 3-4 years.  He is able to walk approximate 30 minutes on the treadmill.  He can go up 1 flight of stairs. He notes that during COVID, he used to walk outside and was able to walk for about an hours. He does exercise at a senior fitness class where he does 2 yoga classes, 2 strength training, and 1 cardio. He denies any cough. No WL or NS. He is able to perform his activities of daily living. With respect to inhalers, pt is not on any inhalers. Patient also has postnasal nasal drip 2/2 to indoor allergies (dust and mold) and is on azelastine as well as Flonase.    PMHx/PSHx:  Sick sinus syndrome, resolved when taken off BB.   CAD that is post 5 vessel 6/2013  Postnasal drip  Colon cancer status post surgery 2017/2018.  Carotid artery occlusion  Hypertension  Hyperlipidemia  Valvular heart disease  Obstructive sleep apnea on CPAP  Spinal stenosis  Blepharoplasty  Right hernia repair surgery    Social Hx:   Tobacco: lifelong nonsmoker.  Occupation: optometrist. Retired 2012.  Travel: over the holidays, he went to [facility].  Live in a townhouse.    Review of Systems - 10 point review of system negative except for what is mentioned in the HPI.    Exam/Data:   /78 (BP Location: Left arm, Patient Position: Chair, Cuff Size: Adult Regular)   Pulse 65   Ht 1.613 m (5' 3.5\")   Wt 62.7 kg (138 lb 3.2 oz)   SpO2 97%   BMI 24.10 kg/m      EXAM:  GEN: comfortable, NAD  HEENT: NCAT, EMOI  CVS: S1S2, RRR  Lung: CTA bilaterally  Abd: soft, nt, + BS. No masses  Ext: no " c/c/e  Neuro: nonfocal  Skin: no visible rash  Musculoskeletal: FROM all extremities  Psych: appropriate    DATA    Labs: reviewed in EMR and outside records where pertinent.     PFTs done on 5/2023:  FEV1 92% predicted, FVC 90% predicted, ratio 76.  No flattening of the flow-volume loop.  RV 89% predicted, TLC 82% predicted.  DLCO 91% predicted.    Assessment/Plan:   Antoni Stoll is a 87 year old male, lifelong nonsmoker, who is here for VILLA. PFT's are essentially unremarkable, TLC on lower end. We do not have recent chest imaging.  Pt is not on inhalers. He has ALECIA but is on CPAP.  He had been given lasix but no improvement with his VILLA. Does not report wheezing. ? Normal for his age.  Would like to look at diaphrams (SNIF) if cxr is abnormal.      Recommendations:  Start albuterol inhaler as needed  Will get a CXR  Continue exercising (30 min on treadmill)  Diet, weight loss    RTC in 3 months.     Yovanny Young MD  Pulmonary and Critical Care Medicine    Family History   Problem Relation Age of Onset     Coronary Artery Disease Mother      Colon Cancer Father      Coronary Artery Disease Father      No Known Allergies    Medications:     Current Outpatient Medications   Medication Sig Dispense Refill     acetaminophen (TYLENOL) 500 MG tablet [ACETAMINOPHEN (TYLENOL) 500 MG TABLET] Take 1,000 mg by mouth daily .             aspirin 325 MG tablet [ASPIRIN 325 MG TABLET] Take 325 mg by mouth daily.       atorvastatin (LIPITOR) 20 MG tablet Take 1 tablet (20 mg) by mouth daily 90 tablet 3     azelastine (ASTELIN) 0.1 % nasal spray 2 sprays each nostril 1-2x daily as needed for nasal congestion 30 mL 11     B2/VITS A,C,E/LUT/ZEAXANTH/MIN (ICAPS ORAL) [B2/VITS A,C,E/LUT/ZEAXANTH/MIN (ICAPS ORAL)] Take 1 capsule by mouth daily.       calcium polycarbophil (FIBERCON) 625 mg tablet [CALCIUM POLYCARBOPHIL (FIBERCON) 625 MG TABLET] Take 1,250 mg by mouth daily.       cholecalciferol, vitamin D3, 1,000 unit tablet  [CHOLECALCIFEROL, VITAMIN D3, 1,000 UNIT TABLET] Take 1,000 Units by mouth daily.       fluconazole (DIFLUCAN) 150 MG tablet TAKE 1 TABLET BY MOUTH ONCE WEEKLY FOR 4 WEEKS FOR NAIL FUNGUS       fluticasone (FLONASE) 50 MCG/ACT nasal spray        glucosamine-chondroitin 500-400 mg cap [GLUCOSAMINE-CHONDROITIN 500-400 MG CAP] Take 1 capsule by mouth 2 (two) times a day.       latanoprost (XALATAN) 0.005 % ophthalmic solution Place 1 drop into both eyes At Bedtime       lisinopril (ZESTRIL) 2.5 MG tablet [LISINOPRIL (PRINIVIL,ZESTRIL) 2.5 MG TABLET] TAKE 1 TABLET BY MOUTH EVERY DAY 90 tablet 3     loratadine 10 MG capsule Take 10 mg by mouth daily       nystatin (MYCOSTATIN) 183544 UNIT/GM external powder Apply to affected areas twice a day if flare of rash 60 g 3     pregabalin (LYRICA) 25 MG capsule TAKE 1 CAPSULE(25 MG) BY MOUTH TWICE DAILY 60 capsule 1     saw palmetto fruit 450 mg cap [SAW PALMETTO FRUIT 450 MG CAP] Take 1 capsule by mouth 2 (two) times a day.       tamsulosin (FLOMAX) 0.4 mg Cp24 [TAMSULOSIN (FLOMAX) 0.4 MG CP24] Take 0.4 mg by mouth at bedtime.       azelastine (ASTELIN) 0.1 % nasal spray          Much or all of the text in this note was generated through the use of the Dragon Dictate voice-to-text software. Errors in spelling or words which seem out of context are unintentional. Sound alike errors, in particular, may have escaped editing.

## 2023-05-04 LAB
DLCOCOR-%PRED-PRE: 91 %
DLCOCOR-PRE: 20.3 ML/MIN/MMHG
DLCOUNC-%PRED-PRE: 81 %
DLCOUNC-PRE: 18.12 ML/MIN/MMHG
DLCOUNC-PRED: 22.28 ML/MIN/MMHG
ERV-%PRED-PRE: 108 %
ERV-PRE: 1.22 L
ERV-PRED: 1.13 L
EXPTIME-PRE: 7.95 SEC
FEF2575-%PRED-PRE: 100 %
FEF2575-PRE: 1.69 L/SEC
FEF2575-PRED: 1.68 L/SEC
FEFMAX-%PRED-PRE: 110 %
FEFMAX-PRE: 6.75 L/SEC
FEFMAX-PRED: 6.13 L/SEC
FEV1-%PRED-PRE: 92 %
FEV1-PRE: 2.21 L
FEV1FEV6-PRE: 76 %
FEV1FEV6-PRED: 76 %
FEV1FVC-PRE: 76 %
FEV1FVC-PRED: 75 %
FEV1SVC-PRE: 76 %
FEV1SVC-PRED: 59 %
FIFMAX-PRE: 4.17 L/SEC
FRCPLETH-%PRED-PRE: 102 %
FRCPLETH-PRE: 3.82 L
FRCPLETH-PRED: 3.72 L
FVC-%PRED-PRE: 90 %
FVC-PRE: 2.9 L
FVC-PRED: 3.21 L
IC-%PRED-PRE: 58 %
IC-PRE: 1.7 L
IC-PRED: 2.94 L
RVPLETH-%PRED-PRE: 89 %
RVPLETH-PRE: 2.6 L
RVPLETH-PRED: 2.9 L
TLCPLETH-%PRED-PRE: 82 %
TLCPLETH-PRE: 5.52 L
TLCPLETH-PRED: 6.72 L
VA-%PRED-PRE: 82 %
VA-PRE: 4.77 L
VC-%PRED-PRE: 71 %
VC-PRE: 2.93 L
VC-PRED: 4.07 L

## 2023-05-06 ASSESSMENT — PAIN SCALES - PAIN ENJOYMENT GENERAL ACTIVITY SCALE (PEG)
INTERFERED_GENERAL_ACTIVITY: 0 - DOES NOT INTERFERE
AVG_PAIN_PASTWEEK: 2
INTERFERED_ENJOYMENT_LIFE: 0
PEG_TOTALSCORE: .67
INTERFERED_GENERAL_ACTIVITY: 0
INTERFERED_ENJOYMENT_LIFE: 0 - DOES NOT INTERFERE
PEG_TOTALSCORE: 0.67

## 2023-05-09 ASSESSMENT — PAIN SCALES - PAIN ENJOYMENT GENERAL ACTIVITY SCALE (PEG)
PEG_TOTALSCORE: 0.67
INTERFERED_GENERAL_ACTIVITY: 0 - DOES NOT INTERFERE
INTERFERED_ENJOYMENT_LIFE: 0
AVG_PAIN_PASTWEEK: 2
INTERFERED_GENERAL_ACTIVITY: 0
INTERFERED_ENJOYMENT_LIFE: 0 - DOES NOT INTERFERE
PEG_TOTALSCORE: .67

## 2023-05-10 ENCOUNTER — OFFICE VISIT (OUTPATIENT)
Dept: PALLIATIVE MEDICINE | Facility: OTHER | Age: 88
End: 2023-05-10
Attending: ANESTHESIOLOGY
Payer: MEDICARE

## 2023-05-10 DIAGNOSIS — M48.061 SPINAL STENOSIS OF LUMBAR REGION, UNSPECIFIED WHETHER NEUROGENIC CLAUDICATION PRESENT: Primary | ICD-10-CM

## 2023-05-10 PROCEDURE — 97811 ACUP 1/> W/O ESTIM EA ADD 15: CPT | Mod: KX | Performed by: ACUPUNCTURIST

## 2023-05-10 PROCEDURE — 97810 ACUP 1/> WO ESTIM 1ST 15 MIN: CPT | Mod: KX | Performed by: ACUPUNCTURIST

## 2023-05-10 NOTE — PROGRESS NOTES
ACUPUNCTURIST TREATMENT NOTE      Antoni Stoll, a 87 year old male, is here today for Follow - Up exam. Patient is referred by Juan Antonio. Treatment 6.    HPI  Main Complaint:  Chronic pain of neck and low back, numbness of feet:    Reports feeling a lot of improvement after last treatment for a few days, but then pain starts to return after a few days. Neck and shoulders has been improved. Lower back pain increased also the last few days. Pain is in center of low back and can radiate into his right hip    Numbness in right foot and ankle mainly.        Past Medical History  Past Medical History Reviewed: Yes   has a past medical history of Anemia, Cancer (H), Carotid artery occlusion, Coronary artery disease, Glaucoma, High cholesterol, History of colon cancer (8/13/2018), Hyperlipidemia, Hypertension, Left inguinal hernia, Low back pain, Mild aortic insufficiency, Moderate tricuspid regurgitation, Postnasal drip, Sleep apnea, obstructive, Spinal stenosis, SSS (sick sinus syndrome) (H) (12/9/2019), Umbilical hernia, and Unspecified cataract.    Objective  Basic Exam Completed:   No    TCM Exam Completed: Yes   Limbs/Back: Right Lower Extremity, Lower Back and neck    Tongue/Pulse Exam Completed: No    Patient Assessment    PEG: A Three-Item Scale Assessing Pain Intensity and Interference    What number best describes your PAIN ON AVERAGE in the past week? 2    What number best describes how, during the past week, pain has interfered with your ENJOYMENT OF LIFE? 0 - Does not interfere    What number best describes how, during the past week, pain has interfered with your GENERAL ACTIVITY? 0 - Does not interfere    PEG Total Score: 0.67    Keo BISHOP, Cesar KA, Jayjay MJ, Fannie TA, Napoelon J, Andreas ASENCIO, Susanne SM, Angelica K. Development and initial validation of the PEG, a 3-item scale assessing pain intensity and interference. Journal of General Internal Medicine. 2009 Jun;24:733-738.  Patient Type: Pain  Patient Complaint:  Chronic pain of neck and low back, numbness of feet    Acupuncture 5/10/2023 5/16/2023   Intervention Reason Pain; Pain 2; Neuropathy Pain; Pain 2; Neuropathy   Pain Location low back low back   Pre-session Pain Rating 3 3   Post-session Pain Rating 2 2   Pain 2 Location neck neck   Pre-session Pain 2 Rating 2 3   Post-session Pain 2 Rating 1 2   Neuropathy Location Feet and (R) ankle feet   Pre-session Neuropathy rating 3 3   Post-session Neuropathy rating 2 2       TCM Diagnosis: Qi Stagnation;Blood Stagnation;Qi Deficiency;Blood Deficiency;Yin Deficiency;Kidney Qi Deficiency;Other Melvin Yang Channel obstruction    Treatment Principle: unblock channels, move qi and blood, nourish and stop pain    TCM / Acupuncture Treatment  Acupuncture Points:       Initial insertions: Baihui, Gb20, HTJJ L2-L5, Ub23, Shiqizhuixia, Ub31, Ub32, Yaoyan       Second insertions: Gb34, Ub60, Ki3. Right: Ub27-28, Ub53, Gb29, Ub40, Ub57, Sp6, Ub62, Gb40, Gb41, Liv3, Sp3, Ki1                    Accessory Techniques 4/24/2023 5/16/2023   Accessory Techniques TDP Heat Lamp; Tuina; Topicals TDP Heat Lamp; Tuina; Topicals   TDP Heat Lamp location used low back low back   Tuina location used low back and neck low back and neck   Topicals Po Sum On Po Sum On   Topicals location used low back and neck low back and neck         9/8/2022     2:00 PM   Oswestry Disability Index ([employer]   Ricky Harrison 1980, All rights reserved)   Section 1 - Pain intensity The pain is very mild at the moment.   Section 2 - Personal care (washing, dressing, etc.)  I can look after myself normally without causing additional pain.   Section 3 - Lifting Pain prevents me from lifting heavy weights off the floor but I can manage if they are conveniently positioned, e.g. on a table.   Section 4 - Walking Pain prevents me from walking more than a quarter of a mile.   Section 5 - Sitting I can sit in my favorite chair as long as I like.   Section 6 - Standing Pain prevents me  from standing for more than 1 hour.   Section 7 - Sleeping My sleep is never interrupted by pain.   Section 8 - Sex life (if applicable) Not answered/NA   Section 9 - Social life My social life is normal and causes me no additional pain.   Section 10 - Traveling I can travel anywhere but it gives me additional pain.   Oswestry Score (%) 20       Assessment and Plan  Treatment Observations: Patient fell asleep and relaxed well during treatment  Acupuncture Treatment Recommendations: Acupuncture for diagnosis: M48.061 (ICD-10-CM) - Spinal stenosis of lumbar region, unspecified whether neurogenic claudication present       It is my recommendation that this patient seek advice from their Primary Care Provider about active symptoms not addressed during this visit. The risks and benefits of acupuncture were reviewed and the patient stated understanding. The patient's questions were answered to their satisfaction. Consent was provided for treatment. We thank you for the referral and opportunity to treat this patient.    Time Spent with Patient:   I spent a total of 30 minutes face-to-face with Antoni Stoll during today's office visit.     Kamryn White L.Ac.  05/16/23

## 2023-05-10 NOTE — PROGRESS NOTES
ACUPUNCTURIST TREATMENT NOTE      Antoni Stoll, a 87 year old male, is here today for Follow - Up exam. Patient is referred by Juan Antonio.    HPI  Main Complaint: Chronic pain of back and neck  Secondary Complaints: Numbness of bilateral feet and (R) ankle. Numbness is worse on the right side but improved after past two acupuncture treatments.    Past Medical History  Past Medical History Reviewed: No   has a past medical history of Anemia, Cancer (H), Carotid artery occlusion, Coronary artery disease, Glaucoma, High cholesterol, History of colon cancer (8/13/2018), Hyperlipidemia, Hypertension, Left inguinal hernia, Low back pain, Mild aortic insufficiency, Moderate tricuspid regurgitation, Postnasal drip, Sleep apnea, obstructive, Spinal stenosis, SSS (sick sinus syndrome) (H) (12/9/2019), Umbilical hernia, and Unspecified cataract.    Objective  Basic Exam Completed:       TCM Exam Completed: Yes   Limbs/Back: Lower Back    Tongue/Pulse Exam Completed: No    Patient Assessment  Patient Type: Pain  Patient Complaint: Chronic pain of neck and low back, numbness of feet and (R) ankle    Acupuncture 4/24/2023 5/10/2023   Intervention Reason Pain 2; Neuropathy Pain; Pain 2; Neuropathy   Pain Location low back low back   Pre-session Pain Rating 3 3   Post-session Pain Rating 2 2   Pain 2 Location neck neck   Pre-session Pain 2 Rating 3 2   Post-session Pain 2 Rating 2 1   Neuropathy Location Feet Feet and (R) ankle   Pre-session Neuropathy rating 3 3   Post-session Neuropathy rating 2 2       TCM Diagnosis: Qi Stagnation;Blood Stagnation;Qi Deficiency;Blood Deficiency;Yin Deficiency;Kidney Qi Deficiency;Other Melvin Yang Channel obstruction    Treatment Principle: unblock channels, move qi and blood, nourish and stop pain    TCM / Acupuncture Treatment  Acupuncture Points:       Initial insertions: Gb 20, 21, Du 16, (R) ankle sumanth x6, Kid 1, 3       Second insertions: (L) Ling gu, (L) Si 3, (R) Bl 60, 62, 65, Thom @  L2-5, Bl 23, 31, 32, 52, 54, Cody vaibhav            Number of needles inserted: 40  Number of needles removed: 40    Accessory Techniques 4/19/2023 4/24/2023   Accessory Techniques TDP Heat Lamp; Tuina; Topicals TDP Heat Lamp; Tuina; Topicals   TDP Heat Lamp location used low back low back   Tuina location used - low back and neck   Topicals - Po Sum On   Topicals location used - low back and neck         9/8/2022     2:00 PM   Oswestry Disability Index ([employer]   Ricky Harrison 1980, All rights reserved)   Section 1 - Pain intensity The pain is very mild at the moment.   Section 2 - Personal care (washing, dressing, etc.)  I can look after myself normally without causing additional pain.   Section 3 - Lifting Pain prevents me from lifting heavy weights off the floor but I can manage if they are conveniently positioned, e.g. on a table.   Section 4 - Walking Pain prevents me from walking more than a quarter of a mile.   Section 5 - Sitting I can sit in my favorite chair as long as I like.   Section 6 - Standing Pain prevents me from standing for more than 1 hour.   Section 7 - Sleeping My sleep is never interrupted by pain.   Section 8 - Sex life (if applicable) Not answered/NA   Section 9 - Social life My social life is normal and causes me no additional pain.   Section 10 - Traveling I can travel anywhere but it gives me additional pain.   Oswestry Score (%) 20       Assessment and Plan  Treatment Observations:    Acupuncture Treatment Recommendations: Acupuncture for diagnosis: M48.061 (ICD-10-CM) - Spinal stenosis of lumbar region, unspecified whether neurogenic claudication present       It is my recommendation that this patient seek advice from their Primary Care Provider about active symptoms not addressed during this visit. The risks and benefits of acupuncture were reviewed and the patient stated understanding. The patient's questions were answered to their satisfaction. Consent was provided for treatment. We  thank you for the referral and opportunity to treat this patient.    Time Spent with Patient:   I spent a total of 30 minutes face-to-face with Antoni Stoll during today's office visit.     Nikolas Taveras

## 2023-05-16 ENCOUNTER — OFFICE VISIT (OUTPATIENT)
Dept: PALLIATIVE MEDICINE | Facility: OTHER | Age: 88
End: 2023-05-16
Payer: MEDICARE

## 2023-05-16 DIAGNOSIS — M48.061 SPINAL STENOSIS OF LUMBAR REGION, UNSPECIFIED WHETHER NEUROGENIC CLAUDICATION PRESENT: Primary | ICD-10-CM

## 2023-05-16 PROCEDURE — 97810 ACUP 1/> WO ESTIM 1ST 15 MIN: CPT | Performed by: ACUPUNCTURIST

## 2023-05-16 PROCEDURE — 97811 ACUP 1/> W/O ESTIM EA ADD 15: CPT | Performed by: ACUPUNCTURIST

## 2023-05-24 ENCOUNTER — OFFICE VISIT (OUTPATIENT)
Dept: PALLIATIVE MEDICINE | Facility: OTHER | Age: 88
End: 2023-05-24
Payer: MEDICARE

## 2023-05-24 DIAGNOSIS — M48.061 SPINAL STENOSIS OF LUMBAR REGION, UNSPECIFIED WHETHER NEUROGENIC CLAUDICATION PRESENT: Primary | ICD-10-CM

## 2023-05-24 PROCEDURE — 97811 ACUP 1/> W/O ESTIM EA ADD 15: CPT | Performed by: ACUPUNCTURIST

## 2023-05-24 PROCEDURE — 97810 ACUP 1/> WO ESTIM 1ST 15 MIN: CPT | Performed by: ACUPUNCTURIST

## 2023-05-24 ASSESSMENT — PAIN SCALES - PAIN ENJOYMENT GENERAL ACTIVITY SCALE (PEG)
INTERFERED_GENERAL_ACTIVITY: 0
INTERFERED_ENJOYMENT_LIFE: 0
AVG_PAIN_PASTWEEK: 3
PEG_TOTALSCORE: 1
INTERFERED_GENERAL_ACTIVITY: 0 - DOES NOT INTERFERE
INTERFERED_GENERAL_ACTIVITY: 0
INTERFERED_ENJOYMENT_LIFE: 0
INTERFERED_ENJOYMENT_LIFE: 0 - DOES NOT INTERFERE
AVG_PAIN_PASTWEEK: 3
INTERFERED_ENJOYMENT_LIFE: 0 - DOES NOT INTERFERE
INTERFERED_GENERAL_ACTIVITY: 0 - DOES NOT INTERFERE
PEG_TOTALSCORE: 1

## 2023-05-24 NOTE — PROGRESS NOTES
ACUPUNCTURIST TREATMENT NOTE      Antoni Stoll, a 88 year old male, is here today for Follow - Up exam. Patient is referred by Juan Antonio.    HPI  Main Complaint: Chronic low back and neck pain. Pain has been exacerbated lately due to lots of spring gardening activity.  Secondary Complaints: Numbness of feet and (R) ankle.    Past Medical History  Past Medical History Reviewed: No   has a past medical history of Anemia, Cancer (H), Carotid artery occlusion, Coronary artery disease, Glaucoma, High cholesterol, History of colon cancer (8/13/2018), Hyperlipidemia, Hypertension, Left inguinal hernia, Low back pain, Mild aortic insufficiency, Moderate tricuspid regurgitation, Postnasal drip, Sleep apnea, obstructive, Spinal stenosis, SSS (sick sinus syndrome) (H) (12/9/2019), Umbilical hernia, and Unspecified cataract.    Objective  Basic Exam Completed:   Lower Extremity (ies): Normal    TCM Exam Completed: Yes   Limbs/Back: Upper and Lower Back    Tongue/Pulse Exam Completed: No    Patient Assessment  Patient Type: Pain  Patient Complaint: Chronic pain of neck and low back, numbness of feet    Acupuncture 5/16/2023 5/24/2023   Intervention Reason Pain; Pain 2; Neuropathy Pain; Pain 2; Neuropathy   Pain Location low back low back   Pre-session Pain Rating 3 3   Post-session Pain Rating 2 1   Pain 2 Location neck neck   Pre-session Pain 2 Rating 3 2   Post-session Pain 2 Rating 2 1   Neuropathy Location feet Feet   Pre-session Neuropathy rating 3 3   Post-session Neuropathy rating 2 1       TCM Diagnosis: Qi Stagnation;Blood Stagnation;Qi Deficiency;Blood Deficiency;Yin Deficiency;Kidney Qi Deficiency;Other Melvin Yang Channel obstruction    Treatment Principle: unblock channels, move qi and blood, nourish and stop pain    TCM / Acupuncture Treatment  Acupuncture Points:       Initial insertions: Gb 20, 21, Rosiouosupriyaasupriya @ L2, 3, 4, 5, Bl 23, 31, 32, 54       Second insertions: Kid 1, 2, Gb 41, (R) ankle sumanth x6             Number of needles inserted: 32  Number of needles removed: 32    Accessory Techniques 5/16/2023 5/24/2023   Accessory Techniques TDP Heat Lamp; Tuina; Topicals TDP Heat Lamp   TDP Heat Lamp location used low back low back   Tuina location used low back and neck -   Topicals Po Sum On -   Topicals location used low back and neck -         9/8/2022     2:00 PM   Oswestry Disability Index ([employer]   Ricky Harrison 1980, All rights reserved)   Section 1 - Pain intensity The pain is very mild at the moment.   Section 2 - Personal care (washing, dressing, etc.)  I can look after myself normally without causing additional pain.   Section 3 - Lifting Pain prevents me from lifting heavy weights off the floor but I can manage if they are conveniently positioned, e.g. on a table.   Section 4 - Walking Pain prevents me from walking more than a quarter of a mile.   Section 5 - Sitting I can sit in my favorite chair as long as I like.   Section 6 - Standing Pain prevents me from standing for more than 1 hour.   Section 7 - Sleeping My sleep is never interrupted by pain.   Section 8 - Sex life (if applicable) Not answered/NA   Section 9 - Social life My social life is normal and causes me no additional pain.   Section 10 - Traveling I can travel anywhere but it gives me additional pain.   Oswestry Score (%) 20       Assessment and Plan  Treatment Observations:    Acupuncture Treatment Recommendations: Acupuncture for diagnosis: M48.061 (ICD-10-CM) - Spinal stenosis of lumbar region, unspecified whether neurogenic claudication present       It is my recommendation that this patient seek advice from their Primary Care Provider about active symptoms not addressed during this visit. The risks and benefits of acupuncture were reviewed and the patient stated understanding. The patient's questions were answered to their satisfaction. Consent was provided for treatment. We thank you for the referral and opportunity to treat this  patient.    Time Spent with Patient:   I spent a total of 30 minutes face-to-face with Antoni Stoll during today's office visit.     Nikolas Taveras

## 2023-05-25 NOTE — PROGRESS NOTES
ACUPUNCTURIST TREATMENT NOTE      Antoni Stoll, a 88 year old male, is here today for Follow - Up exam. Patient is referred by Juan Antonio. Treatment 9.    HPI  Main Complaint: Main Complaint:  Chronic pain of neck and low back, numbness of feet:    Over all patient states he is doing well and continues to feel better with chronic pain levels. Patient reports he did a lot of lifting and watering of plants on Friday and felt more sore after that. This morning has a little more pain than usual in left low back and hip area.     Continues with some tingling and numbness mainly in right ankle.      Past Medical History  Past Medical History Reviewed: Yes   has a past medical history of Anemia, Cancer (H), Carotid artery occlusion, Coronary artery disease, Glaucoma, High cholesterol, History of colon cancer (8/13/2018), Hyperlipidemia, Hypertension, Left inguinal hernia, Low back pain, Mild aortic insufficiency, Moderate tricuspid regurgitation, Postnasal drip, Sleep apnea, obstructive, Spinal stenosis, SSS (sick sinus syndrome) (H) (12/9/2019), Umbilical hernia, and Unspecified cataract.    Objective  Basic Exam Completed:   No    TCM Exam Completed: Yes   Limbs/Back: Right Lower Extremity and Lower Back    Tongue/Pulse Exam Completed: No    Patient Assessment    PEG: A Three-Item Scale Assessing Pain Intensity and Interference    What number best describes your PAIN ON AVERAGE in the past week? 3    What number best describes how, during the past week, pain has interfered with your ENJOYMENT OF LIFE? 0 - Does not interfere    What number best describes how, during the past week, pain has interfered with your GENERAL ACTIVITY? 0 - Does not interfere    PEG Total Score: 1    Keo EE, Cesar KA, Jayjay MJ, Fannie TA, Napoleon J, Andreas ASENCIO, Susanne SM, Angelica K. Development and initial validation of the PEG, a 3-item scale assessing pain intensity and interference. Journal of General Internal Medicine. 2009 Fausto;24:733-738.  Patient  Type: Pain  Patient Complaint: Chronic pain of neck and low back, numbness of feet    Acupuncture 5/24/2023 5/30/2023   Intervention Reason Pain; Pain 2; Neuropathy Pain; Pain 2; Neuropathy   Pain Location low back low back   Pre-session Pain Rating 3 3   Post-session Pain Rating 1 1   Pain 2 Location neck neck   Pre-session Pain 2 Rating 2 3   Post-session Pain 2 Rating 1 1   Neuropathy Location Feet feet   Pre-session Neuropathy rating 3 3   Post-session Neuropathy rating 1 1       TCM Diagnosis: Qi Stagnation;Blood Stagnation;Qi Deficiency;Blood Deficiency;Yin Deficiency;Kidney Qi Deficiency;Other Melvin Yang Channel obstruction    Treatment Principle: unblock channels, move qi and blood, nourish and stop pain    TCM / Acupuncture Treatment  Acupuncture Points:       Initial insertions: Baihui, Gb20, Ub10, HTJJ L2-L5, Ub23, Shiqizhuixia, Ub31, Ub32       Second insertions: Ub40, Ub57, Ub60, Ki3. Left: Ub24-26, Yaoyan, Gb29. Right: Ub62, Gb40, Ki1                    Accessory Techniques 5/24/2023 5/30/2023   Accessory Techniques TDP Heat Lamp TDP Heat Lamp   TDP Heat Lamp location used low back low back   Tuina location used - -   Topicals - -   Topicals location used - -         9/8/2022     2:00 PM   Oswestry Disability Index ([employer]   Ricky Harrison 1980, All rights reserved)   Section 1 - Pain intensity The pain is very mild at the moment.   Section 2 - Personal care (washing, dressing, etc.)  I can look after myself normally without causing additional pain.   Section 3 - Lifting Pain prevents me from lifting heavy weights off the floor but I can manage if they are conveniently positioned, e.g. on a table.   Section 4 - Walking Pain prevents me from walking more than a quarter of a mile.   Section 5 - Sitting I can sit in my favorite chair as long as I like.   Section 6 - Standing Pain prevents me from standing for more than 1 hour.   Section 7 - Sleeping My sleep is never interrupted by pain.   Section 8 - Sex  life (if applicable) Not answered/NA   Section 9 - Social life My social life is normal and causes me no additional pain.   Section 10 - Traveling I can travel anywhere but it gives me additional pain.   Oswestry Score (%) 20       Assessment and Plan  Treatment Observations: Patient fell asleep and relaxed well during treatment  Acupuncture Treatment Recommendations: Acupuncture for diagnosis: M48.061 (ICD-10-CM) - Spinal stenosis of lumbar region, unspecified whether neurogenic claudication present     This service is performed and billed incident to Dr. Ankit Rodas.    It is my recommendation that this patient seek advice from their Primary Care Provider about active symptoms not addressed during this visit. The risks and benefits of acupuncture were reviewed and the patient stated understanding. The patient's questions were answered to their satisfaction. Consent was provided for treatment. We thank you for the referral and opportunity to treat this patient.    Time Spent with Patient:   I spent a total of 30 minutes face-to-face with Antoni Stoll during today's office visit.     Kamryn White L.Ac.  05/30/23

## 2023-05-30 ENCOUNTER — OFFICE VISIT (OUTPATIENT)
Dept: PALLIATIVE MEDICINE | Facility: OTHER | Age: 88
End: 2023-05-30
Payer: MEDICARE

## 2023-05-30 DIAGNOSIS — M48.061 SPINAL STENOSIS OF LUMBAR REGION, UNSPECIFIED WHETHER NEUROGENIC CLAUDICATION PRESENT: Primary | ICD-10-CM

## 2023-05-30 PROCEDURE — 97810 ACUP 1/> WO ESTIM 1ST 15 MIN: CPT | Performed by: ACUPUNCTURIST

## 2023-05-30 PROCEDURE — 97811 ACUP 1/> W/O ESTIM EA ADD 15: CPT | Performed by: ACUPUNCTURIST

## 2023-06-05 ENCOUNTER — OFFICE VISIT (OUTPATIENT)
Dept: PALLIATIVE MEDICINE | Facility: OTHER | Age: 88
End: 2023-06-05
Payer: MEDICARE

## 2023-06-05 DIAGNOSIS — M48.061 SPINAL STENOSIS OF LUMBAR REGION, UNSPECIFIED WHETHER NEUROGENIC CLAUDICATION PRESENT: Primary | ICD-10-CM

## 2023-06-05 PROCEDURE — 97811 ACUP 1/> W/O ESTIM EA ADD 15: CPT | Performed by: ACUPUNCTURIST

## 2023-06-05 PROCEDURE — 97810 ACUP 1/> WO ESTIM 1ST 15 MIN: CPT | Performed by: ACUPUNCTURIST

## 2023-06-05 ASSESSMENT — PAIN SCALES - PAIN ENJOYMENT GENERAL ACTIVITY SCALE (PEG)
INTERFERED_GENERAL_ACTIVITY: 0 - DOES NOT INTERFERE
INTERFERED_ENJOYMENT_LIFE: 0 - DOES NOT INTERFERE
INTERFERED_ENJOYMENT_LIFE: 0
INTERFERED_ENJOYMENT_LIFE: 0 - DOES NOT INTERFERE
INTERFERED_GENERAL_ACTIVITY: 0 - DOES NOT INTERFERE
INTERFERED_GENERAL_ACTIVITY: 0
AVG_PAIN_PASTWEEK: 3
INTERFERED_GENERAL_ACTIVITY: 0
PEG_TOTALSCORE: 1
AVG_PAIN_PASTWEEK: 3
PEG_TOTALSCORE: 1
INTERFERED_ENJOYMENT_LIFE: 0

## 2023-06-05 NOTE — PROGRESS NOTES
ACUPUNCTURIST TREATMENT NOTE      Antoni Stoll, a 88 year old male, is here today for Follow - Up exam. Patient is referred by Juan Antonio.    HPI  Main Complaint: Chronic pain of neck and low back, numbness of feet  Follow-up: Patient reports he continues to benefit from the acupuncture and feels improved pain for several days following.  He reports recent tightness and pain at (L) hip.  He reports continued neuropathy at bilateral lower legs and feet, (R) >(L) primary area of discomfort at (R) ankle.      Past Medical History  Past Medical History Reviewed: Yes   has a past medical history of Anemia, Cancer (H), Carotid artery occlusion, Coronary artery disease, Glaucoma, High cholesterol, History of colon cancer (8/13/2018), Hyperlipidemia, Hypertension, Left inguinal hernia, Low back pain, Mild aortic insufficiency, Moderate tricuspid regurgitation, Postnasal drip, Sleep apnea, obstructive, Spinal stenosis, SSS (sick sinus syndrome) (H) (12/9/2019), Umbilical hernia, and Unspecified cataract.    Objective  Basic Exam Completed:   No    TCM Exam Completed: Yes   Limbs/Back: Lower Back    Tongue/Pulse Exam Completed: No    Patient Assessment  Patient Type: Pain  Patient Complaint: Chronic pain of neck and low back, numbness of feet    Acupuncture 5/30/2023 6/5/2023   Intervention Reason Pain; Pain 2; Neuropathy Pain; Neuropathy; Pain 2   Pain Location low back low back   Pre-session Pain Rating 3 3   Post-session Pain Rating 1 0   Pain 2 Location neck neck   Pre-session Pain 2 Rating 3 3   Post-session Pain 2 Rating 1 0   Neuropathy Location feet feet   Pre-session Neuropathy rating 3 3   Post-session Neuropathy rating 1 1       TCM Diagnosis: Qi Stagnation;Blood Stagnation;Qi Deficiency;Blood Deficiency;Yin Deficiency;Kidney Qi Deficiency;Other Melvin Yang Channel obstruction    Treatment Principle: unblock channels, move qi and blood, nourish and stop pain    TCM / Acupuncture Treatment  Acupuncture Points:        Initial insertions: Du 20, scalp x3, GB 20, HTJJ L2-L5, BL 23, Cody Rip, Glute medius and minimus MP       Second insertions: SI 3, TW 5, BL 57, GB 34, BL 60, BL 62, BL 63, GB 41                    Accessory Techniques 5/30/2023 6/5/2023   Accessory Techniques TDP Heat Lamp TDP Heat Lamp   TDP Heat Lamp location used low back low back   Tuina location used - -   Topicals - -   Topicals location used - -         9/8/2022     2:00 PM   Oswestry Disability Index ([employer]   Ricky Harrison 1980, All rights reserved)   Section 1 - Pain intensity The pain is very mild at the moment.   Section 2 - Personal care (washing, dressing, etc.)  I can look after myself normally without causing additional pain.   Section 3 - Lifting Pain prevents me from lifting heavy weights off the floor but I can manage if they are conveniently positioned, e.g. on a table.   Section 4 - Walking Pain prevents me from walking more than a quarter of a mile.   Section 5 - Sitting I can sit in my favorite chair as long as I like.   Section 6 - Standing Pain prevents me from standing for more than 1 hour.   Section 7 - Sleeping My sleep is never interrupted by pain.   Section 8 - Sex life (if applicable) Not answered/NA   Section 9 - Social life My social life is normal and causes me no additional pain.   Section 10 - Traveling I can travel anywhere but it gives me additional pain.   Oswestry Score (%) 20       Assessment and Plan  Treatment Observations: Patient rested comfortably  Acupuncture Treatment Recommendations: Acupuncture for diagnosis: M48.061 (ICD-10-CM) - Spinal stenosis of lumbar region, unspecified whether neurogenic claudication present       It is my recommendation that this patient seek advice from their Primary Care Provider about active symptoms not addressed during this visit. The risks and benefits of acupuncture were reviewed and the patient stated understanding. The patient's questions were answered to their satisfaction. Consent  was provided for treatment. We thank you for the referral and opportunity to treat this patient.    Time Spent with Patient:   I spent a total of 30 minutes face-to-face with Antoni Stoll during today's office visit.     Emma Neal L.Ac.

## 2023-06-07 ENCOUNTER — OFFICE VISIT (OUTPATIENT)
Dept: PALLIATIVE MEDICINE | Facility: OTHER | Age: 88
End: 2023-06-07
Payer: MEDICARE

## 2023-06-07 DIAGNOSIS — M48.061 SPINAL STENOSIS OF LUMBAR REGION, UNSPECIFIED WHETHER NEUROGENIC CLAUDICATION PRESENT: Primary | ICD-10-CM

## 2023-06-07 PROCEDURE — 97811 ACUP 1/> W/O ESTIM EA ADD 15: CPT | Mod: KX | Performed by: ACUPUNCTURIST

## 2023-06-07 PROCEDURE — 97810 ACUP 1/> WO ESTIM 1ST 15 MIN: CPT | Mod: KX | Performed by: ACUPUNCTURIST

## 2023-06-07 NOTE — PROGRESS NOTES
ACUPUNCTURIST TREATMENT NOTE      Antoni Stoll, a 88 year old male, is here today for Follow - Up exam. Patient is referred by Juan Antonio.    HPI  Main Complaint: Chronic pain of neck and low back. Wily states that overall he is feeling better with his pain.  Secondary Complaints: Acute pain of (L) hip. Numbness of feet, especially (R) side.    Past Medical History  Past Medical History Reviewed: No   has a past medical history of Anemia, Cancer (H), Carotid artery occlusion, Coronary artery disease, Glaucoma, High cholesterol, History of colon cancer (8/13/2018), Hyperlipidemia, Hypertension, Left inguinal hernia, Low back pain, Mild aortic insufficiency, Moderate tricuspid regurgitation, Postnasal drip, Sleep apnea, obstructive, Spinal stenosis, SSS (sick sinus syndrome) (H) (12/9/2019), Umbilical hernia, and Unspecified cataract.    Objective  Basic Exam Completed:   Lower Extremity (ies): Abnormal - Numbness    TCM Exam Completed: Yes   Limbs/Back: Upper and Lower Back    Tongue/Pulse Exam Completed: No    Patient Assessment  Patient Type: Pain  Patient Complaint: Chronic pain of neck and low back, numbness of feet, acute pain of (L) hip    Acupuncture 6/5/2023 6/7/2023   Intervention Reason Pain; Neuropathy; Pain 2 Pain; Pain 2; Pain 3; Neuropathy   Pain Location low back low back   Pre-session Pain Rating 3 3   Post-session Pain Rating 0 1   Pain 2 Location neck neck   Pre-session Pain 2 Rating 3 3   Post-session Pain 2 Rating 0 0   Pain 3 Location - (L) hip   Pre-session Pain 3 Rating - 3   Post-session Pain 3 Rating - 0   Neuropathy Location feet Feet   Pre-session Neuropathy rating 3 3   Post-session Neuropathy rating 1 2       TCM Diagnosis: Qi Stagnation;Blood Stagnation;Qi Deficiency;Blood Deficiency;Yin Deficiency;Kidney Qi Deficiency;Other Melvin Yang Channel obstruction    Treatment Principle: unblock channels, move qi and blood, nourish and stop pain    TCM / Acupuncture Treatment  Acupuncture Points:        Initial insertions: Thom @ C5, L 2, 3, 4, 5, Jaylan, (L) Brandy, (L) Joycelynflaco, (R) Manuela       Second insertions: Bl 23, 52, (L) Bl 31, 32, 54, (L) Cody esposito, Kid 1, 3, (R) Sp 6, Jes 3            Number of needles inserted: 30  Number of needles removed: 30    Accessory Techniques 5/30/2023 6/5/2023   Accessory Techniques TDP Heat Lamp TDP Heat Lamp   TDP Heat Lamp location used low back low back   Tuina location used - -   Topicals - -   Topicals location used - -         9/8/2022     2:00 PM   Oswestry Disability Index ([employer]   Ricky Harrison 1980, All rights reserved)   Section 1 - Pain intensity The pain is very mild at the moment.   Section 2 - Personal care (washing, dressing, etc.)  I can look after myself normally without causing additional pain.   Section 3 - Lifting Pain prevents me from lifting heavy weights off the floor but I can manage if they are conveniently positioned, e.g. on a table.   Section 4 - Walking Pain prevents me from walking more than a quarter of a mile.   Section 5 - Sitting I can sit in my favorite chair as long as I like.   Section 6 - Standing Pain prevents me from standing for more than 1 hour.   Section 7 - Sleeping My sleep is never interrupted by pain.   Section 8 - Sex life (if applicable) Not answered/NA   Section 9 - Social life My social life is normal and causes me no additional pain.   Section 10 - Traveling I can travel anywhere but it gives me additional pain.   Oswestry Score (%) 20       Assessment and Plan  Treatment Observations:    Acupuncture Treatment Recommendations:         It is my recommendation that this patient seek advice from their Primary Care Provider about active symptoms not addressed during this visit. The risks and benefits of acupuncture were reviewed and the patient stated understanding. The patient's questions were answered to their satisfaction. Consent was provided for treatment. We thank you for the referral and opportunity to treat  this patient.    Time Spent with Patient:   I spent a total of 30 minutes face-to-face with Antoni Stoll during today's office visit.     Nikolas Taveras

## 2023-06-08 NOTE — PROGRESS NOTES
ACUPUNCTURIST TREATMENT NOTE      Antoni Stoll, a 88 year old male, is here today for Follow - Up exam. Patient is referred by Juan Antonio. Treatment 12.    HPI  Main Complaint: Chronic pain of neck and low back, numbness of feet:    Patient reports over all he is doing well and improving. Neck pain has improved the most. Continues with more numbness in right foot vs left, but improving    Secondary Complaints: Left hip pain: improving    Past Medical History  Past Medical History Reviewed: Yes   has a past medical history of Anemia, Cancer (H), Carotid artery occlusion, Coronary artery disease, Glaucoma, High cholesterol, History of colon cancer (8/13/2018), Hyperlipidemia, Hypertension, Left inguinal hernia, Low back pain, Mild aortic insufficiency, Moderate tricuspid regurgitation, Postnasal drip, Sleep apnea, obstructive, Spinal stenosis, SSS (sick sinus syndrome) (H) (12/9/2019), Umbilical hernia, and Unspecified cataract.    Objective    TCM Exam Completed: Yes  Limbs/Back: Bilateral Lower Extremity, Lower Back and neck    Tongue/Pulse Exam Completed: No    Patient Assessment      Patient Type: Pain  Patient Complaint: Chronic pain of neck and low back, numbness of feet, acute pain of (L) hip    Acupuncture 6/7/2023 6/13/2023   Intervention Reason Pain; Pain 2; Pain 3; Neuropathy Pain; Pain 2; Pain 3; Neuropathy   Pain Location low back low back   Pre-session Pain Rating 3 3   Post-session Pain Rating 1 1   Pain 2 Location neck neck   Pre-session Pain 2 Rating 3 1   Post-session Pain 2 Rating 0 0   Pain 3 Location (L) hip (L) hip   Pre-session Pain 3 Rating 3 2   Post-session Pain 3 Rating 0 0   Neuropathy Location Feet feet   Pre-session Neuropathy rating 3 2   Post-session Neuropathy rating 2 1       TCM Diagnosis: Qi Stagnation;Blood Stagnation;Qi Deficiency;Blood Deficiency;Yin Deficiency;Kidney Qi Deficiency;Other Melvin Yang Channel obstruction    Treatment Principle: unblock channels, move qi and blood,  nourish and stop pain    TCM / Acupuncture Treatment  Acupuncture Points:       Initial insertions: Baihui, Gb20, HTJJ L2-L5, Ub23, Shiqizhuixia, Ub31       Second insertions: Yaoyan, Ub40, Ub57, Ub60, Ki3. Left: Gb29. Right: Ub62, Gb40, Ki1                    Accessory Techniques 6/5/2023 6/13/2023   Accessory Techniques TDP Heat Lamp TDP Heat Lamp   TDP Heat Lamp location used low back low back   Tuina location used - -   Topicals - -   Topicals location used - -         9/8/2022     2:00 PM   Oswestry Disability Index ([employer]   Ricky Harrison 1980, All rights reserved)   Section 1 - Pain intensity The pain is very mild at the moment.   Section 2 - Personal care (washing, dressing, etc.)  I can look after myself normally without causing additional pain.   Section 3 - Lifting Pain prevents me from lifting heavy weights off the floor but I can manage if they are conveniently positioned, e.g. on a table.   Section 4 - Walking Pain prevents me from walking more than a quarter of a mile.   Section 5 - Sitting I can sit in my favorite chair as long as I like.   Section 6 - Standing Pain prevents me from standing for more than 1 hour.   Section 7 - Sleeping My sleep is never interrupted by pain.   Section 8 - Sex life (if applicable) Not answered/NA   Section 9 - Social life My social life is normal and causes me no additional pain.   Section 10 - Traveling I can travel anywhere but it gives me additional pain.   Oswestry Score (%) 20       Assessment and Plan  Treatment Observations: Patient relaxed well during treatment  Acupuncture Treatment Recommendations: Acupuncture for diagnosis: M48.061 (ICD-10-CM) - Spinal stenosis of lumbar region, unspecified whether neurogenic claudication present. Patient to schedule treatment 1x/month for 8 additional visits.      This service is performed and billed incident to Dr. Ankit Rodas.      It is my recommendation that this patient seek advice from their Primary Care Provider  about active symptoms not addressed during this visit. The risks and benefits of acupuncture were reviewed and the patient stated understanding. The patient's questions were answered to their satisfaction. Consent was provided for treatment. We thank you for the referral and opportunity to treat this patient.    Time Spent with Patient:   I spent a total of 30 minutes face-to-face with Antoni Stoll during today's office visit.     Kamryn White L.Ac.  06/13/23

## 2023-06-13 ENCOUNTER — OFFICE VISIT (OUTPATIENT)
Dept: PALLIATIVE MEDICINE | Facility: OTHER | Age: 88
End: 2023-06-13
Payer: MEDICARE

## 2023-06-13 DIAGNOSIS — M48.061 SPINAL STENOSIS OF LUMBAR REGION, UNSPECIFIED WHETHER NEUROGENIC CLAUDICATION PRESENT: Primary | ICD-10-CM

## 2023-06-13 PROCEDURE — 97811 ACUP 1/> W/O ESTIM EA ADD 15: CPT | Performed by: ACUPUNCTURIST

## 2023-06-13 PROCEDURE — 97810 ACUP 1/> WO ESTIM 1ST 15 MIN: CPT | Performed by: ACUPUNCTURIST

## 2023-06-13 ASSESSMENT — PAIN SCALES - PAIN ENJOYMENT GENERAL ACTIVITY SCALE (PEG)
AVG_PAIN_PASTWEEK: 3
INTERFERED_ENJOYMENT_LIFE: 3
INTERFERED_GENERAL_ACTIVITY: 2
PEG_TOTALSCORE: 2.67

## 2023-06-20 ASSESSMENT — PAIN SCALES - PAIN ENJOYMENT GENERAL ACTIVITY SCALE (PEG)
INTERFERED_ENJOYMENT_LIFE: 0
INTERFERED_ENJOYMENT_LIFE: 0 - DOES NOT INTERFERE
AVG_PAIN_PASTWEEK: 3
PEG_TOTALSCORE: 1.33
INTERFERED_GENERAL_ACTIVITY: 1

## 2023-06-26 ENCOUNTER — OFFICE VISIT (OUTPATIENT)
Dept: PALLIATIVE MEDICINE | Facility: OTHER | Age: 88
End: 2023-06-26
Payer: MEDICARE

## 2023-06-26 DIAGNOSIS — M48.061 SPINAL STENOSIS OF LUMBAR REGION, UNSPECIFIED WHETHER NEUROGENIC CLAUDICATION PRESENT: Primary | ICD-10-CM

## 2023-06-26 PROCEDURE — 97811 ACUP 1/> W/O ESTIM EA ADD 15: CPT | Performed by: ACUPUNCTURIST

## 2023-06-26 PROCEDURE — 97810 ACUP 1/> WO ESTIM 1ST 15 MIN: CPT | Performed by: ACUPUNCTURIST

## 2023-06-26 NOTE — PROGRESS NOTES
ACUPUNCTURIST TREATMENT NOTE      Antoni Stoll, a 88 year old male, is here today for Follow - Up exam. Patient is referred by Juan Antonio.    HPI  Main Complaint: Chronic pain of neck and low back, numbness of feet, acute pain of (L) hip  Follow-up: Patient reports that he is benefiting with pain relief from the acupuncture.  He reports the improvement lasts for several days then slowly returns.  He reports that the pain and numbness at his (R) foot is worse compared to his (L) foot.  He denies any significant improvement with the numbness at his (R) foot.      Past Medical History  Past Medical History Reviewed: Yes   has a past medical history of Anemia, Cancer (H), Carotid artery occlusion, Coronary artery disease, Glaucoma, High cholesterol, History of colon cancer (8/13/2018), Hyperlipidemia, Hypertension, Left inguinal hernia, Low back pain, Mild aortic insufficiency, Moderate tricuspid regurgitation, Postnasal drip, Sleep apnea, obstructive, Spinal stenosis, SSS (sick sinus syndrome) (H) (12/9/2019), Umbilical hernia, and Unspecified cataract.    Objective    TCM Exam Completed: Yes  Limbs/Back: Right Lower Extremity, Lower Back and (L) hip and neck    Tongue/Pulse Exam Completed: No    Patient Assessment  Patient Type: Pain  Patient Complaint: Chronic pain of neck and low back, numbness of feet, acute pain of (L) hip    Acupuncture 6/13/2023 6/26/2023   Intervention Reason Pain; Pain 2; Pain 3; Neuropathy Pain; Pain 2; Pain 3; Neuropathy   Pain Location low back low back   Pre-session Pain Rating 3 3   Post-session Pain Rating 1 2   Pain 2 Location neck neck   Pre-session Pain 2 Rating 1 2   Post-session Pain 2 Rating 0 1   Pain 3 Location (L) hip (L) hip   Pre-session Pain 3 Rating 2 3   Post-session Pain 3 Rating 0 2   Neuropathy Location feet feet   Pre-session Neuropathy rating 2 2   Post-session Neuropathy rating 1 2       TCM Diagnosis: Qi Stagnation;Blood Stagnation;Qi Deficiency;Blood Deficiency;Yin  Deficiency;Kidney Qi Deficiency;Other Melvin Yang Channel obstruction    Treatment Principle: unblock channels, move qi and blood, nourish and stop pain    TCM / Acupuncture Treatment  Acupuncture Points:       Initial insertions: Du 20, GB 20, BL 10, Ewa Tajik, HTJJ L1-L5; BL (21, 22, 23, 24), BL 31, BL 31, Cody Rip       Second insertions: BL 57, GB 34, BL 60, BL 62, GB 41            Number of needles inserted: 41  Number of needles removed: 41    Accessory Techniques 6/13/2023 6/26/2023   Accessory Techniques TDP Heat Lamp TDP Heat Lamp   TDP Heat Lamp location used low back low back   Tuina location used - -   Topicals - -   Topicals location used - -         9/8/2022     2:00 PM   Oswestry Disability Index ([employer]   Ricky Harrison 1980, All rights reserved)   Section 1 - Pain intensity The pain is very mild at the moment.   Section 2 - Personal care (washing, dressing, etc.)  I can look after myself normally without causing additional pain.   Section 3 - Lifting Pain prevents me from lifting heavy weights off the floor but I can manage if they are conveniently positioned, e.g. on a table.   Section 4 - Walking Pain prevents me from walking more than a quarter of a mile.   Section 5 - Sitting I can sit in my favorite chair as long as I like.   Section 6 - Standing Pain prevents me from standing for more than 1 hour.   Section 7 - Sleeping My sleep is never interrupted by pain.   Section 8 - Sex life (if applicable) Not answered/NA   Section 9 - Social life My social life is normal and causes me no additional pain.   Section 10 - Traveling I can travel anywhere but it gives me additional pain.   Oswestry Score (%) 20       Assessment and Plan  Treatment Observations: Patient rested comfortably and fell asleep  Acupuncture Treatment Recommendations: Acupuncture for diagnosis: M48.061 (ICD-10-CM) - Spinal stenosis of lumbar region, unspecified whether neurogenic claudication present. Patient to schedule treatment 1x/month  for 7 additional visits.       It is my recommendation that this patient seek advice from their Primary Care Provider about active symptoms not addressed during this visit. The risks and benefits of acupuncture were reviewed and the patient stated understanding. The patient's questions were answered to their satisfaction. Consent was provided for treatment. We thank you for the referral and opportunity to treat this patient.    Time Spent with Patient:   I spent a total of 30 minutes face-to-face with Antoni Stoll during today's office visit.     Emma Neal L.Ac.

## 2023-06-28 ASSESSMENT — PAIN SCALES - PAIN ENJOYMENT GENERAL ACTIVITY SCALE (PEG)
PEG_TOTALSCORE: 1.33
AVG_PAIN_PASTWEEK: 3
INTERFERED_ENJOYMENT_LIFE: 0 - DOES NOT INTERFERE
INTERFERED_GENERAL_ACTIVITY: 1

## 2023-06-30 ENCOUNTER — TRANSFERRED RECORDS (OUTPATIENT)
Dept: HEALTH INFORMATION MANAGEMENT | Facility: CLINIC | Age: 88
End: 2023-06-30
Payer: MEDICARE

## 2023-07-03 ENCOUNTER — OFFICE VISIT (OUTPATIENT)
Dept: PALLIATIVE MEDICINE | Facility: OTHER | Age: 88
End: 2023-07-03
Payer: MEDICARE

## 2023-07-03 DIAGNOSIS — M48.061 SPINAL STENOSIS OF LUMBAR REGION, UNSPECIFIED WHETHER NEUROGENIC CLAUDICATION PRESENT: Primary | ICD-10-CM

## 2023-07-03 PROCEDURE — 97811 ACUP 1/> W/O ESTIM EA ADD 15: CPT | Performed by: ACUPUNCTURIST

## 2023-07-03 PROCEDURE — 97810 ACUP 1/> WO ESTIM 1ST 15 MIN: CPT | Performed by: ACUPUNCTURIST

## 2023-07-03 NOTE — PROGRESS NOTES
ACUPUNCTURIST TREATMENT NOTE      Antoni Stoll, a 88 year old male, is here today for Follow - Up exam. Patient is referred by Juan Antonio.    HPI  Main Complaint: Chronic pain of neck and low back, numbness of feet  Patient reports his low back pain and neck pain symptoms are improved.  He reports the neuropathy at his (R) foot has been worse last couple of days, he describes increased pins and needles discomfort at his feet up to his ankles, (R)>(L).      Past Medical History  Past Medical History Reviewed: Yes   has a past medical history of Anemia, Cancer (H), Carotid artery occlusion, Coronary artery disease, Glaucoma, High cholesterol, History of colon cancer (8/13/2018), Hyperlipidemia, Hypertension, Left inguinal hernia, Low back pain, Mild aortic insufficiency, Moderate tricuspid regurgitation, Postnasal drip, Sleep apnea, obstructive, Spinal stenosis, SSS (sick sinus syndrome) (H) (12/9/2019), Umbilical hernia, and Unspecified cataract.    Objective    TCM Exam Completed: Yes  Limbs/Back: Bilateral Lower Extremity, Lower Back and neck      Patient Assessment  Patient Type: Pain  Patient Complaint: Chronic pain of neck and low back, numbness of feet    Acupuncture 6/26/2023 7/3/2023   Intervention Reason Pain; Pain 2; Pain 3; Neuropathy Pain; Pain 2; Neuropathy   Pain Location low back low back   Pre-session Pain Rating 3 2   Post-session Pain Rating 2 0   Pain 2 Location neck neck   Pre-session Pain 2 Rating 2 1   Post-session Pain 2 Rating 1 0   Pain 3 Location (L) hip -   Pre-session Pain 3 Rating 3 -   Post-session Pain 3 Rating 2 -   Neuropathy Location feet feet   Pre-session Neuropathy rating 2 5   Post-session Neuropathy rating 2 1       TCM Diagnosis: Qi Stagnation;Blood Stagnation;Qi Deficiency;Blood Deficiency;Yin Deficiency;Kidney Qi Deficiency;Other Melvin Yang Channel obstruction    Treatment Principle: unblock channels, move qi and blood, nourish and stop pain    TCM / Acupuncture  Treatment  Acupuncture Points:       Initial insertions: Du 20, GB 20, BL 10, GB 21, HTJJ T12-L5, BL 23, BL 31, BL 32       Second insertions: BL 57, GB 34, BL 60, BL 62, GB 41, KI 1            Number of needles inserted: 37  Number of needles removed: 37    Accessory Techniques 6/26/2023 7/3/2023   Accessory Techniques TDP Heat Lamp TDP Heat Lamp   TDP Heat Lamp location used low back low back   Tuina location used - -   Topicals - -   Topicals location used - -         9/8/2022     2:00 PM   Oswestry Disability Index ([employer]   Ricky Harrison 1980, All rights reserved)   Section 1 - Pain intensity The pain is very mild at the moment.   Section 2 - Personal care (washing, dressing, etc.)  I can look after myself normally without causing additional pain.   Section 3 - Lifting Pain prevents me from lifting heavy weights off the floor but I can manage if they are conveniently positioned, e.g. on a table.   Section 4 - Walking Pain prevents me from walking more than a quarter of a mile.   Section 5 - Sitting I can sit in my favorite chair as long as I like.   Section 6 - Standing Pain prevents me from standing for more than 1 hour.   Section 7 - Sleeping My sleep is never interrupted by pain.   Section 8 - Sex life (if applicable) Not answered/NA   Section 9 - Social life My social life is normal and causes me no additional pain.   Section 10 - Traveling I can travel anywhere but it gives me additional pain.   Oswestry Score (%) 20       Assessment and Plan  Treatment Observations: Patient rested comfortably  Acupuncture Treatment Recommendations: Acupuncture for diagnosis: M48.061 (ICD-10-CM) - Spinal stenosis of lumbar region, unspecified whether neurogenic claudication present. Patient to schedule treatment 1x/month for 6 additional visits.   This service is performed and billed incident to Dr. Ankit Rodas M.D.       It is my recommendation that this patient seek advice from their Primary Care Provider about active  symptoms not addressed during this visit. The risks and benefits of acupuncture were reviewed and the patient stated understanding. The patient's questions were answered to their satisfaction. Consent was provided for treatment. We thank you for the referral and opportunity to treat this patient.    Time Spent with Patient:   I spent a total of 30 minutes face-to-face with Antoni Stoll during today's office visit.     Emma Neal L.Ac.

## 2023-07-10 ENCOUNTER — TELEPHONE (OUTPATIENT)
Dept: CARDIOLOGY | Facility: CLINIC | Age: 88
End: 2023-07-10
Payer: MEDICARE

## 2023-07-11 ENCOUNTER — TELEPHONE (OUTPATIENT)
Dept: DERMATOLOGY | Facility: CLINIC | Age: 88
End: 2023-07-11
Payer: MEDICARE

## 2023-07-11 NOTE — TELEPHONE ENCOUNTER
07/11 LVM and MyC for patient to [facility] [place] appt

## 2023-07-11 NOTE — LETTER
July 11, 2023    Antoni Stoll  [address] [address]      Dear Antoni      You are scheduled for Mohs Surgery on Tuesday, July 18 th at 7:30 am.     Please check in at [facility]nd floor [facility].     Be sure to eat a good breakfast and bathe and wash your hair prior to Surgery. Please bring  with you if this is above your neck    If you are taking any anti-coagulants that are prescribed by your Doctor (such as Coumadin/warfarin, Plavix, Aspirin, Ibuprofen), please continue taking them.     However, If you are taking anti-coagulants over the counter without  a Doctor's order for a Medical condition, please discontinue them 10 days prior to Surgery.      Please wear loose comfortable clothing as it could possibly be 4-6 hours until your surgery is completed depending upon how many layers of tissue need to be removed.     Thank you,    Jb Thomas MD

## 2023-07-11 NOTE — CONFIDENTIAL NOTE
Appointment scheduled for July 18th at 7:30 am- asked patient to call back to discuss   Thank you,    Nubia CORDOBARN BSN  [facility]- [phone]

## 2023-07-11 NOTE — CONFIDENTIAL NOTE
Called patient:  Patient notified and educated on test results   Mohs procedure explained- all questions answered  appointment scheduled- mohs packet mailed.    Thank you,    Nubia CORDOBARN BSN  [facility]  [phone]

## 2023-07-11 NOTE — TELEPHONE ENCOUNTER
[facility] Call Center    Phone Message    May a detailed message be left on voicemail: yes     Reason for Call: Other: Pt called regarding below. Please call him back to schedule. Thanks      Action Taken: Message routed to:  Other: [place] [facility]    Travel Screening: Not Applicable

## 2023-07-13 NOTE — TELEPHONE ENCOUNTER
07/13 [facility] for patient to sched [facility] visit (30 min)

## 2023-07-18 ENCOUNTER — OFFICE VISIT (OUTPATIENT)
Dept: DERMATOLOGY | Facility: CLINIC | Age: 88
End: 2023-07-18
Payer: MEDICARE

## 2023-07-18 VITALS — DIASTOLIC BLOOD PRESSURE: 81 MMHG | SYSTOLIC BLOOD PRESSURE: 178 MMHG | OXYGEN SATURATION: 97 % | HEART RATE: 68 BPM

## 2023-07-18 DIAGNOSIS — D03.4 MELANOMA IN SITU OF SCALP (H): ICD-10-CM

## 2023-07-18 DIAGNOSIS — L82.1 SEBORRHEIC KERATOSES: ICD-10-CM

## 2023-07-18 DIAGNOSIS — D18.01 ANGIOMA OF SKIN: ICD-10-CM

## 2023-07-18 DIAGNOSIS — Z85.828 HISTORY OF SKIN CANCER: Primary | ICD-10-CM

## 2023-07-18 DIAGNOSIS — D23.9 DERMAL NEVUS: ICD-10-CM

## 2023-07-18 DIAGNOSIS — L81.4 LENTIGO: ICD-10-CM

## 2023-07-18 PROCEDURE — 88341 IMHCHEM/IMCYTCHM EA ADD ANTB: CPT | Performed by: DERMATOLOGY

## 2023-07-18 PROCEDURE — 17311 MOHS 1 STAGE H/N/HF/G: CPT | Performed by: DERMATOLOGY

## 2023-07-18 PROCEDURE — 99203 OFFICE O/P NEW LOW 30 MIN: CPT | Mod: 25 | Performed by: DERMATOLOGY

## 2023-07-18 PROCEDURE — 88342 IMHCHEM/IMCYTCHM 1ST ANTB: CPT | Mod: 59 | Performed by: DERMATOLOGY

## 2023-07-18 ASSESSMENT — PAIN SCALES - GENERAL: PAINLEVEL: NO PAIN (0)

## 2023-07-18 NOTE — LETTER
7/18/2023         RE: Antoni Stoll  [address]  [place] [address]        Dear Colleague,    Thank you for referring your patient, Antoni Stoll, to the [facility]. Please see a copy of my visit note below.    Surgical Office Location :   [facility]  [address] [place] [address]      Antoni Stoll , a 88 year old year old male patient, I was asked to see by Dr. 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  Patient has no other skin complaints today.  Remainder of the HPI, Meds, PMH, Allergies, FH, and SH was reviewed in chart.      Past Medical History:   Diagnosis Date     Anemia      Basal cell carcinoma      Cancer (H)     colon     Carotid artery occlusion      Coronary artery disease      Glaucoma      High cholesterol      History of colon cancer 08/13/2018     Hyperlipidemia      Hypertension      Left inguinal hernia      Low back pain      Malignant melanoma (H)      Mild aortic insufficiency      Moderate tricuspid regurgitation      Postnasal drip      Sleep apnea, obstructive     CPAP     Spinal stenosis     lumbar     SSS (sick sinus syndrome) (H) 12/09/2019     Umbilical hernia      Unspecified cataract     Created by Conversion        Past Surgical History:   Procedure Laterality Date     ARTERIAL BYPASS SURGERY       BLEPHAROPLASTY Bilateral      BYPASS GRAFT ARTERY CORONARY  06/28/2013    5-vessel     BYPASS GRAFT ARTERY CORONARY  2013    5 vessel     CARDIAC CATHETERIZATION  06/03/2013     CATARACT EXTRACTION Bilateral      COLON SURGERY Right      HERNIA REPAIR      right inguinal     HERNIORRHAPHY INGUINAL Left 2/14/2022    Procedure: INCARCERATED LEFT INGUINAL HERNIA REPAIR WITH MESH;  Surgeon: Jack Naqvi MD;  Location: [facility] OR     [facility]         Family History   Problem Relation Age of Onset     Coronary Artery Disease Mother      Colon Cancer Father      Coronary Artery Disease Father        Social History      Socioeconomic History     Marital status: Single     Spouse name: Not on file     Number of children: Not on file     Years of education: Not on file     Highest education level: Not on file   Occupational History     Not on file   Tobacco Use     Smoking status: Never     Smokeless tobacco: Never   Vaping Use     Vaping Use: Never used   Substance and Sexual Activity     Alcohol use: Yes     Comment: Alcoholic Drinks/day: 5 drinks weekly     Drug use: No     Sexual activity: Not on file   Other Topics Concern     Parent/sibling w/ CABG, MI or angioplasty before 65F 55M? Not Asked   Social History Narrative     Not on file     Social Determinants of Health     Financial Resource Strain: Not on file   Food Insecurity: Not on file   Transportation Needs: Not on file   Physical Activity: Not on file   Stress: Not on file   Social Connections: Not on file   Intimate Partner Violence: Not on file   Housing Stability: Not on file       Outpatient Encounter Medications as of 7/18/2023   Medication Sig Dispense Refill     acetaminophen (TYLENOL) 500 MG tablet [ACETAMINOPHEN (TYLENOL) 500 MG TABLET] Take 1,000 mg by mouth daily .             albuterol (PROAIR HFA/PROVENTIL HFA/VENTOLIN HFA) 108 (90 Base) MCG/ACT inhaler Inhale 2 puffs into the lungs every 6 hours as needed for shortness of breath, wheezing or cough 18 g 11     aspirin 325 MG tablet [ASPIRIN 325 MG TABLET] Take 325 mg by mouth daily.       atorvastatin (LIPITOR) 20 MG tablet Take 1 tablet (20 mg) by mouth daily 90 tablet 3     azelastine (ASTELIN) 0.1 % nasal spray 2 sprays each nostril 1-2x daily as needed for nasal congestion 30 mL 11     azelastine (ASTELIN) 0.1 % nasal spray        B2/VITS A,C,E/LUT/ZEAXANTH/MIN (ICAPS ORAL) [B2/VITS A,C,E/LUT/ZEAXANTH/MIN (ICAPS ORAL)] Take 1 capsule by mouth daily.       calcium polycarbophil (FIBERCON) 625 mg tablet [CALCIUM POLYCARBOPHIL (FIBERCON) 625 MG TABLET] Take 1,250 mg by mouth daily.        cholecalciferol, vitamin D3, 1,000 unit tablet [CHOLECALCIFEROL, VITAMIN D3, 1,000 UNIT TABLET] Take 1,000 Units by mouth daily.       fluconazole (DIFLUCAN) 150 MG tablet TAKE 1 TABLET BY MOUTH ONCE WEEKLY FOR 4 WEEKS FOR NAIL FUNGUS       fluticasone (FLONASE) 50 MCG/ACT nasal spray        glucosamine-chondroitin 500-400 mg cap [GLUCOSAMINE-CHONDROITIN 500-400 MG CAP] Take 1 capsule by mouth 2 (two) times a day.       latanoprost (XALATAN) 0.005 % ophthalmic solution Place 1 drop into both eyes At Bedtime       lisinopril (ZESTRIL) 2.5 MG tablet [LISINOPRIL (PRINIVIL,ZESTRIL) 2.5 MG TABLET] TAKE 1 TABLET BY MOUTH EVERY DAY 90 tablet 3     loratadine 10 MG capsule Take 10 mg by mouth daily       nystatin (MYCOSTATIN) 828098 UNIT/GM external powder Apply to affected areas twice a day if flare of rash 60 g 3     pregabalin (LYRICA) 25 MG capsule TAKE 1 CAPSULE(25 MG) BY MOUTH TWICE DAILY 60 capsule 1     saw palmetto fruit 450 mg cap [SAW PALMETTO FRUIT 450 MG CAP] Take 1 capsule by mouth 2 (two) times a day.       tamsulosin (FLOMAX) 0.4 mg Cp24 [TAMSULOSIN (FLOMAX) 0.4 MG CP24] Take 0.4 mg by mouth at bedtime.       No facility-administered encounter medications on file as of 7/18/2023.             Review Of Systems  Skin: As above  Eyes: negative  Ears/Nose/Throat: negative  Respiratory: No shortness of breath, dyspnea on exertion, cough, or hemoptysis  Cardiovascular: negative  Gastrointestinal: negative  Genitourinary: negative  Musculoskeletal: negative  Neurologic: negative  Psychiatric: negative  Hematologic/Lymphatic/Immunologic: negative  Endocrine: negative      O:   NAD, WDWN, Alert & Oriented, Mood & Affect wnl, Vitals stable   Here today alone   BP (!) 178/81   Pulse 68   SpO2 97%    General appearance antonio ii   Vitals stable   Alert, oriented and in no acute distress      Following lymph nodes palpated: Occipital, Cervical, Supraclavicular no lad  L vertex scalp 2cm red brown patch    Stuck on  papules and brown macules on trunk and ext    Red papules on trunk   Flesh colored papules on trunk          Eyes: Conjunctivae/lids:Normal     ENT: Lips, buccal mucosa, tongue: normal    MSK:Normal    Cardiovascular: peripheral edema none    Pulm: Breathing Normal    Lymph Nodes: No Head and Neck Lymphadenopathy     Neuro/Psych: Orientation:Normal; Mood/Affect:Normal      A/P:  1. Seborrheic keratosis, lentigo, angioma, dermal nevus, hx of non-melanoma skin cancer   2. Melanoma in situ scalp  MELANOMA DISCUSSED WITH PATIENT:  I discussed the specifics of tumor, prognosis, metachronous melanoma, self exam, and genetics with the patient. I explained the need for monthly skin exams including and taught the patient how to do this. Patient was asked about new or changing moles . I discussed with patient signs and symptoms that could arise in the setting of recurrent locoregional or metastatic disease. In addition, the need to undergo every 6month dermatologic full skin survey and evaluation given that patients with a diagnosis of melanoma are at risk of recurrence (local and distant) and of subsequent de jenna melanoma.    It was a pleasure speaking to Antoni Stoll today.  Previous clinic  notes and pertinent laboratory tests were reviewed prior to Antoni Stoll's visit.  Signs and Symptoms of skin cancer discussed with patient.  Patient encouraged to perform monthly skin exams.  UV precautions reviewed with patient.  Risks of non-melanoma skin cancer discussed with patient   Return to clinic 1 month  PROCEDURE NOTE  L vertex scalp melanoma in situ   MELANOMA DISCUSSED WITH PATIENT:  I discussed the specifics of tumor, prognosis, metachronous melanoma, self exam, and genetics with the patient. I explained the need for monthly skin exams including and taught the patient how to do this. Patient was asked about new or changing moles . I discussed with patient signs and symptoms that could arise in the setting of  recurrent locoregional or metastatic disease. In addition, the need to undergo every 6 month dermatologic full skin survey and evaluation given that patients with a diagnosis of melanoma are at risk of recurrence (local and distant) and of subsequent de jenna melanoma.  . I reviewed treatment options, including a discussion of wide excision (the gold standard) versus Mohs surgery with MART-1 immunostains.     Note: MART-1 (Melanoma Antigen Recognized by T-cells) antibody immunostaining was used during Mohs surgery as per standard protocol, in addition to routine processing of all specimens with hematoxylin and eosin. The peripheral margins/edges were processed with the MART-1 stain (2 specimens total). The center was examined with hematoxylin & eosin and MART-1 immunostains. The patient was informed of the procedure and its risk/benefits during the consent for the procedure.    One or more of the reagents used in immunohistochemical testing in this case may not have been cleared or approved by the U.S. Food and Drug Administration (FDA). The FDA has determined that such clearance or approval is not necessary. These tests are used for clinical purposes. They should not be regarded as investigational or for research. These reagents  performance characteristics have been determined by [facility]. This laboratory is certified under the Clinical Laboratory Improvement Amendments of 1988 (CLIA-88) as qualified to perform high complexity clinical laboratory testing.      MOHS:   Aggressive histology    The rationale for Mohs surgery was discussed with the patient and consent was obtained.  The risks and benefits as well as alternatives to therapy were discussed, in detail.  Specifically, the risks of infection, scarring, bleeding, prolonged wound healing, incomplete removal, allergy to anesthesia, nerve injury and recurrence were addressed.  Indication for Mohs was Aggressive histology. Prior to the  procedure, the treatment site was clearly identified and, if available, confirmed with previous photos and confirmed by the patient   All components of the Universal Protocol/PAUSE rule were completed.  The Mohs surgeon operated in two distinct and integrated capacities as the surgeon and pathologist.      The area was prepped with Betasept.  A rim of normal appearing skin was marked circumferentially around the lesion.  The area was infiltrated with local anesthesia.  The tumor was first debulked to remove all clinically apparent tumor.  An incision following the standard Mohs approach was done and the specimen was oriented,mapped and placed in 2 block(s).  Each specimen was then chromacoded and processed in the Mohs laboratory using standard Mohs technique and submitted for frozen section histology.  Frozen section analysis showed no residual tumor but CLEAR MARGINS.      The tumor was excised using standard Mohs technique in 1 stages(s).  MART 1 stains were performed on 2 specimens. CLEAR MARGINS OBTAINED and Final defect size was 3.1 cm.     We discussed the options for wound management in full with the patient including risks/benefits/ possible outcomes.      REPAIR SECOND INTENT: We discussed the options for wound management in full with the patient including risks/benefits/possible outcomes. Decision made to allow the wound to heal by second intention. Cautery was used for for hemostasis. EBL minimal; complications none; wound care routine.  The patient was discharged in good condition and will return in one month or prn for wound evaluation.        Again, thank you for allowing me to participate in the care of your patient.        Sincerely,        Jb Thomas MD

## 2023-07-18 NOTE — PROGRESS NOTES
Antoni Stoll , a 88 year old year old male patient, I was asked to see by Dr. 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  Patient has no other skin complaints today.  Remainder of the HPI, Meds, PMH, Allergies, FH, and SH was reviewed in chart.      Past Medical History:   Diagnosis Date    Anemia     Basal cell carcinoma     Cancer (H)     colon    Carotid artery occlusion     Coronary artery disease     Glaucoma     High cholesterol     History of colon cancer 08/13/2018    Hyperlipidemia     Hypertension     Left inguinal hernia     Low back pain     Malignant melanoma (H)     Mild aortic insufficiency     Moderate tricuspid regurgitation     Postnasal drip     Sleep apnea, obstructive     CPAP    Spinal stenosis     lumbar    SSS (sick sinus syndrome) (H) 12/09/2019    Umbilical hernia     Unspecified cataract     Created by Conversion        Past Surgical History:   Procedure Laterality Date    ARTERIAL BYPASS SURGERY      BLEPHAROPLASTY Bilateral     BYPASS GRAFT ARTERY CORONARY  06/28/2013    5-vessel    BYPASS GRAFT ARTERY CORONARY  2013    5 vessel    CARDIAC CATHETERIZATION  06/03/2013    CATARACT EXTRACTION Bilateral     COLON SURGERY Right     HERNIA REPAIR      right inguinal    HERNIORRHAPHY INGUINAL Left 2/14/2022    Procedure: INCARCERATED LEFT INGUINAL HERNIA REPAIR WITH MESH;  Surgeon: Jack Naqvi MD;  Location: [facility] OR    [facility] Left         Family History   Problem Relation Age of Onset    Coronary Artery Disease Mother     Colon Cancer Father     Coronary Artery Disease Father        Social History     Socioeconomic History    Marital status: Single     Spouse name: Not on file    Number of children: Not on file    Years of education: Not on file    Highest education level: Not on file   Occupational History    Not on file   Tobacco Use    Smoking status: Never    Smokeless tobacco: Never   Vaping Use    Vaping Use: Never used   Substance and Sexual Activity    Alcohol use: Yes      Comment: Alcoholic Drinks/day: 5 drinks weekly    Drug use: No    Sexual activity: Not on file   Other Topics Concern    Parent/sibling w/ CABG, MI or angioplasty before 65F 55M? Not Asked   Social History Narrative    Not on file     Social Determinants of Health     Financial Resource Strain: Not on file   Food Insecurity: Not on file   Transportation Needs: Not on file   Physical Activity: Not on file   Stress: Not on file   Social Connections: Not on file   Intimate Partner Violence: Not on file   Housing Stability: Not on file       Outpatient Encounter Medications as of 7/18/2023   Medication Sig Dispense Refill    acetaminophen (TYLENOL) 500 MG tablet [ACETAMINOPHEN (TYLENOL) 500 MG TABLET] Take 1,000 mg by mouth daily .            albuterol (PROAIR HFA/PROVENTIL HFA/VENTOLIN HFA) 108 (90 Base) MCG/ACT inhaler Inhale 2 puffs into the lungs every 6 hours as needed for shortness of breath, wheezing or cough 18 g 11    aspirin 325 MG tablet [ASPIRIN 325 MG TABLET] Take 325 mg by mouth daily.      atorvastatin (LIPITOR) 20 MG tablet Take 1 tablet (20 mg) by mouth daily 90 tablet 3    azelastine (ASTELIN) 0.1 % nasal spray 2 sprays each nostril 1-2x daily as needed for nasal congestion 30 mL 11    azelastine (ASTELIN) 0.1 % nasal spray       B2/VITS A,C,E/LUT/ZEAXANTH/MIN (ICAPS ORAL) [B2/VITS A,C,E/LUT/ZEAXANTH/MIN (ICAPS ORAL)] Take 1 capsule by mouth daily.      calcium polycarbophil (FIBERCON) 625 mg tablet [CALCIUM POLYCARBOPHIL (FIBERCON) 625 MG TABLET] Take 1,250 mg by mouth daily.      cholecalciferol, vitamin D3, 1,000 unit tablet [CHOLECALCIFEROL, VITAMIN D3, 1,000 UNIT TABLET] Take 1,000 Units by mouth daily.      fluconazole (DIFLUCAN) 150 MG tablet TAKE 1 TABLET BY MOUTH ONCE WEEKLY FOR 4 WEEKS FOR NAIL FUNGUS      fluticasone (FLONASE) 50 MCG/ACT nasal spray       glucosamine-chondroitin 500-400 mg cap [GLUCOSAMINE-CHONDROITIN 500-400 MG CAP] Take 1 capsule by mouth 2 (two) times a day.       latanoprost (XALATAN) 0.005 % ophthalmic solution Place 1 drop into both eyes At Bedtime      lisinopril (ZESTRIL) 2.5 MG tablet [LISINOPRIL (PRINIVIL,ZESTRIL) 2.5 MG TABLET] TAKE 1 TABLET BY MOUTH EVERY DAY 90 tablet 3    loratadine 10 MG capsule Take 10 mg by mouth daily      nystatin (MYCOSTATIN) 048153 UNIT/GM external powder Apply to affected areas twice a day if flare of rash 60 g 3    pregabalin (LYRICA) 25 MG capsule TAKE 1 CAPSULE(25 MG) BY MOUTH TWICE DAILY 60 capsule 1    saw palmetto fruit 450 mg cap [SAW PALMETTO FRUIT 450 MG CAP] Take 1 capsule by mouth 2 (two) times a day.      tamsulosin (FLOMAX) 0.4 mg Cp24 [TAMSULOSIN (FLOMAX) 0.4 MG CP24] Take 0.4 mg by mouth at bedtime.       No facility-administered encounter medications on file as of 7/18/2023.             Review Of Systems  Skin: As above  Eyes: negative  Ears/Nose/Throat: negative  Respiratory: No shortness of breath, dyspnea on exertion, cough, or hemoptysis  Cardiovascular: negative  Gastrointestinal: negative  Genitourinary: negative  Musculoskeletal: negative  Neurologic: negative  Psychiatric: negative  Hematologic/Lymphatic/Immunologic: negative  Endocrine: negative      O:   NAD, WDWN, Alert & Oriented, Mood & Affect wnl, Vitals stable   Here today alone   BP (!) 178/81   Pulse 68   SpO2 97%    General appearance antonio ii   Vitals stable   Alert, oriented and in no acute distress      Following lymph nodes palpated: Occipital, Cervical, Supraclavicular no lad  L vertex scalp 2cm red brown patch    Stuck on papules and brown macules on trunk and ext    Red papules on trunk   Flesh colored papules on trunk          Eyes: Conjunctivae/lids:Normal     ENT: Lips, buccal mucosa, tongue: normal    MSK:Normal    Cardiovascular: peripheral edema none    Pulm: Breathing Normal    Lymph Nodes: No Head and Neck Lymphadenopathy     Neuro/Psych: Orientation:Normal; Mood/Affect:Normal      A/P:  1. Seborrheic keratosis, lentigo, angioma, dermal  nevus, hx of non-melanoma skin cancer   2. Melanoma in situ scalp  MELANOMA DISCUSSED WITH PATIENT:  I discussed the specifics of tumor, prognosis, metachronous melanoma, self exam, and genetics with the patient. I explained the need for monthly skin exams including and taught the patient how to do this. Patient was asked about new or changing moles . I discussed with patient signs and symptoms that could arise in the setting of recurrent locoregional or metastatic disease. In addition, the need to undergo every 6month dermatologic full skin survey and evaluation given that patients with a diagnosis of melanoma are at risk of recurrence (local and distant) and of subsequent de jenna melanoma.    It was a pleasure speaking to Antoni Stoll today.  Previous clinic  notes and pertinent laboratory tests were reviewed prior to Antoni Stoll's visit.  Signs and Symptoms of skin cancer discussed with patient.  Patient encouraged to perform monthly skin exams.  UV precautions reviewed with patient.  Risks of non-melanoma skin cancer discussed with patient   Return to clinic 1 month  PROCEDURE NOTE  L vertex scalp melanoma in situ   MELANOMA DISCUSSED WITH PATIENT:  I discussed the specifics of tumor, prognosis, metachronous melanoma, self exam, and genetics with the patient. I explained the need for monthly skin exams including and taught the patient how to do this. Patient was asked about new or changing moles . I discussed with patient signs and symptoms that could arise in the setting of recurrent locoregional or metastatic disease. In addition, the need to undergo every 6 month dermatologic full skin survey and evaluation given that patients with a diagnosis of melanoma are at risk of recurrence (local and distant) and of subsequent de jenna melanoma.  . I reviewed treatment options, including a discussion of wide excision (the gold standard) versus Mohs surgery with MART-1 immunostains.     Note: MART-1 (Melanoma  Antigen Recognized by T-cells) antibody immunostaining was used during Mohs surgery as per standard protocol, in addition to routine processing of all specimens with hematoxylin and eosin. The peripheral margins/edges were processed with the MART-1 stain (2 specimens total). The center was examined with hematoxylin & eosin and MART-1 immunostains. The patient was informed of the procedure and its risk/benefits during the consent for the procedure.    One or more of the reagents used in immunohistochemical testing in this case may not have been cleared or approved by the U.S. Food and Drug Administration (FDA). The FDA has determined that such clearance or approval is not necessary. These tests are used for clinical purposes. They should not be regarded as investigational or for research. These reagents  performance characteristics have been determined by [facility]. This laboratory is certified under the Clinical Laboratory Improvement Amendments of 1988 (CLIA-88) as qualified to perform high complexity clinical laboratory testing.      MOHS:   Aggressive histology    The rationale for Mohs surgery was discussed with the patient and consent was obtained.  The risks and benefits as well as alternatives to therapy were discussed, in detail.  Specifically, the risks of infection, scarring, bleeding, prolonged wound healing, incomplete removal, allergy to anesthesia, nerve injury and recurrence were addressed.  Indication for Mohs was Aggressive histology. Prior to the procedure, the treatment site was clearly identified and, if available, confirmed with previous photos and confirmed by the patient   All components of the Universal Protocol/PAUSE rule were completed.  The Mohs surgeon operated in two distinct and integrated capacities as the surgeon and pathologist.      The area was prepped with Betasept.  A rim of normal appearing skin was marked circumferentially around the lesion.  The area was  infiltrated with local anesthesia.  The tumor was first debulked to remove all clinically apparent tumor.  An incision following the standard Mohs approach was done and the specimen was oriented,mapped and placed in 2 block(s).  Each specimen was then chromacoded and processed in the Mohs laboratory using standard Mohs technique and submitted for frozen section histology.  Frozen section analysis showed no residual tumor but CLEAR MARGINS.      The tumor was excised using standard Mohs technique in 1 stages(s).  MART 1 stains were performed on 2 specimens. CLEAR MARGINS OBTAINED and Final defect size was 3.1 cm.     We discussed the options for wound management in full with the patient including risks/benefits/ possible outcomes.      REPAIR SECOND INTENT: We discussed the options for wound management in full with the patient including risks/benefits/possible outcomes. Decision made to allow the wound to heal by second intention. Cautery was used for for hemostasis. EBL minimal; complications none; wound care routine.  The patient was discharged in good condition and will return in one month or prn for wound evaluation.

## 2023-07-18 NOTE — PATIENT INSTRUCTIONS
Open Wound Care     for Scalp        No strenuous activity for 48 hours    Take Tylenol as needed for discomfort.                                                .         Do not drink alcoholic beverages for 48 hours.    Keep the pressure bandage in place for 24 hours. If the bandage becomes blood tinged or loose, reinforce it with gauze and tape.        (Refer to the reverse side of this page for management of bleeding).    Remove bandage in 24 hours and begin wound care as follows:     Clean area with tap water using a Q tip or gauze pad, (shower / bathe normally)  Dry wound with Q tip or gauze pad  Apply Aquaphor, Vaseline, Polysporin or Bacitracin Ointment with a Q tip  Do NOT use Neosporin Ointment *  Cover the wound with a band-aid or nonstick gauze pad and paper tape.  Repeat wound care once a day until wound is completely healed.    It is an old wives tale that a wound heals better when it is exposed to air and allowed to dry out. The wound will heal faster with a better cosmetic result if it is kept moist with ointment and covered with a bandage.  Do not let the wound dry out.      Supplies Needed:                Qtips or gauze pads                Polysporin or Bacitracin Ointment                Bandaids or nonstick gauze pads and paper tape    Wound care kits and brown paper tape are available for purchase at   the pharmacy.    BLEEDING:    Use tightly rolled up gauze or cloth to apply direct pressure over the bandage for 20   minutes.  Reapply pressure for an additional 20 minutes if necessary  Call the office or go to the nearest emergency room if pressure fails to stop the bleeding.  Use additional gauze and tape to maintain pressure once the bleeding has stopped.  Begin wound care 24 hours after surgery as directed.                  WOUND HEALING    One week after surgery a pink / red halo will form around the outside of the wound.   This is new skin.  The center of the wound will appear yellowish  white and produce some drainage.  The pink halo will slowly migrate in toward the center of the wound until the wound is covered with new shiny pink skin.  There will be no more drainage when the wound is completely healed.  It will take six months to one year for the redness to fade.  The scar may be itchy, tight and sensitive to extreme temperatures for a year after the surgery.  Massaging the area several times a day for several minutes after the wound is completely healed will help the scar soften and normalize faster. Begin massage only after healing is complete.      In case of emergency call: Dr Thomas: [phone]    [facility]: [phone]    [facility]:[phone]

## 2023-07-25 ENCOUNTER — OFFICE VISIT (OUTPATIENT)
Dept: INTERNAL MEDICINE | Facility: CLINIC | Age: 88
End: 2023-07-25
Payer: MEDICARE

## 2023-07-25 VITALS
OXYGEN SATURATION: 96 % | HEIGHT: 63 IN | HEART RATE: 66 BPM | TEMPERATURE: 98.3 F | BODY MASS INDEX: 23.67 KG/M2 | DIASTOLIC BLOOD PRESSURE: 68 MMHG | WEIGHT: 133.6 LBS | RESPIRATION RATE: 19 BRPM | SYSTOLIC BLOOD PRESSURE: 145 MMHG

## 2023-07-25 DIAGNOSIS — I10 ESSENTIAL HYPERTENSION: ICD-10-CM

## 2023-07-25 DIAGNOSIS — I25.84 CORONARY ARTERY DISEASE DUE TO CALCIFIED CORONARY LESION: Primary | ICD-10-CM

## 2023-07-25 DIAGNOSIS — Z86.0100 HISTORY OF COLONIC POLYPS: ICD-10-CM

## 2023-07-25 DIAGNOSIS — M48.061 SPINAL STENOSIS, LUMBAR REGION, WITHOUT NEUROGENIC CLAUDICATION: ICD-10-CM

## 2023-07-25 DIAGNOSIS — Z86.006 HISTORY OF MELANOMA IN SITU: ICD-10-CM

## 2023-07-25 DIAGNOSIS — I25.10 CORONARY ARTERY DISEASE DUE TO CALCIFIED CORONARY LESION: Primary | ICD-10-CM

## 2023-07-25 DIAGNOSIS — Z23 NEED FOR COVID-19 VACCINE: ICD-10-CM

## 2023-07-25 DIAGNOSIS — G47.33 OBSTRUCTIVE SLEEP APNEA ON CPAP: ICD-10-CM

## 2023-07-25 DIAGNOSIS — M41.9 KYPHOSCOLIOSIS: ICD-10-CM

## 2023-07-25 PROCEDURE — 91312 COVID-19 BIVALENT 12+ (PFIZER): CPT | Performed by: INTERNAL MEDICINE

## 2023-07-25 PROCEDURE — 0121A COVID-19 BIVALENT 12+ (PFIZER): CPT | Performed by: INTERNAL MEDICINE

## 2023-07-25 PROCEDURE — 99214 OFFICE O/P EST MOD 30 MIN: CPT | Mod: 25 | Performed by: INTERNAL MEDICINE

## 2023-07-25 NOTE — PATIENT INSTRUCTIONS
Get a flu shot this fall.    I would not strongly recommend the Shingrix shingles vaccine, but that can be obtained at local pharmacy if you wish to proceed with that vaccine series.    Continue to do your daily back exercises.    Follow-up with pulmonary medicine later this year as planned.    Contact me if blood pressure is running above 140/85 more than occasionally, or if you have increasing lightheaded dizzy spells with lower blood pressures.    Otherwise I will see you after January 25 for your yearly physical.    You have been referred to [facility] for colonoscopy that is due after September.  Gastroenterology may wish you to have the colonoscopy in the hospital, has a colonoscopy at age above 80 would be higher risk.  You may need a preop before proceeding with colonoscopy.

## 2023-07-25 NOTE — PROGRESS NOTES
Antoni Stoll   88 year old male    Date of Visit: 7/25/2023    Chief Complaint   Patient presents with    Hypertension    Immunization     Shingles vaccine      Weight Problem     Has lost weight but still has fat on his belly - loses the weight in his arms, hips and legs    Derm Problem     Had MOHS procedure for melanoma on head    Breathing Problem     Saw the pulmonologist in May - would like to inform you as to how that appt went     Subjective  88-year-old retired  is here for routine follow-up of hypertension and coronary disease.    He has severe kyphoscoliosis with restrictive lung physiology.  Obstructive sleep apnea with CPAP but highly compliant.  He has had stable dyspnea on exertion.  February 2023 heart echo without pulmonary hypertension.  Ejection fraction 60-65% with mild AI and mild MR.    Patient plans to follow-up with the pulmonary clinic with a chest x-ray later this year.    He does not have cough or bronchitis symptoms.  He is never smoked.    No lower extremity edema.  No chest pain or chest pressure.    Coronary artery disease with heart bypass in 2013 but has never had a heart attack.  Continues on full dose aspirin and atorvastatin 20 mg.  No abdominal pain complaints.  No new myalgias.    He has severe L3-5 lumbar spinal stenosis seen on MRI in 2021.  Not a surgical candidate.  He continues to do the treadmill 5 days a week, stretching exercises and yoga.  Has been doing acupuncture with some benefit recently.    BPH stable on Flomax.    History of colon polyps but September 2020 colonoscopy was negative.  His bowels are regular no blood in stool.  He still wishes to do his 3-year colonoscopy follow-up which is due in September.    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  That is healing.    His blood pressure at home has been running on the low side in the 100/60-1 10/70 range.  He denies significant  worsening orthostasis but occasionally gets mild lightheadedness.  No falls.    Blood pressure was high at [facility] surgery.  It was borderline high here initially but was 145/68 on my recheck.    He is on lisinopril 2.5 mg a day.  Normal kidney labs in January.    PMHx:    Past Medical History:   Diagnosis Date    Anemia     Basal cell carcinoma     Cancer (H)     colon    Carotid artery occlusion     Coronary artery disease     Glaucoma     High cholesterol     History of colon cancer 08/13/2018    Hyperlipidemia     Hypertension     Left inguinal hernia     Low back pain     Malignant melanoma (H)     Mild aortic insufficiency     Moderate tricuspid regurgitation     Postnasal drip     Sleep apnea, obstructive     CPAP    Spinal stenosis     lumbar    SSS (sick sinus syndrome) (H) 12/09/2019    Umbilical hernia     Unspecified cataract     Created by Conversion      PSHx:    Past Surgical History:   Procedure Laterality Date    ARTERIAL BYPASS SURGERY      BLEPHAROPLASTY Bilateral     BYPASS GRAFT ARTERY CORONARY  06/28/2013    5-vessel    BYPASS GRAFT ARTERY CORONARY  2013    5 vessel    CARDIAC CATHETERIZATION  06/03/2013    CATARACT EXTRACTION Bilateral     COLON SURGERY Right     HERNIA REPAIR      right inguinal    HERNIORRHAPHY INGUINAL Left 2/14/2022    Procedure: INCARCERATED LEFT INGUINAL HERNIA REPAIR WITH MESH;  Surgeon: Jack Naqvi MD;  [place] [facility] [place]    STRIP VEIN Left      Immunizations:   Immunization History   Administered Date(s) Administered    COVID-19 Bivalent 12+ (Pfizer) 07/25/2023    COVID-19 MONOVALENT 12+ (Pfizer) 04/16/2021, 05/18/2021, 01/11/2022    COVID-19 Monovalent 12+ (Pfizer 2022) 07/25/2022    FLU 6-35 months 10/11/2010    FLUAD(HD)65+ QUAD 11/05/2021    Flu, Unspecified 10/23/2007, 10/29/2009, 10/11/2010, 11/08/2011, 11/05/2021    Influenza (H1N1) 01/18/2010    Influenza (High Dose) 3 valent vaccine 10/13/2015, 10/18/2016, 11/01/2017, 12/10/2018, 12/11/2019     "Influenza (IIV3) PF 10/23/2007, 10/29/2009, 11/08/2011, 11/29/2012, 10/25/2013, 11/19/2014    Influenza Vaccine 65+ (Fluzone HD) 11/30/2020, 01/25/2023    Influenza Vaccine, 6+MO IM (QUADRIVALENT W/PRESERVATIVES) 11/29/2012, 10/25/2013, 11/19/2014    Pneumo Conj 13-V (2010&after) 04/09/2015    Pneumococcal 23 valent 10/03/1999, 03/06/2007    Pneumococcal, Unspecified 01/01/2012    TDAP (Adacel,Boostrix) 08/26/2022    Td (Adult), Adsorbed 03/11/1992, 10/24/2000, 04/11/2011    Zoster vaccine, live 11/08/2007       ROS A comprehensive review of systems was performed and was otherwise negative    Medications, allergies, and problem list were reviewed and updated    Exam  BP (!) 145/68   Pulse 66   Temp 98.3  F (36.8  C) (Oral)   Resp 19   Ht 1.61 m (5' 3.39\")   Wt 60.6 kg (133 lb 9.6 oz)   SpO2 96%   BMI 23.38 kg/m    Appears well.  Severe kyphoscoliosis with restrictive chest physiology.  Lungs are otherwise clear.  Heart is regular without murmur.  No ankle edema.  Abdomen is nontender with ventral hernia/umbilical hernia.    Assessment/Plan  1. Coronary artery disease due to calcified coronary lesion  Asymptomatic.  Continue aspirin daily and atorvastatin 20 mg.    2. Kyphoscoliosis  Significant restrictive chest physiology.  Significant dyspnea on exertion but stable.  Continue with back exercises    3. Obstructive sleep apnea on CPAP   Continue with CPAP    4. Spinal stenosis, lumbar region, without neurogenic claudication  Stable, severe.  Continue daily treadmill and exercises of the back    5. Essential hypertension    Borderline high today borderline low at home.  Continue lisinopril 2.5 mg a day    6. History of colonic polyps  That would be a high risk for proceeding with colonoscopy.  He does except that risk and wishes to proceed as planned for colon polyp follow-up.  - Colonoscopy Screening  Referral; Future    7. History of melanoma in situ  Follow-up with dermatology in 3 months    8. " Need for COVID-19 vaccine  Given today  - COVID-19 BIVALENT 12+ (PFIZER)      Return in about 6 months (around 1/25/2024).   Patient Instructions   Get a flu shot this fall.    I would not strongly recommend the Shingrix shingles vaccine, but that can be obtained at local pharmacy if you wish to proceed with that vaccine series.    Continue to do your daily back exercises.    Follow-up with pulmonary medicine later this year as planned.    Contact me if blood pressure is running above 140/85 more than occasionally, or if you have increasing lightheaded dizzy spells with lower blood pressures.    Otherwise I will see you after January 25 for your yearly physical.    You have been referred to [facility] for colonoscopy that is due after September.  Gastroenterology may wish you to have the colonoscopy in the hospital, has a colonoscopy at age above 80 would be higher risk.  You may need a preop before proceeding with colonoscopy.    Sarthak Romano MD, MD        Current Outpatient Medications   Medication Sig Dispense Refill    acetaminophen (TYLENOL) 500 MG tablet [ACETAMINOPHEN (TYLENOL) 500 MG TABLET] Take 1,000 mg by mouth daily .            albuterol (PROAIR HFA/PROVENTIL HFA/VENTOLIN HFA) 108 (90 Base) MCG/ACT inhaler Inhale 2 puffs into the lungs every 6 hours as needed for shortness of breath, wheezing or cough 18 g 11    aspirin 325 MG tablet [ASPIRIN 325 MG TABLET] Take 325 mg by mouth daily.      atorvastatin (LIPITOR) 20 MG tablet Take 1 tablet (20 mg) by mouth daily 90 tablet 3    azelastine (ASTELIN) 0.1 % nasal spray 2 sprays each nostril 1-2x daily as needed for nasal congestion 30 mL 11    B2/VITS A,C,E/LUT/ZEAXANTH/MIN (ICAPS ORAL) [B2/VITS A,C,E/LUT/ZEAXANTH/MIN (ICAPS ORAL)] Take 1 capsule by mouth daily.      calcium polycarbophil (FIBERCON) 625 mg tablet [CALCIUM POLYCARBOPHIL (FIBERCON) 625 MG TABLET] Take 1,250 mg by mouth daily.      cholecalciferol, vitamin D3, 1,000 unit tablet  [CHOLECALCIFEROL, VITAMIN D3, 1,000 UNIT TABLET] Take 1,000 Units by mouth daily.      fluticasone (FLONASE) 50 MCG/ACT nasal spray       glucosamine-chondroitin 500-400 mg cap [GLUCOSAMINE-CHONDROITIN 500-400 MG CAP] Take 1 capsule by mouth 2 (two) times a day.      latanoprost (XALATAN) 0.005 % ophthalmic solution Place 1 drop into both eyes At Bedtime      lisinopril (ZESTRIL) 2.5 MG tablet [LISINOPRIL (PRINIVIL,ZESTRIL) 2.5 MG TABLET] TAKE 1 TABLET BY MOUTH EVERY DAY 90 tablet 3    loratadine 10 MG capsule Take 10 mg by mouth daily      nystatin (MYCOSTATIN) 078959 UNIT/GM external powder Apply to affected areas twice a day if flare of rash 60 g 3    pregabalin (LYRICA) 25 MG capsule TAKE 1 CAPSULE(25 MG) BY MOUTH TWICE DAILY 60 capsule 1    saw palmetto fruit 450 mg cap [SAW PALMETTO FRUIT 450 MG CAP] Take 1 capsule by mouth 2 (two) times a day.      tamsulosin (FLOMAX) 0.4 mg Cp24 [TAMSULOSIN (FLOMAX) 0.4 MG CP24] Take 0.4 mg by mouth at bedtime.       No Known Allergies  Social History     Tobacco Use    Smoking status: Never    Smokeless tobacco: Never   Vaping Use    Vaping Use: Never used   Substance Use Topics    Alcohol use: Yes     Comment: Alcoholic Drinks/day: 5 drinks weekly    Drug use: No             Subjective   Wily is a 88 year old, presenting for the following health issues:  Hypertension, Immunization (Shingles vaccine/), Weight Problem (Has lost weight but still has fat on his belly - loses the weight in his arms, hips and legs), Derm Problem (Had MOHS procedure for melanoma on head), and Breathing Problem (Saw the pulmonologist in May - would like to inform you as to how that appt went)        7/25/2023     1:28 PM   Additional Questions   Roomed by SANDY LIMA   Accompanied by self     History of Present Illness       Back Pain:  He presents for follow up of back pain. Patient's back pain is a chronic problem.  Location of back pain:  Right lower back, left lower back, right upper back, left  "upper back, right side of neck, left side of neck, right shoulder and left shoulder  Description of back pain: burning  Back pain spreads: right thigh and left thigh    Since patient first noticed back pain, pain is: always present, but gets better and worse  Does back pain interfere with his job:  Not applicable       Hyperlipidemia:  He presents for follow up of hyperlipidemia.   He is taking medication to lower cholesterol. He is not having myalgia or other side effects to statin medications.    Hypertension: He presents for follow up of hypertension.  He does check blood pressure  regularly outside of the clinic. Outside blood pressures have been over 140/90. He follows a low salt diet.     Vascular Disease:  He presents for follow up of vascular disease.     He never takes nitroglycerin. He takes daily aspirin.    He eats 2-3 servings of fruits and vegetables daily.He consumes 1 sweetened beverage(s) daily.He exercises with enough effort to increase his heart rate 60 or more minutes per day.  He exercises with enough effort to increase his heart rate 5 days per week.   He is taking medications regularly.               Review of Systems         Objective    BP (!) 159/75   Pulse 66   Temp 98.3  F (36.8  C) (Oral)   Resp 19   Ht 1.61 m (5' 3.39\")   Wt 60.6 kg (133 lb 9.6 oz)   SpO2 96%   BMI 23.38 kg/m    Body mass index is 23.38 kg/m .  Physical Exam                         "

## 2023-08-03 DIAGNOSIS — M48.062 SPINAL STENOSIS OF LUMBAR REGION WITH NEUROGENIC CLAUDICATION: ICD-10-CM

## 2023-08-03 DIAGNOSIS — M43.16 SPONDYLOLISTHESIS OF LUMBAR REGION: ICD-10-CM

## 2023-08-03 RX ORDER — PREGABALIN 25 MG/1
CAPSULE ORAL
Qty: 60 CAPSULE | Refills: 1 | Status: SHIPPED | OUTPATIENT
Start: 2023-08-03 | End: 2023-10-03

## 2023-08-14 DIAGNOSIS — I10 ESSENTIAL HYPERTENSION: ICD-10-CM

## 2023-08-14 NOTE — TELEPHONE ENCOUNTER
Pt called to request a refill on his lisinopril. Pt states he is going out of town tomorrow and will need a refill by tomorrow.     TC advised Pt the turn around is 1-3 business days. Pt requested to have this expedited if possible.     Paola Gonzales

## 2023-08-15 RX ORDER — LISINOPRIL 2.5 MG/1
TABLET ORAL
Qty: 90 TABLET | Refills: 3 | Status: SHIPPED | OUTPATIENT
Start: 2023-08-15 | End: 2024-08-06

## 2023-09-22 ASSESSMENT — PAIN SCALES - PAIN ENJOYMENT GENERAL ACTIVITY SCALE (PEG)
PEG_TOTALSCORE: 1.67
INTERFERED_GENERAL_ACTIVITY: 1
INTERFERED_ENJOYMENT_LIFE: 0 - DOES NOT INTERFERE
AVG_PAIN_PASTWEEK: 4

## 2023-09-25 ENCOUNTER — TRANSFERRED RECORDS (OUTPATIENT)
Dept: HEALTH INFORMATION MANAGEMENT | Facility: CLINIC | Age: 88
End: 2023-09-25
Payer: MEDICARE

## 2023-09-26 ENCOUNTER — OFFICE VISIT (OUTPATIENT)
Dept: INTERNAL MEDICINE | Facility: CLINIC | Age: 88
End: 2023-09-26
Payer: MEDICARE

## 2023-09-26 VITALS
RESPIRATION RATE: 18 BRPM | BODY MASS INDEX: 23.21 KG/M2 | OXYGEN SATURATION: 99 % | WEIGHT: 131 LBS | SYSTOLIC BLOOD PRESSURE: 129 MMHG | TEMPERATURE: 98.3 F | HEART RATE: 71 BPM | DIASTOLIC BLOOD PRESSURE: 64 MMHG | HEIGHT: 63 IN

## 2023-09-26 DIAGNOSIS — I25.84 CORONARY ARTERY DISEASE DUE TO CALCIFIED CORONARY LESION: ICD-10-CM

## 2023-09-26 DIAGNOSIS — I25.10 CORONARY ARTERY DISEASE DUE TO CALCIFIED CORONARY LESION: ICD-10-CM

## 2023-09-26 DIAGNOSIS — Z01.818 PREOP GENERAL PHYSICAL EXAM: Primary | ICD-10-CM

## 2023-09-26 DIAGNOSIS — G47.33 OBSTRUCTIVE SLEEP APNEA ON CPAP: ICD-10-CM

## 2023-09-26 DIAGNOSIS — Z51.81 ENCOUNTER FOR THERAPEUTIC DRUG MONITORING: ICD-10-CM

## 2023-09-26 DIAGNOSIS — I10 ESSENTIAL HYPERTENSION: ICD-10-CM

## 2023-09-26 DIAGNOSIS — N40.0 BENIGN PROSTATIC HYPERPLASIA, UNSPECIFIED WHETHER LOWER URINARY TRACT SYMPTOMS PRESENT: ICD-10-CM

## 2023-09-26 DIAGNOSIS — Z86.0100 HISTORY OF COLONIC POLYPS: ICD-10-CM

## 2023-09-26 DIAGNOSIS — M48.061 SPINAL STENOSIS, LUMBAR REGION, WITHOUT NEUROGENIC CLAUDICATION: ICD-10-CM

## 2023-09-26 DIAGNOSIS — M41.9 KYPHOSCOLIOSIS: ICD-10-CM

## 2023-09-26 DIAGNOSIS — Z23 NEED FOR IMMUNIZATION AGAINST INFLUENZA: ICD-10-CM

## 2023-09-26 LAB
ALBUMIN SERPL BCG-MCNC: 4.3 G/DL (ref 3.5–5.2)
ALP SERPL-CCNC: 64 U/L (ref 40–129)
ALT SERPL W P-5'-P-CCNC: 20 U/L (ref 0–70)
ANION GAP SERPL CALCULATED.3IONS-SCNC: 10 MMOL/L (ref 7–15)
AST SERPL W P-5'-P-CCNC: 65 U/L (ref 0–45)
BILIRUB SERPL-MCNC: 0.3 MG/DL
BUN SERPL-MCNC: 28.4 MG/DL (ref 8–23)
CALCIUM SERPL-MCNC: 9.1 MG/DL (ref 8.8–10.2)
CHLORIDE SERPL-SCNC: 105 MMOL/L (ref 98–107)
CREAT SERPL-MCNC: 1.02 MG/DL (ref 0.67–1.17)
DEPRECATED HCO3 PLAS-SCNC: 24 MMOL/L (ref 22–29)
EGFRCR SERPLBLD CKD-EPI 2021: 72 ML/MIN/1.73M2
ERYTHROCYTE [DISTWIDTH] IN BLOOD BY AUTOMATED COUNT: 13.2 % (ref 10–15)
GLUCOSE SERPL-MCNC: 102 MG/DL (ref 70–99)
HCT VFR BLD AUTO: 36.4 % (ref 40–53)
HGB BLD-MCNC: 11.7 G/DL (ref 13.3–17.7)
MCH RBC QN AUTO: 32 PG (ref 26.5–33)
MCHC RBC AUTO-ENTMCNC: 32.1 G/DL (ref 31.5–36.5)
MCV RBC AUTO: 100 FL (ref 78–100)
PLATELET # BLD AUTO: 181 10E3/UL (ref 150–450)
POTASSIUM SERPL-SCNC: 4.5 MMOL/L (ref 3.4–5.3)
PROT SERPL-MCNC: 7.1 G/DL (ref 6.4–8.3)
RBC # BLD AUTO: 3.66 10E6/UL (ref 4.4–5.9)
SODIUM SERPL-SCNC: 139 MMOL/L (ref 135–145)
WBC # BLD AUTO: 6.7 10E3/UL (ref 4–11)

## 2023-09-26 PROCEDURE — G0008 ADMIN INFLUENZA VIRUS VAC: HCPCS | Performed by: INTERNAL MEDICINE

## 2023-09-26 PROCEDURE — 93005 ELECTROCARDIOGRAM TRACING: CPT | Performed by: INTERNAL MEDICINE

## 2023-09-26 PROCEDURE — 36415 COLL VENOUS BLD VENIPUNCTURE: CPT | Performed by: INTERNAL MEDICINE

## 2023-09-26 PROCEDURE — 80053 COMPREHEN METABOLIC PANEL: CPT | Performed by: INTERNAL MEDICINE

## 2023-09-26 PROCEDURE — 90662 IIV NO PRSV INCREASED AG IM: CPT | Performed by: INTERNAL MEDICINE

## 2023-09-26 PROCEDURE — 85027 COMPLETE CBC AUTOMATED: CPT | Performed by: INTERNAL MEDICINE

## 2023-09-26 PROCEDURE — 99214 OFFICE O/P EST MOD 30 MIN: CPT | Mod: 25 | Performed by: INTERNAL MEDICINE

## 2023-09-26 NOTE — PATIENT INSTRUCTIONS
Stop aspirin 1 week before colonoscopy.  Restart aspirin day after colonoscopy if no major bleeding.    Follow directions from gastroenterology regarding your bowel prep.    Bring your CPAP machine with you in case needed in the recovery room or while sedated.    Do not take your lisinopril on the morning of the colonoscopy.  Continue to take your eyedrops as usual.    If you have any significant worsening abdominal pain after the colonoscopy or if you develop a fever after the colonoscopy, go to the emergency room for immediate evaluation.    Follow-up with me on January 29 for your adult wellness visit physical exam.

## 2023-09-26 NOTE — PROGRESS NOTES
ACUPUNCTURIST TREATMENT NOTE      Antoni Stoll, a 88 year old male, is here today for Follow - Up exam. Patient is referred by Juan Antonio. (3 of 8)    HPI  Main Complaint: Chronic pain of neck and low back, numbness of feet:     Patient has not been seen since 7/3/23. Over all he reports he has been doing about the same. Is noticing some more tingling in left lower leg and foot that comes and goes. Also notes some pain wrapping around from low back into hips and lateral thighs.      Past Medical History  Past Medical History Reviewed: Yes   has a past medical history of Anemia, Basal cell carcinoma, Cancer (H), Carotid artery occlusion, Coronary artery disease, Glaucoma, High cholesterol, History of colon cancer (08/13/2018), Hyperlipidemia, Hypertension, Left inguinal hernia, Low back pain, Malignant melanoma (H), Mild aortic insufficiency, Moderate tricuspid regurgitation, Postnasal drip, Sleep apnea, obstructive, Spinal stenosis, SSS (sick sinus syndrome) (H) (12/09/2019), Umbilical hernia, and Unspecified cataract.    Objective    TCM Exam Completed: Yes  Limbs/Back: Right Lower Extremity, Lower Back, and neck    Tongue/Pulse Exam Completed: No    Patient Assessment    PEG: A Three-Item Scale Assessing Pain Intensity and Interference    What number best describes your PAIN ON AVERAGE in the past week? 4    What number best describes how, during the past week, pain has interfered with your ENJOYMENT OF LIFE? 0 - Does not interfere    What number best describes how, during the past week, pain has interfered with your GENERAL ACTIVITY? 1    PEG Total Score: 1.67    Keo BISHOP, Cesar KA, Jayjay MJ, Fannie TA, Napoleon J, Andreas ASENCIO, Susanne JOYNER, Angelica K. Development and initial validation of the PEG, a 3-item scale assessing pain intensity and interference. Journal of General Internal Medicine. 2009 Jun;24:733-738.  Patient Type: Pain  Patient Complaint: Chronic pain in low back, neck, neuropathy in right foot        7/3/2023      8:00 AM 9/28/2023    10:00 AM   Acupuncture   Intervention Reason Pain,Pain 2,Neuropathy Pain,Pain 2,Neuropathy   Pain Location low back low back   Pre-session Pain Rating 2 4   Post-session Pain Rating 0 2   Pain 2 Location neck neck   Pre-session Pain 2 Rating 1 2   Post-session Pain 2 Rating 0 1   Neuropathy Location feet right foot   Pre-session Neuropathy rating 5 5   Post-session Neuropathy rating 1        TCM Diagnosis: Qi Stagnation;Blood Stagnation;Qi Deficiency;Blood Deficiency;Yin Deficiency;Kidney Qi Deficiency;Other Melvin Yang Channel obstruction    Treatment Principle: unblock channels, move qi and blood, nourish and stop pain    TCM / Acupuncture Treatment  Acupuncture Points:       Initial insertions: Baihui, Gb20, Ub10, Ub23, Shiqizhuixia, Yaoyan       Second insertions: HTJJ L2-L5, Ub31, Ub32, Ub40, Gb34, Ub57, Ub62, Ki3, Sp3, Liv3, Gb41                          9/8/2022     2:00 PM   Oswestry Disability Index ([employer]   Ricky Harrison 1980, All rights reserved)   Section 1 - Pain intensity The pain is very mild at the moment.   Section 2 - Personal care (washing, dressing, etc.)  I can look after myself normally without causing additional pain.   Section 3 - Lifting Pain prevents me from lifting heavy weights off the floor but I can manage if they are conveniently positioned, e.g. on a table.   Section 4 - Walking Pain prevents me from walking more than a quarter of a mile.   Section 5 - Sitting I can sit in my favorite chair as long as I like.   Section 6 - Standing Pain prevents me from standing for more than 1 hour.   Section 7 - Sleeping My sleep is never interrupted by pain.   Section 8 - Sex life (if applicable) Not answered/NA   Section 9 - Social life My social life is normal and causes me no additional pain.   Section 10 - Traveling I can travel anywhere but it gives me additional pain.   Oswestry Score (%) 20       Assessment and Plan  Treatment Observations: Patient relaxed well during  treatment  Acupuncture Treatment Recommendations: Acupuncture for diagnosis: M48.061 (ICD-10-CM) - Spinal stenosis of lumbar region, unspecified whether neurogenic claudication present. Patient to schedule treatment 1x/month for 5 additional visits.       It is my recommendation that this patient seek advice from their Primary Care Provider about active symptoms not addressed during this visit. The risks and benefits of acupuncture were reviewed and the patient stated understanding. The patient's questions were answered to their satisfaction. Consent was provided for treatment. We thank you for the referral and opportunity to treat this patient.    Time Spent with Patient:   I spent a total of 30 minutes face-to-face with Antoni Stoll during today's office visit.     Kamryn White L.Ac.

## 2023-09-26 NOTE — PROGRESS NOTES
[facility]  [address] [address]  Phone: [phone]  Fax: [phone]  Primary Provider: Sarthak Hendricks  Pre-op Performing Provider: SARTHAK HENDRICKS      PREOPERATIVE EVALUATION:  Today's date: 9/26/2023    Wily is a 88 year old male who presents for a preoperative evaluation.      9/26/2023     3:48 PM   Additional Questions   Roomed by Marilee RAIN   Accompanied by bryn         9/26/2023     3:48 PM   Patient Reported Additional Medications   Patient reports taking the following new medications no       Surgical Information:  Surgery/Procedure: Colonoscopy  Surgery Location: [facility]  Surgeon: Dr Ritter  Surgery Date: 10/20/23  Time of Surgery: na  Where patient plans to recover: At home with family  Fax number for surgical facility: [phone]    Assessment & Plan     The proposed surgical procedure is considered LOW risk.    Preop general physical exam  Patient is cleared to proceed with colonoscopy in the hospital.  I did discuss risks of colonoscopy including bowel perforation, anesthesia risk, and coronary artery disease risk with being off aspirin, risk of heart attack and heart arrhythmia.  Also risk of bleeding.    Patient accepts these risks and wishes to proceed with colonoscopy.  Patient was given the option of not proceeding with colonoscopy, but with risk of undiagnosed colon cancer.  - EKG 12-lead, tracing only  - Comprehensive metabolic panel  - CBC with platelets    Patient instructions:  Stop aspirin 1 week before colonoscopy.  Restart aspirin day after colonoscopy if no major bleeding.    Follow directions from gastroenterology regarding your bowel prep.    Bring your CPAP machine with you in case needed in the recovery room or while sedated.    Do not take your lisinopril on the morning of the colonoscopy.  Continue to take your eyedrops as usual.    If you have any significant worsening abdominal pain after the colonoscopy or if you  develop a fever after the colonoscopy, go to the emergency room for immediate evaluation.    Follow-up with me on January 29 for your adult wellness visit physical exam.    History of colonic polyps  As above    Obstructive sleep apnea on CPAP  Compliant with CPAP.  Patient was told to bring CPAP machine with him in case needed while sedated.    Kyphoscoliosis  Severe, restricts chest expansion.  Moderate dyspnea on exertion stable.    Coronary artery disease due to calcified coronary lesion  Asymptomatic.  Will be off aspirin for 1 week.  I discussed risk of being off aspirin.  Patient accepts the risk and wishes to proceed with colonoscopy    Essential hypertension  Controlled.  He will skip lisinopril on the morning of the colonoscopy to reduce risk of hypotension    Spinal stenosis, lumbar region, without neurogenic claudication  No flare at this time.  Continue regular treadmill walking.    Benign prostatic hyperplasia, unspecified whether lower urinary tract symptoms present  Controlled and no UTI symptoms.  Takes Flomax at night    Encounter for therapeutic drug monitoring    - Comprehensive metabolic panel    Need for immunization against influenza  Given today  - INFLUENZA VACCINE 65+ (FLUZONE HD)            RECOMMENDATION:  APPROVAL GIVEN to proceed with proposed procedure, without further diagnostic evaluation.    [id number]}    Subjective       HPI related to upcoming procedure: 88-year-old male with history of colon polyp although his September 2020 colonoscopy was negative.  Despite his age and other medical issues he does wish to proceed with his 5-year colonoscopy for follow-up.    He does have a history of coronary disease with a coronary bypass in 2013 but has not had a history of MI and has not had any recent problems.  2021 heart stress test was negative.  He does 30 minutes on the treadmill 5 days a week without difficulty.    He is on aspirin daily and atorvastatin.    He has obstructive sleep  apnea but highly compliant with CPAP.    He has severe kyphoscoliosis with restrictive chest wall physiology condition and moderate dyspnea on exertion that is stable.    February 2023 heart echo did not show pulmonary hypertension.  Ejection fraction 60-65% with mild aortic insufficiency and mild mitral regurgitation.    He is never smoked.    No cough or fever or new bronchitis symptoms currently.    He no palpitations or history of arrhythmia.  No lower extremity edema.    He has severe L3-5 spinal stenosis, but has not had new leg radicular pain.  He is able to do the treadmill as above.    BPH controlled with Flomax.  No history of UTI.    Melanoma in situ of the scalp was removed in July, that is almost completely healed now.    Hypertension controlled on lisinopril 2.5 mg in the morning.  In January of this year creatinine level was 1.08.    Bowels are regular and no blood in stool or new changing stool pattern.    He does have an umbilical hernia        9/22/2023     7:52 PM   Preop Questions   1. Have you ever had a heart attack or stroke? No   2. Have you ever had surgery on your heart or blood vessels, such as a stent placement, a coronary artery bypass, or surgery on an artery in your head, neck, heart, or legs? YES -CABG   3. Do you have chest pain with activity? No   4. Do you have a history of  heart failure? No   5. Do you currently have a cold, bronchitis or symptoms of other infection? No   6. Do you have a cough, shortness of breath, or wheezing? YES -moderate dyspnea on exertion is stable.  No acute cough   7. Do you or anyone in your family have previous history of blood clots? No   8. Do you or does anyone in your family have a serious bleeding problem such as prolonged bleeding following surgeries or cuts? No   9. Have you ever had problems with anemia or been told to take iron pills? No   10. Have you had any abnormal blood loss such as black, tarry or bloody stools? No   11. Have you ever  had a blood transfusion? No   12. Are you willing to have a blood transfusion if it is medically needed before, during, or after your surgery? NO -    13. Have you or any of your relatives ever had problems with anesthesia? No   14. Do you have sleep apnea, excessive snoring or daytime drowsiness? YES -has sleep apnea   14a. Do you have a CPAP machine? Yes   15. Do you have any artifical heart valves or other implanted medical devices like a pacemaker, defibrillator, or continuous glucose monitor? No   16. Do you have artificial joints? No   17. Are you allergic to latex? No       Health Care Directive:  Patient has a Health Care Directive on file      Preoperative Review of :   reviewed - no record of controlled substances prescribed.          Review of Systems  Constitutional, neuro, ENT, endocrine, pulmonary, cardiac, gastrointestinal, genitourinary, musculoskeletal, integument and psychiatric systems are negative, except as otherwise noted.    Patient Active Problem List    Diagnosis Date Noted    Dyspnea on exertion 01/17/2022     Priority: Medium    Unspecified glaucoma      Priority: Medium     Created by Conversion        Atherosclerosis of native coronary artery of native heart with angina pectoris (H)      Priority: Medium     Created by Conversion  [facility] Annotation: Jun 29 2009  9:04Violetta Prasadc: 2/07 cath:  EF   70,   no wall motion abnorms.  25-50% prox/mid LAD.  50-75% distal LAD.  70%   prox   Cx.  Occluded OM.  Replacement Utility updated for latest IMO load        Spinal stenosis 12/07/2017     Priority: Medium    Anemia 09/16/2017     Priority: Medium    BPH (benign prostatic hyperplasia) 09/16/2017     Priority: Medium    Hypercholesterolemia      Priority: Medium     Created by Conversion        Essential hypertension      Priority: Medium     Created by Conversion  Replacement Utility updated for latest IMO load        Inguinal Hernia      Priority: Medium     Created by  [facility] Annotation: Jun 29 2009  9:54Sarthak Prasad: R>L  Replacement Utility updated for latest IMO load        Sleep Apnea      Priority: Medium     Created by [facility] Annotation: Jun 29 2009  9:01Sarthak Prasad: uses CPAP  Replacement Utility updated for latest IMO load          Past Medical History:   Diagnosis Date    Anemia     Basal cell carcinoma     Cancer (H)     colon    Carotid artery occlusion     Coronary artery disease     Glaucoma     High cholesterol     History of colon cancer 08/13/2018    Hyperlipidemia     Hypertension     Left inguinal hernia     Low back pain     Malignant melanoma (H)     Mild aortic insufficiency     Moderate tricuspid regurgitation     Postnasal drip     Sleep apnea, obstructive     CPAP    Spinal stenosis     lumbar    SSS (sick sinus syndrome) (H) 12/09/2019    Umbilical hernia     Unspecified cataract     Created by Conversion      Past Surgical History:   Procedure Laterality Date    ARTERIAL BYPASS SURGERY      BLEPHAROPLASTY Bilateral     BYPASS GRAFT ARTERY CORONARY  06/28/2013    5-vessel    BYPASS GRAFT ARTERY CORONARY  2013    5 vessel    CARDIAC CATHETERIZATION  06/03/2013    CATARACT EXTRACTION Bilateral     COLON SURGERY Right     HERNIA REPAIR      right inguinal    HERNIORRHAPHY INGUINAL Left 2/14/2022    Procedure: INCARCERATED LEFT INGUINAL HERNIA REPAIR WITH MESH;  Surgeon: Jack Naqvi MD;  Location: [facility]    STRIP [facility] Left      Current Outpatient Medications   Medication Sig Dispense Refill    acetaminophen (TYLENOL) 500 MG tablet [ACETAMINOPHEN (TYLENOL) 500 MG TABLET] Take 1,000 mg by mouth daily .            aspirin 325 MG tablet [ASPIRIN 325 MG TABLET] Take 325 mg by mouth daily.      atorvastatin (LIPITOR) 20 MG tablet Take 1 tablet (20 mg) by mouth daily 90 tablet 3    azelastine (ASTELIN) 0.1 % nasal spray 2 sprays each nostril 1-2x daily as needed for nasal congestion 30 mL 11    B2/VITS  "A,C,E/LUT/ZEAXANTH/MIN (ICAPS ORAL) [B2/VITS A,C,E/LUT/ZEAXANTH/MIN (ICAPS ORAL)] Take 1 capsule by mouth daily.      calcium polycarbophil (FIBERCON) 625 mg tablet [CALCIUM POLYCARBOPHIL (FIBERCON) 625 MG TABLET] Take 1,250 mg by mouth daily.      cholecalciferol, vitamin D3, 1,000 unit tablet [CHOLECALCIFEROL, VITAMIN D3, 1,000 UNIT TABLET] Take 1,000 Units by mouth daily.      fluticasone (FLONASE) 50 MCG/ACT nasal spray       glucosamine-chondroitin 500-400 mg cap [GLUCOSAMINE-CHONDROITIN 500-400 MG CAP] Take 1 capsule by mouth 2 (two) times a day.      latanoprost (XALATAN) 0.005 % ophthalmic solution Place 1 drop into both eyes At Bedtime      lisinopril (ZESTRIL) 2.5 MG tablet [LISINOPRIL (PRINIVIL,ZESTRIL) 2.5 MG TABLET] TAKE 1 TABLET BY MOUTH EVERY DAY 90 tablet 3    loratadine 10 MG capsule Take 10 mg by mouth daily      nystatin (MYCOSTATIN) 611795 UNIT/GM external powder Apply to affected areas twice a day if flare of rash 60 g 3    pregabalin (LYRICA) 25 MG capsule TAKE 1 CAPSULE(25 MG) BY MOUTH TWICE DAILY 60 capsule 1    saw palmetto fruit 450 mg cap [SAW PALMETTO FRUIT 450 MG CAP] Take 1 capsule by mouth 2 (two) times a day.      tamsulosin (FLOMAX) 0.4 mg Cp24 [TAMSULOSIN (FLOMAX) 0.4 MG CP24] Take 0.4 mg by mouth at bedtime.      albuterol (PROAIR HFA/PROVENTIL HFA/VENTOLIN HFA) 108 (90 Base) MCG/ACT inhaler Inhale 2 puffs into the lungs every 6 hours as needed for shortness of breath, wheezing or cough (Patient not taking: Reported on 9/26/2023) 18 g 11       No Known Allergies     Social History     Tobacco Use    Smoking status: Never    Smokeless tobacco: Never   Substance Use Topics    Alcohol use: Yes     Comment: Alcoholic Drinks/day: 5 drinks weekly     Family History   Problem Relation Age of Onset    Coronary Artery Disease Mother     Colon Cancer Father     Coronary Artery Disease Father      History   Drug Use No         Objective     Pulse 71   Resp 18   Ht 1.6 m (5' 3\")   Wt 59.4 " kg (131 lb)   SpO2 99%   BMI 23.21 kg/m      Physical Exam  Alert and oriented x3.  Normal cognition.  Good mobility and able to climb up on the exam table.  Severe kyphoscoliosis.  Pupils and irises equal and reactive.  Normal pharynx.  Teeth in good condition without loose teeth.  No neck mass or cervical adenopathy.  No JVD and no carotid bruits.  Heart is regular without murmur.  Abdomen is thin and nontender.  No pulsatile mass.  He has a large umbilical hernia.  Severe kyphoscoliosis with reduced respiratory excursion but lungs are otherwise clear without wheezing or crackles.  No lower extremity edema.    Recent Labs   Lab Test 05/03/23  1528 01/25/23  1032 07/25/22  1359 01/18/22  1431   HGB 11.3 12.6*  --  12.4*   PLT  --  192  --  212   NA  --  140 142 140   POTASSIUM  --  4.3 4.2 4.8   CR  --  1.08 1.00 1.05        Diagnostics:  Labs pending at this time.  Results will be reviewed when available.  Recent Results (from the past 168 hour(s))   EKG 12-lead, tracing only    Collection Time: 09/26/23  4:00 PM   Result Value Ref Range    Systolic Blood Pressure  mmHg    Diastolic Blood Pressure  mmHg    Ventricular Rate 61 BPM    Atrial Rate 61 BPM    ME Interval 142 ms    QRS Duration 88 ms     ms    QTc 406 ms    P Axis -14 degrees    R AXIS -14 degrees    T Axis 47 degrees    Interpretation ECG       Sinus rhythm with occasional Premature ventricular complexes  Otherwise normal ECG  When compared with ECG of 13-AUG-2018 21:23,  Premature ventricular complexes are now Present     EKG reviewed with patient, normal sinus rhythm and essentially normal except for a PVC    Revised Cardiac Risk Index (RCRI):  The patient has the following serious cardiovascular risks for perioperative complications:   - No serious cardiac risks = 0 points     RCRI Interpretation: 0 points: Class I (very low risk - 0.4% complication rate)         Signed Electronically by: Sarthak Romano MD  Copy of this evaluation report is  provided to requesting physician.

## 2023-09-27 ENCOUNTER — TELEPHONE (OUTPATIENT)
Dept: INTERNAL MEDICINE | Facility: CLINIC | Age: 88
End: 2023-09-27
Payer: MEDICARE

## 2023-09-27 DIAGNOSIS — R79.89 ELEVATED LIVER FUNCTION TESTS: Primary | ICD-10-CM

## 2023-09-27 LAB
ATRIAL RATE - MUSE: 61 BPM
DIASTOLIC BLOOD PRESSURE - MUSE: NORMAL MMHG
INTERPRETATION ECG - MUSE: NORMAL
P AXIS - MUSE: -14 DEGREES
PR INTERVAL - MUSE: 142 MS
QRS DURATION - MUSE: 88 MS
QT - MUSE: 404 MS
QTC - MUSE: 406 MS
R AXIS - MUSE: -14 DEGREES
SYSTOLIC BLOOD PRESSURE - MUSE: NORMAL MMHG
T AXIS - MUSE: 47 DEGREES
VENTRICULAR RATE- MUSE: 61 BPM

## 2023-09-27 PROCEDURE — 93010 ELECTROCARDIOGRAM REPORT: CPT | Mod: OFF | Performed by: INTERNAL MEDICINE

## 2023-09-27 NOTE — TELEPHONE ENCOUNTER
Patient called to give pre op information.    [facility] in [place]   Fax: [phone]  Colonoscopy done by:   Dr Livia Ritter     Faxed pre-op to the fax number above.

## 2023-09-27 NOTE — TELEPHONE ENCOUNTER
Contact patient.  Make sure he sees the lab message on his [employer].  His AST liver test was mildly high.  He can continue on his atorvastatin at this time.  Make sure he is not drinking any alcohol.  Have him recheck his liver tests with a nonfasting lab appointment in 2 weeks.    His other labs are okay and he can proceed with the colonoscopy as planned.

## 2023-09-28 ENCOUNTER — OFFICE VISIT (OUTPATIENT)
Dept: PALLIATIVE MEDICINE | Facility: OTHER | Age: 88
End: 2023-09-28
Attending: INTERNAL MEDICINE
Payer: MEDICARE

## 2023-09-28 DIAGNOSIS — M48.061 SPINAL STENOSIS OF LUMBAR REGION, UNSPECIFIED WHETHER NEUROGENIC CLAUDICATION PRESENT: Primary | ICD-10-CM

## 2023-09-28 PROCEDURE — 97811 ACUP 1/> W/O ESTIM EA ADD 15: CPT | Mod: KX | Performed by: ACUPUNCTURIST

## 2023-09-28 PROCEDURE — 97810 ACUP 1/> WO ESTIM 1ST 15 MIN: CPT | Mod: KX | Performed by: ACUPUNCTURIST

## 2023-09-28 NOTE — TELEPHONE ENCOUNTER
S-(situation): Return call from patient regarding results.    B-(background): See previous.    A-(assessment): Patient reports that he has now seen these results and has scheduled a lab appointment. Denies questions.     R-(recommendations): No further action. Closing encounter.

## 2023-10-02 NOTE — PROGRESS NOTES
AUDIOLOGY REPORT    SUMMARY: Audiology visit completed. See audiogram for results. Abuse screening not completed due to same day appt with ENT clinic, where this is addressed.      RECOMMENDATIONS: Follow-up with ENT.      Agnes Robles, [facility]  [place] Licensed Audiologist [id number]         pt with hx cervical ca, on chemo, c/o of lower abd pain for few days, denies vag bleed, denies dysuria

## 2023-10-03 DIAGNOSIS — M43.16 SPONDYLOLISTHESIS OF LUMBAR REGION: ICD-10-CM

## 2023-10-03 DIAGNOSIS — M48.062 SPINAL STENOSIS OF LUMBAR REGION WITH NEUROGENIC CLAUDICATION: ICD-10-CM

## 2023-10-03 RX ORDER — PREGABALIN 25 MG/1
CAPSULE ORAL
Qty: 60 CAPSULE | Refills: 1 | Status: SHIPPED | OUTPATIENT
Start: 2023-10-03 | End: 2023-11-08

## 2023-10-03 NOTE — TELEPHONE ENCOUNTER
I will provide a refill for this patient.  It should be noted that his last office visit was January 6, 2023 not August 3, 2023.  He should schedule a follow-up visit before January for further refills or can get this from PCP.

## 2023-10-10 NOTE — PROGRESS NOTES
ACUPUNCTURIST TREATMENT NOTE      Antoni Stoll, a 88 year old male, is here today for Follow - Up exam. Patient is referred by Juan Antonio. (4 of 8)    HPI  Main Complaint: Chronic pain of neck and low back, numbness of feet:      In general he has been feeling well, felt good after last treatment for about 4 days. A little more sore in low back today. Right foot is still having numbness.    Past Medical History  Past Medical History Reviewed: Yes   has a past medical history of Anemia, Basal cell carcinoma, Cancer (H), Carotid artery occlusion, Coronary artery disease, Glaucoma, High cholesterol, History of colon cancer (08/13/2018), Hyperlipidemia, Hypertension, Left inguinal hernia, Low back pain, Malignant melanoma (H), Mild aortic insufficiency, Moderate tricuspid regurgitation, Postnasal drip, Sleep apnea, obstructive, Spinal stenosis, SSS (sick sinus syndrome) (H) (12/09/2019), Umbilical hernia, and Unspecified cataract.    Objective    TCM Exam Completed: Yes  Limbs/Back: Right Lower Extremity, Lower Back, and neck    Tongue/Pulse Exam Completed: No    Patient Assessment    PEG: A Three-Item Scale Assessing Pain Intensity and Interference    What number best describes your PAIN ON AVERAGE in the past week? 3    What number best describes how, during the past week, pain has interfered with your ENJOYMENT OF LIFE? 3    What number best describes how, during the past week, pain has interfered with your GENERAL ACTIVITY? 1    PEG Total Score: 2.33    Keo BISHOP, Cesar KA, Jayjay MJ, Fannie ARANGO, Napoleon J, Andreas JM, Susanne SM, Angelica K. Development and initial validation of the PEG, a 3-item scale assessing pain intensity and interference. Journal of General Internal Medicine. 2009 Jun;24:733-738.  Patient Type: Pain  Patient Complaint: Chronic pain in low back, neck, neuropathy in right foot        9/28/2023    10:00 AM 10/12/2023     1:00 PM   Acupuncture   Intervention Reason Pain,Pain 2,Neuropathy Pain,Pain  2,Neuropathy   Pain Location low back low back   Pre-session Pain Rating 4 3   Post-session Pain Rating 2 2   Pain 2 Location neck neck   Pre-session Pain 2 Rating 2 2   Post-session Pain 2 Rating 1    Neuropathy Location right foot right foot   Pre-session Neuropathy rating 5 4   Patient complaint: Chronic pain in low back, neck, neuropathy in right foot Chronic pain in low back, neck, neuropathy in right foot   Patient Type Pain Pain   Initial insertions Baihui, Gb20, Ub10, Ub23, Shiqizhuixia, Yaoyan Baihui, Gb20, Ub23, Ub52, Shiqizhuixia, Yaoyan   Second insertions HTJJ L2-L5, Ub31, Ub32, Ub40, Gb34, Ub57, Ub62, Ki3, Sp3, Liv3, Gb41 HTJJ L2-L5, Ub31, Ub57, Ub60, Ub62, Ki3, Liv3, Gb41 (R) Sp3   Accessory Techniques TDP Heat Lamp TDP Heat Lamp   TDP Heat Lamp location used low back low back   TCM Diagnosis Qi Stagnation,Blood Stagnation,Qi Deficiency,Blood Deficiency,Yin Deficiency,Kidney Qi Deficiency,Other Qi Stagnation,Blood Stagnation,Qi Deficiency,Blood Deficiency,Yin Deficiency,Kidney Qi Deficiency,Other   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 Patient to schedule treatment 1x/month for 5 additional visits. Acupuncture for diagnosis: M48.061 (ICD-10-CM) - Spinal stenosis of lumbar region, unspecified whether neurogenic claudication present. Patient to schedule treatment 1x/month for 5 additional visits.       TCM Diagnosis: Qi Stagnation;Blood Stagnation;Qi Deficiency;Blood Deficiency;Yin Deficiency;Kidney Qi Deficiency;Other Melvin Beckman Channel  obstruction    Treatment Principle: unblock channels, move qi and blood, nourish and stop pain    TCM / Acupuncture Treatment  Acupuncture Points:       Initial insertions: Baihui, Gb20, Ub23, Ub52, Shiqizhuixia, Yaoyan       Second insertions: HTJJ L2-L5, Ub31, Ub57, Ub60, Ub62, Ki3, Liv3, Gb41 (R) Sp3                          9/8/2022     2:00 PM   Oswestry Disability Index ([employer]OST[employer]   Ricky Harrison 1980, All rights reserved)   Section 1 - Pain intensity The pain is very mild at the moment.   Section 2 - Personal care (washing, dressing, etc.)  I can look after myself normally without causing additional pain.   Section 3 - Lifting Pain prevents me from lifting heavy weights off the floor but I can manage if they are conveniently positioned, e.g. on a table.   Section 4 - Walking Pain prevents me from walking more than a quarter of a mile.   Section 5 - Sitting I can sit in my favorite chair as long as I like.   Section 6 - Standing Pain prevents me from standing for more than 1 hour.   Section 7 - Sleeping My sleep is never interrupted by pain.   Section 8 - Sex life (if applicable) Not answered/NA   Section 9 - Social life My social life is normal and causes me no additional pain.   Section 10 - Traveling I can travel anywhere but it gives me additional pain.   Oswestry Score (%) 20       Assessment and Plan  Treatment Observations: Patient relaxed well during treatment  Acupuncture Treatment Recommendations: Acupuncture for diagnosis: M48.061 (ICD-10-CM) - Spinal stenosis of lumbar region, unspecified whether neurogenic claudication present. Patient to schedule treatment 1x/month for 5 additional visits.       It is my recommendation that this patient seek advice from their Primary Care Provider about active symptoms not addressed during this visit. The risks and benefits of acupuncture were reviewed and the patient stated understanding. The patient's questions were answered to their satisfaction. Consent was  provided for treatment. We thank you for the referral and opportunity to treat this patient.    Time Spent with Patient:   I spent a total of 30 minutes face-to-face with Antoni Stoll during today's office visit.     Kamryn White L.Ac.  10/12/23

## 2023-10-12 ENCOUNTER — OFFICE VISIT (OUTPATIENT)
Dept: PALLIATIVE MEDICINE | Facility: OTHER | Age: 88
End: 2023-10-12
Payer: MEDICARE

## 2023-10-12 DIAGNOSIS — M48.061 SPINAL STENOSIS OF LUMBAR REGION, UNSPECIFIED WHETHER NEUROGENIC CLAUDICATION PRESENT: Primary | ICD-10-CM

## 2023-10-12 PROCEDURE — 97811 ACUP 1/> W/O ESTIM EA ADD 15: CPT | Mod: KX | Performed by: ACUPUNCTURIST

## 2023-10-12 PROCEDURE — 97810 ACUP 1/> WO ESTIM 1ST 15 MIN: CPT | Performed by: ACUPUNCTURIST

## 2023-10-12 ASSESSMENT — PAIN SCALES - PAIN ENJOYMENT GENERAL ACTIVITY SCALE (PEG)
AVG_PAIN_PASTWEEK: 3
INTERFERED_ENJOYMENT_LIFE: 3
INTERFERED_GENERAL_ACTIVITY: 1
PEG_TOTALSCORE: 2.33

## 2023-10-13 ENCOUNTER — LAB (OUTPATIENT)
Dept: LAB | Facility: CLINIC | Age: 88
End: 2023-10-13
Attending: INTERNAL MEDICINE
Payer: MEDICARE

## 2023-10-13 DIAGNOSIS — R79.89 ELEVATED LIVER FUNCTION TESTS: ICD-10-CM

## 2023-10-13 PROCEDURE — 84460 ALANINE AMINO (ALT) (SGPT): CPT

## 2023-10-13 PROCEDURE — 36415 COLL VENOUS BLD VENIPUNCTURE: CPT

## 2023-10-13 PROCEDURE — 84450 TRANSFERASE (AST) (SGOT): CPT

## 2023-10-14 LAB
ALT SERPL W P-5'-P-CCNC: 12 U/L (ref 0–70)
AST SERPL W P-5'-P-CCNC: 20 U/L (ref 0–45)

## 2023-11-06 ENCOUNTER — TELEPHONE (OUTPATIENT)
Dept: PALLIATIVE MEDICINE | Facility: OTHER | Age: 88
End: 2023-11-06
Payer: MEDICARE

## 2023-11-06 NOTE — TELEPHONE ENCOUNTER
I left a message for the patient to return Kitty or Licha regarding Acupuncture appts     Please Transfer call     Kitty

## 2023-11-07 ENCOUNTER — OFFICE VISIT (OUTPATIENT)
Dept: PALLIATIVE MEDICINE | Facility: OTHER | Age: 88
End: 2023-11-07
Payer: MEDICARE

## 2023-11-07 DIAGNOSIS — M48.061 SPINAL STENOSIS OF LUMBAR REGION, UNSPECIFIED WHETHER NEUROGENIC CLAUDICATION PRESENT: Primary | ICD-10-CM

## 2023-11-07 PROCEDURE — 97810 ACUP 1/> WO ESTIM 1ST 15 MIN: CPT | Performed by: ACUPUNCTURIST

## 2023-11-07 PROCEDURE — 97811 ACUP 1/> W/O ESTIM EA ADD 15: CPT | Performed by: ACUPUNCTURIST

## 2023-11-07 ASSESSMENT — PAIN SCALES - PAIN ENJOYMENT GENERAL ACTIVITY SCALE (PEG)
AVG_PAIN_PASTWEEK: 3
INTERFERED_GENERAL_ACTIVITY: 0 - DOES NOT INTERFERE
PEG_TOTALSCORE: 1.33
INTERFERED_ENJOYMENT_LIFE: 1

## 2023-11-07 NOTE — TELEPHONE ENCOUNTER
[facility] Call Center    Phone Message    May a detailed message be left on voicemail: yes     Reason for Call: Other: Patient returning a call for Kitty or Licha. Please call back when available.      Action Taken: Other: Pain    Travel Screening: Not Applicable

## 2023-11-08 ENCOUNTER — OFFICE VISIT (OUTPATIENT)
Dept: PHYSICAL MEDICINE AND REHAB | Facility: CLINIC | Age: 88
End: 2023-11-08
Payer: MEDICARE

## 2023-11-08 VITALS — HEART RATE: 64 BPM | DIASTOLIC BLOOD PRESSURE: 60 MMHG | SYSTOLIC BLOOD PRESSURE: 123 MMHG

## 2023-11-08 DIAGNOSIS — M75.121 COMPLETE TEAR OF RIGHT ROTATOR CUFF, UNSPECIFIED WHETHER TRAUMATIC: Primary | ICD-10-CM

## 2023-11-08 DIAGNOSIS — M19.011 OSTEOARTHRITIS OF RIGHT GLENOHUMERAL JOINT: ICD-10-CM

## 2023-11-08 DIAGNOSIS — M48.062 SPINAL STENOSIS OF LUMBAR REGION WITH NEUROGENIC CLAUDICATION: ICD-10-CM

## 2023-11-08 DIAGNOSIS — M41.9 SCOLIOSIS OF THORACOLUMBAR SPINE, UNSPECIFIED SCOLIOSIS TYPE: ICD-10-CM

## 2023-11-08 DIAGNOSIS — G89.29 CHRONIC RIGHT SHOULDER PAIN: ICD-10-CM

## 2023-11-08 DIAGNOSIS — M43.16 SPONDYLOLISTHESIS OF LUMBAR REGION: ICD-10-CM

## 2023-11-08 DIAGNOSIS — M25.511 CHRONIC RIGHT SHOULDER PAIN: ICD-10-CM

## 2023-11-08 PROCEDURE — 99215 OFFICE O/P EST HI 40 MIN: CPT | Performed by: PHYSICAL MEDICINE & REHABILITATION

## 2023-11-08 RX ORDER — PREGABALIN 25 MG/1
CAPSULE ORAL
Qty: 60 CAPSULE | Refills: 1 | Status: SHIPPED | OUTPATIENT
Start: 2023-11-08 | End: 2024-01-30

## 2023-11-08 ASSESSMENT — PAIN SCALES - GENERAL: PAINLEVEL: MILD PAIN (3)

## 2023-11-08 NOTE — LETTER
11/8/2023         RE: Antoni Stoll  [address]  [facility] [address]        Dear Colleague,    Thank you for referring your patient, Antoni Stoll, to the [facility]. Please see a copy of my visit note below.    Assessment/Plan:      Wily was seen today for back pain.    Diagnoses and all orders for this visit:    Complete tear of right rotator cuff, unspecified whether traumatic  -     PAIN Shoulder Block Right; Future  -     PAIN US Guided Peripheral NB; Future    Chronic right shoulder pain  -     PAIN Shoulder Block Right; Future  -     PAIN US Guided Peripheral NB; Future    Osteoarthritis of right glenohumeral joint  -     PAIN Shoulder Block Right; Future  -     PAIN US Guided Peripheral NB; Future    Spinal stenosis of lumbar region with neurogenic claudication  -     MR Thoracic Spine w/o Contrast; Future  -     [facility] Referral; Future  -     pregabalin (LYRICA) 25 MG capsule; TAKE 1 CAPSULE(25 MG) BY MOUTH TWICE DAILY    Spondylolisthesis of lumbar region  -     MR Thoracic Spine w/o Contrast; Future  -     pregabalin (LYRICA) 25 MG capsule; TAKE 1 CAPSULE(25 MG) BY MOUTH TWICE DAILY    Scoliosis of thoracolumbar spine, unspecified scoliosis type  -     MR Thoracic Spine w/o Contrast; Future         Assessment: Pleasant 88 year old male with history of colon cancer, hyperlipidemia, hypertension, carotid artery occlusion, coronary artery disease with:        1.  Worsening of chronic lumbar spine pain with bilateral lower extremity claudication symptoms.    He has right greater than left lower extremity pain and paresthesias related to severe spinal stenosis L4-5 and slightly less at L3-4  with L4-5 spondylolisthesis.  There is tortuosity of the nerve roots.  Pain persist despite physical therapy, medications and lumbar epidurals. bilateral L5-S1 transforaminal epidural steroid injection did offer some relief but only 40 to 50% and that  benefit slowly diminished over 1 month. He continues to have numbness and tingling in his right foot.  He was seen by Dr. Kenny at [facility] orth[facility]ics and is seeing Dr Torres.  He would be a surgical candidate but holding off as long as possible.  Continues with no weakness may be some equivocal of the right great toe extensors.    Symptoms continue to worsen with the lower extremity paresthesias up to the knees can only tolerate maybe 20 minutes of standing.  He does have chronic L5-S1 radicular findings on recent EMG and neurology.  Symptoms had been stable and tolerable on Lyrica 25 mg twice daily without significant side effects but he is somewhat drowsy.  Paresthesias are worsening.  No weakness in the legs on exam today.        2.  Worsening of chronic right shoulder pain.   complete tear of the rotator cuff supraspinatus with retraction of the glenohumeral joint effusion in the past which responded well to glenohumeral joint injection. No improvement with the last injection on September 7, 2022.              Discussion:    1.  He has significant worsening of his right shoulder pain with decreased range of motion consistent with osteoarthritis  as well as chronic rotator cuff tearing.  Injections have not been helpful long-term and we discussed options of cool RF.  We also discussed spinal cord stimulator trial for his lumbar spine as he has exhausted conservative management of physical therapy, medications, is not surgical per neurosurgery, acupuncture has been tried as well.    2.  Recommend diagnostic blocks for the right shoulder proceeding to cool RF as indicated.  Dr. Van will do this.    3.  We will obtain an MRI of the thoracic spine in preparation for potential spinal cord stimulator trial.    4.  See behavioral healthNikia for spinal cord stimulator eval and a note has been sent in epic.    5.  Handout has been provided for cool RF and spinal cord stimulator.    6.  Continue with  Lyrica 25 mg twice daily.  With a goal of decreasing this once spinal cord stimulator is implanted if he does well with the trial.    7.  Follow-up at his earliest convenience for injection      It was our pleasure caring for your patient today, if there any questions or concerns please do not hesitate to contact us.    Over 40 minutes were spent on the date of the encounter performing chart review, patient visit and documentation in addition to any procedure.      Subjective:   Patient ID: Antoni Stoll is a 88 year old male.    History of Present Illness: Patient presents for follow-up of right shoulder pain as well as lumbar spine pain with progressive right greater than left lower extremity paresthesias leg pain and difficulty walking.  Right shoulder pain has persisted worse with any movement or reaching of the right shoulder.  Prior right shoulder injection initially was helpful and the second injection was not.  Pain is within the right shoulder itself.    Also has progressed bilateral low back pain and gluteal pain into the posterior legs to the knees and numbness and tingling both feet right is progressing up to the mid tibias.  Worse with standing walking better with sitting.  Pain is a 5/10 at worst 3/10 today 2/10 at best.  Taking Lyrica 25 mg twice daily is drowsy unsure if it is her medication or not feels it is helpful for pain.  Has been seen by Dr. Mac.  I reviewed the note from September 2022.  He did not feel patient would be a surgical candidate recommended nonsurgical treatment given the severity of his spinal stenosis as well as the scoliosis.  Has been doing acupuncture since that time feels that it may be minimally beneficial but it is being terminated through [facility] and he is not sure he is going to continue this on his own.  Wondering if there are any   other options for his pain.      Imaging: Lumbar plain films December 2022 personally reviewed along with the report lumbar  scoliotic curve with retrolisthesis of L2 and L3 anterior listhesis of L4 on L5 measuring 6 mm.  No definite compression fractures noted at that time.  Hips are unremarkable    MRI right shoulder 2020 reveals rotator cuff tearing full-thickness and glenohumeral joint effusion    Review of Systems: Pertinent positives: Paresthesias and weakness in the legs.  Pertinent negatives: No numbness, tingling or weakness.  No bowel or bladder incontinence.  No urinary retention.  No fevers, unintentional weight loss, balance changes, headaches, frequent falling, difficulty swallowing, or coordination difficulties.  All others reviewed are negative.           Past Medical History:   Diagnosis Date     Anemia      Basal cell carcinoma      Cancer (H)     colon     Carotid artery occlusion      Coronary artery disease      Glaucoma      High cholesterol      History of colon cancer 08/13/2018     Hyperlipidemia      Hypertension      Left inguinal hernia      Low back pain      Malignant melanoma (H)      Mild aortic insufficiency      Moderate tricuspid regurgitation      Postnasal drip      Sleep apnea, obstructive     CPAP     Spinal stenosis     lumbar     SSS (sick sinus syndrome) (H) 12/09/2019     Umbilical hernia      Unspecified cataract     Created by Conversion        The following portions of the patient's history were reviewed and updated as appropriate: allergies, current medications, past family history, past medical history, past social history, past surgical history and problem list.           Objective:   Physical Exam:    /60   Pulse 64   There is no height or weight on file to calculate BMI.      General: Alert and oriented with normal affect. Attention, knowledge, memory, and language are intact. No acute distress.   Eyes: Sclerae are clear.  Respirations: Unlabored. CV: No lower extremity edema.     Decreased range of motion right shoulder internal/external rotation.  Sensation is intact to light  touch throughout the   lower extremities.   .    Manual muscle testing reveals:  Right /Left out of 5     5/5 hip flexors  5/5 knee flexors  5/5 knee extensors  5/5 ankle plantar flexors  5/5 ankle dorsiflexors  5/5    ankle evertors      Again, thank you for allowing me to participate in the care of your patient.        Sincerely,        Orlin Mir, DO

## 2023-11-08 NOTE — PATIENT INSTRUCTIONS
A right shoulder injection has been ordered today. Please schedule this injection at least 2 weeks from now to allow time for insurance prior authorization. On the day of your injection, you cannot be sick or taking antibiotics. If you become sick and are prescribed, please call the clinic so your injection can be rescheduled for once you have completed your antibiotics. You will need to bring a  with you for your injection. If you have any questions or concerns prior to your injection, please do not hesitate to call the nurse navigation line at [phone].   An MRI was ordered for you today.  You will be contacted by scheduling within 3 days.    If you are not contacted, please call Radiology at [phone].    Continue with pregabalin 25 mg twice daily  See Behavioral health for a spinal cord stimulator daisy

## 2023-11-08 NOTE — PROGRESS NOTES
Assessment/Plan:      Wily was seen today for back pain.    Diagnoses and all orders for this visit:    Complete tear of right rotator cuff, unspecified whether traumatic  -     PAIN Shoulder Block Right; Future  -     PAIN US Guided Peripheral NB; Future    Chronic right shoulder pain  -     PAIN Shoulder Block Right; Future  -     PAIN US Guided Peripheral NB; Future    Osteoarthritis of right glenohumeral joint  -     PAIN Shoulder Block Right; Future  -     PAIN US Guided Peripheral NB; Future    Spinal stenosis of lumbar region with neurogenic claudication  -     MR Thoracic Spine w/o Contrast; Future  -     [facility]  Referral; Future  -     pregabalin (LYRICA) 25 MG capsule; TAKE 1 CAPSULE(25 MG) BY MOUTH TWICE DAILY    Spondylolisthesis of lumbar region  -     MR Thoracic Spine w/o Contrast; Future  -     pregabalin (LYRICA) 25 MG capsule; TAKE 1 CAPSULE(25 MG) BY MOUTH TWICE DAILY    Scoliosis of thoracolumbar spine, unspecified scoliosis type  -     MR Thoracic Spine w/o Contrast; Future         Assessment: Pleasant 88 year old male with history of colon cancer, hyperlipidemia, hypertension, carotid artery occlusion, coronary artery disease with:        1.  Worsening of chronic lumbar spine pain with bilateral lower extremity claudication symptoms.    He has right greater than left lower extremity pain and paresthesias related to severe spinal stenosis L4-5 and slightly less at L3-4  with L4-5 spondylolisthesis.  There is tortuosity of the nerve roots.  Pain persist despite physical therapy, medications and lumbar epidurals. bilateral L5-S1 transforaminal epidural steroid injection did offer some relief but only 40 to 50% and that benefit slowly diminished over 1 month. He continues to have numbness and tingling in his right foot.  He was seen by Dr. Kenny at [facility] orthopedics and is seeing Dr Torres.  He would be a surgical candidate but holding off as long as possible.   Continues with no weakness may be some equivocal of the right great toe extensors.    Symptoms continue to worsen with the lower extremity paresthesias up to the knees can only tolerate maybe 20 minutes of standing.  He does have chronic L5-S1 radicular findings on recent EMG and neurology.  Symptoms had been stable and tolerable on Lyrica 25 mg twice daily without significant side effects but he is somewhat drowsy.  Paresthesias are worsening.  No weakness in the legs on exam today.        2.  Worsening of chronic right shoulder pain.   complete tear of the rotator cuff supraspinatus with retraction of the glenohumeral joint effusion in the past which responded well to glenohumeral joint injection. No improvement with the last injection on September 7, 2022.              Discussion:    1.  He has significant worsening of his right shoulder pain with decreased range of motion consistent with osteoarthritis  as well as chronic rotator cuff tearing.  Injections have not been helpful long-term and we discussed options of cool RF.  We also discussed spinal cord stimulator trial for his lumbar spine as he has exhausted conservative management of physical therapy, medications, is not surgical per neurosurgery, acupuncture has been tried as well.    2.  Recommend diagnostic blocks for the right shoulder proceeding to cool RF as indicated.  Dr. Van will do this.    3.  We will obtain an MRI of the thoracic spine in preparation for potential spinal cord stimulator trial.    4.  See behavioral Nikia hernandez for spinal cord stimulator eval and a note has been sent in epic.    5.  Handout has been provided for cool RF and spinal cord stimulator.    6.  Continue with Lyrica 25 mg twice daily.  With a goal of decreasing this once spinal cord stimulator is implanted if he does well with the trial.    7.  Follow-up at his earliest convenience for injection      It was our pleasure caring for your patient today, if there  any questions or concerns please do not hesitate to contact us.    Over 40 minutes were spent on the date of the encounter performing chart review, patient visit and documentation in addition to any procedure.      Subjective:   Patient ID: Antoni Stoll is a 88 year old male.    History of Present Illness: Patient presents for follow-up of right shoulder pain as well as lumbar spine pain with progressive right greater than left lower extremity paresthesias leg pain and difficulty walking.  Right shoulder pain has persisted worse with any movement or reaching of the right shoulder.  Prior right shoulder injection initially was helpful and the second injection was not.  Pain is within the right shoulder itself.    Also has progressed bilateral low back pain and gluteal pain into the posterior legs to the knees and numbness and tingling both feet right is progressing up to the mid tibias.  Worse with standing walking better with sitting.  Pain is a 5/10 at worst 3/10 today 2/10 at best.  Taking Lyrica 25 mg twice daily is drowsy unsure if it is her medication or not feels it is helpful for pain.  Has been seen by Dr. Mac.  I reviewed the note from September 2022.  He did not feel patient would be a surgical candidate recommended nonsurgical treatment given the severity of his spinal stenosis as well as the scoliosis.  Has been doing acupuncture since that time feels that it may be minimally beneficial but it is being terminated through  [employer] [place] and he is not sure he is going to continue this on his own.  Wondering if there are any   other options for his pain.      Imaging: Lumbar plain films December 2022 personally reviewed along with the report lumbar scoliotic curve with retrolisthesis of L2 and L3 anterior listhesis of L4 on L5 measuring 6 mm.  No definite compression fractures noted at that time.  Hips are unremarkable    MRI right shoulder 2020 reveals rotator cuff tearing full-thickness and  glenohumeral joint effusion    Review of Systems: Pertinent positives: Paresthesias and weakness in the legs.  Pertinent negatives: No numbness, tingling or weakness.  No bowel or bladder incontinence.  No urinary retention.  No fevers, unintentional weight loss, balance changes, headaches, frequent falling, difficulty swallowing, or coordination difficulties.  All others reviewed are negative.           Past Medical History:   Diagnosis Date    Anemia     Basal cell carcinoma     Cancer (H)     colon    Carotid artery occlusion     Coronary artery disease     Glaucoma     High cholesterol     History of colon cancer 08/13/2018    Hyperlipidemia     Hypertension     Left inguinal hernia     Low back pain     Malignant melanoma (H)     Mild aortic insufficiency     Moderate tricuspid regurgitation     Postnasal drip     Sleep apnea, obstructive     CPAP    Spinal stenosis     lumbar    SSS (sick sinus syndrome) (H) 12/09/2019    Umbilical hernia     Unspecified cataract     Created by Conversion        The following portions of the patient's history were reviewed and updated as appropriate: allergies, current medications, past family history, past medical history, past social history, past surgical history and problem list.           Objective:   Physical Exam:    /60   Pulse 64   There is no height or weight on file to calculate BMI.      General: Alert and oriented with normal affect. Attention, knowledge, memory, and language are intact. No acute distress.   Eyes: Sclerae are clear.  Respirations: Unlabored. CV: No lower extremity edema.     Decreased range of motion right shoulder internal/external rotation.  Sensation is intact to light touch throughout the   lower extremities.   .    Manual muscle testing reveals:  Right /Left out of 5     5/5 hip flexors  5/5 knee flexors  5/5 knee extensors  5/5 ankle plantar flexors  5/5 ankle dorsiflexors  5/5    ankle evertors

## 2023-11-10 ASSESSMENT — PAIN SCALES - PAIN ENJOYMENT GENERAL ACTIVITY SCALE (PEG)
INTERFERED_ENJOYMENT_LIFE: 1
INTERFERED_GENERAL_ACTIVITY: 1
AVG_PAIN_PASTWEEK: 3
PEG_TOTALSCORE: 1.67

## 2023-11-15 ENCOUNTER — OFFICE VISIT (OUTPATIENT)
Dept: PALLIATIVE MEDICINE | Facility: OTHER | Age: 88
End: 2023-11-15
Payer: MEDICARE

## 2023-11-15 DIAGNOSIS — M48.061 SPINAL STENOSIS OF LUMBAR REGION, UNSPECIFIED WHETHER NEUROGENIC CLAUDICATION PRESENT: Primary | ICD-10-CM

## 2023-11-15 PROCEDURE — 97811 ACUP 1/> W/O ESTIM EA ADD 15: CPT | Performed by: ACUPUNCTURIST

## 2023-11-15 PROCEDURE — 97810 ACUP 1/> WO ESTIM 1ST 15 MIN: CPT | Performed by: ACUPUNCTURIST

## 2023-11-15 NOTE — PROGRESS NOTES
ACUPUNCTURIST TREATMENT NOTE      Antoni Stoll, a 88 year old male, is here today for Follow - Up exam. Patient is referred by Juan Antonio.    HPI  Main Complaint: Chronic pain of low back  Secondary Complaints: Chronic neck pain    Past Medical History  Past Medical History Reviewed: Yes   has a past medical history of Anemia, Basal cell carcinoma, Cancer (H), Carotid artery occlusion, Coronary artery disease, Glaucoma, High cholesterol, History of colon cancer (08/13/2018), Hyperlipidemia, Hypertension, Left inguinal hernia, Low back pain, Malignant melanoma (H), Mild aortic insufficiency, Moderate tricuspid regurgitation, Postnasal drip, Sleep apnea, obstructive, Spinal stenosis, SSS (sick sinus syndrome) (H) (12/09/2019), Umbilical hernia, and Unspecified cataract.    Objective    TCM Exam Completed: Yes  Limbs/Back: Upper and Lower Back    Tongue/Pulse Exam Completed: No    Patient Assessment  Patient Type: Pain  Patient Complaint: Chronic pain of neck and low back        11/7/2023     3:00 PM 11/15/2023    10:00 AM   Acupuncture   Intervention Reason Pain,Pain 2,Neuropathy Pain,Pain 2   Pain Location low back low back   Pre-session Pain Rating 2 4   Post-session Pain Rating  2   Pain 2 Location neck neck   Pre-session Pain 2 Rating 1 3   Post-session Pain 2 Rating  1   Neuropathy Location right foot    Patient complaint: Chronic pain in low back, neck, neuropathy in right foot Chronic pain of neck and low back   Initial insertions Baihui, Gb20, Ub23, Ub52, Shiqizhuixia, Yaoyan Gb 20, 21, 3 emperors, Bl 60, 62, 63   Second insertions HTJJ L2-L5, Ub31, Gb34, Ub57, Ub60, Ub62, Ki3, Gb41, Sp3 Thom @ L 2, 3, 4, 5, Bl 23, 31, 32, 54, Cody vaibhav   Number of needles inserted  34   Number of needles removed  34   Accessory Techniques TDP Heat Lamp TDP Heat Lamp   TDP Heat Lamp location used low back low back   TCM Diagnosis Qi Stagnation,Blood Stagnation,Qi Deficiency,Blood Deficiency,Yin Deficiency,Kidney Qi  Deficiency,Other Qi Stagnation,Blood Stagnation,Qi Deficiency,Blood Deficiency,Yin Deficiency,Kidney Qi Deficiency,Other   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 Patient to schedule treatment 1x/month for 5 additional visits. Acupuncture for diagnosis: M48.061 (ICD-10-CM) - Spinal stenosis of lumbar region, unspecified whether neurogenic claudication present. Patient to schedule treatment 1x/month for 5 additional visits.       TCM Diagnosis: Qi Stagnation;Blood Stagnation;Qi Deficiency;Blood Deficiency;Yin Deficiency;Kidney Qi Deficiency;Other Melvin Yang Channel obstruction    Treatment Principle: unblock channels, move qi and blood, nourish and stop pain    TCM / Acupuncture Treatment  Acupuncture Points:       Initial insertions: Gb 20, 21, 3 emperors, Bl 60, 62, 63       Second insertions: [place] @ L 2, 3, 4, 5, Bl 23, 31, 32, 54, Cody vaibhav            Number of needles inserted: 34  Number of needles removed: 34          9/8/2022     2:00 PM   Oswestry Disability Index ([employer]   Ricky Harrison 1980, All rights reserved)   Section 1 - Pain intensity The pain is very mild at the moment.   Section 2 - Personal care (washing, dressing, etc.)  I can look after myself normally without causing additional pain.   Section 3 - Lifting Pain prevents me from lifting heavy weights off the floor but I can manage if they are conveniently positioned, e.g. on a table.   Section 4 - Walking Pain prevents me from walking more than a quarter of a mile.   Section 5 - Sitting I can sit in my favorite chair as long as I like.    Section 6 - Standing Pain prevents me from standing for more than 1 hour.   Section 7 - Sleeping My sleep is never interrupted by pain.   Section 8 - Sex life (if applicable) Not answered/NA   Section 9 - Social life My social life is normal and causes me no additional pain.   Section 10 - Traveling I can travel anywhere but it gives me additional pain.   Oswestry Score (%) 20       Assessment and Plan  Treatment Observations:    Acupuncture Treatment Recommendations: Acupuncture for diagnosis: M48.061 (ICD-10-CM) - Spinal stenosis of lumbar region, unspecified whether neurogenic claudication present. Patient to schedule treatment 1x/month for 5 additional visits.       It is my recommendation that this patient seek advice from their Primary Care Provider about active symptoms not addressed during this visit. The risks and benefits of acupuncture were reviewed and the patient stated understanding. The patient's questions were answered to their satisfaction. Consent was provided for treatment. We thank you for the referral and opportunity to treat this patient.    Time Spent with Patient:   I spent a total of 30 minutes face-to-face with Antoni Stoll during today's office visit.     Nikolas Taveras

## 2023-11-18 ASSESSMENT — ANXIETY QUESTIONNAIRES
GAD7 TOTAL SCORE: 0
4. TROUBLE RELAXING: NOT AT ALL
3. WORRYING TOO MUCH ABOUT DIFFERENT THINGS: NOT AT ALL
7. FEELING AFRAID AS IF SOMETHING AWFUL MIGHT HAPPEN: NOT AT ALL
6. BECOMING EASILY ANNOYED OR IRRITABLE: NOT AT ALL
GAD7 TOTAL SCORE: 0
IF YOU CHECKED OFF ANY PROBLEMS ON THIS QUESTIONNAIRE, HOW DIFFICULT HAVE THESE PROBLEMS MADE IT FOR YOU TO DO YOUR WORK, TAKE CARE OF THINGS AT HOME, OR GET ALONG WITH OTHER PEOPLE: NOT DIFFICULT AT ALL
2. NOT BEING ABLE TO STOP OR CONTROL WORRYING: NOT AT ALL
5. BEING SO RESTLESS THAT IT IS HARD TO SIT STILL: NOT AT ALL
1. FEELING NERVOUS, ANXIOUS, OR ON EDGE: NOT AT ALL

## 2023-11-18 ASSESSMENT — PATIENT HEALTH QUESTIONNAIRE - PHQ9
SUM OF ALL RESPONSES TO PHQ QUESTIONS 1-9: 1
10. IF YOU CHECKED OFF ANY PROBLEMS, HOW DIFFICULT HAVE THESE PROBLEMS MADE IT FOR YOU TO DO YOUR WORK, TAKE CARE OF THINGS AT HOME, OR GET ALONG WITH OTHER PEOPLE: NOT DIFFICULT AT ALL
SUM OF ALL RESPONSES TO PHQ QUESTIONS 1-9: 1

## 2023-11-18 ASSESSMENT — PAIN SCALES - PAIN ENJOYMENT GENERAL ACTIVITY SCALE (PEG)
PEG_TOTALSCORE: 2
INTERFERED_ENJOYMENT_LIFE: 1
AVG_PAIN_PASTWEEK: 4
INTERFERED_GENERAL_ACTIVITY: 1

## 2023-11-20 ENCOUNTER — OFFICE VISIT (OUTPATIENT)
Dept: PALLIATIVE MEDICINE | Facility: OTHER | Age: 88
End: 2023-11-20
Attending: PHYSICAL MEDICINE & REHABILITATION
Payer: MEDICARE

## 2023-11-20 DIAGNOSIS — G89.4 CHRONIC PAIN SYNDROME: Primary | ICD-10-CM

## 2023-11-20 PROCEDURE — 90791 PSYCH DIAGNOSTIC EVALUATION: CPT | Performed by: SOCIAL WORKER

## 2023-11-20 NOTE — PROGRESS NOTES
ACUPUNCTURIST TREATMENT NOTE      Antoni Stoll, a 88 year old male, is here today for Follow - Up exam. Patient is referred by Juan Antonio.    HPI  Main Complaint: Chronic pain of neck and low back, numbness of feet:        Continues to feel about the same, mostly mild pain levels. Continues with numbness in R foot.    Past Medical History  Past Medical History Reviewed: Yes   has a past medical history of Anemia, Basal cell carcinoma, Cancer (H), Carotid artery occlusion, Coronary artery disease, Glaucoma, High cholesterol, History of colon cancer (08/13/2018), Hyperlipidemia, Hypertension, Left inguinal hernia, Low back pain, Malignant melanoma (H), Mild aortic insufficiency, Moderate tricuspid regurgitation, Postnasal drip, Sleep apnea, obstructive, Spinal stenosis, SSS (sick sinus syndrome) (H) (12/09/2019), Umbilical hernia, and Unspecified cataract.    Objective    TCM Exam Completed: Yes  Limbs/Back: Right Lower Extremity, Lower Back, and neck    Tongue/Pulse Exam Completed: No    Patient Assessment    PEG: A Three-Item Scale Assessing Pain Intensity and Interference    What number best describes your PAIN ON AVERAGE in the past week? 4    What number best describes how, during the past week, pain has interfered with your ENJOYMENT OF LIFE? 1    What number best describes how, during the past week, pain has interfered with your GENERAL ACTIVITY? 1    PEG Total Score: 2    Keo EE, Cesar KA, Jayjay MJ, Fannie ARANGO, Napoleon J, Andreas JM, Susanne SM, Angelica K. Development and initial validation of the PEG, a 3-item scale assessing pain intensity and interference. Journal of General Internal Medicine. 2009 Jun;24:733-738.  Patient Type: Pain  Patient Complaint: Chronic pain of neck and low back        11/15/2023    10:00 AM 11/21/2023     8:00 AM   Acupuncture   Intervention Reason Pain,Pain 2 Pain,Pain 2   Pain Location low back low back   Pre-session Pain Rating 4 4   Post-session Pain Rating 2    Pain 2 Location neck  neck   Pre-session Pain 2 Rating 3 3   Post-session Pain 2 Rating 1    Patient complaint: Chronic pain of neck and low back Chronic pain of neck and low back   Initial insertions Gb 20, 21, 3 emperors, Bl 60, 62, 63 Baihui, Gb20, Ub23, Ub52, Shiqizhutayloria, Yaoyan   Second insertions Thom @ L 2, 3, 4, 5, Bl 23, 31, 32, 54, Cody vaihbav HTJJ L2-L5, Ub31, Gb34, Ub57, Ub60, Ki3, Liv3, Gb41, Sp3   Number of needles inserted 34    Number of needles removed 34    Accessory Techniques TDP Heat Lamp TDP Heat Lamp,Tuina,Topicals   TDP Heat Lamp location used low back low back   Tuina location used  low back and neck   Topicals  Po Sum On   Topicals location used  low back and neck   TCM Diagnosis Qi Stagnation,Blood Stagnation,Qi Deficiency,Blood Deficiency,Yin Deficiency,Kidney Qi Deficiency,Other Qi Stagnation,Blood Stagnation,Qi Deficiency,Blood Deficiency,Yin Deficiency,Kidney Qi Deficiency,Other   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 Patient to schedule treatment 1x/month for 5 additional visits. Acupuncture for diagnosis: M48.061 (ICD-10-CM) - Spinal stenosis of lumbar region, unspecified whether neurogenic claudication present. Patient to schedule treatment 1x/month for 5 additional visits.       TCM Diagnosis: Qi Stagnation;Blood Stagnation;Qi Deficiency;Blood Deficiency;Yin Deficiency;Kidney Qi Deficiency;Other Melvin Yang Channel obstruction    Treatment Principle: unblock channels, move qi and blood, nourish and stop pain    TCM / Acupuncture Treatment  Acupuncture Points:       Initial insertions: Baiamyi,  Gb20, Ub23, Ub52, Chloezmartha, Yaoyan       Second insertions: HTJJ L2-L5, Ub31, Gb34, Ub57, Ub60, Ki3, Liv3, Gb41, Sp3                          9/8/2022     2:00 PM   Oswestry Disability Index ([employer]   [employer] 1980, All rights reserved)   Section 1 - Pain intensity The pain is very mild at the moment.   Section 2 - Personal care (washing, dressing, etc.)  I can look after myself normally without causing additional pain.   Section 3 - Lifting Pain prevents me from lifting heavy weights off the floor but I can manage if they are conveniently positioned, e.g. on a table.   Section 4 - Walking Pain prevents me from walking more than a quarter of a mile.   Section 5 - Sitting I can sit in my favorite chair as long as I like.   Section 6 - Standing Pain prevents me from standing for more than 1 hour.   Section 7 - Sleeping My sleep is never interrupted by pain.   Section 8 - Sex life (if applicable) Not answered/NA   Section 9 - Social life My social life is normal and causes me no additional pain.   Section 10 - Traveling I can travel anywhere but it gives me additional pain.   Oswestry Score (%) 20       Assessment and Plan  Treatment Observations: Patient relaxed well during treatment  Acupuncture Treatment Recommendations: Acupuncture for diagnosis: M48.061 (ICD-10-CM) - Spinal stenosis of lumbar region, unspecified whether neurogenic claudication present. Patient to schedule treatment 1x/month for 5 additional visits.       It is my recommendation that this patient seek advice from their Primary Care Provider about active symptoms not addressed during this visit. The risks and benefits of acupuncture were reviewed and the patient stated understanding. The patient's questions were answered to their satisfaction. Consent was provided for treatment. We thank you for the referral and opportunity to treat this patient.    Time Spent with Patient:   I spent a total of 30 minutes face-to-face with Antoni Stoll  during today's office visit.     Kamryn White L.Ac.

## 2023-11-21 ENCOUNTER — OFFICE VISIT (OUTPATIENT)
Dept: PALLIATIVE MEDICINE | Facility: OTHER | Age: 88
End: 2023-11-21
Attending: INTERNAL MEDICINE
Payer: MEDICARE

## 2023-11-21 ENCOUNTER — TELEPHONE (OUTPATIENT)
Dept: PHYSICAL MEDICINE AND REHAB | Facility: CLINIC | Age: 88
End: 2023-11-21

## 2023-11-21 DIAGNOSIS — M54.50 LUMBAR SPINE PAIN: ICD-10-CM

## 2023-11-21 DIAGNOSIS — M48.061 SPINAL STENOSIS OF LUMBAR REGION, UNSPECIFIED WHETHER NEUROGENIC CLAUDICATION PRESENT: Primary | ICD-10-CM

## 2023-11-21 DIAGNOSIS — M43.16 SPONDYLOLISTHESIS OF LUMBAR REGION: ICD-10-CM

## 2023-11-21 DIAGNOSIS — M41.9 SCOLIOSIS OF THORACOLUMBAR SPINE, UNSPECIFIED SCOLIOSIS TYPE: ICD-10-CM

## 2023-11-21 DIAGNOSIS — M48.062 SPINAL STENOSIS OF LUMBAR REGION WITH NEUROGENIC CLAUDICATION: Primary | ICD-10-CM

## 2023-11-21 PROCEDURE — 97810 ACUP 1/> WO ESTIM 1ST 15 MIN: CPT | Performed by: ACUPUNCTURIST

## 2023-11-21 PROCEDURE — 97811 ACUP 1/> W/O ESTIM EA ADD 15: CPT | Mod: KX | Performed by: ACUPUNCTURIST

## 2023-11-21 ASSESSMENT — PAIN SCALES - PAIN ENJOYMENT GENERAL ACTIVITY SCALE (PEG)
AVG_PAIN_PASTWEEK: 4
PEG_TOTALSCORE: 2
INTERFERED_ENJOYMENT_LIFE: 1
INTERFERED_GENERAL_ACTIVITY: 1

## 2023-11-21 NOTE — PROGRESS NOTES
"[facility] [place]      PATIENT'S NAME: Antoni Stoll  PREFERRED NAME: Wily  PRONOUNS:       MRN: [id number]  : 1935  ADDRESS: [address] [facility] [address] [address]  ACCT. NUMBER:  [id number]  DATE OF SERVICE: 23  START TIME: 1500  END TIME: 1545  PREFERRED PHONE: [phone]   May we leave a program related message: Yes  EMERGENCY CONTACT: was not obtained   .  SERVICE MODALITY:  In-person    [facility] ADULT Mental Health DIAGNOSTIC ASSESSMENT    Identifying Information:  Patient is a 88 year old,  individual.  Patient was referred for an assessment by  [facility]/referring provider.  Patient attended the session alone.    Chief Complaint:   The reason for seeking services at this time is: \"Back pain\".  The problem(s) began 10/28/23.  Pt attended diagnostic assessment as part of the Spinal Cord Stimulator trial approval process.  Pt has been dealing with chronic pain for many years and has tried multiple medical modalities with minimal relief.     Social/Family History:  Patient reported they grew up in other [place], [place].  They were raised by biological parents  .  Parents were always together.  Patient reported that their childhood was good.  Patient described their current relationships with family of origin as okay.     The patient describes their cultural background as .  Cultural influences and impact on patient's life structure, values, norms, and healthcare: Grew up in [employer] environment attending [employer] regularly..  Contextual influences on patient's health include: Contextual Factors: Individual Factors   .    These factors will be addressed in the Preliminary Treatment plan. Patient identified their preferred language to be English. Patient reported they does not need the assistance of an  or other support involved in therapy.     Patient reported had no significant delays in developmental tasks.   Patient's highest " education level was graduate school  .  Patient identified the following learning problems: none reported.  Modifications will not be used to assist communication in therapy.   Patient reports they are  able to understand written materials.     Patient's current relationship status is single for many years.   Patient identified their sexual orientation as bi-sexual.  Patient reported having   no child(daiana). Patient identified friends; other as part of their support system.  Patient identified the quality of these relationships as good,  .      Patient's current living/housing situation involves staying in own home/apartment.  The immediate members of family and household include Antoni Stoll, ,Father and they report that housing is stable. Pt is in the process of moving to an independent living facility.     Patient is currently retired.  Patient reports their finances are obtained through other. Patient does not identify finances as a current stressor.      Patient reported that they have not been involved with the legal system.     . Patient does not report being under probation/ parole/ jurisdiction. They are not under any current court jurisdiction. .    Patient's Strengths and Limitations:  Patient identified the following strengths or resources that will help them succeed in treatment: friends / good social support, insight, intelligence, and work ethic. Things that may interfere with the patient's success in treatment include: physical health concerns.     Assessments:  The following assessments were completed by patient for this visit:  PHQ9:       2023     8:38 PM   PHQ-9 SCORE   PHQ-9 Total Score MyChart 1 (Minimal depression)   PHQ-9 Total Score 1     GAD7:       2023     8:40 PM   ISIDRO-7 SCORE   Total Score 0 (minimal anxiety)   Total Score 0     CAGE-AID:       2023     9:43 PM   CAGE-AID Total Score   Total Score 0   Total Score MyChart 0 (A total score of 2 or greater is  considered clinically significant)     PROMIS 10-Global Health (only subscores and total score):       11/18/2023     9:39 PM   PROMIS-10 Scores Only   Global Mental Health Score 19   Global Physical Health Score 13   PROMIS TOTAL - SUBSCORES 32     [place] Suicide Severity Rating Scale (Lifetime/Recent)       No data to display                Personal and Family Medical History:  Patient does not report a family history of mental health concerns.  Patient reports family history includes Colon Cancer in his father; Coronary Artery Disease in his father and mother..     Patient does not report Mental Health Diagnosis or Treatment.      Patient has had a physical exam to rule out medical causes for current symptoms.  Date of last physical exam was within the past year. Client was encouraged to follow up with PCP if symptoms were to develop. The patient has a [facility] Primary Care Provider, who is named Sarthak Romano.  Patient reports the following current medical concerns: see medical chart .  Patient reports pain concerns including back pain.  Patient does want help addressing pain concerns..   There are not significant appetite / nutritional concerns / weight changes.   Patient does not report a history of head injury / trauma / cognitive impairment.         Current Outpatient Medications:     acetaminophen (TYLENOL) 500 MG tablet, [ACETAMINOPHEN (TYLENOL) 500 MG TABLET] Take 1,000 mg by mouth daily .      , Disp: , Rfl:     aspirin 325 MG tablet, [ASPIRIN 325 MG TABLET] Take 325 mg by mouth daily., Disp: , Rfl:     atorvastatin (LIPITOR) 20 MG tablet, Take 1 tablet (20 mg) by mouth daily, Disp: 90 tablet, Rfl: 3    azelastine (ASTELIN) 0.1 % nasal spray, 2 sprays each nostril 1-2x daily as needed for nasal congestion, Disp: 30 mL, Rfl: 11    B2/VITS A,C,E/LUT/ZEAXANTH/MIN (ICAPS ORAL), [B2/VITS A,C,E/LUT/ZEAXANTH/MIN (ICAPS ORAL)] Take 1 capsule by mouth daily., Disp: , Rfl:     calcium polycarbophil (FIBERCON)  625 mg tablet, [CALCIUM POLYCARBOPHIL (FIBERCON) 625 MG TABLET] Take 1,250 mg by mouth daily., Disp: , Rfl:     cholecalciferol, vitamin D3, 1,000 unit tablet, [CHOLECALCIFEROL, VITAMIN D3, 1,000 UNIT TABLET] Take 1,000 Units by mouth daily., Disp: , Rfl:     fluticasone (FLONASE) 50 MCG/ACT nasal spray, , Disp: , Rfl:     glucosamine-chondroitin 500-400 mg cap, [GLUCOSAMINE-CHONDROITIN 500-400 MG CAP] Take 1 capsule by mouth 2 (two) times a day., Disp: , Rfl:     latanoprost (XALATAN) 0.005 % ophthalmic solution, Place 1 drop into both eyes At Bedtime, Disp: , Rfl:     lisinopril (ZESTRIL) 2.5 MG tablet, [LISINOPRIL (PRINIVIL,ZESTRIL) 2.5 MG TABLET] TAKE 1 TABLET BY MOUTH EVERY DAY, Disp: 90 tablet, Rfl: 3    loratadine 10 MG capsule, Take 10 mg by mouth daily, Disp: , Rfl:     nystatin (MYCOSTATIN) 763431 UNIT/GM external powder, Apply to affected areas twice a day if flare of rash, Disp: 60 g, Rfl: 3    pregabalin (LYRICA) 25 MG capsule, TAKE 1 CAPSULE(25 MG) BY MOUTH TWICE DAILY, Disp: 60 capsule, Rfl: 1    saw palmetto fruit 450 mg cap, [SAW PALMETTO FRUIT 450 MG CAP] Take 1 capsule by mouth 2 (two) times a day., Disp: , Rfl:     tamsulosin (FLOMAX) 0.4 mg Cp24, [TAMSULOSIN (FLOMAX) 0.4 MG CP24] Take 0.4 mg by mouth at bedtime., Disp: , Rfl:       Medication Adherence:  Patient reports taking.  taking prescribed medications as prescribed.    Patient Allergies:  No Known Allergies    Medical History:    Past Medical History:   Diagnosis Date    Anemia     Basal cell carcinoma     Cancer (H)     colon    Carotid artery occlusion     Coronary artery disease     Glaucoma     High cholesterol     History of colon cancer 08/13/2018    Hyperlipidemia     Hypertension     Left inguinal hernia     Low back pain     Malignant melanoma (H)     Mild aortic insufficiency     Moderate tricuspid regurgitation     Postnasal drip     Sleep apnea, obstructive     CPAP    Spinal stenosis     lumbar    SSS (sick sinus syndrome)  (H) 12/09/2019    Umbilical hernia     Unspecified cataract     Created by Conversion          Current Mental Status Exam:   Appearance:  Appropriate    Eye Contact:  Good   Psychomotor:  Normal       Gait / station:  slow  Attitude / Demeanor: Cooperative   Speech      Rate / Production: Normal/ Responsive      Volume:  Normal  volume      Language:  intact  Mood:   Normal  Affect:   Appropriate    Thought Content: Clear   Thought Process: Coherent       Associations: No loosening of associations  Insight:   Good   Judgment:  Intact   Orientation:  All  Attention/concentration: Good    Substance Use:   Patient did not report a family history of substance use concerns; see medical history section for details.  Patient has not received chemical dependency treatment in the past.  Patient has not ever been to detox.      Patient is not currently receiving any chemical dependency treatment.           Substance History of use Age of first use Date of last use     Pattern and duration of use (include amounts and frequency)   Alcohol currently use   23 11/16/23 1 glass of wine, 1-3x per week.   Cannabis   used in the past 88 09/30/23 As prescribed     Amphetamines   never used     REPORTS SUBSTANCE USE: N/A   Cocaine/crack    never used       REPORTS SUBSTANCE USE: N/A   Hallucinogens never used         REPORTS SUBSTANCE USE: N/A   Inhalants Never used    NA REPORTS SUBSTANCE USE: N/A   Heroin never used         REPORTS SUBSTANCE USE: N/A   Other Opiates never used     REPORTS SUBSTANCE USE: N/A   Benzodiazepine   never used     REPORTS SUBSTANCE USE: N/A   Barbiturates never used     REPORTS SUBSTANCE USE: N/A   Over the counter meds currently use +/-40 11/19/23 As prescribed   Caffeine currently use 20   Daily use   Nicotine  never used     REPORTS SUBSTANCE USE: N/A   Other substances not listed above:  Identify:  never used     REPORTS SUBSTANCE USE: N/A     Patient reported the following problems as a result of their  substance use: no problems, not applicable.    Substance Use: No symptoms    Based on the negative CAGE score and clinical interview there  are not indications of drug or alcohol abuse.    Significant Losses / Trauma / Abuse / Neglect Issues:   Patient did   serve in the .  There are indications or report of significant loss, trauma, abuse or neglect issues related to: are no indications and client denies any losses, trauma, abuse, or neglect concerns.  Concerns for possible neglect are not present.      Safety Assessment:   Patient denies current homicidal ideation and behaviors.  Patient denies current self-injurious ideation and behaviors.    Patient denied risk behaviors associated with substance use.   Patient denies any high risk behaviors associated with mental health symptoms.  Patient reports the following current concerns for their personal safety: None.  Patient reports there are not firearms in the house.          .    History of Safety Concerns:  Patient denied a history of homicidal ideation.     Patient denied a history of personal safety concerns.    Patient denied a history of assaultive behaviors.    Patient denied a history of sexual assault behaviors.     Patient denied a history of risk behaviors associated with substance use.  Patient denies any history of high risk behaviors associated with mental health symptoms.  Patient reports the following protective factors: forward or future oriented thinking; dedication to family or friends; safe and stable environment; regular sleep; effectively controls impulses; regular physical activity; sense of belonging; purpose; abstinence from substances; adherence with prescribed medication; agreement to use safety plan; daily obligations; structured day; effective problem solving skills; commitment to well being; sense of meaning; positive social skills; healthy fear of risky behaviors or pain; financial stability; strong sense of self worth or  esteem; sense of personal control or determination; access to a variety of clinical interventions and pets    Risk Plan:  See Recommendations for Safety and Risk Management Plan    Review of Symptoms per patient report:   Depression: Change in energy level  Demetria:  No Symptoms  Psychosis: No Symptoms  Anxiety: No Symptoms  Panic:  No symptoms  Post Traumatic Stress Disorder:  No Symptoms   Eating Disorder: No Symptoms  ADD / ADHD:  No symptoms  Conduct Disorder: No symptoms  Autism Spectrum Disorder: No symptoms  Obsessive Compulsive Disorder: No Symptoms    Patient reports the following compulsive behaviors and treatment history:  NA .      Diagnostic Criteria:   Patient does not meet criteria for mental health Diagnosis.    Functional Status:  Patient reports the following functional impairments:  management of the household and or completion of tasks.     Nonprogrammatic care:  Patient is requesting basic services to address current mental health concerns.    Clinical Summary:  1. Psychosocial, Cultural and Contextual Factors: Pt is a 88 year old, , single male.  Pt lives independently. English primary language, college education, [employer] background, no learning disabilities, no children but supportive friendships.  2. Principal DSM5 Diagnoses  (Sustained by DSM5 Criteria Listed Above):   Pt does not meet criteria for a mental health diagnosis  3. Other Diagnoses that is relevant to services:   Chronic Pain  4. Provisional Diagnosis:  NA  5. Prognosis: Unknown.  6. Likely consequences of symptoms if not treated: Unknown.  7. Client strengths include:  caring, educated, empathetic, goal-focused, good listener, insightful, intelligent, motivated, open to learning, support of family, friends and providers, and willing to ask questions .     SCS EVALUATIONS:    Does the patient:     Have a reasonable understanding of what the SCS actually is? (as below)    - Understand that the SCS is implanted surgically,  beneath their skin, with wires placed into their spinal column? YES  - Understand that the SCS is made of metal, and that wires may remain in their body permanently? YES  - Understand that the SCS is battery powered, and the battery likely will need to be replaced after 7-10 years? YES  - Understand that the permanent implant is preceded by a trial, where the wires are placed into the spine, but they come out to the surface, and are plugged into an external battery that the patient wears for the duration of the trial (5-7 days)? YES        Know what the SCS is doing, and what it is not doing? (as below)    - Know that the SCS is distracting their nervous system from the pain they otherwise would feel? YES   - Know that it is not curing the source of the problem? YES  - know the battery needs to be charged weekly, and sometimes daily (depending on how much power they use), and eventually replaced after 7-10 years involving another small surgery?YES  - Know there are generally several months of reprogramming and fine-tuning that are needed after the implant to get the settings  just right ? YES   - Know they will have a remote control, need to learn how to use it to get the most out of the stimulator? YES     Understand the general process start to finish including:     - an understanding their health insurance is presented with all their past treatments tried (meds, procedures, therapy, etc.) and they give approval or denial for the SCS trial? YES     -appealing denials can take up to 30 additional days to the process? YES    - if the trial is approved and then scheduled that the scheduling is contingent upon appointment availability?  YES     - that the trial takes generally 1 week, at end of trial the patient decides if there was good stimulation/coverage of the painful areas? YES     -an understanding there are bench marks for proceeding with implant such as the need to have at least 50% relief of pain, 50%  reduction in medication doses and 50% improvement in function? YES    -an understanding that request for prior approval from the insurance of the permanent SCS implant is required? YES    -an understanding that only when approval is granted is there an attempt to schedule patient and that this schedule is also contingent on provider availability? YES    -an understanding that the implant procedure is done in an outpatient surgery center and that s/he goes home from the surgery center the same day? YES    -an understanding that s/he returns to clinic approximately 1 week post op and has the reprograming done by device company at that time? YES     -an understanding that s/he returns to clinic at 2-3 weeks post op; has additional reprogramming, and that some restrictions are lifted for driving, lifting, bending, twisting? YES    -an understanding that s/he returns to clinic at 6 weeks post-op; fine-turning of programming (if needed), and all restrictions are lifted? YES    -an understanding that additional, as needed, visits for more reprogramming can be done out of the office, with just the device company? YES           Recommendations:     1. Plan for Safety and Risk Management:   Safety and Risk: Recommended that patient call 911 or go to the local ED should there be a change in any of these risk factors..          Report to child / adult protection services was NA.     2. Patient's identified  none identified .     3. Initial Treatment will focus on:    Spinal Cord Stimulator Trial Evaluation .     4. Resources/Service Plan:    services are not indicated.   Modifications to assist communication are not indicated.   Additional disability accommodations are not indicated.      5. Collaboration:   Collaboration / coordination of treatment will be initiated with the following  support professionals:  [facility] Provider .      6.  Referrals:   The following referral(s) will be initiated:  None .       A  Release of Information has been obtained for the following:  NA .     Clinical Substantiation/medical necessity for the above recommendations:  Pt did not meet criteria for mental health diagnosis. He is being seen for Spinal Cord Stimulator trial eval.    7. YON:    YON:  Discussed the general effects of drugs and alcohol on health and well-being. Provider gave patient printed information about the  effects of chemical use on their health and well being. Recommendations:  take medications as prescribed .     8. Records:   These were reviewed at time of assessment.   Information in this assessment was obtained from the medical record and  provided by patient who is a good historian.    Patient's access to their mental health medical record will be withheld due to the following reason(s): DA .    9.   Interactive Complexity: No    10. Safety Plan:  Patient denied any current/recent/lifetime history of suicidal ideation and/or behaviors.  No safety plan indicated at this time.     11. SCS Eval:  Pt does not have any mental health or chemical dependency concerns that may interfere with the SCS trial process.  Pt is in the process of moving into a independent living facility, considering waiting to do trial until that is complete. Pt is also traveling over the holidays, discussed the desire to wait until he returns.  He has good understanding of the SCS process and has reasonable expectations.  He makes his own medical decisions. He lives alone, but states he has a lot of friends that would be willing to help him.  Pt appears to be an appropriate candidate from a mental health standpoint.    Provider Name/ Credentials:  Veda Vaughn, NAINA, [facility]  November 20, 2023

## 2023-11-21 NOTE — TELEPHONE ENCOUNTER
Please inform the patient that I have received his evaluation with behavioral health with approval to move forward with a spinal cord stimulator trial.  Once he has had his MRI, we can potentially move forward with the stimulator trial.     He can also move forward with the right shoulder blocks with Dr. Van for potential cool RF.

## 2023-11-21 NOTE — TELEPHONE ENCOUNTER
Phone call to patient to relay information from [facility] Orlin Mir DO. Left message to return call.     Per patient's appointments, he is scheduled for the thoracic MRI on 11/25/23 and the blocks with Dr. Van on 11/30/23.

## 2023-11-25 ENCOUNTER — HOSPITAL ENCOUNTER (OUTPATIENT)
Dept: MRI IMAGING | Facility: HOSPITAL | Age: 88
Discharge: HOME | End: 2023-11-25
Attending: PHYSICAL MEDICINE & REHABILITATION | Admitting: PHYSICAL MEDICINE & REHABILITATION
Payer: MEDICARE

## 2023-11-25 DIAGNOSIS — M48.062 SPINAL STENOSIS OF LUMBAR REGION WITH NEUROGENIC CLAUDICATION: ICD-10-CM

## 2023-11-25 DIAGNOSIS — M43.16 SPONDYLOLISTHESIS OF LUMBAR REGION: ICD-10-CM

## 2023-11-25 DIAGNOSIS — M41.9 SCOLIOSIS OF THORACOLUMBAR SPINE, UNSPECIFIED SCOLIOSIS TYPE: ICD-10-CM

## 2023-11-25 PROCEDURE — G1010 CDSM STANSON: HCPCS

## 2023-11-28 ENCOUNTER — OFFICE VISIT (OUTPATIENT)
Dept: PALLIATIVE MEDICINE | Facility: OTHER | Age: 88
End: 2023-11-28
Payer: MEDICARE

## 2023-11-28 DIAGNOSIS — M48.061 SPINAL STENOSIS OF LUMBAR REGION, UNSPECIFIED WHETHER NEUROGENIC CLAUDICATION PRESENT: Primary | ICD-10-CM

## 2023-11-28 PROCEDURE — 97810 ACUP 1/> WO ESTIM 1ST 15 MIN: CPT | Performed by: ACUPUNCTURIST

## 2023-11-28 PROCEDURE — 97811 ACUP 1/> W/O ESTIM EA ADD 15: CPT | Performed by: ACUPUNCTURIST

## 2023-11-28 NOTE — PROGRESS NOTES
ACUPUNCTURIST TREATMENT NOTE      Antoni Stoll, a 88 year old male, is here today for Follow - Up exam. Patient is referred by Juan Antonio.    HPI  Main Complaint: Chronic pain of neck and low back, numbness of feet:         Continues to feel about the same. Low pain levels in general.    Past Medical History  Past Medical History Reviewed: Yes   has a past medical history of Anemia, Basal cell carcinoma, Cancer (H), Carotid artery occlusion, Coronary artery disease, Glaucoma, High cholesterol, History of colon cancer (08/13/2018), Hyperlipidemia, Hypertension, Left inguinal hernia, Low back pain, Malignant melanoma (H), Mild aortic insufficiency, Moderate tricuspid regurgitation, Postnasal drip, Sleep apnea, obstructive, Spinal stenosis, SSS (sick sinus syndrome) (H) (12/09/2019), Umbilical hernia, and Unspecified cataract.    Objective    TCM Exam Completed: Yes  Limbs/Back: Right Lower Extremity and Lower Back    Tongue/Pulse Exam Completed: No    Patient Assessment    PEG: A Three-Item Scale Assessing Pain Intensity and Interference    What number best describes your PAIN ON AVERAGE in the past week? 4    What number best describes how, during the past week, pain has interfered with your ENJOYMENT OF LIFE? 1    What number best describes how, during the past week, pain has interfered with your GENERAL ACTIVITY? 1    PEG Total Score: 2    Keo EE, Cesar KA, Jayjay MJ, Fannie TA, Napoleon J, Andreas JM, Susanne SM, Angelica K. Development and initial validation of the PEG, a 3-item scale assessing pain intensity and interference. Journal of General Internal Medicine. 2009 Jun;24:733-738.  Patient Type: Pain  Patient Complaint: Chronic pain of neck and low back        11/21/2023     8:00 AM 11/28/2023     8:00 AM   Acupuncture   Intervention Reason Pain,Pain 2 Pain,Pain 2   Pain Location low back low back   Pre-session Pain Rating 4 3   Pain 2 Location neck neck   Pre-session Pain 2 Rating 3 3   Patient complaint: Chronic  pain of neck and low back Chronic pain of neck and low back   Initial insertions Baihui, Gb20, Ub23, Ub52, Shiqizhuixia, Yaoyan Baihui, Gb20, Ub23-26, Ub52, Shiqizhuixia, Yaoyan   Second insertions HTJJ L2-L5, Ub31, Gb34, Ub57, Ub60, Ki3, Liv3, Gb41, Sp3 HTJJ L2-L5, Ub31, Ub57, Ub60, Ki3, Ub62, Liv3, Gb41, (R)Sp3   Accessory Techniques TDP Heat Lamp,Tuina,Topicals TDP Heat Lamp,Tuina,Topicals   TDP Heat Lamp location used low back low back   Tuina location used low back and neck low back and neck   Topicals Po Sum On Po Sum On   Topicals location used low back and neck low back and neck   TCM Diagnosis Qi Stagnation,Blood Stagnation,Qi Deficiency,Blood Deficiency,Yin Deficiency,Kidney Qi Deficiency,Other Qi Stagnation,Blood Stagnation,Qi Deficiency,Blood Deficiency,Yin Deficiency,Kidney Qi Deficiency,Other   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 Patient to schedule treatment 1x/month for 5 additional visits. Acupuncture for diagnosis: M48.061 (ICD-10-CM) - Spinal stenosis of lumbar region, unspecified whether neurogenic claudication present. Patient to schedule treatment 1x/month for 5 additional visits.       TCM Diagnosis: Qi Stagnation;Blood Stagnation;Qi Deficiency;Blood Deficiency;Yin Deficiency;Kidney Qi Deficiency;Other Melvin Yang Channel obstruction    Treatment Principle: unblock channels, move qi and blood, nourish and stop pain    TCM / Acupuncture Treatment  Acupuncture Points:       Initial insertions: Baihui, Gb20, Ub23-26, Ub52, Shiqizhuixia, Yaoyan        Second insertions: HTJJ L2-L5, Ub31, Ub57, Ub60, Ki3, Ub62, Liv3, Gb41, (R)Sp3                          9/8/2022     2:00 PM   Oswestry Disability Index ([employer]   Ricky Harrison 1980, All rights reserved)   Section 1 - Pain intensity The pain is very mild at the moment.   Section 2 - Personal care (washing, dressing, etc.)  I can look after myself normally without causing additional pain.   Section 3 - Lifting Pain prevents me from lifting heavy weights off the floor but I can manage if they are conveniently positioned, e.g. on a table.   Section 4 - Walking Pain prevents me from walking more than a quarter of a mile.   Section 5 - Sitting I can sit in my favorite chair as long as I like.   Section 6 - Standing Pain prevents me from standing for more than 1 hour.   Section 7 - Sleeping My sleep is never interrupted by pain.   Section 8 - Sex life (if applicable) Not answered/NA   Section 9 - Social life My social life is normal and causes me no additional pain.   Section 10 - Traveling I can travel anywhere but it gives me additional pain.   Oswestry Score (%) 20       Assessment and Plan  Treatment Observations: Patient relaxed well during treatment  Acupuncture Treatment Recommendations: Acupuncture for diagnosis: M48.061 (ICD-10-CM) - Spinal stenosis of lumbar region, unspecified whether neurogenic claudication present. Patient to schedule treatment 1x/month for 5 additional visits.       It is my recommendation that this patient seek advice from their Primary Care Provider about active symptoms not addressed during this visit. The risks and benefits of acupuncture were reviewed and the patient stated understanding. The patient's questions were answered to their satisfaction. Consent was provided for treatment. We thank you for the referral and opportunity to treat this patient.    Time Spent with Patient:   I spent a total of 30 minutes face-to-face with Antoni Stoll during today's office visit.     Kamryn  Cindy Allison

## 2023-11-30 ENCOUNTER — RADIOLOGY INJECTION OFFICE VISIT (OUTPATIENT)
Dept: PHYSICAL MEDICINE AND REHAB | Facility: CLINIC | Age: 88
End: 2023-11-30
Attending: PHYSICAL MEDICINE & REHABILITATION
Payer: MEDICARE

## 2023-11-30 VITALS
TEMPERATURE: 97.5 F | SYSTOLIC BLOOD PRESSURE: 175 MMHG | HEART RATE: 68 BPM | OXYGEN SATURATION: 95 % | DIASTOLIC BLOOD PRESSURE: 74 MMHG

## 2023-11-30 DIAGNOSIS — G89.29 CHRONIC RIGHT SHOULDER PAIN: ICD-10-CM

## 2023-11-30 DIAGNOSIS — M19.011 OSTEOARTHRITIS OF RIGHT GLENOHUMERAL JOINT: ICD-10-CM

## 2023-11-30 DIAGNOSIS — M75.121 COMPLETE TEAR OF RIGHT ROTATOR CUFF, UNSPECIFIED WHETHER TRAUMATIC: ICD-10-CM

## 2023-11-30 DIAGNOSIS — M25.511 CHRONIC RIGHT SHOULDER PAIN: ICD-10-CM

## 2023-11-30 PROCEDURE — 99207 PR NON-BILLABLE SERV PER CHARTING: CPT | Performed by: PAIN MEDICINE

## 2023-11-30 ASSESSMENT — PAIN SCALES - GENERAL: PAINLEVEL: NO PAIN (1)

## 2023-12-04 NOTE — TELEPHONE ENCOUNTER
He has significant lumbar scoliosis with degenerative disc disease spinal stenosis and spondylolisthesis.  Due to the complexity of the potential lumbar surgery he is wanting to proceed with all nonoperative options.      Spinal cord stimulator trial order has been placed.  Vendor would be at the discretion of the performing provider.

## 2023-12-06 NOTE — TELEPHONE ENCOUNTER
Kwesi Ordaz,  Has this patient already had surgery in the past?  And looking at his MRI of his lumbar and thoracic spine I believe it will be a difficult spinal cord stimulator case and would recommend that he be seen either by Dr. Soliman at [facility] or Dr. Buckley through the [facility] neurosurgery department.    Thanks,  Ricky

## 2023-12-12 ENCOUNTER — TELEPHONE (OUTPATIENT)
Dept: PHYSICAL MEDICINE AND REHAB | Facility: CLINIC | Age: 88
End: 2023-12-12
Payer: MEDICARE

## 2023-12-12 DIAGNOSIS — M43.16 SPONDYLOLISTHESIS OF LUMBAR REGION: ICD-10-CM

## 2023-12-12 DIAGNOSIS — M41.9 SCOLIOSIS OF THORACOLUMBAR SPINE, UNSPECIFIED SCOLIOSIS TYPE: ICD-10-CM

## 2023-12-12 DIAGNOSIS — M48.062 SPINAL STENOSIS OF LUMBAR REGION WITH NEUROGENIC CLAUDICATION: Primary | ICD-10-CM

## 2023-12-12 NOTE — TELEPHONE ENCOUNTER
Attempted to contact patient to discuss care plan recommendations recommended by Dr. Mir, but he was unreachable at this time.  A message was left encouraging the patient to return the call at his earliest convenience to discuss plans for moving forward with spinal cord stimulator trial.

## 2023-12-13 DIAGNOSIS — E78.00 PURE HYPERCHOLESTEROLEMIA: ICD-10-CM

## 2023-12-14 RX ORDER — ATORVASTATIN CALCIUM 20 MG/1
20 TABLET, FILM COATED ORAL DAILY
Qty: 90 TABLET | Refills: 3 | OUTPATIENT
Start: 2023-12-14

## 2023-12-14 NOTE — TELEPHONE ENCOUNTER
Patient returned previous message and after discussing recommendations by Dr. Mir and Dr. Van, the patient has decided that he would be interested in moving forward with a neurosurgery referral with Dr. Buckley to discuss the spinal cord stimulator trial.  He was informed that Dr. Mir would be informed and that a referral would be placed.     He was made aware that he would be contacted by a member of the neurosurgery team to schedule the appointment, but was encouraged to contact the [facility] if he was not contacted in the next week.    The patient verbalized understanding of the plan.

## 2024-01-09 ENCOUNTER — HOSPITAL ENCOUNTER (OUTPATIENT)
Dept: GENERAL RADIOLOGY | Facility: HOSPITAL | Age: 89
Discharge: HOME | End: 2024-01-09
Attending: INTERNAL MEDICINE | Admitting: INTERNAL MEDICINE
Payer: MEDICARE

## 2024-01-09 ENCOUNTER — OFFICE VISIT (OUTPATIENT)
Dept: PULMONOLOGY | Facility: CLINIC | Age: 89
End: 2024-01-09
Payer: MEDICARE

## 2024-01-09 VITALS
BODY MASS INDEX: 23.56 KG/M2 | DIASTOLIC BLOOD PRESSURE: 76 MMHG | SYSTOLIC BLOOD PRESSURE: 126 MMHG | HEART RATE: 70 BPM | OXYGEN SATURATION: 100 % | WEIGHT: 133 LBS

## 2024-01-09 DIAGNOSIS — R06.09 DOE (DYSPNEA ON EXERTION): Primary | ICD-10-CM

## 2024-01-09 DIAGNOSIS — R06.09 DYSPNEA ON EXERTION: ICD-10-CM

## 2024-01-09 PROCEDURE — 99213 OFFICE O/P EST LOW 20 MIN: CPT | Performed by: INTERNAL MEDICINE

## 2024-01-09 PROCEDURE — 71046 X-RAY EXAM CHEST 2 VIEWS: CPT

## 2024-01-09 NOTE — PROGRESS NOTES
Pulmonary Clinic Follow up Note  Date of Service: 01/09/2024    Reason for Consultation: Dyspnea on exertion    History:     HPI: Patient is an 87-year-old male with past medical history significant for CAD, sick sinus syndrome, colon cancer status postresection, hypertension, hyperlipidemia, valvular heart disease who was referred here for evaluation of shortness of breath.    Patient notes that he has been having shortness of breath over the past 3-4 years.  He is able to walk approximate 30 minutes on the treadmill.  He can go up 1 flight of stairs. He notes that during COVID, he used to walk outside and was able to walk for about an hours. He does exercise at a senior fitness class where he does 2 yoga classes, 2 strength training, and 1 cardio. He denies any cough. No WL or NS. He is able to perform his activities of daily living. With respect to inhalers, pt is not on any inhalers. Patient also has postnasal nasal drip 2/2 to indoor allergies (dust and mold) and is on azelastine as well as Flonase.    Interval history:   Wily is here for his scheduled follow-up visit.  He notes that on Thursday, he developed fever and scratchy throat.  He tested himself for COVID twice and his results were negative.  He has defervesced since then.  He remains active and works out 30 minutes on a treadmill at least 5 days a week.  He has a cough that is productive of yellow phlegm.  No fevers however.  Chest x-ray was done prior to this visit.  With respect to inhalers, patient was given Lena last visit however he has noticed no improvement.  He has since stopped using his rescue inhaler.    PMHx/PSHx:  Sick sinus syndrome, resolved when taken off BB.   CAD that is post 5 vessel 6/2013  Postnasal drip  Colon cancer status post surgery 2017/2018.  Carotid artery occlusion  Hypertension  Hyperlipidemia  Valvular heart disease  Obstructive sleep apnea on CPAP  Spinal stenosis  Blepharoplasty  Right hernia repair surgery    Social  Hx:   Tobacco: lifelong nonsmoker.  Occupation: optometrist. Retired 2012.  Travel: over the holidays, he went to [facility].  Live in a townhouse.    Review of Systems - 10 point review of system negative except for what is mentioned in the HPI.    Exam/Data:   /76 (BP Location: Right arm, Patient Position: Chair, Cuff Size: Adult Regular)   Pulse 70   Wt 60.3 kg (133 lb)   SpO2 100%   BMI 23.56 kg/m      EXAM:  GEN: comfortable, NAD  HEENT: NCAT, EMOI  CVS: S1S2, RRR  Lung: CTA bilaterally  Abd: soft, nt, + BS. No masses  Ext: no c/c/e  Neuro: nonfocal  Skin: no visible rash  Musculoskeletal: FROM all extremities  Psych: appropriate    DATA    Labs: reviewed in EMR and outside records where pertinent.     PFTs done on 5/2023:  FEV1 92% predicted, FVC 90% predicted, ratio 76.  No flattening of the flow-volume loop.  RV 89% predicted, TLC 82% predicted.  DLCO 91% predicted.    cXR on 1/9/2024:  IMPRESSION: Sternotomy. Some minimal scattered fibrosis. Lungs otherwise clear. No acute findings.    Assessment/Plan:   Antoni Stoll is a 87 year old male, lifelong nonsmoker, who is here for VILLA. PFT's are essentially unremarkable, TLC on lower end. We do not have recent chest imaging.  Pt is not on inhalers. He has ALECIA but is on CPAP.  He had been given lasix but no improvement with his VILLA. Does not report wheezing. ? Normal for his age versus scoliosis.      Recommendations:  albuterol inhaler as needed -although not much benefit  Continue exercising (30 min on treadmill)  Diet, weight loss    RTC in 3 months.     Yovanny Young MD  Pulmonary and Critical Care Medicine    Family History   Problem Relation Age of Onset    Coronary Artery Disease Mother     Colon Cancer Father     Coronary Artery Disease Father      No Known Allergies    Medications:     Current Outpatient Medications   Medication Sig Dispense Refill    acetaminophen (TYLENOL) 500 MG tablet [ACETAMINOPHEN (TYLENOL) 500 MG TABLET] Take 1,000  mg by mouth daily .            aspirin 325 MG tablet [ASPIRIN 325 MG TABLET] Take 325 mg by mouth daily.      atorvastatin (LIPITOR) 20 MG tablet Take 1 tablet (20 mg) by mouth daily 90 tablet 3    azelastine (ASTELIN) 0.1 % nasal spray 2 sprays each nostril 1-2x daily as needed for nasal congestion 30 mL 11    B2/VITS A,C,E/LUT/ZEAXANTH/MIN (ICAPS ORAL) [B2/VITS A,C,E/LUT/ZEAXANTH/MIN (ICAPS ORAL)] Take 1 capsule by mouth daily.      calcium polycarbophil (FIBERCON) 625 mg tablet [CALCIUM POLYCARBOPHIL (FIBERCON) 625 MG TABLET] Take 1,250 mg by mouth daily.      cholecalciferol, vitamin D3, 1,000 unit tablet [CHOLECALCIFEROL, VITAMIN D3, 1,000 UNIT TABLET] Take 1,000 Units by mouth daily.      fluticasone (FLONASE) 50 MCG/ACT nasal spray       glucosamine-chondroitin 500-400 mg cap [GLUCOSAMINE-CHONDROITIN 500-400 MG CAP] Take 1 capsule by mouth 2 (two) times a day.      latanoprost (XALATAN) 0.005 % ophthalmic solution Place 1 drop into both eyes At Bedtime      lisinopril (ZESTRIL) 2.5 MG tablet [LISINOPRIL (PRINIVIL,ZESTRIL) 2.5 MG TABLET] TAKE 1 TABLET BY MOUTH EVERY DAY 90 tablet 3    loratadine 10 MG capsule Take 10 mg by mouth daily      nystatin (MYCOSTATIN) 369261 UNIT/GM external powder Apply to affected areas twice a day if flare of rash 60 g 3    pregabalin (LYRICA) 25 MG capsule TAKE 1 CAPSULE(25 MG) BY MOUTH TWICE DAILY 60 capsule 1    saw palmetto fruit 450 mg cap [SAW PALMETTO FRUIT 450 MG CAP] Take 1 capsule by mouth 2 (two) times a day.      tamsulosin (FLOMAX) 0.4 mg Cp24 [TAMSULOSIN (FLOMAX) 0.4 MG CP24] Take 0.4 mg by mouth at bedtime.         Much or all of the text in this note was generated through the use of the Dragon Dictate voice-to-text software. Errors in spelling or words which seem out of context are unintentional. Sound alike errors, in particular, may have escaped editing.

## 2024-01-25 ASSESSMENT — ENCOUNTER SYMPTOMS
JOINT SWELLING: 0
CONSTIPATION: 0
FEVER: 1
NAUSEA: 0
CHILLS: 1
PALPITATIONS: 0
ABDOMINAL PAIN: 0
WEAKNESS: 0
EYE PAIN: 0
SHORTNESS OF BREATH: 1
HEMATOCHEZIA: 0
HEARTBURN: 0
FREQUENCY: 0
MYALGIAS: 0
DYSURIA: 0
DIZZINESS: 0
NERVOUS/ANXIOUS: 0
HEMATURIA: 0
HEADACHES: 0
ARTHRALGIAS: 1
PARESTHESIAS: 0
COUGH: 1
DIARRHEA: 0
SORE THROAT: 0

## 2024-01-25 ASSESSMENT — ACTIVITIES OF DAILY LIVING (ADL): CURRENT_FUNCTION: NO ASSISTANCE NEEDED

## 2024-01-29 ENCOUNTER — OFFICE VISIT (OUTPATIENT)
Dept: INTERNAL MEDICINE | Facility: CLINIC | Age: 89
End: 2024-01-29
Payer: MEDICARE

## 2024-01-29 VITALS
OXYGEN SATURATION: 97 % | RESPIRATION RATE: 18 BRPM | TEMPERATURE: 97.5 F | HEART RATE: 74 BPM | WEIGHT: 133 LBS | BODY MASS INDEX: 23.57 KG/M2 | SYSTOLIC BLOOD PRESSURE: 155 MMHG | DIASTOLIC BLOOD PRESSURE: 70 MMHG | HEIGHT: 63 IN

## 2024-01-29 DIAGNOSIS — Z86.0100 HISTORY OF COLONIC POLYPS: ICD-10-CM

## 2024-01-29 DIAGNOSIS — Z51.81 ENCOUNTER FOR THERAPEUTIC DRUG MONITORING: ICD-10-CM

## 2024-01-29 DIAGNOSIS — G47.33 OBSTRUCTIVE SLEEP APNEA ON CPAP: ICD-10-CM

## 2024-01-29 DIAGNOSIS — R06.02 SHORTNESS OF BREATH ON EXERTION: ICD-10-CM

## 2024-01-29 DIAGNOSIS — R05.2 SUBACUTE COUGH: ICD-10-CM

## 2024-01-29 DIAGNOSIS — E78.00 HYPERCHOLESTEROLEMIA: ICD-10-CM

## 2024-01-29 DIAGNOSIS — I25.84 CORONARY ARTERY DISEASE DUE TO CALCIFIED CORONARY LESION: ICD-10-CM

## 2024-01-29 DIAGNOSIS — M48.061 SPINAL STENOSIS OF LUMBAR REGION, UNSPECIFIED WHETHER NEUROGENIC CLAUDICATION PRESENT: ICD-10-CM

## 2024-01-29 DIAGNOSIS — I10 ESSENTIAL HYPERTENSION: ICD-10-CM

## 2024-01-29 DIAGNOSIS — Z00.00 ENCOUNTER FOR MEDICARE ANNUAL WELLNESS EXAM: Primary | ICD-10-CM

## 2024-01-29 DIAGNOSIS — H40.9 GLAUCOMA, UNSPECIFIED GLAUCOMA TYPE, UNSPECIFIED LATERALITY: ICD-10-CM

## 2024-01-29 DIAGNOSIS — I25.10 CORONARY ARTERY DISEASE DUE TO CALCIFIED CORONARY LESION: ICD-10-CM

## 2024-01-29 DIAGNOSIS — Z85.828 HISTORY OF SKIN CANCER: ICD-10-CM

## 2024-01-29 LAB
ALBUMIN SERPL BCG-MCNC: 4 G/DL (ref 3.5–5.2)
ALP SERPL-CCNC: 74 U/L (ref 40–150)
ALT SERPL W P-5'-P-CCNC: 8 U/L (ref 0–70)
ANION GAP SERPL CALCULATED.3IONS-SCNC: 10 MMOL/L (ref 7–15)
AST SERPL W P-5'-P-CCNC: 17 U/L (ref 0–45)
BILIRUB SERPL-MCNC: 0.3 MG/DL
BUN SERPL-MCNC: 16.2 MG/DL (ref 8–23)
CALCIUM SERPL-MCNC: 9.3 MG/DL (ref 8.8–10.2)
CHLORIDE SERPL-SCNC: 102 MMOL/L (ref 98–107)
CHOLEST SERPL-MCNC: 72 MG/DL
CREAT SERPL-MCNC: 0.93 MG/DL (ref 0.67–1.17)
DEPRECATED HCO3 PLAS-SCNC: 26 MMOL/L (ref 22–29)
EGFRCR SERPLBLD CKD-EPI 2021: 80 ML/MIN/1.73M2
ERYTHROCYTE [DISTWIDTH] IN BLOOD BY AUTOMATED COUNT: 13.2 % (ref 10–15)
FASTING STATUS PATIENT QL REPORTED: YES
GLUCOSE SERPL-MCNC: 106 MG/DL (ref 70–99)
HCT VFR BLD AUTO: 36.5 % (ref 40–53)
HDLC SERPL-MCNC: 33 MG/DL
HGB BLD-MCNC: 11.7 G/DL (ref 13.3–17.7)
LDLC SERPL CALC-MCNC: 29 MG/DL
MCH RBC QN AUTO: 31.9 PG (ref 26.5–33)
MCHC RBC AUTO-ENTMCNC: 32.1 G/DL (ref 31.5–36.5)
MCV RBC AUTO: 100 FL (ref 78–100)
NONHDLC SERPL-MCNC: 39 MG/DL
PLATELET # BLD AUTO: 306 10E3/UL (ref 150–450)
POTASSIUM SERPL-SCNC: 4.2 MMOL/L (ref 3.4–5.3)
PROT SERPL-MCNC: 7.7 G/DL (ref 6.4–8.3)
RBC # BLD AUTO: 3.67 10E6/UL (ref 4.4–5.9)
SODIUM SERPL-SCNC: 138 MMOL/L (ref 135–145)
TRIGL SERPL-MCNC: 48 MG/DL
WBC # BLD AUTO: 10.1 10E3/UL (ref 4–11)

## 2024-01-29 PROCEDURE — 80053 COMPREHEN METABOLIC PANEL: CPT | Performed by: INTERNAL MEDICINE

## 2024-01-29 PROCEDURE — G0439 PPPS, SUBSEQ VISIT: HCPCS | Performed by: INTERNAL MEDICINE

## 2024-01-29 PROCEDURE — 36415 COLL VENOUS BLD VENIPUNCTURE: CPT | Performed by: INTERNAL MEDICINE

## 2024-01-29 PROCEDURE — 80061 LIPID PANEL: CPT | Performed by: INTERNAL MEDICINE

## 2024-01-29 PROCEDURE — 85027 COMPLETE CBC AUTOMATED: CPT | Performed by: INTERNAL MEDICINE

## 2024-01-29 PROCEDURE — 99214 OFFICE O/P EST MOD 30 MIN: CPT | Mod: 25 | Performed by: INTERNAL MEDICINE

## 2024-01-29 RX ORDER — AZITHROMYCIN 250 MG/1
TABLET, FILM COATED ORAL
Qty: 6 TABLET | Refills: 0 | Status: SHIPPED | OUTPATIENT
Start: 2024-01-29 | End: 2024-02-03

## 2024-01-29 ASSESSMENT — ENCOUNTER SYMPTOMS
ABDOMINAL PAIN: 0
FREQUENCY: 0
CONSTIPATION: 0
DYSURIA: 0
NAUSEA: 0
HEADACHES: 0
JOINT SWELLING: 0
EYE PAIN: 0
SORE THROAT: 0
FEVER: 1
DIARRHEA: 0
PALPITATIONS: 0
SHORTNESS OF BREATH: 1
HEMATURIA: 0
CHILLS: 1
NERVOUS/ANXIOUS: 0
MYALGIAS: 0
WEAKNESS: 0
ARTHRALGIAS: 1
DIZZINESS: 0
COUGH: 1

## 2024-01-29 ASSESSMENT — ACTIVITIES OF DAILY LIVING (ADL): CURRENT_FUNCTION: NO ASSISTANCE NEEDED

## 2024-01-29 NOTE — PROGRESS NOTES
"Preventive Care Visit  [facility]  Sarthak Romano MD, Internal Medicine  Jan 29, 2024      SUBJECTIVE:   Wily is a 88 year old, presenting for the following:  Medicare Visit (Fasting.) and Cough (Yellow sputum, fever x 3 weeks.)        1/29/2024    11:08 AM   Additional Questions   Roomed by Marilee RAIN   Accompanied by bryn     Are you in the first 12 months of your Medicare coverage?  No    Healthy Habits:     In general, how would you rate your overall health?  Good    Frequency of exercise:  4-5 days/week    Duration of exercise:  Greater than 60 minutes    Do you usually eat at least 4 servings of fruit and vegetables a day, include whole grains    & fiber and avoid regularly eating high fat or \"junk\" foods?  Yes    Taking medications regularly:  Yes    Medication side effects:  None    Ability to successfully perform activities of daily living:  No assistance needed    Home Safety:  Throw rugs in the hallway    Hearing Impairment:  Difficulty following a conversation in a noisy restaurant or crowded room, feel that people are mumbling or not speaking clearly, difficulty following dialogue in the theater, difficult to understand a speaker at a public meeting or Shinto service, need to ask people to speak up or repeat themselves and difficulty understanding soft or whispered speech    In the past 6 months, have you been bothered by leaking of urine?  No    In general, how would you rate your overall mental or emotional health?  Excellent  Answers submitted by the patient for this visit:  Annual Preventive Visit (Submitted on 1/25/2024)  Chief Complaint: Annual Exam:  Blood in stool: No  heartburn: No  peripheral edema: No  mood changes: No  Skin sensation changes: No  impotence: No      Today's PHQ-2 Score:       1/29/2024    10:58 AM   PHQ-2 ( 1999 [employer])   Q1: Little interest or pleasure in doing things 0   Q2: Feeling down, depressed or hopeless 0   PHQ-2 Score 0   Q1: Little interest or " pleasure in doing things Not at all   Q2: Feeling down, depressed or hopeless Not at all   PHQ-2 Score 0           Have you ever done Advance Care Planning? (For example, a Health Directive, POLST, or a discussion with a medical provider or your loved ones about your wishes): Yes, advance care planning is on file.       Fall risk  Fallen 2 or more times in the past year?: No  Any fall with injury in the past year?: No    Cognitive Screening   1) Repeat 3 items (Leader, Season, Table)    2) Clock draw: NORMAL  3) 3 item recall: Recalls 3 objects  Results: 3 items recalled: COGNITIVE IMPAIRMENT LESS LIKELY    Mini-CogTM Copyright S [employer]. Licensed by the author for use in [facility]; reprinted with permission (soob@[facility]). All rights reserved.      Do you have sleep apnea, excessive snoring or daytime drowsiness? : yes    Reviewed and updated as needed this visit by clinical staff   Tobacco  Allergies  Meds              Reviewed and updated as needed this visit by Provider     Meds              Social History     Tobacco Use    Smoking status: Never     Passive exposure: Past    Smokeless tobacco: Never   Substance Use Topics    Alcohol use: Yes     Comment: Alcoholic Drinks/day: 5 drinks weekly             1/25/2024     4:46 PM   Alcohol Use   Prescreen: >3 drinks/day or >7 drinks/week? No     Do you have a current opioid prescription? No  Do you use any other controlled substances or medications that are not prescribed by a provider? None              Current providers sharing in care for this patient include:   Patient Care Team:  Sarthak Romano MD as PCP - General (Internal Medicine)  Sarthak Romano MD as Assigned PCP  Orlin Mir DO as Assigned Neuroscience Provider  Emma Neal as Therapist (Acupuncture)  Yovanny Young MD as Assigned Pulmonology Provider  Jb Thomas MD as Assigned Surgical Provider  Mars Guevara MD as Assigned Heart and Vascular Provider    The  following health maintenance items are reviewed in Epic and correct as of today:  Health Maintenance   Topic Date Due    URINE DRUG SCREEN  Never done    ANNUAL REVIEW OF HM ORDERS  Never done    RSV VACCINE (Pregnancy & 60+) (1 - 1-dose 60+ series) Never done    ZOSTER IMMUNIZATION (2 of 3) 01/03/2008    COVID-19 Vaccine (6 - 2023-24 season) 09/19/2023    MEDICARE ANNUAL WELLNESS VISIT  01/25/2024    FALL RISK ASSESSMENT  01/29/2025    ADVANCE CARE PLANNING  01/29/2029    DTAP/TDAP/TD IMMUNIZATION (2 - Td or Tdap) 08/26/2032    PHQ-2 (once per calendar year)  Completed    INFLUENZA VACCINE  Completed    Pneumococcal Vaccine: 65+ Years  Completed    IPV IMMUNIZATION  Aged Out    HPV IMMUNIZATION  Aged Out    MENINGITIS IMMUNIZATION  Aged Out    RSV MONOCLONAL ANTIBODY  Aged Out     Current Outpatient Medications   Medication Sig Dispense Refill    acetaminophen (TYLENOL) 500 MG tablet [ACETAMINOPHEN (TYLENOL) 500 MG TABLET] Take 1,000 mg by mouth daily .            aspirin 325 MG tablet [ASPIRIN 325 MG TABLET] Take 325 mg by mouth daily.      atorvastatin (LIPITOR) 20 MG tablet Take 1 tablet (20 mg) by mouth daily 90 tablet 3    azelastine (ASTELIN) 0.1 % nasal spray 2 sprays each nostril 1-2x daily as needed for nasal congestion 30 mL 11    azithromycin (ZITHROMAX) 250 MG tablet Take 2 tablets (500 mg) by mouth daily for 1 day, THEN 1 tablet (250 mg) daily for 4 days. 6 tablet 0    B2/VITS A,C,E/LUT/ZEAXANTH/MIN (ICAPS ORAL) [B2/VITS A,C,E/LUT/ZEAXANTH/MIN (ICAPS ORAL)] Take 1 capsule by mouth daily.      calcium polycarbophil (FIBERCON) 625 mg tablet [CALCIUM POLYCARBOPHIL (FIBERCON) 625 MG TABLET] Take 1,250 mg by mouth daily.      cholecalciferol, vitamin D3, 1,000 unit tablet [CHOLECALCIFEROL, VITAMIN D3, 1,000 UNIT TABLET] Take 1,000 Units by mouth daily.      fluticasone (FLONASE) 50 MCG/ACT nasal spray       glucosamine-chondroitin 500-400 mg cap [GLUCOSAMINE-CHONDROITIN 500-400 MG CAP] Take 1 capsule by  "mouth 2 (two) times a day.      latanoprost (XALATAN) 0.005 % ophthalmic solution Place 1 drop into both eyes At Bedtime      lisinopril (ZESTRIL) 2.5 MG tablet [LISINOPRIL (PRINIVIL,ZESTRIL) 2.5 MG TABLET] TAKE 1 TABLET BY MOUTH EVERY DAY 90 tablet 3    loratadine 10 MG capsule Take 10 mg by mouth daily      nystatin (MYCOSTATIN) 145209 UNIT/GM external powder Apply to affected areas twice a day if flare of rash 60 g 3    pregabalin (LYRICA) 25 MG capsule TAKE 1 CAPSULE(25 MG) BY MOUTH TWICE DAILY 60 capsule 1    saw palmetto fruit 450 mg cap [SAW PALMETTO FRUIT 450 MG CAP] Take 1 capsule by mouth 2 (two) times a day.      tamsulosin (FLOMAX) 0.4 mg Cp24 [TAMSULOSIN (FLOMAX) 0.4 MG CP24] Take 0.4 mg by mouth at bedtime.       No Known Allergies        Review of Systems   Constitutional:  Positive for chills and fever.   HENT:  Positive for congestion and hearing loss. Negative for ear pain and sore throat.    Eyes:  Negative for pain and visual disturbance.   Respiratory:  Positive for cough and shortness of breath.    Cardiovascular:  Negative for chest pain and palpitations.   Gastrointestinal:  Negative for abdominal pain, constipation, diarrhea and nausea.   Genitourinary:  Negative for dysuria, frequency, genital sores, hematuria, penile discharge and urgency.   Musculoskeletal:  Positive for arthralgias. Negative for joint swelling and myalgias.   Skin:  Negative for rash.   Neurological:  Negative for dizziness, weakness and headaches.   Psychiatric/Behavioral:  The patient is not nervous/anxious.       Continued cough and nasal congestion with muscle achiness and fatigue, despite negative COVID test.  Denies worsening shortness of breath.  Some yellow nasal discharge.    OBJECTIVE:   BP (!) 155/70   Pulse 74   Temp 97.5  F (36.4  C)   Resp 18   Ht 1.6 m (5' 3\")   Wt 60.3 kg (133 lb)   SpO2 97%   BMI 23.56 kg/m     Estimated body mass index is 23.56 kg/m  as calculated from the following:    Height as " "of this encounter: 1.6 m (5' 3\").    Weight as of this encounter: 60.3 kg (133 lb).  Physical Exam  Patient does not appear acutely ill.  Alert and oriented x 3 with good mood and affect.  Clock face drawing and word recall normal.  He has moderately severe kyphosis.  Able to climb on exam table.  Not parkinsonian.  Pupils and irises equal and reactive.  Extraocular muscles intact.  No jaundice or conjunctivitis.  External ears and nose exam is normal without significant cerumen and normal tympanic membranes.  Sclerotic areas on scalp but not clear cancerous lesion.  Pharynx appears normal with minimal postnasal drip pharyngitis.  Teeth in good condition.  No thrush.  No cervical adenopathy.  No JVD and no carotid bruits.  No thyromegaly or nodularity.  Lungs are clear to auscultation with mild reduced respiratory excursion.  No rhonchi or wheezing or crackles.  Abdomen is thin, nontender.  No hepatosplenomegaly and no pulsatile abdominal mass.  Heart is regular without murmur.  No ankle edema.  He declined  exam    ASSESSMENT / PLAN:   Encounter for Medicare annual wellness exam  Main issue for patient is cardiovascular risk and stroke risk with his sleep apnea.  I also emphasized the risk of deconditioning and mobility problems with a spinal stenosis and emphasized the importance of his regular exercise routine and to return to the gym 5 days a week to do his treadmill and exercises.    Recent illness is likely a COVID illness that he is slowly recovering from.  He has been 3 weeks into the illness, therefore I would not consider Paxlovid treatment.  You may have an acute sinusitis secondary infection after viral infection.    Patient is full code.    Patient instructions:  Treat possible sinus infection with azithromycin Z-Chris for 5 days.  Tea and gargling.  Some hot showers can help you clear the phlegm out of your nose.  Some patients do a Marcy pot, or saline irrigation of the nose.    It is possible that " you are still recovering from COVID illness, which would give you similar symptoms.  If that is the case, I would anticipate you will continue to get better over time.  If you have worsening shortness of breath or worsening symptoms, get reevaluated.    Get back to the gym and your regular exercise routine 5 times a week.  You will need regular exercise to keep healthy and your back from hurting worse.    Continue to wear your CPAP every night.    Check your blood pressure.  Goal blood pressure less than 140/85 but not less than 110/60.  If your blood pressure is outside that range more than occasionally, return to clinic to discuss blood pressure.    See dermatology in the spring for your routine skin cancer follow-up.    See ophthalmology in the spring for your eye exam and glaucoma management.    You have a right wrist ganglion cyst, which is a herniation of the tendon sheath, usually from gripping too hard.  No intervention is needed for this, avoid gripping too hard or for too long.    Follow-up in 6 months for a routine blood pressure checkup appointment.      Coronary artery disease due to calcified coronary lesion  Asymptomatic.  Continue aspirin atorvastatin 20 mg.  Goal LDL less than 100  - Lipid Profile    Essential hypertension  Usually well-controlled.  Blood pressure has been variable between 120-170/80-90.  But recent illness and inactivity are likely affecting blood pressure.  His blood pressure usually is well-controlled if not slightly on the low side on lisinopril 2.5 mg a day.    Continue on current lisinopril but I did discuss with him checking his blood pressure, and returning to clinic if outside of control range, see patient instructions    Hypercholesterolemia  Goal LDL less than 100.  Continue atorvastatin 20 mg    Obstructive sleep apnea on CPAP  Compliant with CPAP.  Associated with severe kyphoscoliosis.    Shortness of breath on exertion  Associated with deconditioning and  kyphoscoliosis above.  He did see pulmonary medicine on January night, was not found to have other lung disease.  Chest x-ray was negative at that time.  Given an albuterol inhaler without significant benefit    Spinal stenosis of lumbar region, unspecified whether neurogenic claudication present  No flare pain currently.  Emphasized the importance of regular walking and activity.  Usually goes to the gym 5 days a week to do the treadmill.    History of colonic polyps  Just had a colonoscopy October 2023.  He had a small 4 mm polyp.  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  He takes MiraLAX as needed for constipation.    No further colonoscopies planned    History of skin cancer  History of melanoma in situ of the scalp.  He has an appointment coming up in the spring.  Small sclerotic areas noted consistent with sun damage, but I did not see any suspicious mole or nodule.    Glaucoma, unspecified glaucoma type, unspecified laterality  Controlled.  Due to see ophthalmology in the spring.  On drops    Subacute cough, 3 weeks old  I suspect this is COVID illness despite testing negative.  You may have a secondary sinus infection after a viral infection and will be given a Z-Chris.  I discussed Marcy pot, see patient instructions.    If worsening symptoms or worsening shortness of breath, get reevaluated.  - azithromycin (ZITHROMAX) 250 MG tablet  Dispense: 6 tablet; Refill: 0    Encounter for therapeutic drug monitoring    - CBC with platelets  - Comprehensive metabolic panel      Patient has been advised of split billing requirements and indicates understanding: Yes      Counseling  Reviewed preventive health counseling, as reflected in patient instructions       Regular exercise       Healthy diet/nutrition       Vision screening        He reports that he has never smoked. He has been exposed to tobacco smoke. He has never used smokeless tobacco.      Appropriate preventive  services were discussed with this patient, including applicable screening as appropriate for fall prevention, nutrition, physical activity, Tobacco-use cessation, weight loss and cognition.  Checklist reviewing preventive services available has been given to the patient.    Reviewed patients plan of care and provided an AVS. The Basic Care Plan (routine screening as documented in Health Maintenance) for Antoni meets the Care Plan requirement. This Care Plan has been established and reviewed with the Patient.          Signed Electronically by: Sarthak Romano MD    Identified Health Risks  I have reviewed Opioid Use Disorder and Substance Use Disorder risk factors and made any needed referrals.

## 2024-01-29 NOTE — PATIENT INSTRUCTIONS
Treat possible sinus infection with azithromycin Z-Chris for 5 days.  Tea and gargling.  Some hot showers can help you clear the phlegm out of your nose.  Some patients do a Marcy pot, or saline irrigation of the nose.    It is possible that you are still recovering from COVID illness, which would give you similar symptoms.  If that is the case, I would anticipate you will continue to get better over time.  If you have worsening shortness of breath or worsening symptoms, get reevaluated.    Get back to the gym and your regular exercise routine 5 times a week.  You will need regular exercise to keep healthy and your back from hurting worse.    Continue to wear your CPAP every night.    Check your blood pressure.  Goal blood pressure less than 140/85 but not less than 110/60.  If your blood pressure is outside that range more than occasionally, return to clinic to discuss blood pressure.    See dermatology in the spring for your routine skin cancer follow-up.    See ophthalmology in the spring for your eye exam and glaucoma management.    You have a right wrist ganglion cyst, which is a herniation of the tendon sheath, usually from gripping too hard.  No intervention is needed for this, avoid gripping too hard or for too long.    Follow-up in 6 months for a routine blood pressure checkup appointment.

## 2024-01-30 DIAGNOSIS — M43.16 SPONDYLOLISTHESIS OF LUMBAR REGION: ICD-10-CM

## 2024-01-30 DIAGNOSIS — M48.062 SPINAL STENOSIS OF LUMBAR REGION WITH NEUROGENIC CLAUDICATION: ICD-10-CM

## 2024-01-31 RX ORDER — PREGABALIN 25 MG/1
CAPSULE ORAL
Qty: 60 CAPSULE | Refills: 1 | Status: SHIPPED | OUTPATIENT
Start: 2024-01-31 | End: 2024-04-01

## 2024-03-13 DIAGNOSIS — E78.00 PURE HYPERCHOLESTEROLEMIA: ICD-10-CM

## 2024-03-13 RX ORDER — ATORVASTATIN CALCIUM 20 MG/1
20 TABLET, FILM COATED ORAL DAILY
Qty: 90 TABLET | Refills: 2 | Status: SHIPPED | OUTPATIENT
Start: 2024-03-13

## 2024-04-01 DIAGNOSIS — M43.16 SPONDYLOLISTHESIS OF LUMBAR REGION: ICD-10-CM

## 2024-04-01 DIAGNOSIS — M48.062 SPINAL STENOSIS OF LUMBAR REGION WITH NEUROGENIC CLAUDICATION: ICD-10-CM

## 2024-04-01 RX ORDER — PREGABALIN 25 MG/1
CAPSULE ORAL
Qty: 60 CAPSULE | Refills: 1 | Status: SHIPPED | OUTPATIENT
Start: 2024-04-01 | End: 2024-06-03

## 2024-05-10 ENCOUNTER — TELEPHONE (OUTPATIENT)
Dept: OTOLARYNGOLOGY | Facility: CLINIC | Age: 89
End: 2024-05-10
Payer: MEDICARE

## 2024-05-10 NOTE — TELEPHONE ENCOUNTER
Left detailed message for pt. We have not received any requests to refill the Astelin spray. Dr. Portillo has not seen Wily in almost two years so he should return to see Dr. Portillo if he's still needing this. Alternatively he could request this from his PCP or buy it over-the-counter.   Sharlene Nava RN on 5/10/2024 at 4:30 PM      rehabilitation facility

## 2024-05-10 NOTE — TELEPHONE ENCOUNTER
[facility] Call Center    Phone Message    May a detailed message be left on voicemail: yes     Reason for Call: Other: PT calling stating pharmacy is waiting on authorization of pts nasal spray. Please advise. Thanks.     Action Taken: Other: ENT    Travel Screening: Not Applicable

## 2024-06-02 DIAGNOSIS — M48.062 SPINAL STENOSIS OF LUMBAR REGION WITH NEUROGENIC CLAUDICATION: ICD-10-CM

## 2024-06-02 DIAGNOSIS — M43.16 SPONDYLOLISTHESIS OF LUMBAR REGION: ICD-10-CM

## 2024-06-03 RX ORDER — PREGABALIN 25 MG/1
CAPSULE ORAL
Qty: 60 CAPSULE | Refills: 1 | Status: SHIPPED | OUTPATIENT
Start: 2024-06-03 | End: 2024-08-09

## 2024-06-03 NOTE — TELEPHONE ENCOUNTER
Last appointment: 11/8/23  Next appointment: none    Notes/Comments: last ordered 4/1/24 #60 with 1 refill      Rx request(s) has been reviewed.

## 2024-07-29 ENCOUNTER — OFFICE VISIT (OUTPATIENT)
Dept: INTERNAL MEDICINE | Facility: CLINIC | Age: 89
End: 2024-07-29
Payer: MEDICARE

## 2024-07-29 VITALS
DIASTOLIC BLOOD PRESSURE: 72 MMHG | HEART RATE: 66 BPM | BODY MASS INDEX: 24.62 KG/M2 | SYSTOLIC BLOOD PRESSURE: 140 MMHG | WEIGHT: 139 LBS | OXYGEN SATURATION: 97 %

## 2024-07-29 DIAGNOSIS — I10 ESSENTIAL HYPERTENSION: Primary | ICD-10-CM

## 2024-07-29 DIAGNOSIS — I83.92 ASYMPTOMATIC VARICOSE VEINS OF LEFT LOWER EXTREMITY: ICD-10-CM

## 2024-07-29 DIAGNOSIS — Z23 NEED FOR SHINGLES VACCINE: ICD-10-CM

## 2024-07-29 DIAGNOSIS — I25.84 CORONARY ARTERY DISEASE DUE TO CALCIFIED CORONARY LESION: ICD-10-CM

## 2024-07-29 DIAGNOSIS — I25.10 CORONARY ARTERY DISEASE DUE TO CALCIFIED CORONARY LESION: ICD-10-CM

## 2024-07-29 DIAGNOSIS — G47.33 OBSTRUCTIVE SLEEP APNEA ON CPAP: ICD-10-CM

## 2024-07-29 DIAGNOSIS — D03.4 MELANOMA IN SITU OF SCALP (H): ICD-10-CM

## 2024-07-29 PROCEDURE — 99214 OFFICE O/P EST MOD 30 MIN: CPT | Performed by: INTERNAL MEDICINE

## 2024-07-29 PROCEDURE — G2211 COMPLEX E/M VISIT ADD ON: HCPCS | Performed by: INTERNAL MEDICINE

## 2024-07-29 RX ORDER — VIT C/E/ZN/COPPR/LUTEIN/ZEAXAN 250MG-90MG
1 CAPSULE ORAL DAILY
COMMUNITY

## 2024-07-29 NOTE — PATIENT INSTRUCTIONS
Goal blood pressure less than 140/85, but not less than 100/50.  If your blood pressure is outside of that range more than occasionally, contact me to consider medication change.    Continue with your regular exercise and activity.    Avoid all alcohol.    Continue to wear your CPAP machine every night.    Get a COVID-vaccine and flu shot this fall.    You can get the Shingrix shingles vaccine, which can be obtained at local pharmacy.    I will see you after January 29 for your yearly physical exam.    Asymptomatic varicose veins do not need treatment.  You can wear compression stockings if you have discomfort from varicose veins.

## 2024-07-29 NOTE — PROGRESS NOTES
Antoni tSoll   89 year old male    Date of Visit: 7/29/2024    Chief Complaint   Patient presents with    Follow Up     6 mo follow up.      Subjective  89-year-old male here for blood pressure checkup appointment.  He has severe kyphoscoliosis and sleep apnea but highly compliant with CPAP.  He is not having worsening daytime sleepiness.  No history of atrial fibrillation.  No palpitations.  No increasing shortness of breath or edema.    His blood pressure at home has been on the low side, which is normal for him.  He has a whitecoat hypertension syndrome.  His blood pressure cuff has correlated with us in the past.  Blood pressure is running 96/56-1 40/64 at home.  He denies significant orthostatic dizziness.  He is on lisinopril 2.5 mg a day.    He tends to have higher blood pressures in clinic but lower at home.  His was 155/70 last January.    He has gained 6 pounds since then.  He has cut down on alcohol some.  He has been eating more sweets since doing that.    No increasing shortness of breath, goes to the gym 5 days a week with good exercise.  Chronic back pain with his kyphoscoliosis.    No epigastric pain or history of GI bleeding, still on aspirin.  No chest pain or chest pressure.  No exertional symptoms.  History of coronary disease.  Still tolerating atorvastatin 20 mg.  Excellent cholesterol levels and normal kidney and liver tests in January at his physical.    Glaucoma controlled, sees ophthalmology tomorrow.    Past history of melanoma in situ of the scalp.  No recurrence he was seen by dermatology in May of this year, given a 6-month follow-up.    He has varicose veins of his left legs.  But no pain or significant swelling.  No history of superficial thrombophlebitis.    PMHx:    Past Medical History:   Diagnosis Date    Anemia     Basal cell carcinoma     Cancer (H)     colon    Carotid artery occlusion     Coronary artery disease     Glaucoma     High cholesterol     History of colon  cancer 08/13/2018    Hyperlipidemia     Hypertension     Left inguinal hernia     Low back pain     Malignant melanoma (H)     Mild aortic insufficiency     Moderate tricuspid regurgitation     Postnasal drip     Sleep apnea, obstructive     CPAP    Spinal stenosis     lumbar    SSS (sick sinus syndrome) (H) 12/09/2019    Umbilical hernia     Unspecified cataract     Created by Conversion      PSHx:    Past Surgical History:   Procedure Laterality Date    ARTERIAL BYPASS SURGERY      BLEPHAROPLASTY Bilateral     BYPASS GRAFT ARTERY CORONARY  06/28/2013    5-vessel    BYPASS GRAFT ARTERY CORONARY  2013    5 vessel    CARDIAC CATHETERIZATION  06/03/2013    CATARACT EXTRACTION Bilateral     COLON SURGERY Right     HERNIA REPAIR      right inguinal    HERNIORRHAPHY INGUINAL Left 2/14/2022    Procedure: INCARCERATED LEFT INGUINAL HERNIA REPAIR WITH MESH;  Surgeon: Jack Naqvi MD;  Location: [facility] OR    STRIP VEIN Left      Immunizations:   Immunization History   Administered Date(s) Administered    COVID-19 Bivalent 12+ (Pfizer) 07/25/2023    COVID-19 MONOVALENT 12+ (Pfizer) 04/16/2021, 05/18/2021, 01/11/2022    COVID-19 Monovalent 12+ (Pfizer 2022) 07/25/2022    Flu, Unspecified 10/23/2007, 10/29/2009, 10/11/2010, 11/08/2011, 11/05/2021, 09/01/2022    Influenza (H1N1) 01/18/2010    Influenza (High Dose) 3 valent vaccine 10/13/2015, 10/18/2016, 11/01/2017, 12/10/2018, 12/11/2019    Influenza (IIV3) PF 10/23/2007, 10/29/2009, 11/08/2011, 11/29/2012, 10/25/2013, 11/19/2014    Influenza Vaccine 65+ (FLUAD) 11/05/2021    Influenza Vaccine 65+ (Fluzone HD) 11/30/2020, 01/25/2023, 09/26/2023    Influenza Vaccine, 6+MO IM (QUADRIVALENT W/PRESERVATIVES) 11/29/2012, 10/25/2013, 11/19/2014    Influenza, seasonal, injectable, PF 10/11/2010    Pneumo Conj 13-V (2010&after) 04/09/2015    Pneumococcal 23 valent 10/03/1999, 03/06/2007    Pneumococcal, Unspecified 01/01/2012, 04/09/2015    TDAP (Adacel,Boostrix) 08/26/2022     Td (Adult), Adsorbed 03/11/1992, 10/24/2000, 04/11/2011    Zoster vaccine, live 11/08/2007       ROS A comprehensive review of systems was performed and was otherwise negative    Medications, allergies, and problem list were reviewed and updated    Exam  BP (!) 140/72   Pulse 66   Wt 63 kg (139 lb)   SpO2 97%   BMI 24.62 kg/m    Appears well.  Lungs are clear to auscultation.  Heart is regular without murmur.  No edema.  Abdomen is nontender.  Severe kyphoscoliosis of the spine.    Assessment/Plan  1. Essential hypertension  Whitecoat hypertension with well-controlled blood pressure at home.  Borderline lower blood pressures at home but tolerates.  Continue lisinopril 2.5 mg a day.  See patient instructions for monitoring blood pressure    2. Coronary artery disease due to calcified coronary lesion  Asymptomatic.  Continue aspirin and atorvastatin.  Excellent exercise tolerance    3. Obstructive sleep apnea on CPAP  Highly compliant with CPAP.  Severe kyphoscoliosis.    4. Melanoma in situ of scalp (H)  No recurrence.  See dermatology in the fall for 6-month follow-up    5. Asymptomatic varicose veins of left lower extremity  Patient states he is asymptomatic, no need for intervention.  I did discuss compression stockings if needed    6. Need for shingles vaccine  You could consider Shingrix vaccine, recommended local pharmacy.  He was warned about immunization reaction  - zoster vaccine recombinant adjuvanted (SHINGRIX) injection; Inject 0.5 mLs into the muscle once for 1 dose Pharmacist administered  Dispense: 0.5 mL; Refill: 0    Glaucoma controlled, appointment tomorrow.    BPH stable on Flomax.    Chronic back pain encouraged him to continue his back exercises regularly.    The longitudinal plan of care for the diagnosis(es)/condition(s) as documented were addressed during this visit. Due to the added complexity in care, I will continue to support Wily in the subsequent management and with ongoing  continuity of care.        Return in about 6 months (around 1/30/2025) for Adult wellness visit physical exam.   Patient Instructions   Goal blood pressure less than 140/85, but not less than 100/50.  If your blood pressure is outside of that range more than occasionally, contact me to consider medication change.    Continue with your regular exercise and activity.    Avoid all alcohol.    Continue to wear your CPAP machine every night.    Get a COVID-vaccine and flu shot this fall.    You can get the Shingrix shingles vaccine, which can be obtained at local pharmacy.    I will see you after January 29 for your yearly physical exam.    Asymptomatic varicose veins do not need treatment.  You can wear compression stockings if you have discomfort from varicose veins.    Sarthak Romano MD, MD        Current Outpatient Medications   Medication Sig Dispense Refill    acetaminophen (TYLENOL) 500 MG tablet [ACETAMINOPHEN (TYLENOL) 500 MG TABLET] Take 1,000 mg by mouth daily .            aspirin 325 MG tablet [ASPIRIN 325 MG TABLET] Take 325 mg by mouth daily.      atorvastatin (LIPITOR) 20 MG tablet Take 1 tablet (20 mg) by mouth daily 90 tablet 2    azelastine (ASTELIN) 0.1 % nasal spray 2 sprays each nostril 1-2x daily as needed for nasal congestion 30 mL 11    calcium polycarbophil (FIBERCON) 625 mg tablet [CALCIUM POLYCARBOPHIL (FIBERCON) 625 MG TABLET] Take 1,250 mg by mouth daily.      cholecalciferol, vitamin D3, 1,000 unit tablet [CHOLECALCIFEROL, VITAMIN D3, 1,000 UNIT TABLET] Take 1,000 Units by mouth daily.      fluticasone (FLONASE) 50 MCG/ACT nasal spray       glucosamine-chondroitin 500-400 mg cap [GLUCOSAMINE-CHONDROITIN 500-400 MG CAP] Take 1 capsule by mouth 2 (two) times a day.      latanoprost (XALATAN) 0.005 % ophthalmic solution Place 1 drop into both eyes At Bedtime      lisinopril (ZESTRIL) 2.5 MG tablet [LISINOPRIL (PRINIVIL,ZESTRIL) 2.5 MG TABLET] TAKE 1 TABLET BY MOUTH EVERY DAY 90 tablet 3     loratadine 10 MG capsule Take 10 mg by mouth daily      Multiple Vitamins-Minerals (PRESERVISION AREDS 2) CHEW       nystatin (MYCOSTATIN) 521102 UNIT/GM external powder Apply to affected areas twice a day if flare of rash 60 g 3    pregabalin (LYRICA) 25 MG capsule TAKE 1 CAPSULE BY MOUTH TWICE DAILY 60 capsule 1    saw palmetto fruit 450 mg cap [SAW PALMETTO FRUIT 450 MG CAP] Take 1 capsule by mouth 2 (two) times a day.      tamsulosin (FLOMAX) 0.4 mg Cp24 [TAMSULOSIN (FLOMAX) 0.4 MG CP24] Take 0.4 mg by mouth at bedtime.      zoster vaccine recombinant adjuvanted (SHINGRIX) injection Inject 0.5 mLs into the muscle once for 1 dose Pharmacist administered 0.5 mL 0     No Known Allergies  Social History     Tobacco Use    Smoking status: Never     Passive exposure: Past    Smokeless tobacco: Never   Vaping Use    Vaping status: Never Used   Substance Use Topics    Alcohol use: Yes     Comment: Alcoholic Drinks/day: 5 drinks weekly    Drug use: No             Subjective   Wily is a 89 year old, presenting for the following health issues:  Follow Up (6 mo follow up. )        7/29/2024     1:26 PM   Additional Questions   Roomed by Marilee RAIN   Accompanied by bryn     History of Present Illness       Back Pain:  He presents for follow up of back pain. Patient's back pain is a chronic problem.  Location of back pain:  Right lower back, left lower back, right middle of back, left middle of back, right side of neck, left side of neck, right shoulder and left buttock  Description of back pain: burning  Back pain spreads: left thigh    Since patient first noticed back pain, pain is: always present, but gets better and worse  Does back pain interfere with his job:  Not applicable       Hyperlipidemia:  He presents for follow up of hyperlipidemia.   He is taking medication to lower cholesterol. He is not having myalgia or other side effects to statin medications.    Hypertension: He presents for follow up of hypertension.  He does  check blood pressure  regularly outside of the clinic. Outside blood pressures have been over 140/90. He follows a low salt diet.     Vascular Disease:  He presents for follow up of vascular disease.     He never takes nitroglycerin. He takes daily aspirin.    He eats 2-3 servings of fruits and vegetables daily.He consumes 0 sweetened beverage(s) daily.He exercises with enough effort to increase his heart rate 30 to 60 minutes per day.  He exercises with enough effort to increase his heart rate 5 days per week.   He is taking medications regularly.                     Objective    BP (!) 140/72   Pulse 66   Wt 63 kg (139 lb)   SpO2 97%   BMI 24.62 kg/m    Body mass index is 24.62 kg/m .  Physical Exam               Signed Electronically by: Sarthak Romano MD

## 2024-08-06 DIAGNOSIS — I10 ESSENTIAL HYPERTENSION: ICD-10-CM

## 2024-08-06 RX ORDER — LISINOPRIL 2.5 MG/1
TABLET ORAL
Qty: 90 TABLET | Refills: 3 | Status: SHIPPED | OUTPATIENT
Start: 2024-08-06

## 2024-08-09 DIAGNOSIS — M48.062 SPINAL STENOSIS OF LUMBAR REGION WITH NEUROGENIC CLAUDICATION: ICD-10-CM

## 2024-08-09 DIAGNOSIS — M43.16 SPONDYLOLISTHESIS OF LUMBAR REGION: ICD-10-CM

## 2024-08-09 RX ORDER — PREGABALIN 25 MG/1
CAPSULE ORAL
Qty: 60 CAPSULE | Refills: 2 | Status: SHIPPED | OUTPATIENT
Start: 2024-08-09

## 2024-09-24 ENCOUNTER — MEDICAL CORRESPONDENCE (OUTPATIENT)
Dept: HEALTH INFORMATION MANAGEMENT | Facility: CLINIC | Age: 89
End: 2024-09-24

## 2024-10-03 ENCOUNTER — NURSE TRIAGE (OUTPATIENT)
Dept: INTERNAL MEDICINE | Facility: CLINIC | Age: 89
End: 2024-10-03
Payer: MEDICARE

## 2024-10-03 ENCOUNTER — APPOINTMENT (OUTPATIENT)
Dept: CT IMAGING | Facility: HOSPITAL | Age: 89
DRG: 351 | End: 2024-10-03
Payer: MEDICARE

## 2024-10-03 ENCOUNTER — OFFICE VISIT (OUTPATIENT)
Dept: FAMILY MEDICINE | Facility: CLINIC | Age: 89
End: 2024-10-03
Payer: MEDICARE

## 2024-10-03 ENCOUNTER — HOSPITAL ENCOUNTER (INPATIENT)
Facility: HOSPITAL | Age: 89
LOS: 2 days | Discharge: HOME | DRG: 351 | End: 2024-10-06
Attending: EMERGENCY MEDICINE | Admitting: HOSPITALIST
Payer: MEDICARE

## 2024-10-03 VITALS
TEMPERATURE: 98.6 F | BODY MASS INDEX: 23.48 KG/M2 | HEIGHT: 63 IN | HEART RATE: 76 BPM | RESPIRATION RATE: 18 BRPM | OXYGEN SATURATION: 97 % | WEIGHT: 132.5 LBS | DIASTOLIC BLOOD PRESSURE: 82 MMHG | SYSTOLIC BLOOD PRESSURE: 168 MMHG

## 2024-10-03 DIAGNOSIS — K40.90 INGUINAL HERNIA WITHOUT OBSTRUCTION OR GANGRENE, RECURRENCE NOT SPECIFIED, UNSPECIFIED LATERALITY: ICD-10-CM

## 2024-10-03 DIAGNOSIS — K21.9 GASTROESOPHAGEAL REFLUX DISEASE WITHOUT ESOPHAGITIS: Primary | ICD-10-CM

## 2024-10-03 DIAGNOSIS — K40.30: ICD-10-CM

## 2024-10-03 DIAGNOSIS — R10.31 RLQ ABDOMINAL PAIN: Primary | ICD-10-CM

## 2024-10-03 LAB
ALBUMIN SERPL BCG-MCNC: 4.9 G/DL (ref 3.5–5.2)
ALP SERPL-CCNC: 93 U/L (ref 40–150)
ALT SERPL W P-5'-P-CCNC: 12 U/L (ref 0–70)
ANION GAP SERPL CALCULATED.3IONS-SCNC: 14 MMOL/L (ref 7–15)
AST SERPL W P-5'-P-CCNC: 22 U/L (ref 0–45)
BASOPHILS # BLD AUTO: 0 10E3/UL (ref 0–0.2)
BASOPHILS NFR BLD AUTO: 0 %
BILIRUB DIRECT SERPL-MCNC: <0.2 MG/DL (ref 0–0.3)
BILIRUB SERPL-MCNC: 0.7 MG/DL
BUN SERPL-MCNC: 70.2 MG/DL (ref 8–23)
CALCIUM SERPL-MCNC: 9.5 MG/DL (ref 8.8–10.4)
CHLORIDE SERPL-SCNC: 92 MMOL/L (ref 98–107)
CREAT SERPL-MCNC: 1.65 MG/DL (ref 0.67–1.17)
EGFRCR SERPLBLD CKD-EPI 2021: 40 ML/MIN/1.73M2
EOSINOPHIL # BLD AUTO: 0 10E3/UL (ref 0–0.7)
EOSINOPHIL NFR BLD AUTO: 0 %
ERYTHROCYTE [DISTWIDTH] IN BLOOD BY AUTOMATED COUNT: 13.2 % (ref 10–15)
GLUCOSE SERPL-MCNC: 146 MG/DL (ref 70–99)
HCO3 SERPL-SCNC: 30 MMOL/L (ref 22–29)
HCT VFR BLD AUTO: 45.3 % (ref 40–53)
HGB BLD-MCNC: 15 G/DL (ref 13.3–17.7)
HOLD SPECIMEN: NORMAL
HOLD SPECIMEN: NORMAL
IMM GRANULOCYTES # BLD: 0.1 10E3/UL
IMM GRANULOCYTES NFR BLD: 1 %
LIPASE SERPL-CCNC: 137 U/L (ref 13–60)
LYMPHOCYTES # BLD AUTO: 1 10E3/UL (ref 0.8–5.3)
LYMPHOCYTES NFR BLD AUTO: 9 %
MCH RBC QN AUTO: 32.8 PG (ref 26.5–33)
MCHC RBC AUTO-ENTMCNC: 33.1 G/DL (ref 31.5–36.5)
MCV RBC AUTO: 99 FL (ref 78–100)
MONOCYTES # BLD AUTO: 1.3 10E3/UL (ref 0–1.3)
MONOCYTES NFR BLD AUTO: 11 %
NEUTROPHILS # BLD AUTO: 9.5 10E3/UL (ref 1.6–8.3)
NEUTROPHILS NFR BLD AUTO: 80 %
NRBC # BLD AUTO: 0 10E3/UL
NRBC BLD AUTO-RTO: 0 /100
PLATELET # BLD AUTO: 254 10E3/UL (ref 150–450)
POTASSIUM SERPL-SCNC: 3.6 MMOL/L (ref 3.4–5.3)
PROT SERPL-MCNC: 8.6 G/DL (ref 6.4–8.3)
RBC # BLD AUTO: 4.58 10E6/UL (ref 4.4–5.9)
SODIUM SERPL-SCNC: 136 MMOL/L (ref 135–145)
WBC # BLD AUTO: 11.9 10E3/UL (ref 4–11)

## 2024-10-03 PROCEDURE — 83690 ASSAY OF LIPASE: CPT | Performed by: EMERGENCY MEDICINE

## 2024-10-03 PROCEDURE — G0378 HOSPITAL OBSERVATION PER HR: HCPCS

## 2024-10-03 PROCEDURE — 82947 ASSAY GLUCOSE BLOOD QUANT: CPT | Performed by: EMERGENCY MEDICINE

## 2024-10-03 PROCEDURE — 84155 ASSAY OF PROTEIN SERUM: CPT | Performed by: EMERGENCY MEDICINE

## 2024-10-03 PROCEDURE — 82248 BILIRUBIN DIRECT: CPT | Performed by: EMERGENCY MEDICINE

## 2024-10-03 PROCEDURE — 99214 OFFICE O/P EST MOD 30 MIN: CPT | Performed by: FAMILY MEDICINE

## 2024-10-03 PROCEDURE — 99285 EMERGENCY DEPT VISIT HI MDM: CPT | Mod: 25

## 2024-10-03 PROCEDURE — 74177 CT ABD & PELVIS W/CONTRAST: CPT | Mod: MG

## 2024-10-03 PROCEDURE — 85004 AUTOMATED DIFF WBC COUNT: CPT | Performed by: STUDENT IN AN ORGANIZED HEALTH CARE EDUCATION/TRAINING PROGRAM

## 2024-10-03 PROCEDURE — 85025 COMPLETE CBC W/AUTO DIFF WBC: CPT | Performed by: EMERGENCY MEDICINE

## 2024-10-03 PROCEDURE — 99222 1ST HOSP IP/OBS MODERATE 55: CPT | Mod: AI | Performed by: HOSPITALIST

## 2024-10-03 PROCEDURE — 36415 COLL VENOUS BLD VENIPUNCTURE: CPT | Performed by: STUDENT IN AN ORGANIZED HEALTH CARE EDUCATION/TRAINING PROGRAM

## 2024-10-03 PROCEDURE — 250N000011 HC RX IP 250 OP 636

## 2024-10-03 RX ORDER — ONDANSETRON 2 MG/ML
4 INJECTION INTRAMUSCULAR; INTRAVENOUS ONCE
Status: DISCONTINUED | OUTPATIENT
Start: 2024-10-03 | End: 2024-10-04

## 2024-10-03 RX ORDER — IOPAMIDOL 755 MG/ML
75 INJECTION, SOLUTION INTRAVASCULAR ONCE
Status: COMPLETED | OUTPATIENT
Start: 2024-10-03 | End: 2024-10-03

## 2024-10-03 RX ADMIN — IOPAMIDOL 75 ML: 755 INJECTION, SOLUTION INTRAVENOUS at 20:43

## 2024-10-03 ASSESSMENT — ENCOUNTER SYMPTOMS
ABDOMINAL PAIN: 1
ABDOMINAL DISTENTION: 1
NAUSEA: 1
LIGHT-HEADEDNESS: 1
RHINORRHEA: 1
VOMITING: 1
CONSTIPATION: 1

## 2024-10-03 ASSESSMENT — ACTIVITIES OF DAILY LIVING (ADL)
ADLS_ACUITY_SCORE: 35

## 2024-10-03 ASSESSMENT — COLUMBIA-SUICIDE SEVERITY RATING SCALE - C-SSRS
6. HAVE YOU EVER DONE ANYTHING, STARTED TO DO ANYTHING, OR PREPARED TO DO ANYTHING TO END YOUR LIFE?: NO
1. IN THE PAST MONTH, HAVE YOU WISHED YOU WERE DEAD OR WISHED YOU COULD GO TO SLEEP AND NOT WAKE UP?: NO

## 2024-10-03 NOTE — PROGRESS NOTES
"Assessment & Plan     RLQ abdominal pain      Inguinal hernia without obstruction or gangrene, recurrence not specified, unspecified laterality  I am concerned with a possible non-reducible incarcerated hernia therefore was sent to ER for evaluation and possible surgical consult.  He will present to ER at [facility].             No follow-ups on file.    Brandyn Lam MD  [facility]    Madhav Julien is a 89 year old male who presents to clinic today for the following health issues:  Chief Complaint   Patient presents with    Constipation      - x 5 days, fluctuating fever, intense pain in RL abdomen, vomiting x 2 days         10/3/2024     6:13 PM   Additional Questions   Roomed by MARIO ALBERTO Roth     HPI    Concern about constipation  Not improved with suppository  Some pain in right side of abd  No vomiting since earlier in week  Nausea and bloating  Heartburn  No fever since yesterday.        Review of Systems        Objective    BP (!) 168/82 (BP Location: Right arm, Patient Position: Sitting, Cuff Size: Adult Regular)   Pulse 76   Temp 98.6  F (37  C) (Oral)   Resp 18   Ht 1.6 m (5' 3\")   Wt 60.1 kg (132 lb 8 oz)   SpO2 97%   BMI 23.47 kg/m    Physical Exam  Vitals and nursing note reviewed.   Constitutional:       Appearance: Normal appearance.   Cardiovascular:      Rate and Rhythm: Normal rate and regular rhythm.      Pulses: Normal pulses.      Heart sounds: Normal heart sounds.   Pulmonary:      Effort: Pulmonary effort is normal.      Breath sounds: Normal breath sounds.   Abdominal:      Comments: I feel a large ?bowel like mass in right inguinal area and I am not able to reduce it  Quite tender.   Neurological:      Mental Status: He is alert.                    "

## 2024-10-03 NOTE — TELEPHONE ENCOUNTER
Nurse Triage SBAR    Is this a 2nd Level Triage? YES, LICENSED PRACTITIONER REVIEW IS REQUIRED    Situation:  Multiple symptoms including lower right abdominal pain, nausea, vomiting, low grade fever, negative for covid.     Background:    Hx: Had colon surgery in 2017   Left hernia repair done one year ago.     Assessment:    Right low abd pain, tender to touch. Rated pain at 7/10 being the highest, but pain is steady and mild without touching.   Denies looking red or discoloration.   Been having constipation issues in the past and been using suppository with output.   Last suppository was on Monday 9/30/24, been having very little BM daily in a soft form.   Denies tarry or blood in stool.   Able to urinate without difficulties, last urinated 5 hours ago.     Also reporting having heartburn, clear nasal drainage, nausea , bloated, low grade fever (highest 99.7), vomiting 3x daily on Monday and Tuesday.   Reported not having appetite and not drinking enough water as he should.     Denies abdominal swellings.  Able to stand, walk, and drive.   Denies cheat pain or breathing difficulties.     Protocol Recommended Disposition:   Call ADS/Go to ED/UCC Now (Or To Office with PCP Approval)    Recommendation:    Attempted to call ADS [place] multiple times, but does not work. Recommended UC now. Pt was agreeable. Pt will be coming to walk in care now as he feels okay driving.     Does the patient meet one of the following criteria for ADS visit consideration? 16+ years old, with an [facility] PCP     TIP  Providers, please consider if this condition is appropriate for management at one of our Acute and Diagnostic Services sites.     If patient is a good candidate, please use dotphrase <dot>triageresponse and select Refer to ADS to document.     Reason for Disposition   MILD TO MODERATE constant pain lasting > 2 hours    Additional Information   Negative: Passed out (i.e., fainted, collapsed and was not responding)   Negative:  Shock suspected (e.g., cold/pale/clammy skin, too weak to stand, low BP, rapid pulse)   Negative: Sounds like a life-threatening emergency to the triager   Negative: Followed an abdomen (stomach) injury   Negative: Chest pain   Negative: Pain is mainly in upper abdomen (if needed ask: 'is it mainly above the belly button?')   Negative: Abdomen bloating or swelling are main symptoms   Negative: SEVERE abdominal pain (e.g., excruciating)   Negative: Vomiting red blood or black (coffee ground) material   Negative: Blood in bowel movements  (Exception: Blood on surface of BM with constipation.)   Negative: Black or tarry bowel movements  (Exception: Chronic-unchanged black-grey BMs AND is taking iron pills or Pepto-Bismol.)   Negative: Unable to urinate (or only a few drops) and bladder feels very full   Negative: Pain in scrotum persists > 1 hour    Protocols used: Abdominal Pain - Male-A-[place]    Asa CORDOBA RN

## 2024-10-04 ENCOUNTER — ANESTHESIA (OUTPATIENT)
Dept: SURGERY | Facility: HOSPITAL | Age: 89
DRG: 351 | End: 2024-10-04
Payer: MEDICARE

## 2024-10-04 ENCOUNTER — APPOINTMENT (OUTPATIENT)
Dept: RADIOLOGY | Facility: HOSPITAL | Age: 89
DRG: 351 | End: 2024-10-04
Attending: INTERNAL MEDICINE
Payer: MEDICARE

## 2024-10-04 ENCOUNTER — ANESTHESIA EVENT (OUTPATIENT)
Dept: SURGERY | Facility: HOSPITAL | Age: 89
DRG: 351 | End: 2024-10-04
Payer: MEDICARE

## 2024-10-04 PROBLEM — N17.9 AKI (ACUTE KIDNEY INJURY) (H): Status: ACTIVE | Noted: 2024-10-04

## 2024-10-04 LAB
ANION GAP SERPL CALCULATED.3IONS-SCNC: 10 MMOL/L (ref 7–15)
BUN SERPL-MCNC: 60.3 MG/DL (ref 8–23)
CALCIUM SERPL-MCNC: 8.3 MG/DL (ref 8.8–10.4)
CHLORIDE SERPL-SCNC: 99 MMOL/L (ref 98–107)
CREAT SERPL-MCNC: 1.29 MG/DL (ref 0.67–1.17)
EGFRCR SERPLBLD CKD-EPI 2021: 54 ML/MIN/1.73M2
ERYTHROCYTE [DISTWIDTH] IN BLOOD BY AUTOMATED COUNT: 13.2 % (ref 10–15)
GLUCOSE SERPL-MCNC: 128 MG/DL (ref 70–99)
HCO3 SERPL-SCNC: 29 MMOL/L (ref 22–29)
HCT VFR BLD AUTO: 40 % (ref 40–53)
HGB BLD-MCNC: 13 G/DL (ref 13.3–17.7)
MCH RBC QN AUTO: 32.4 PG (ref 26.5–33)
MCHC RBC AUTO-ENTMCNC: 32.5 G/DL (ref 31.5–36.5)
MCV RBC AUTO: 100 FL (ref 78–100)
PLATELET # BLD AUTO: 197 10E3/UL (ref 150–450)
POTASSIUM SERPL-SCNC: 4.1 MMOL/L (ref 3.4–5.3)
RBC # BLD AUTO: 4.01 10E6/UL (ref 4.4–5.9)
SODIUM SERPL-SCNC: 138 MMOL/L (ref 135–145)
WBC # BLD AUTO: 9.4 10E3/UL (ref 4–11)

## 2024-10-04 PROCEDURE — 49521 REREPAIR ING HERNIA BLOCKED: CPT | Performed by: ANESTHESIOLOGY

## 2024-10-04 PROCEDURE — 258N000003 HC RX IP 258 OP 636: Performed by: STUDENT IN AN ORGANIZED HEALTH CARE EDUCATION/TRAINING PROGRAM

## 2024-10-04 PROCEDURE — 250N000011 HC RX IP 250 OP 636: Performed by: REGISTERED NURSE

## 2024-10-04 PROCEDURE — 250N000011 HC RX IP 250 OP 636: Performed by: SPECIALIST

## 2024-10-04 PROCEDURE — 250N000025 HC SEVOFLURANE, PER MIN: Performed by: SPECIALIST

## 2024-10-04 PROCEDURE — 99222 1ST HOSP IP/OBS MODERATE 55: CPT | Mod: 57 | Performed by: SPECIALIST

## 2024-10-04 PROCEDURE — 49520 REREPAIR ING HERNIA REDUCE: CPT | Mod: AS

## 2024-10-04 PROCEDURE — 250N000013 HC RX MED GY IP 250 OP 250 PS 637: Performed by: SPECIALIST

## 2024-10-04 PROCEDURE — 99232 SBSQ HOSP IP/OBS MODERATE 35: CPT | Performed by: INTERNAL MEDICINE

## 2024-10-04 PROCEDURE — 250N000009 HC RX 250: Performed by: REGISTERED NURSE

## 2024-10-04 PROCEDURE — C1781 MESH (IMPLANTABLE): HCPCS | Performed by: SPECIALIST

## 2024-10-04 PROCEDURE — 258N000003 HC RX IP 258 OP 636: Performed by: INTERNAL MEDICINE

## 2024-10-04 PROCEDURE — 250N000013 HC RX MED GY IP 250 OP 250 PS 637: Performed by: HOSPITALIST

## 2024-10-04 PROCEDURE — 272N000001 HC OR GENERAL SUPPLY STERILE: Performed by: SPECIALIST

## 2024-10-04 PROCEDURE — 36415 COLL VENOUS BLD VENIPUNCTURE: CPT | Performed by: HOSPITALIST

## 2024-10-04 PROCEDURE — 258N000003 HC RX IP 258 OP 636: Performed by: HOSPITALIST

## 2024-10-04 PROCEDURE — 80048 BASIC METABOLIC PNL TOTAL CA: CPT | Performed by: HOSPITALIST

## 2024-10-04 PROCEDURE — 370N000017 HC ANESTHESIA TECHNICAL FEE, PER MIN: Performed by: SPECIALIST

## 2024-10-04 PROCEDURE — 49521 REREPAIR ING HERNIA BLOCKED: CPT | Performed by: REGISTERED NURSE

## 2024-10-04 PROCEDURE — G0378 HOSPITAL OBSERVATION PER HR: HCPCS

## 2024-10-04 PROCEDURE — 0YU50JZ SUPPLEMENT RIGHT INGUINAL REGION WITH SYNTHETIC SUBSTITUTE, OPEN APPROACH: ICD-10-PCS | Performed by: SPECIALIST

## 2024-10-04 PROCEDURE — 82310 ASSAY OF CALCIUM: CPT | Performed by: HOSPITALIST

## 2024-10-04 PROCEDURE — 99100 ANES PT EXTEME AGE<1 YR&>70: CPT | Performed by: REGISTERED NURSE

## 2024-10-04 PROCEDURE — 120N000001 HC R&B MED SURG/OB

## 2024-10-04 PROCEDURE — 250N000011 HC RX IP 250 OP 636: Mod: JZ | Performed by: SPECIALIST

## 2024-10-04 PROCEDURE — 710N000009 HC RECOVERY PHASE 1, LEVEL 1, PER MIN: Performed by: SPECIALIST

## 2024-10-04 PROCEDURE — 999N000065 XR ABDOMEN PORT 1 VIEW

## 2024-10-04 PROCEDURE — 85027 COMPLETE CBC AUTOMATED: CPT | Performed by: HOSPITALIST

## 2024-10-04 PROCEDURE — 49520 REREPAIR ING HERNIA REDUCE: CPT | Mod: RT | Performed by: SPECIALIST

## 2024-10-04 PROCEDURE — 258N000003 HC RX IP 258 OP 636: Performed by: REGISTERED NURSE

## 2024-10-04 PROCEDURE — 999N000141 HC STATISTIC PRE-PROCEDURE NURSING ASSESSMENT: Performed by: SPECIALIST

## 2024-10-04 PROCEDURE — 360N000075 HC SURGERY LEVEL 2, PER MIN: Performed by: SPECIALIST

## 2024-10-04 DEVICE — SELF-GRIPPING POLYESTER MESH,RIGHT ANATOMICAL, POLYESTER WITH POLYLACTIC ACID GRIPS
Type: IMPLANTABLE DEVICE | Site: ABDOMEN | Status: FUNCTIONAL
Brand: PROGRIP

## 2024-10-04 RX ORDER — BISACODYL 10 MG
10 SUPPOSITORY, RECTAL RECTAL DAILY PRN
Status: DISCONTINUED | OUTPATIENT
Start: 2024-10-07 | End: 2024-10-06 | Stop reason: HOSPADM

## 2024-10-04 RX ORDER — ACETAMINOPHEN 325 MG/1
975 TABLET ORAL EVERY 8 HOURS
Status: DISCONTINUED | OUTPATIENT
Start: 2024-10-04 | End: 2024-10-06 | Stop reason: HOSPADM

## 2024-10-04 RX ORDER — ATORVASTATIN CALCIUM 10 MG/1
20 TABLET, FILM COATED ORAL EVERY EVENING
Status: DISCONTINUED | OUTPATIENT
Start: 2024-10-04 | End: 2024-10-06 | Stop reason: HOSPADM

## 2024-10-04 RX ORDER — PROCHLORPERAZINE 25 MG
12.5 SUPPOSITORY, RECTAL RECTAL EVERY 12 HOURS PRN
Status: DISCONTINUED | OUTPATIENT
Start: 2024-10-04 | End: 2024-10-04

## 2024-10-04 RX ORDER — ONDANSETRON 4 MG/1
4 TABLET, ORALLY DISINTEGRATING ORAL EVERY 30 MIN PRN
Status: DISCONTINUED | OUTPATIENT
Start: 2024-10-04 | End: 2024-10-04 | Stop reason: HOSPADM

## 2024-10-04 RX ORDER — HYDROMORPHONE HCL IN WATER/PF 6 MG/30 ML
0.1 PATIENT CONTROLLED ANALGESIA SYRINGE INTRAVENOUS
Status: DISCONTINUED | OUTPATIENT
Start: 2024-10-04 | End: 2024-10-06 | Stop reason: HOSPADM

## 2024-10-04 RX ORDER — NALOXONE HYDROCHLORIDE 0.4 MG/ML
0.1 INJECTION, SOLUTION INTRAMUSCULAR; INTRAVENOUS; SUBCUTANEOUS
Status: DISCONTINUED | OUTPATIENT
Start: 2024-10-04 | End: 2024-10-04 | Stop reason: HOSPADM

## 2024-10-04 RX ORDER — GLYCOPYRROLATE 0.2 MG/ML
INJECTION, SOLUTION INTRAMUSCULAR; INTRAVENOUS PRN
Status: DISCONTINUED | OUTPATIENT
Start: 2024-10-04 | End: 2024-10-04

## 2024-10-04 RX ORDER — SODIUM CHLORIDE, SODIUM LACTATE, POTASSIUM CHLORIDE, CALCIUM CHLORIDE 600; 310; 30; 20 MG/100ML; MG/100ML; MG/100ML; MG/100ML
INJECTION, SOLUTION INTRAVENOUS CONTINUOUS
Status: DISCONTINUED | OUTPATIENT
Start: 2024-10-04 | End: 2024-10-04 | Stop reason: HOSPADM

## 2024-10-04 RX ORDER — HYDRALAZINE HYDROCHLORIDE 20 MG/ML
10 INJECTION INTRAMUSCULAR; INTRAVENOUS EVERY 4 HOURS PRN
Status: DISCONTINUED | OUTPATIENT
Start: 2024-10-04 | End: 2024-10-06 | Stop reason: HOSPADM

## 2024-10-04 RX ORDER — HYDROMORPHONE HCL IN WATER/PF 6 MG/30 ML
0.2 PATIENT CONTROLLED ANALGESIA SYRINGE INTRAVENOUS
Status: DISCONTINUED | OUTPATIENT
Start: 2024-10-04 | End: 2024-10-06 | Stop reason: HOSPADM

## 2024-10-04 RX ORDER — LIDOCAINE HYDROCHLORIDE 10 MG/ML
INJECTION, SOLUTION INFILTRATION; PERINEURAL PRN
Status: DISCONTINUED | OUTPATIENT
Start: 2024-10-04 | End: 2024-10-04

## 2024-10-04 RX ORDER — OXYCODONE HYDROCHLORIDE 5 MG/1
5 TABLET ORAL
Status: DISCONTINUED | OUTPATIENT
Start: 2024-10-04 | End: 2024-10-04 | Stop reason: HOSPADM

## 2024-10-04 RX ORDER — PROCHLORPERAZINE MALEATE 5 MG/1
5 TABLET ORAL EVERY 6 HOURS PRN
Status: DISCONTINUED | OUTPATIENT
Start: 2024-10-04 | End: 2024-10-06 | Stop reason: HOSPADM

## 2024-10-04 RX ORDER — FENTANYL CITRATE 50 UG/ML
INJECTION, SOLUTION INTRAMUSCULAR; INTRAVENOUS PRN
Status: DISCONTINUED | OUTPATIENT
Start: 2024-10-04 | End: 2024-10-04

## 2024-10-04 RX ORDER — ACETAMINOPHEN 325 MG/1
975 TABLET ORAL ONCE
Status: COMPLETED | OUTPATIENT
Start: 2024-10-04 | End: 2024-10-04

## 2024-10-04 RX ORDER — DEXAMETHASONE SODIUM PHOSPHATE 10 MG/ML
INJECTION, SOLUTION INTRAMUSCULAR; INTRAVENOUS PRN
Status: DISCONTINUED | OUTPATIENT
Start: 2024-10-04 | End: 2024-10-04

## 2024-10-04 RX ORDER — CEFAZOLIN SODIUM/WATER 2 G/20 ML
2 SYRINGE (ML) INTRAVENOUS
Status: COMPLETED | OUTPATIENT
Start: 2024-10-04 | End: 2024-10-04

## 2024-10-04 RX ORDER — DEXAMETHASONE SODIUM PHOSPHATE 4 MG/ML
4 INJECTION, SOLUTION INTRA-ARTICULAR; INTRALESIONAL; INTRAMUSCULAR; INTRAVENOUS; SOFT TISSUE
Status: DISCONTINUED | OUTPATIENT
Start: 2024-10-04 | End: 2024-10-04 | Stop reason: HOSPADM

## 2024-10-04 RX ORDER — KETAMINE HYDROCHLORIDE 10 MG/ML
INJECTION INTRAMUSCULAR; INTRAVENOUS PRN
Status: DISCONTINUED | OUTPATIENT
Start: 2024-10-04 | End: 2024-10-04

## 2024-10-04 RX ORDER — BISACODYL 10 MG
10 SUPPOSITORY, RECTAL RECTAL DAILY PRN
Status: DISCONTINUED | OUTPATIENT
Start: 2024-10-04 | End: 2024-10-04

## 2024-10-04 RX ORDER — FAMOTIDINE 20 MG/1
20 TABLET, FILM COATED ORAL AT BEDTIME
Status: DISCONTINUED | OUTPATIENT
Start: 2024-10-04 | End: 2024-10-06 | Stop reason: HOSPADM

## 2024-10-04 RX ORDER — LABETALOL HYDROCHLORIDE 5 MG/ML
10 INJECTION, SOLUTION INTRAVENOUS
Status: DISCONTINUED | OUTPATIENT
Start: 2024-10-04 | End: 2024-10-04 | Stop reason: HOSPADM

## 2024-10-04 RX ORDER — NALOXONE HYDROCHLORIDE 0.4 MG/ML
0.4 INJECTION, SOLUTION INTRAMUSCULAR; INTRAVENOUS; SUBCUTANEOUS
Status: DISCONTINUED | OUTPATIENT
Start: 2024-10-04 | End: 2024-10-06 | Stop reason: HOSPADM

## 2024-10-04 RX ORDER — HYDRALAZINE HYDROCHLORIDE 10 MG/1
10 TABLET, FILM COATED ORAL EVERY 4 HOURS PRN
Status: DISCONTINUED | OUTPATIENT
Start: 2024-10-04 | End: 2024-10-06 | Stop reason: HOSPADM

## 2024-10-04 RX ORDER — AMOXICILLIN 250 MG
1 CAPSULE ORAL 2 TIMES DAILY
Status: DISCONTINUED | OUTPATIENT
Start: 2024-10-04 | End: 2024-10-04

## 2024-10-04 RX ORDER — HYDROMORPHONE HCL IN WATER/PF 6 MG/30 ML
0.2 PATIENT CONTROLLED ANALGESIA SYRINGE INTRAVENOUS EVERY 5 MIN PRN
Status: DISCONTINUED | OUTPATIENT
Start: 2024-10-04 | End: 2024-10-04 | Stop reason: HOSPADM

## 2024-10-04 RX ORDER — LIDOCAINE 40 MG/G
CREAM TOPICAL
Status: DISCONTINUED | OUTPATIENT
Start: 2024-10-04 | End: 2024-10-06 | Stop reason: HOSPADM

## 2024-10-04 RX ORDER — ACETAMINOPHEN 325 MG/1
650 TABLET ORAL EVERY 4 HOURS PRN
Status: DISCONTINUED | OUTPATIENT
Start: 2024-10-04 | End: 2024-10-04

## 2024-10-04 RX ORDER — PROPOFOL 10 MG/ML
INJECTION, EMULSION INTRAVENOUS PRN
Status: DISCONTINUED | OUTPATIENT
Start: 2024-10-04 | End: 2024-10-04

## 2024-10-04 RX ORDER — FENTANYL CITRATE 50 UG/ML
50 INJECTION, SOLUTION INTRAMUSCULAR; INTRAVENOUS EVERY 5 MIN PRN
Status: DISCONTINUED | OUTPATIENT
Start: 2024-10-04 | End: 2024-10-04 | Stop reason: HOSPADM

## 2024-10-04 RX ORDER — FLUTICASONE PROPIONATE 50 MCG
1 SPRAY, SUSPENSION (ML) NASAL DAILY PRN
Status: DISCONTINUED | OUTPATIENT
Start: 2024-10-04 | End: 2024-10-06 | Stop reason: HOSPADM

## 2024-10-04 RX ORDER — FENTANYL CITRATE 50 UG/ML
25 INJECTION, SOLUTION INTRAMUSCULAR; INTRAVENOUS EVERY 5 MIN PRN
Status: DISCONTINUED | OUTPATIENT
Start: 2024-10-04 | End: 2024-10-04 | Stop reason: HOSPADM

## 2024-10-04 RX ORDER — TAMSULOSIN HYDROCHLORIDE 0.4 MG/1
0.4 CAPSULE ORAL AT BEDTIME
Status: DISCONTINUED | OUTPATIENT
Start: 2024-10-04 | End: 2024-10-06 | Stop reason: HOSPADM

## 2024-10-04 RX ORDER — HYDROMORPHONE HCL IN WATER/PF 6 MG/30 ML
0.4 PATIENT CONTROLLED ANALGESIA SYRINGE INTRAVENOUS EVERY 5 MIN PRN
Status: DISCONTINUED | OUTPATIENT
Start: 2024-10-04 | End: 2024-10-04 | Stop reason: HOSPADM

## 2024-10-04 RX ORDER — SODIUM CHLORIDE 9 MG/ML
INJECTION, SOLUTION INTRAVENOUS CONTINUOUS
Status: ACTIVE | OUTPATIENT
Start: 2024-10-04 | End: 2024-10-04

## 2024-10-04 RX ORDER — BUPIVACAINE HYDROCHLORIDE 2.5 MG/ML
INJECTION, SOLUTION EPIDURAL; INFILTRATION; INTRACAUDAL PRN
Status: DISCONTINUED | OUTPATIENT
Start: 2024-10-04 | End: 2024-10-04 | Stop reason: HOSPADM

## 2024-10-04 RX ORDER — NALOXONE HYDROCHLORIDE 0.4 MG/ML
0.2 INJECTION, SOLUTION INTRAMUSCULAR; INTRAVENOUS; SUBCUTANEOUS
Status: DISCONTINUED | OUTPATIENT
Start: 2024-10-04 | End: 2024-10-06 | Stop reason: HOSPADM

## 2024-10-04 RX ORDER — ACETAMINOPHEN 650 MG/1
650 SUPPOSITORY RECTAL EVERY 4 HOURS PRN
Status: DISCONTINUED | OUTPATIENT
Start: 2024-10-04 | End: 2024-10-04

## 2024-10-04 RX ORDER — LATANOPROST 50 UG/ML
1 SOLUTION/ DROPS OPHTHALMIC AT BEDTIME
Status: DISCONTINUED | OUTPATIENT
Start: 2024-10-04 | End: 2024-10-06 | Stop reason: HOSPADM

## 2024-10-04 RX ORDER — AMOXICILLIN 250 MG
2 CAPSULE ORAL 2 TIMES DAILY
Status: DISCONTINUED | OUTPATIENT
Start: 2024-10-04 | End: 2024-10-04

## 2024-10-04 RX ORDER — ONDANSETRON 4 MG/1
4 TABLET, ORALLY DISINTEGRATING ORAL EVERY 6 HOURS PRN
Status: DISCONTINUED | OUTPATIENT
Start: 2024-10-04 | End: 2024-10-06 | Stop reason: HOSPADM

## 2024-10-04 RX ORDER — LISINOPRIL 2.5 MG/1
2.5 TABLET ORAL DAILY
Status: DISCONTINUED | OUTPATIENT
Start: 2024-10-04 | End: 2024-10-06 | Stop reason: HOSPADM

## 2024-10-04 RX ORDER — POLYETHYLENE GLYCOL 3350 17 G/17G
17 POWDER, FOR SOLUTION ORAL 2 TIMES DAILY PRN
Status: DISCONTINUED | OUTPATIENT
Start: 2024-10-04 | End: 2024-10-06 | Stop reason: HOSPADM

## 2024-10-04 RX ORDER — SODIUM CHLORIDE, SODIUM LACTATE, POTASSIUM CHLORIDE, CALCIUM CHLORIDE 600; 310; 30; 20 MG/100ML; MG/100ML; MG/100ML; MG/100ML
INJECTION, SOLUTION INTRAVENOUS CONTINUOUS
Status: DISCONTINUED | OUTPATIENT
Start: 2024-10-04 | End: 2024-10-06

## 2024-10-04 RX ORDER — ACETAMINOPHEN 325 MG/1
975 TABLET ORAL ONCE
Status: DISCONTINUED | OUTPATIENT
Start: 2024-10-04 | End: 2024-10-04

## 2024-10-04 RX ORDER — PREGABALIN 25 MG/1
25 CAPSULE ORAL 2 TIMES DAILY
Status: DISCONTINUED | OUTPATIENT
Start: 2024-10-04 | End: 2024-10-06 | Stop reason: HOSPADM

## 2024-10-04 RX ORDER — LIDOCAINE 40 MG/G
CREAM TOPICAL
Status: DISCONTINUED | OUTPATIENT
Start: 2024-10-04 | End: 2024-10-04 | Stop reason: HOSPADM

## 2024-10-04 RX ORDER — ONDANSETRON 4 MG/1
4 TABLET, ORALLY DISINTEGRATING ORAL EVERY 6 HOURS PRN
Status: DISCONTINUED | OUTPATIENT
Start: 2024-10-04 | End: 2024-10-04

## 2024-10-04 RX ORDER — ASPIRIN 325 MG
325 TABLET ORAL DAILY
Status: DISCONTINUED | OUTPATIENT
Start: 2024-10-04 | End: 2024-10-06 | Stop reason: HOSPADM

## 2024-10-04 RX ORDER — ONDANSETRON 2 MG/ML
4 INJECTION INTRAMUSCULAR; INTRAVENOUS EVERY 30 MIN PRN
Status: DISCONTINUED | OUTPATIENT
Start: 2024-10-04 | End: 2024-10-04 | Stop reason: HOSPADM

## 2024-10-04 RX ORDER — POLYETHYLENE GLYCOL 3350 17 G/17G
17 POWDER, FOR SOLUTION ORAL DAILY
Status: DISCONTINUED | OUTPATIENT
Start: 2024-10-05 | End: 2024-10-06 | Stop reason: HOSPADM

## 2024-10-04 RX ORDER — ONDANSETRON 2 MG/ML
4 INJECTION INTRAMUSCULAR; INTRAVENOUS EVERY 6 HOURS PRN
Status: DISCONTINUED | OUTPATIENT
Start: 2024-10-04 | End: 2024-10-06 | Stop reason: HOSPADM

## 2024-10-04 RX ORDER — LORATADINE 10 MG/1
10 TABLET ORAL DAILY
Status: DISCONTINUED | OUTPATIENT
Start: 2024-10-04 | End: 2024-10-06 | Stop reason: HOSPADM

## 2024-10-04 RX ORDER — ACETAMINOPHEN 325 MG/1
650 TABLET ORAL EVERY 4 HOURS PRN
Status: DISCONTINUED | OUTPATIENT
Start: 2024-10-07 | End: 2024-10-06 | Stop reason: HOSPADM

## 2024-10-04 RX ORDER — SODIUM CHLORIDE 9 MG/ML
INJECTION, SOLUTION INTRAVENOUS CONTINUOUS
Status: DISCONTINUED | OUTPATIENT
Start: 2024-10-04 | End: 2024-10-04

## 2024-10-04 RX ORDER — PREGABALIN 25 MG/1
25 CAPSULE ORAL 2 TIMES DAILY
Status: DISCONTINUED | OUTPATIENT
Start: 2024-10-04 | End: 2024-10-04

## 2024-10-04 RX ORDER — ONDANSETRON 2 MG/ML
INJECTION INTRAMUSCULAR; INTRAVENOUS PRN
Status: DISCONTINUED | OUTPATIENT
Start: 2024-10-04 | End: 2024-10-04

## 2024-10-04 RX ORDER — ONDANSETRON 2 MG/ML
4 INJECTION INTRAMUSCULAR; INTRAVENOUS EVERY 6 HOURS PRN
Status: DISCONTINUED | OUTPATIENT
Start: 2024-10-04 | End: 2024-10-04

## 2024-10-04 RX ORDER — PROPOFOL 10 MG/ML
INJECTION, EMULSION INTRAVENOUS CONTINUOUS PRN
Status: DISCONTINUED | OUTPATIENT
Start: 2024-10-04 | End: 2024-10-04

## 2024-10-04 RX ORDER — PROCHLORPERAZINE MALEATE 5 MG/1
5 TABLET ORAL EVERY 6 HOURS PRN
Status: DISCONTINUED | OUTPATIENT
Start: 2024-10-04 | End: 2024-10-04

## 2024-10-04 RX ORDER — AMOXICILLIN 250 MG
1 CAPSULE ORAL 2 TIMES DAILY
Status: DISCONTINUED | OUTPATIENT
Start: 2024-10-04 | End: 2024-10-06 | Stop reason: HOSPADM

## 2024-10-04 RX ADMIN — FLUTICASONE PROPIONATE 1 SPRAY: 50 SPRAY, METERED NASAL at 01:16

## 2024-10-04 RX ADMIN — LATANOPROST 1 DROP: 50 SOLUTION/ DROPS OPHTHALMIC at 22:16

## 2024-10-04 RX ADMIN — SENNOSIDES AND DOCUSATE SODIUM 1 TABLET: 8.6; 5 TABLET ORAL at 21:12

## 2024-10-04 RX ADMIN — PHENYLEPHRINE HYDROCHLORIDE 200 MCG: 10 INJECTION INTRAVENOUS at 13:06

## 2024-10-04 RX ADMIN — ONDANSETRON 4 MG: 2 INJECTION INTRAMUSCULAR; INTRAVENOUS at 13:00

## 2024-10-04 RX ADMIN — PHENYLEPHRINE HYDROCHLORIDE 200 MCG: 10 INJECTION INTRAVENOUS at 13:12

## 2024-10-04 RX ADMIN — TAMSULOSIN HYDROCHLORIDE 0.4 MG: 0.4 CAPSULE ORAL at 01:16

## 2024-10-04 RX ADMIN — KETAMINE HYDROCHLORIDE 30 MG: 10 INJECTION INTRAMUSCULAR; INTRAVENOUS at 12:57

## 2024-10-04 RX ADMIN — SODIUM CHLORIDE, POTASSIUM CHLORIDE, SODIUM LACTATE AND CALCIUM CHLORIDE: 600; 310; 30; 20 INJECTION, SOLUTION INTRAVENOUS at 12:23

## 2024-10-04 RX ADMIN — SODIUM CHLORIDE: 9 INJECTION, SOLUTION INTRAVENOUS at 00:15

## 2024-10-04 RX ADMIN — FENTANYL CITRATE 25 MCG: 50 INJECTION INTRAMUSCULAR; INTRAVENOUS at 13:25

## 2024-10-04 RX ADMIN — FAMOTIDINE 20 MG: 20 TABLET ORAL at 21:12

## 2024-10-04 RX ADMIN — ACETAMINOPHEN 975 MG: 325 TABLET ORAL at 21:13

## 2024-10-04 RX ADMIN — PROPOFOL 25 MCG/KG/MIN: 10 INJECTION, EMULSION INTRAVENOUS at 13:01

## 2024-10-04 RX ADMIN — PREGABALIN 25 MG: 25 CAPSULE ORAL at 21:13

## 2024-10-04 RX ADMIN — ATORVASTATIN CALCIUM 20 MG: 10 TABLET, FILM COATED ORAL at 21:11

## 2024-10-04 RX ADMIN — Medication 2 G: at 12:55

## 2024-10-04 RX ADMIN — PHENYLEPHRINE HYDROCHLORIDE 200 MCG: 10 INJECTION INTRAVENOUS at 13:09

## 2024-10-04 RX ADMIN — FENTANYL CITRATE 50 MCG: 50 INJECTION INTRAMUSCULAR; INTRAVENOUS at 12:58

## 2024-10-04 RX ADMIN — DEXAMETHASONE SODIUM PHOSPHATE 4 MG: 10 INJECTION, SOLUTION INTRAMUSCULAR; INTRAVENOUS at 13:00

## 2024-10-04 RX ADMIN — PREGABALIN 25 MG: 25 CAPSULE ORAL at 01:38

## 2024-10-04 RX ADMIN — SODIUM CHLORIDE, POTASSIUM CHLORIDE, SODIUM LACTATE AND CALCIUM CHLORIDE: 600; 310; 30; 20 INJECTION, SOLUTION INTRAVENOUS at 18:39

## 2024-10-04 RX ADMIN — TAMSULOSIN HYDROCHLORIDE 0.4 MG: 0.4 CAPSULE ORAL at 21:11

## 2024-10-04 RX ADMIN — LIDOCAINE HYDROCHLORIDE 5 ML: 10 INJECTION, SOLUTION INFILTRATION; PERINEURAL at 12:55

## 2024-10-04 RX ADMIN — PHENYLEPHRINE HYDROCHLORIDE 100 MCG: 10 INJECTION INTRAVENOUS at 13:21

## 2024-10-04 RX ADMIN — GLYCOPYRROLATE 0.2 MG: 0.2 INJECTION INTRAMUSCULAR; INTRAVENOUS at 13:06

## 2024-10-04 RX ADMIN — LATANOPROST 1 DROP: 50 SOLUTION/ DROPS OPHTHALMIC at 01:16

## 2024-10-04 RX ADMIN — PHENYLEPHRINE HYDROCHLORIDE 100 MCG: 10 INJECTION INTRAVENOUS at 13:39

## 2024-10-04 RX ADMIN — PROPOFOL 150 MG: 10 INJECTION, EMULSION INTRAVENOUS at 12:57

## 2024-10-04 RX ADMIN — Medication 40 MG: at 12:58

## 2024-10-04 ASSESSMENT — ACTIVITIES OF DAILY LIVING (ADL)
ADLS_ACUITY_SCORE: 35
ADLS_ACUITY_SCORE: 36
ADLS_ACUITY_SCORE: 36
ADLS_ACUITY_SCORE: 35
ADLS_ACUITY_SCORE: 35
ADLS_ACUITY_SCORE: 36
ADLS_ACUITY_SCORE: 35
ADLS_ACUITY_SCORE: 36
ADLS_ACUITY_SCORE: 35
ADLS_ACUITY_SCORE: 35
DEPENDENT_IADLS:: INDEPENDENT
ADLS_ACUITY_SCORE: 36
ADLS_ACUITY_SCORE: 36
ADLS_ACUITY_SCORE: 35
ADLS_ACUITY_SCORE: 36
ADLS_ACUITY_SCORE: 36
ADLS_ACUITY_SCORE: 35
ADLS_ACUITY_SCORE: 36
ADLS_ACUITY_SCORE: 36
ADLS_ACUITY_SCORE: 35
ADLS_ACUITY_SCORE: 36
ADLS_ACUITY_SCORE: 36

## 2024-10-04 NOTE — PLAN OF CARE
Goal Outcome Evaluation:      Plan of Care Reviewed With: patient          Outcome Evaluation: Likely home with no new needs.

## 2024-10-04 NOTE — MEDICATION SCRIBE - ADMISSION MEDICATION HISTORY
Medication Scribe Admission Medication History    Admission medication history is complete. The information provided in this note is only as accurate as the sources available at the time of the update.    Information Source(s): Patient and CareEverywhere/SureScripts via in-person    Pertinent Information:     Changes made to PTA medication list:  Added: None  Deleted: None  Changed: None    Allergies reviewed with patient and updates made in EHR: yes    Medication History Completed By: HARSHIL WEINSTEIN 10/3/2024 9:33 PM    PTA Med List   Medication Sig Last Dose    acetaminophen (TYLENOL) 500 MG tablet Take 1,000 mg by mouth every 8 hours as needed. 10/3/2024 at am    aspirin 325 MG tablet [ASPIRIN 325 MG TABLET] Take 325 mg by mouth daily. 10/3/2024 at am    atorvastatin (LIPITOR) 20 MG tablet Take 1 tablet (20 mg) by mouth daily 10/2/2024 at pm    calcium polycarbophil (FIBERCON) 625 mg tablet Take 2 tablets by mouth daily. 10/3/2024 at am    cholecalciferol, vitamin D3, 1,000 unit tablet Take 1 tablet by mouth daily. 10/3/2024 at am    fluticasone (FLONASE) 50 MCG/ACT nasal spray Spray 1 spray into both nostrils daily as needed. 10/2/2024 at pm    glucosamine-chondroitin 500-400 mg cap [GLUCOSAMINE-CHONDROITIN 500-400 MG CAP] Take 1 capsule by mouth 2 (two) times a day. 10/3/2024 at am    latanoprost (XALATAN) 0.005 % ophthalmic solution Place 1 drop into both eyes At Bedtime 10/2/2024 at pm    lisinopril (ZESTRIL) 2.5 MG tablet TAKE 1 TABLET BY MOUTH EVERY DAY 10/3/2024 at am    loratadine 10 MG capsule Take 10 mg by mouth daily 10/3/2024 at am    Multiple Vitamins-Minerals (PRESERVISION AREDS 2) CHEW Take 1 chew tab by mouth daily. 10/3/2024 at am    pregabalin (LYRICA) 25 MG capsule TAKE 1 CAPSULE BY MOUTH TWICE DAILY 10/3/2024 at am    saw palmetto fruit 450 mg cap [SAW PALMETTO FRUIT 450 MG CAP] Take 1 capsule by mouth 2 (two) times a day. 10/3/2024 at am    tamsulosin (FLOMAX) 0.4 mg Cp24 [TAMSULOSIN  (FLOMAX) 0.4 MG CP24] Take 0.4 mg by mouth at bedtime. 10/2/2024 at pm

## 2024-10-04 NOTE — PROGRESS NOTES
No further care management intervention anticipated at this time.  Please re-consult if further needs arise.  Care management signing off.

## 2024-10-04 NOTE — CONSULTS
Consultation - Surgery  Antoni Stoll,  1935, MRN [id number]    Admitting Dx: Obstruction concurrent with and due to inguinal hernia of right side    PCP: Sarthak Romano, [phone]   Code status:  Full Code       Extended Emergency Contact Information  Primary Emergency Contact: Will Gill   [place]  Mobile Phone: [phone]  Relation: Nephew  Secondary Emergency Contact: Jb Gill   [facility]  Home Phone: [phone]  Mobile Phone: [phone]  Relation: Nephew       Assessment and Plan   Impression:  Small bowel obstruction secondary to right inguinal hernia.  The hernia has been able to be reduced but this was a very significant obstruction so I think it would be reasonable to go ahead with inguinal hernia repair while he is here to prevent it from happening again.  This is a recurrent hernia which I explained can be a little more difficult to repair.  He asked appropriate questions.  We will get him set up today assuming OR time available.      Plan:  Repair of recurrent right inguinal hernia.  Because it is a recurrence we will do it open.           Chief Complaint <principal problem not specified>       HPI    We have been requested by Dr. Farrar to evaluate Antoni Stoll for right inguinal hernia.  This is a 89 year old year old male who has had multiple previous abdominal hernia repairs in the past but presented with a bulge in the right groin.  They were able to reduce it but on CT scan he has significantly dilated loops of small bowel and his stomach is massively dilated.  Primarily because of that he was admitted despite the fact they are able to reduce the hernia.  He is feeling okay now but even on the after his NG was placed, there is still significantly dilated loops of small bowel.       Medical History  Patient Active Problem List   Diagnosis    Unspecified glaucoma    Inguinal hernia    Hypercholesterolemia    Essential hypertension    Atherosclerosis of  native coronary artery of native heart with angina pectoris (H)    Sleep Apnea    Anemia    BPH (benign prostatic hyperplasia)    Spinal stenosis    Dyspnea on exertion    Melanoma in situ of scalp (H)    Obstruction concurrent with and due to inguinal hernia of right side       [unfilled] Surgical History  Past Surgical History:   Procedure Laterality Date    ARTERIAL BYPASS SURGERY      BLEPHAROPLASTY Bilateral     BYPASS GRAFT ARTERY CORONARY  06/28/2013    5-vessel    BYPASS GRAFT ARTERY CORONARY  2013    5 vessel    CARDIAC CATHETERIZATION  06/03/2013    CATARACT EXTRACTION Bilateral     COLON SURGERY Right     HERNIA REPAIR      right inguinal    HERNIORRHAPHY INGUINAL Left 2/14/2022    Procedure: INCARCERATED LEFT INGUINAL HERNIA REPAIR WITH MESH;  Surgeon: Jack Naqvi MD;  Location: [facility] OR    STRIP [facility] Left         Social History  Social History     Tobacco Use    Smoking status: Never     Passive exposure: Past    Smokeless tobacco: Never   Vaping Use    Vaping status: Never Used   Substance Use Topics    Alcohol use: Yes     Comment: Alcoholic Drinks/day: 5 drinks weekly    Drug use: No       Allergies  No Known Allergies Family History  Reviewed, and family history includes Colon Cancer in his father; Coronary Artery Disease in his father and mother.  The Family history is not pertinent to the patients chief complaint. Psychosocial Needs  Social History     Social History Narrative    Not on file     Additional psychosocial needs reviewed per nursing assessment.     Prior to Admission Medications   Current Outpatient Medications   Medication Instructions    acetaminophen (TYLENOL) 1,000 mg, Oral, EVERY 8 HOURS PRN    aspirin (ASA) 325 mg, DAILY    atorvastatin (LIPITOR) 20 mg, Oral, DAILY    calcium polycarbophil (FIBERCON) 625 mg tablet 2 tablets, Oral, DAILY    cholecalciferol, vitamin D3, 1,000 unit tablet 1 tablet, Oral, DAILY    fluticasone (FLONASE) 50 MCG/ACT nasal spray 1 spray, Both  Nostrils, DAILY PRN    glucosamine-chondroitin 500-400 mg cap 1 capsule, 2 TIMES DAILY    latanoprost (XALATAN) 0.005 % ophthalmic solution 1 drop, Both Eyes, AT BEDTIME    lisinopril (ZESTRIL) 2.5 MG tablet TAKE 1 TABLET BY MOUTH EVERY DAY    loratadine 10 mg, Oral, DAILY    Multiple Vitamins-Minerals (PRESERVISION AREDS 2) CHEW 1 chew tab, Oral, DAILY    pregabalin (LYRICA) 25 MG capsule TAKE 1 CAPSULE BY MOUTH TWICE DAILY    saw palmetto fruit 450 mg cap 1 capsule, 2 TIMES DAILY    tamsulosin (FLOMAX) 0.4 mg, AT BEDTIME           Review of Systems:  Pertinent items are noted in HPI. Physical Exam:  Temp:  [97.9  F (36.6  C)-99.1  F (37.3  C)] 99.1  F (37.3  C)  Pulse:  [57-78] 63  Resp:  [16-18] 18  BP: (116-168)/(59-82) 152/68  SpO2:  [93 %-98 %] 94 %    General appearance: alert, appears stated age, and cooperative  Eyes: no abnormalities detected  Lungs: Comfortably.  No shortness of breath  Heart: Regular.  Not tachycardic  Abdomen: Mildly distended.  Nontender.  Hernia in the right groin seems to be reduced.  Extremities: extremities, peripheral pulses and reflexes normal  Pulses: 2+ and symmetric  Skin: Skin color, texture, turgor normal. No rashes or lesions  Neurologic: Grossly normal       Pertinent Labs  Lab Results: personally reviewed.   Lab Results   Component Value Date    WBC 9.4 10/04/2024    HGB 13.0 10/04/2024    HCT 40.0 10/04/2024     10/04/2024     10/04/2024   Creatinine 1.65       Pertinent Radiology  Radiology Results: Personally reviewed image/s and Personally reviewed impression/s  EKG Results: not reviewed.

## 2024-10-04 NOTE — PLAN OF CARE
PRIMARY DIAGNOSIS: ACUTE PAIN  OUTPATIENT/OBSERVATION GOALS TO BE MET BEFORE DISCHARGE:  1. Pain Status: Pain free.    2. Return to near baseline physical activity: No    3. Cleared for discharge by consultants (if involved): No    Discharge Planner Nurse   Safe discharge environment identified: Yes  Barriers to discharge: Yes       Entered by: Dora Adam RN 10/04/2024 7:02 PM   Pt admitted overnoc with NG tube, NPO x med/ice chips.   Please review provider order for any additional goals.   Nurse to notify provider when observation goals have been met and patient is ready for discharge.Goal Outcome Evaluation:

## 2024-10-04 NOTE — ANESTHESIA PREPROCEDURE EVALUATION
Anesthesia Pre-Procedure Evaluation    Patient: Antoni Stoll   MRN: [id number] : 1935        Preoperative Diagnosis: Incarcerated left inguinal hernia [K40.30]    Procedure : Procedure(s):  INCARCERATED RIGHT INGUINAL HERNIA REPAIR          Past Medical History:   Diagnosis Date     Anemia      Basal cell carcinoma      Cancer (H)     colon     Carotid artery occlusion      Coronary artery disease      Glaucoma      High cholesterol      History of colon cancer 2018     Hyperlipidemia      Hypertension      Left inguinal hernia      Low back pain      Malignant melanoma (H)      Mild aortic insufficiency      Moderate tricuspid regurgitation      Postnasal drip      Sleep apnea, obstructive     CPAP     Spinal stenosis     lumbar     SSS (sick sinus syndrome) (H) 2019     Umbilical hernia      Unspecified cataract     Created by Conversion       Past Surgical History:   Procedure Laterality Date     ARTERIAL BYPASS SURGERY       BLEPHAROPLASTY Bilateral      BYPASS GRAFT ARTERY CORONARY  2013    5-vessel     BYPASS GRAFT ARTERY CORONARY  2013    5 vessel     CARDIAC CATHETERIZATION  2013     CATARACT EXTRACTION Bilateral      COLON SURGERY Right      HERNIA REPAIR      right inguinal     HERNIORRHAPHY INGUINAL Left 2022    Procedure: INCARCERATED LEFT INGUINAL HERNIA REPAIR WITH MESH;  Surgeon: Jack Naqvi MD;  Location: [facility] [place]     [facility]       No Known Allergies   Social History     Tobacco Use     Smoking status: Never     Passive exposure: Past     Smokeless tobacco: Never   Substance Use Topics     Alcohol use: Yes     Comment: Alcoholic Drinks/day: 5 drinks weekly      Wt Readings from Last 1 Encounters:   10/03/24 59.9 kg (132 lb)        Anesthesia Evaluation            ROS/MED HX  ENT/Pulmonary:     (+) sleep apnea,                                       Neurologic:  - neg neurologic ROS     Cardiovascular:     (+) Dyslipidemia hypertension- -   "CAD angina-  - -           VILLA.                           METS/Exercise Tolerance: >4 METS    Hematologic:       Musculoskeletal:   (+)  arthritis,             GI/Hepatic:  - neg GI/hepatic ROS     Renal/Genitourinary:  - neg Renal ROS     Endo:  - neg endo ROS     Psychiatric/Substance Use:  - neg psychiatric ROS     Infectious Disease:  - neg infectious disease ROS     Malignancy:  - neg malignancy ROS     Other:            Physical Exam    Airway        Mallampati: II   TM distance: > 3 FB    Mouth opening: > 3 cm    Respiratory Devices and Support         Dental       (+) chipped      Cardiovascular   cardiovascular exam normal          Pulmonary   pulmonary exam normal              OUTSIDE LABS:  CBC:   Lab Results   Component Value Date    WBC 9.4 10/04/2024    WBC 11.9 (H) 10/03/2024    HGB 13.0 (L) 10/04/2024    HGB 15.0 10/03/2024    HCT 40.0 10/04/2024    HCT 45.3 10/03/2024     10/04/2024     10/03/2024     BMP:   Lab Results   Component Value Date     10/04/2024     10/03/2024    POTASSIUM 4.1 10/04/2024    POTASSIUM 3.6 10/03/2024    CHLORIDE 99 10/04/2024    CHLORIDE 92 (L) 10/03/2024    CO2 29 10/04/2024    CO2 30 (H) 10/03/2024    BUN 60.3 (H) 10/04/2024    BUN 70.2 (H) 10/03/2024    CR 1.29 (H) 10/04/2024    CR 1.65 (H) 10/03/2024     (H) 10/04/2024     (H) 10/03/2024     COAGS: No results found for: \"PTT\", \"INR\", \"FIBR\"  POC: No results found for: \"BGM\", \"HCG\", \"HCGS\"  HEPATIC:   Lab Results   Component Value Date    ALBUMIN 4.9 10/03/2024    PROTTOTAL 8.6 (H) 10/03/2024    ALT 12 10/03/2024    AST 22 10/03/2024    ALKPHOS 93 10/03/2024    BILITOTAL 0.7 10/03/2024     OTHER:   Lab Results   Component Value Date    A1C 6.1 06/03/2013    GEGE 8.3 (L) 10/04/2024    LIPASE 137 (H) 10/03/2024    TSH 0.79 05/27/2021       Anesthesia Plan    ASA Status:  3    NPO Status:  NPO Appropriate    Anesthesia Type: General.     - Airway: ETT   Induction: Propofol, " Intravenous.   Maintenance: Balanced.   Techniques and Equipment:     - Airway: Video-Laryngoscope       Consents    Anesthesia Plan(s) and associated risks, benefits, and realistic alternatives discussed. Questions answered and patient/representative(s) expressed understanding.     - Discussed:     - Discussed with:  Patient      - Extended Intubation/Ventilatory Support Discussed: No.      - Patient is DNR/DNI Status: No     Use of blood products discussed: No .     Postoperative Care    Pain management: IV analgesics, Multi-modal analgesia, Oral pain medications.   PONV prophylaxis: Ondansetron (or other 5HT-3), Dexamethasone or Solumedrol     Comments:    Other Comments: Decadron, Zofran.  Diprivan infusion.  Tylenol.  Ketamine (0.5 mg/kg).  Magnesium.            Antoni Cook MD

## 2024-10-04 NOTE — ANESTHESIA POSTPROCEDURE EVALUATION
Patient: Antoni Stoll    Procedure: Procedure(s):  REPAIR OF RECURRENT INGUINAL HERNIA WITH MESH       Anesthesia Type:  General    Note:  Disposition: Inpatient   Postop Pain Control: Uneventful            Sign Out: Well controlled pain   PONV: No   Neuro/Psych: Uneventful            Sign Out: Acceptable/Baseline neuro status   Airway/Respiratory: Uneventful            Sign Out: Acceptable/Baseline resp. status   CV/Hemodynamics: Uneventful            Sign Out: Acceptable CV status; No obvious hypovolemia; No obvious fluid overload   Other NRE: NONE   DID A NON-ROUTINE EVENT OCCUR? No       Last vitals:  Vitals Value Taken Time   /76 10/04/24 1500   Temp 36.5  C (97.7  F) 10/04/24 1512   Pulse 57 10/04/24 1512   Resp 21 10/04/24 1512   SpO2 95 % 10/04/24 1512   Vitals shown include unfiled device data.    Electronically Signed By: Antoni Cook MD  October 4, 2024  3:13 PM   Declined

## 2024-10-04 NOTE — ANESTHESIA PROCEDURE NOTES
Airway         Procedure Start/Stop Times: 10/4/2024 12:59 PM  Staff -        CRNA: Kenny Diehl APRN CRNA       Performed By: CRNAIndications and Patient Condition       Indications for airway management: bree-procedural       Induction type:intravenous       Mask difficulty assessment: 1 - vent by mask    Final Airway Details       Final airway type: endotracheal airway       Successful airway: ETT - single  Endotracheal Airway Details        ETT size (mm): 7.0       Cuffed: yes       Successful intubation technique: video laryngoscopy       VL Blade Size: Glidescope 3       Grade View of Cords: 2       Adjucts: stylet       Position: Right       Measured from: gums/teeth       Bite block used: None    Post intubation assessment        Placement verified by: capnometry, equal breath sounds and chest rise        Number of attempts at approach: 1       Number of other approaches attempted: 0       Secured with: tape       Ease of procedure: easy       Dentition: Intact       Dental guard used and removed.    Medication(s) Administered   Medication Administration Time: 10/4/2024 12:59 PM

## 2024-10-04 NOTE — CONSULTS
"Care Management Initial Consult    General Information  Assessment completed with: Patient, Wily  Type of CM/SW Visit: Initial Assessment    Primary Care Provider verified and updated as needed: Yes   Readmission within the last 30 days: no previous admission in last 30 days      Reason for Consult: discharge planning  Advance Care Planning: Advance Care Planning Reviewed: present on chart          Communication Assessment  Patient's communication style: spoken language (English or Bilingual)             Cognitive  Cognitive/Neuro/Behavioral: WDL                      Living Environment:   People in home: alone     Current living Arrangements: town home (\"2 level townhouse\")      Able to return to prior arrangements: yes       Family/Social Support:  Care provided by: self  Provides care for: no one     Support system: Friend, Other (specify) (\"All my family lives far away. I have nephews that would help me. I only have friends that live near me.\")          Description of Support System: Supportive, Involved    Support Assessment: Adequate family and caregiver support, Adequate social supports, Patient communicates needs well met    Current Resources:   Patient receiving home care services: No        Community Resources: None  Equipment currently used at home: none  Supplies currently used at home: Other (\"I don't use my hearing aids because they don't work for me. Glasses\")    Employment/Financial:  Employment Status: retired, , previous service     Employment/ Comments: \"I don't use any  benefits\"  Financial Concerns: none   Referral to Financial Worker: No       Does the patient's insurance plan have a 3 day qualifying hospital stay waiver?  Yes     Which insurance plan 3 day waiver is available? [employer]    Will the waiver be used for post-acute placement? No    Lifestyle & Psychosocial Needs:  Social Determinants of Health     Food Insecurity: Low Risk  (1/25/2024)    Food Insecurity     " Within the past 12 months, did you worry that your food would run out before you got money to buy more?: No     Within the past 12 months, did the food you bought just not last and you didn t have money to get more?: No   Depression: Not at risk (1/29/2024)    PHQ-2     PHQ-2 Score: 0   Housing Stability: Low Risk  (1/25/2024)    Housing Stability     Do you have housing? : Yes     Are you worried about losing your housing?: No   Tobacco Use: Low Risk  (7/29/2024)    Patient History     Smoking Tobacco Use: Never     Smokeless Tobacco Use: Never     Passive Exposure: Past   Financial Resource Strain: Low Risk  (1/25/2024)    Financial Resource Strain     Within the past 12 months, have you or your family members you live with been unable to get utilities (heat, electricity) when it was really needed?: No   Alcohol Use: Not on file   Transportation Needs: Low Risk  (1/25/2024)    Transportation Needs     Within the past 12 months, has lack of transportation kept you from medical appointments, getting your medicines, non-medical meetings or appointments, work, or from getting things that you need?: No   Physical Activity: Not on file   Interpersonal Safety: Low Risk  (7/29/2024)    Interpersonal Safety     Do you feel physically and emotionally safe where you currently live?: Yes     Within the past 12 months, have you been hit, slapped, kicked or otherwise physically hurt by someone?: No     Within the past 12 months, have you been humiliated or emotionally abused in other ways by your partner or ex-partner?: No   Stress: Not on file   Social Connections: Not on file   Health Literacy: Not on file       Functional Status:  Prior to admission patient needed assistance:   Dependent ADLs:: Independent, Ambulation-no assistive device  Dependent IADLs:: Independent  Assesssment of Functional Status: At functional baseline    Mental Health Status:  Mental Health Status: No Current Concerns       Chemical Dependency  "Status:  Chemical Dependency Status: No Current Concerns             Values/Beliefs:  Spiritual, Cultural Beliefs, Roman [employer] Practices, Values that affect care: no               Discussed  Partnership in Safe Discharge Planning  document with patient/family: No    Additional Information:  Assessment: Signed off    Wily lives in a 2 level townhouse alone. He is independent with ADLs and IADLs. He still drives and his car is in the parking lot. \"I am on a waiting list at an [facility], but I don't think I need to move there yet. I am still doing well at home\".    \"All my family lives far away. I have nephews that would help me. I only have friends that live near me. If needed a friend could stay with me when I go home\".    \"I will drive myself home if possible. If needed I can get a friend to help also\".    SANCHEZ was given and discussed. All questions were answered.    Writer verified patient demographics and updated any changes needed in the patient chart.    Next Steps:   Plan: Surgery planned for 10/4/24.  Needs: Likely home with no new needs.    No further care management intervention anticipated at this time.  Please re-consult if further needs arise.  Care management signing off.      Meena Cochran RN    "

## 2024-10-04 NOTE — ED PROVIDER NOTES
EMERGENCY DEPARTMENT ENCOUNTER      NAME: Antoni Stoll  AGE: 89 year old male  YOB: 1935  MRN: [id number]  EVALUATION DATE & TIME: No admission date for patient encounter.    PCP: Sarthak Romano    ED PROVIDER: Sumi Be DO      Chief Complaint   Patient presents with    Abdominal Pain         FINAL IMPRESSION:  1. Obstruction concurrent with and due to inguinal hernia of right side          ED COURSE & MEDICAL DECISION MAKIN-year-old male presented to the ED for evaluation of right lower quadrant abdominal pain with associated vomiting and decreased bowel movements that have been ongoing for the last few days.  Upon arrival to the ED the patient was hemodynamically stable.  He did not appear to be in any obvious distress or discomfort at the time of his initial evaluation.  On exam the patient was noted to have a distended abdomen with diminished bowel sounds and tenderness to palpation noted over the right upper quadrant but not necessarily over the right lower quadrant.  The initial physical exam was somewhat limited secondary to the fact that the patient was evaluated in a waiting room chair.  The patient did not have evidence of an acute abdomen, however.     Following his initial evaluation the patient declined anything for pain control.  He was given a dose of IV Zofran to treat his nausea.    CBC and CMP were both reassuring other than a slightly elevated creatinine.  The patient's lipase was elevated at 137.    CT scan of the abdomen shows a mild obstruction secondary to a right inguinal hernia.  There was no reported ischemic changes noted within the hernia.    The patient was reevaluated and informed of the small bowel obstruction secondary to the hernia.  After lying the patient flat and using direct pressure and the hernia was successfully reduced here in the ED.  The patient reported relief of his abdominal discomfort after the successful reduction of the hernia.    The  patient's case was discussed with the on-call general surgeon Dr. Palmer who recommended overnight observation.  NG tube placement was not recommended.    The patient was admitted to the hospitalist service for further evaluation.    Pertinent Labs & Imaging studies reviewed. (See chart for details)  8:10 PM I met with the patient to gather history and to perform my initial exam. We discussed plans for the ED course, including diagnostic testing and treatment.   9:37 PM I spoke with Dr. Christy Palmer, general surgery.  10:04 PM I spoke with Dr. Vikas Farrar, hospitalist. Plan to admit the patient.         At the conclusion of the encounter I discussed the results of all of the tests and the disposition. The questions were answered. The patient or family acknowledged understanding and was agreeable with the care plan.     Medical Decision Making    History:  Supplemental history from: N/A  External Record(s) reviewed: Documented in chart and Outpatient Record: 10/03/2024 [facility] Visit    Work Up:  Chart documentation includes differential considered and any EKGs or imaging independently interpreted by provider, where specified.  In additional to work up documented, I considered the following work up: Documented in chart, if applicable.    External consultation:  Discussion of management with another provider: Documented in chart, if applicable    Complicating factors:  Care impacted by chronic illness: Cancer/Chemotherapy, Hyperlipidemia, and Hypertension  Care affected by social determinants of health: N/A    Disposition considerations: Admit.      PPE worn: n95 mask, goggles    MEDICATIONS GIVEN IN THE EMERGENCY:  Medications   ondansetron (ZOFRAN) injection 4 mg (0 mg Intravenous Hold 10/3/24 2043)   iopamidol (ISOVUE-370) solution 75 mL (75 mLs Intravenous $Given 10/3/24 2043)       NEW PRESCRIPTIONS STARTED AT TODAY'S ER VISIT  New Prescriptions    No medications on file           =================================================================    HPI    Patient information was obtained from: the patient    Use of : N/A         Antoni Stoll is a 89 year old male with a pertinent history of colon cancer, inguinal hernia, diverticular disease, hypertension and hyperlipidemia who presents to this ED by walk-in for evaluation of multiple complaints.     On Sunday night (09/29), the patient had a new onset of right lower quadrant abdominal pain and constipation. He has experienced these symptoms previously. Typically, he is able to take a suppository with improvement in his constipation. However, his symptoms did not resolve after taking suppositories that night. On Monday (09/30), he had three episodes of emesis, which seem to alleviate his abdominal pain. However, he continued to vomit into Tuesday (10/01) and had some nausea as well. Throughout the rest of the week, he did not eat much and had minimal bowel movements with soft stool. Currently, he endorses tenderness over a hernia in his right lower quadrant. He did not noticed any bulging from the area until now, but did not pay attention to that area prior to this. As of now, he is also having some lightheadedness, abdominal distention, heartburn and mild nausea. The patient notes that he is also having worsening, constant, clear nasal drainage; however, he says that he has sinus issues at baseline.    Per Chart Review, the patient was seen on 10/03/2024 at [facility] evaluation of right lower quadrant abdominal pain. There was concern for possible nonreducible incarcerated hernia. He was sent to the emergency department for evaluation and possible surgical consult.      REVIEW OF SYSTEMS   Review of Systems   HENT:  Positive for rhinorrhea.    Gastrointestinal:  Positive for abdominal distention, abdominal pain, constipation, nausea and vomiting.   Neurological:  Positive for  light-headedness.   All other systems reviewed and are negative.       PAST MEDICAL HISTORY:  Past Medical History:   Diagnosis Date    Anemia     Basal cell carcinoma     Cancer (H)     colon    Carotid artery occlusion     Coronary artery disease     Glaucoma     High cholesterol     History of colon cancer 08/13/2018    Hyperlipidemia     Hypertension     Left inguinal hernia     Low back pain     Malignant melanoma (H)     Mild aortic insufficiency     Moderate tricuspid regurgitation     Postnasal drip     Sleep apnea, obstructive     CPAP    Spinal stenosis     lumbar    SSS (sick sinus syndrome) (H) 12/09/2019    Umbilical hernia     Unspecified cataract     Created by Conversion        PAST SURGICAL HISTORY:  Past Surgical History:   Procedure Laterality Date    ARTERIAL BYPASS SURGERY      BLEPHAROPLASTY Bilateral     BYPASS GRAFT ARTERY CORONARY  06/28/2013    5-vessel    BYPASS GRAFT ARTERY CORONARY  2013    5 vessel    CARDIAC CATHETERIZATION  06/03/2013    CATARACT EXTRACTION Bilateral     COLON SURGERY Right     HERNIA REPAIR      right inguinal    HERNIORRHAPHY INGUINAL Left 2/14/2022    Procedure: INCARCERATED LEFT INGUINAL HERNIA REPAIR WITH MESH;  Surgeon: Jack Naqvi MD;  Location: [facility] Main OR    STRIP VEIN Left            CURRENT MEDICATIONS:    acetaminophen (TYLENOL) 500 MG tablet  aspirin 325 MG tablet  atorvastatin (LIPITOR) 20 MG tablet  calcium polycarbophil (FIBERCON) 625 mg tablet  cholecalciferol, vitamin D3, 1,000 unit tablet  fluticasone (FLONASE) 50 MCG/ACT nasal spray  glucosamine-chondroitin 500-400 mg cap  latanoprost (XALATAN) 0.005 % ophthalmic solution  lisinopril (ZESTRIL) 2.5 MG tablet  loratadine 10 MG capsule  Multiple Vitamins-Minerals (PRESERVISION AREDS 2) CHEW  pregabalin (LYRICA) 25 MG capsule  saw palmetto fruit 450 mg cap  tamsulosin (FLOMAX) 0.4 mg Cp24        ALLERGIES:  No Known Allergies    FAMILY HISTORY:  Family History   Problem Relation Age of Onset     Coronary Artery Disease Mother     Colon Cancer Father     Coronary Artery Disease Father        SOCIAL HISTORY:   Social History     Socioeconomic History    Marital status: Single   Tobacco Use    Smoking status: Never     Passive exposure: Past    Smokeless tobacco: Never   Vaping Use    Vaping status: Never Used   Substance and Sexual Activity    Alcohol use: Yes     Comment: Alcoholic Drinks/day: 5 drinks weekly    Drug use: No     Social Determinants of Health     Financial Resource Strain: Low Risk  (1/25/2024)    Financial Resource Strain     Within the past 12 months, have you or your family members you live with been unable to get utilities (heat, electricity) when it was really needed?: No   Food Insecurity: Low Risk  (1/25/2024)    Food Insecurity     Within the past 12 months, did you worry that your food would run out before you got money to buy more?: No     Within the past 12 months, did the food you bought just not last and you didn t have money to get more?: No   Transportation Needs: Low Risk  (1/25/2024)    Transportation Needs     Within the past 12 months, has lack of transportation kept you from medical appointments, getting your medicines, non-medical meetings or appointments, work, or from getting things that you need?: No   Interpersonal Safety: Low Risk  (7/29/2024)    Interpersonal Safety     Do you feel physically and emotionally safe where you currently live?: Yes     Within the past 12 months, have you been hit, slapped, kicked or otherwise physically hurt by someone?: No     Within the past 12 months, have you been humiliated or emotionally abused in other ways by your partner or ex-partner?: No   Housing Stability: Low Risk  (1/25/2024)    Housing Stability     Do you have housing? : Yes     Are you worried about losing your housing?: No       VITALS:  BP (!) 140/66   Pulse 66   Temp 98.2  F (36.8  C) (Oral)   Resp 16   Wt 59.9 kg (132 lb)   SpO2 97%   BMI 23.38 kg/m       PHYSICAL EXAM    General presentation: Alert, Vital signs reviewed. NAD.   HENT: ENT inspection is normal. Oropharynx is moist and clear.   Eye: Pupils are equal and reactive to light. EOMI  Neck: The neck is supple, with full ROM, with no evidence of meningismus.  Pulmonary: Currently in no acute respiratory distress. Normal, non labored respirations, the lung sounds are normal with good equal air movement. Clear to auscultation bilaterally.   Circulatory: Regular rate and rhythm. Peripheral pulses are strong and equal. No murmurs, rubs, or gallops.   Abdominal: The abdomen is soft. No rigidity, guarding, or rebound. Mild distention.  Diminished bowel sounds. Mild tenderness to palpation in the right upper quadrant and right lower quadrant.  Neurologic: Alert, oriented to person, place, and time. No motor deficit. No sensory deficit. Cranial nerves II through XII are intact.  Musculoskeletal: No extremity tenderness. Full range of motion in all extremities. No extremity edema.   Skin: Skin color is normal. No rash. Warm. Dry to touch.      LAB:  All pertinent labs reviewed and interpreted.  Results for orders placed or performed during the hospital encounter of 10/03/24   CT Abdomen Pelvis w Contrast    Impression    IMPRESSION:   1.  Small bowel containing right inguinal hernia causing small bowel obstruction with significant upstream small bowel distention as well as gastric distention.   Comprehensive metabolic panel   Result Value Ref Range    Sodium 136 135 - 145 mmol/L    Potassium 3.6 3.4 - 5.3 mmol/L    Carbon Dioxide (CO2) 30 (H) 22 - 29 mmol/L    Anion Gap 14 7 - 15 mmol/L    Urea Nitrogen 70.2 (H) 8.0 - 23.0 mg/dL    Creatinine 1.65 (H) 0.67 - 1.17 mg/dL    GFR Estimate 40 (L) >60 mL/min/1.73m2    Calcium 9.5 8.8 - 10.4 mg/dL    Chloride 92 (L) 98 - 107 mmol/L    Glucose 146 (H) 70 - 99 mg/dL    Alkaline Phosphatase 93 40 - 150 U/L    AST 22 0 - 45 U/L    ALT 12 0 - 70 U/L    Protein Total 8.6 (H)  6.4 - 8.3 g/dL    Albumin 4.9 3.5 - 5.2 g/dL    Bilirubin Total 0.7 <=1.2 mg/dL   Result Value Ref Range    Lipase 137 (H) 13 - 60 U/L   Result Value Ref Range    Bilirubin Direct <0.20 0.00 - 0.30 mg/dL   CBC with platelets and differential   Result Value Ref Range    WBC Count 11.9 (H) 4.0 - 11.0 10e3/uL    RBC Count 4.58 4.40 - 5.90 10e6/uL    Hemoglobin 15.0 13.3 - 17.7 g/dL    Hematocrit 45.3 40.0 - 53.0 %    MCV 99 78 - 100 fL    MCH 32.8 26.5 - 33.0 pg    MCHC 33.1 31.5 - 36.5 g/dL    RDW 13.2 10.0 - 15.0 %    Platelet Count 254 150 - 450 10e3/uL    % Neutrophils 80 %    % Lymphocytes 9 %    % Monocytes 11 %    % Eosinophils 0 %    % Basophils 0 %    % Immature Granulocytes 1 %    NRBCs per 100 WBC 0 <1 /100    Absolute Neutrophils 9.5 (H) 1.6 - 8.3 10e3/uL    Absolute Lymphocytes 1.0 0.8 - 5.3 10e3/uL    Absolute Monocytes 1.3 0.0 - 1.3 10e3/uL    Absolute Eosinophils 0.0 0.0 - 0.7 10e3/uL    Absolute Basophils 0.0 0.0 - 0.2 10e3/uL    Absolute Immature Granulocytes 0.1 <=0.4 10e3/uL    Absolute NRBCs 0.0 10e3/uL   Extra Blue Top Tube   Result Value Ref Range    Hold Specimen JIC    Extra Red Top Tube   Result Value Ref Range    Hold Specimen JIC        RADIOLOGY:  Reviewed all pertinent imaging. Please see official radiology report.  CT Abdomen Pelvis w Contrast   Final Result   IMPRESSION:    1.  Small bowel containing right inguinal hernia causing small bowel obstruction with significant upstream small bowel distention as well as gastric distention.              I, Katelyn Linda , am serving as a scribe to document services personally performed by Sumi Be DO based on my observation and the provider's statements to me. I, Sumi Be, attest that Katelyn Linda is acting in a scribe capacity, has observed my performance of the services and has documented them in accordance with my direction.    Jaremy Indiana, DO  Emergency Medicine  [facility] EMERGENCY DEPARTMENT  [id number]  [facility] [id number]  [phone]       Sumi Be DO  10/03/24 0137

## 2024-10-04 NOTE — OP NOTE
Operative Note    Name:  Antoni Stoll  PCP:  Sarthak Romano  Procedure Date:  10/4/2024       Procedure:  Procedure(s):  REPAIR OF RECURRENT INGUINAL HERNIA WITH MESH     Pre-Procedure Diagnosis:  Obstruction concurrent with and due to inguinal hernia of right side [K40.30]     Post-Procedure Diagnosis:    Same, recurrent right inguinal hernia    Surgeon(s):    Saundra Mario MD     Assistant: Karin Reese PA-C        Anesthesia Type:  General       Findings:  Recurrent hernia lateral to the spermatic cord.  Essentially recurrence of his indirect component.    Operative Report:    The patient was brought to the operating suite where he was placed in the supine position.  He was prepped and draped in a sterile fashion.  General anesthesia was administered.  An oblique incision was made in the right groin through her previous incision.  This is carried down through the subcutaneous tissues and then down to the hernia sac which was easily identified and basically just lateral to the spermatic cord.  The external oblique muscle was no longer closed over his spermatic cord as would be expected.  Trying to discern the anatomy for how this was repaired previously was difficult.  The hernia sac was mobilized easily with a combination of sharp dissection electrocautery.  I then opened the sac to be sure that there was no loop of bowel still within it.  It was completely free.  A high ligation was then performed with a 2-0 Vicryl suture ligature.  Once I done this it was pretty clear that this was basically his internal ring had just become somewhat widened again.  I placed a single suture of 0 Tevdek laterally to tighten it up and then placed a ProGrip mesh in the area he would do if you are doing a direct hernia repair.  I was able to get that all mobilized up nicely despite the previous surgery so that this laid over the floor of the inguinal canal and then wrapped around the cord recreating a new ring.   The  external oblique muscle was then closed with running 2-0 Vicryl.  Iza's fascia was closed with interrupted 2-0 Vicryl and the skin was closed with a running 4-0 Monocryl.  Sterile dressings were placed.  The patient tolerated the procedure well.  My assistant, Karin SULLIVAN provided exposure throughout the case and then assisted with closure.  The patient tolerated the procedure well.    Attempted to contact his nephew following surgery.  No answer on the phone and the phone did not go to [facility].    Estimated Blood Loss:   5cc        None       Drains:   NG/OG/NJ Tube Nasogastric 16 fr Right nostril (Active)   Site Description WDL 10/04/24 0800   Status Low continuous suction 10/04/24 0800   Drainage Appearance Green;Bile 10/04/24 0800   Placement Confirmation X-ray 10/04/24 0800   Rod[place] (cm marking) at nare/mouth 58 cm 10/04/24 0800   Output (ml) 450 ml 10/04/24 0936       Complications:    None    Saundra Mario MD     Date: 10/4/2024  Time: 1:46 PM

## 2024-10-04 NOTE — H&P
[facility]    History and Physical - Hospitalist Service       Date of Admission:  10/3/2024    Assessment & Plan      Antoni Stoll is a 89 year old male admitted on 10/3/2024. He came to the ED for evaluation of abdominal pain, nausea, vomiting and constipation    #Small bowel obstruction  #Right inguinal hernia  -Status post bedside reduction by ED provider with improvement of symptoms  -Advance diet as tolerated  -General Surgery consult    #Acute kidney injury  -Secondary to decreased oral intake  -IV hydration  -Avoid nephrotoxins  -Recheck BMP in a.m.    #Essential hypertension  -Elevated blood pressure secondary to pain  -Hold PTA lisinopril secondary to LOUIS  -Hydralazine as needed    #Coronary artery disease  -History of CABG  -Denies angina symptoms  -Continue PTA aspirin, atorvastatin    #BPH  -Continue PTA tamsulosin    #Drug-induced platelet defect  -On PTA aspirin, monitor for bleeding     Observation Goals: -diagnostic tests and consults completed and resulted, -vital signs normal or at patient baseline, Nurse to notify provider when observation goals have been met and patient is ready for discharge.  Diet: Advance Diet as Tolerated: Clear Liquid Diet; Low Saturated Fat Na <2400mg Diet    DVT Prophylaxis: Ambulate every shift  Cabrera Catheter: Not present  Lines: None     Cardiac Monitoring: None  Code Status: Full Code          Disposition Plan     Medically Ready for Discharge: Anticipated Tomorrow           Vikas Farrar MD  Hospitalist Service  [facility]  Securely message with Sendmebox (more info)  Text page via [employer] Paging/Directory     ______________________________________________________________________    Chief Complaint   Abdominal pain, nausea, vomiting, constipation    History is obtained from the patient, electronic health record, and emergency department physician    History of Present Illness   Antoni Stoll is a 89 year old male  who came to the ED from home for evaluation of abdominal pain, nausea, vomiting and constipation.  Past medical history of CAD, CABG, hypertension, hyperlipidemia, colon cancer, melanoma, spinal stenosis, BPH, ALECIA, bilateral inguinal hernia repair.  [facility], denies tobacco or drugs and drinks alcohol about 4 times per week.  He exercises Monday through Friday and tries to stay hydrated.  Since 9/29/2024 he had some abdominal discomfort, bloating and constipation.  He has had decreased appetite and oral intake.  He tried suppository, fiber pills and MiraLAX without relief and prior to ED evaluation vomited a few times at home.  He denied fevers, chills, sore throat, cough, chest pain, shortness of breath or palpitations.  In the ED, workup was consistent with small bowel obstruction secondary to right inguinal hernia which was reduced at bedside by ED provider.      Past Medical History    Past Medical History:   Diagnosis Date    Anemia     Basal cell carcinoma     Cancer (H)     colon    Carotid artery occlusion     Coronary artery disease     Glaucoma     High cholesterol     History of colon cancer 08/13/2018    Hyperlipidemia     Hypertension     Left inguinal hernia     Low back pain     Malignant melanoma (H)     Mild aortic insufficiency     Moderate tricuspid regurgitation     Postnasal drip     Sleep apnea, obstructive     CPAP    Spinal stenosis     lumbar    SSS (sick sinus syndrome) (H) 12/09/2019    Umbilical hernia     Unspecified cataract     Created by Conversion        Past Surgical History   Past Surgical History:   Procedure Laterality Date    ARTERIAL BYPASS SURGERY      BLEPHAROPLASTY Bilateral     BYPASS GRAFT ARTERY CORONARY  06/28/2013    5-vessel    BYPASS GRAFT ARTERY CORONARY  2013    5 vessel    CARDIAC CATHETERIZATION  06/03/2013    CATARACT EXTRACTION Bilateral     COLON SURGERY Right     HERNIA REPAIR      right inguinal    HERNIORRHAPHY INGUINAL Left  2/14/2022    Procedure: INCARCERATED LEFT INGUINAL HERNIA REPAIR WITH MESH;  Surgeon: Jack Naqvi MD;  [place] [facility] [place]    [facility] Left        Prior to Admission Medications   Prior to Admission Medications   Prescriptions Last Dose Informant Patient Reported? Taking?   Multiple Vitamins-Minerals (PRESERVISION AREDS 2) CHEW 10/3/2024 at am  Yes Yes   Sig: Take 1 chew tab by mouth daily.   acetaminophen (TYLENOL) 500 MG tablet 10/3/2024 at am  Yes Yes   Sig: Take 1,000 mg by mouth every 8 hours as needed.   aspirin 325 MG tablet 10/3/2024 at am  Yes Yes   Sig: [ASPIRIN 325 MG TABLET] Take 325 mg by mouth daily.   atorvastatin (LIPITOR) 20 MG tablet 10/2/2024 at pm  No Yes   Sig: Take 1 tablet (20 mg) by mouth daily   calcium polycarbophil (FIBERCON) 625 mg tablet 10/3/2024 at am  Yes Yes   Sig: Take 2 tablets by mouth daily.   cholecalciferol, vitamin D3, 1,000 unit tablet 10/3/2024 at am  Yes Yes   Sig: Take 1 tablet by mouth daily.   fluticasone (FLONASE) 50 MCG/ACT nasal spray 10/2/2024 at pm  Yes Yes   Sig: Spray 1 spray into both nostrils daily as needed.   glucosamine-chondroitin 500-400 mg cap 10/3/2024 at am  Yes Yes   Sig: [GLUCOSAMINE-CHONDROITIN 500-400 MG CAP] Take 1 capsule by mouth 2 (two) times a day.   latanoprost (XALATAN) 0.005 % ophthalmic solution 10/2/2024 at pm  Yes Yes   Sig: Place 1 drop into both eyes At Bedtime   lisinopril (ZESTRIL) 2.5 MG tablet 10/3/2024 at am  No Yes   Sig: TAKE 1 TABLET BY MOUTH EVERY DAY   loratadine 10 MG capsule 10/3/2024 at am  Yes Yes   Sig: Take 10 mg by mouth daily   pregabalin (LYRICA) 25 MG capsule 10/3/2024 at am  No Yes   Sig: TAKE 1 CAPSULE BY MOUTH TWICE DAILY   saw palmetto fruit 450 mg cap 10/3/2024 at am  Yes Yes   Sig: [SAW PALMETTO FRUIT 450 MG CAP] Take 1 capsule by mouth 2 (two) times a day.   tamsulosin (FLOMAX) 0.4 mg Cp24 10/2/2024 at pm  Yes Yes   Sig: [TAMSULOSIN (FLOMAX) 0.4 MG CP24] Take 0.4 mg by mouth at bedtime.       Facility-Administered Medications: None           Physical Exam   Vital Signs: Temp: 98.2  F (36.8  C) Temp src: Oral BP: (!) 140/66 Pulse: 66   Resp: 16 SpO2: 97 %      Weight: 132 lbs 0 oz    Constitutional: no apparent distress  Respiratory: no increased work of breathing and clear to auscultation  Cardiovascular: regular rate and rhythm  GI: normal bowel sounds, soft, distended, and tenderness noted in the right lower quadrant  Musculoskeletal: no lower extremity pitting edema present    Medical Decision Making       MANAGEMENT DISCUSSED with the following over the past 24 hours: Patient       Data     I have personally reviewed the following data over the past 24 hrs:    11.9 (H)  \   15.0   / 254     136 92 (L) 70.2 (H) /  146 (H)   3.6 30 (H) 1.65 (H) \     ALT: 12 AST: 22 AP: 93 TBILI: 0.7   ALB: 4.9 TOT PROTEIN: 8.6 (H) LIPASE: 137 (H)       Imaging results reviewed over the past 24 hrs:   Recent Results (from the past 24 hour(s))   CT Abdomen Pelvis w Contrast    Narrative    EXAM: CT ABDOMEN PELVIS W CONTRAST  LOCATION: [facility]  DATE: 10/3/2024    INDICATION: Abdominal pain and constipation since Sunday, vomiting.  History of inguinal hernias, tender bulge to lower abdomen  COMPARISON: 9/5/2017  TECHNIQUE: CT scan of the abdomen and pelvis was performed following injection of IV contrast. Multiplanar reformats were obtained. Dose reduction techniques were used.  CONTRAST: 75 mL Isovue 370    FINDINGS:   LOWER CHEST: Sternotomy.    HEPATOBILIARY: Normal.    PANCREAS: Pancreatic atrophy. There are a few stable subcentimeter cysts in the pancreas unchanged.    SPLEEN: Normal.    ADRENAL GLANDS: Normal.    KIDNEYS/BLADDER: Simple cyst left kidney.    BOWEL: Ileocolonic anastomosis. Significant small bowel distention with mechanical small bowel obstruction caused by a right inguinal hernia containing small bowel.    LYMPH NODES: Normal.    VASCULATURE: Normal.    PELVIC ORGANS:  Prostatic enlargement.    MUSCULOSKELETAL: Eventration of the ventral abdominal wall. Scoliosis with lumbar degenerative change. Anterior subluxation L4 on L5. Previous left inguinal hernia repair.      Impression    IMPRESSION:   1.  Small bowel containing right inguinal hernia causing small bowel obstruction with significant upstream small bowel distention as well as gastric distention.

## 2024-10-04 NOTE — ED TRIAGE NOTES
The pt presents for evaluation of abdominal pain and constipation since Sunday. He has a hx of inguinal hernias. Sunday developed constipation. He took 2 suppositories without relief. He then began to vomit. Pain remains but is somewhat better than before the vomiting began. Tender bulge in the lower abdomen.

## 2024-10-04 NOTE — PROGRESS NOTES
[facility]    Medicine Progress Note - Hospitalist Service    Date of Admission:  10/3/2024    Assessment & Plan      Antoni Stoll is a 89 year old male admitted on 10/3/2024. He came to the ED for evaluation of abdominal pain, nausea, vomiting and constipation. He was found to have SBO due to right inguinal hernia. Surgical intervention pending.     Small bowel obstruction secondary to right inguinal hernia:   CT abdomen: Small bowel containing right inguinal hernia causing small bowel obstruction with significant upstream small bowel distention as well as gastric distention.   Status post bedside reduction by ED provider with improvement of symptoms.  S/p NG placement  - General Surgery consulted: surgical repair is indicated to prevent recurrence.   - Continue NPO  - Symptomatic management    Acute kidney injury: likely prerenal secondary to decreased oral intake  - IV hydration and monitor    Essential hypertension: Hold PTA lisinopril secondary to LOUIS and NPO. Hydralazine as needed    Coronary artery disease: History of CABG. No active issue currently. Resume PTA aspirin, atorvastatin when able to take oral intake.    BPH: Resume PTA tamsulosin when appropriate          Diet: NPO for Medical/Clinical Reasons Except for: Meds, Ice Chips    DVT Prophylaxis: Pneumatic Compression Devices  Cabrera Catheter: Not present  Lines: None     Cardiac Monitoring: None  Code Status: Full Code      Clinically Significant Risk Factors Present on Admission          # Hypocalcemia: Lowest Ca = 8.3 mg/dL in last 2 days, will monitor and replace as appropriate       # Drug Induced Platelet Defect: home medication list includes an antiplatelet medication   # Hypertension: Noted on problem list             # Financial/Environmental Concerns: none   # History of CABG: noted on surgical history       Disposition Plan     Medically Ready for Discharge: Anticipated in 2-4 Days        Kris Galvez  MD  Hospitalist Service  [facility]  Securely message with Kyp (more info)  Text page via [employer] Paging/Directory   ______________________________________________________________________    Interval History   Patient reports nausea resolved after NG tube placement. His abdominal pain also improved.     Physical Exam   Vital Signs: Temp: 98.4  F (36.9  C) Temp src: Core BP: (!) 157/75 Pulse: 57   Resp: 15 SpO2: 94 % O2 Device: None (Room air) Oxygen Delivery: 4 LPM  Weight: 132 lbs 0 oz    General appearance: not in acute distress. NG tube in place  HEENT: PERRL, EOMI  Lungs: Clear breath sounds in bilateral lung fields  Cardiovascular: Regular rate and rhythm, normal S1-S2  Abdomen: Soft, non tender, no distension  Musculoskeletal: No joint swelling  Skin: No rash and no edema  Neurology: AAO ×3.  Cranial nerves II - XII normal.  Normal muscle strength in all four extremities.     Medical Decision Making       48 MINUTES SPENT BY ME on the date of service doing chart review, history, exam, documentation & further activities per the note.      Data     I have personally reviewed the following data over the past 24 hrs:    9.4  \   13.0 (L)   / 197     138 99 60.3 (H) /  128 (H)   4.1 29 1.29 (H) \     ALT: 12 AST: 22 AP: 93 TBILI: 0.7   ALB: 4.9 TOT PROTEIN: 8.6 (H) LIPASE: 137 (H)       Imaging results reviewed over the past 24 hrs:   Recent Results (from the past 24 hour(s))   CT Abdomen Pelvis w Contrast    Narrative    EXAM: CT ABDOMEN PELVIS W CONTRAST  LOCATION: [facility]  DATE: 10/3/2024    INDICATION: Abdominal pain and constipation since Sunday, vomiting.  History of inguinal hernias, tender bulge to lower abdomen  COMPARISON: 9/5/2017  TECHNIQUE: CT scan of the abdomen and pelvis was performed following injection of IV contrast. Multiplanar reformats were obtained. Dose reduction techniques were used.  CONTRAST: 75 mL Isovue 370    FINDINGS:   LOWER  CHEST: Sternotomy.    HEPATOBILIARY: Normal.    PANCREAS: Pancreatic atrophy. There are a few stable subcentimeter cysts in the pancreas unchanged.    SPLEEN: Normal.    ADRENAL GLANDS: Normal.    KIDNEYS/BLADDER: Simple cyst left kidney.    BOWEL: Ileocolonic anastomosis. Significant small bowel distention with mechanical small bowel obstruction caused by a right inguinal hernia containing small bowel.    LYMPH NODES: Normal.    VASCULATURE: Normal.    PELVIC ORGANS: Prostatic enlargement.    MUSCULOSKELETAL: Eventration of the ventral abdominal wall. Scoliosis with lumbar degenerative change. Anterior subluxation L4 on L5. Previous left inguinal hernia repair.      Impression    IMPRESSION:   1.  Small bowel containing right inguinal hernia causing small bowel obstruction with significant upstream small bowel distention as well as gastric distention.   XR Abdomen Port 1 View    Narrative    EXAM: XR ABDOMEN PORT 1 VIEW  LOCATION: [facility]  DATE: 10/4/2024    INDICATION: for NG tube placement  COMPARISON: CT AP last night.      Impression    IMPRESSION: NG tube has been placed. Tip is in the stomach with the proximal port in the region of the cardia. Advancing the tube 4-5 cm may provide better drainage.    There is gaseous distention of the stomach and dilated small bowel loops in the midabdomen measuring up to 4 cm. Paucity of stool in the colon.    Lumbar rotoscoliosis convex to the right centered at L2. Quite ectatic and calcified splenic artery.      Wartpeel Counseling:  I discussed with the patient the risks of Wartpeel including but not limited to erythema, scaling, itching, weeping, crusting, and pain.

## 2024-10-05 LAB
ALBUMIN SERPL BCG-MCNC: 3.7 G/DL (ref 3.5–5.2)
ALP SERPL-CCNC: 67 U/L (ref 40–150)
ALT SERPL W P-5'-P-CCNC: 8 U/L (ref 0–70)
ANION GAP SERPL CALCULATED.3IONS-SCNC: 11 MMOL/L (ref 7–15)
AST SERPL W P-5'-P-CCNC: 18 U/L (ref 0–45)
BILIRUB SERPL-MCNC: 0.7 MG/DL
BUN SERPL-MCNC: 31.2 MG/DL (ref 8–23)
CALCIUM SERPL-MCNC: 8.2 MG/DL (ref 8.8–10.4)
CHLORIDE SERPL-SCNC: 102 MMOL/L (ref 98–107)
CREAT SERPL-MCNC: 0.91 MG/DL (ref 0.67–1.17)
EGFRCR SERPLBLD CKD-EPI 2021: 82 ML/MIN/1.73M2
ERYTHROCYTE [DISTWIDTH] IN BLOOD BY AUTOMATED COUNT: 12.7 % (ref 10–15)
GLUCOSE SERPL-MCNC: 91 MG/DL (ref 70–99)
HCO3 SERPL-SCNC: 23 MMOL/L (ref 22–29)
HCT VFR BLD AUTO: 40.7 % (ref 40–53)
HGB BLD-MCNC: 13.2 G/DL (ref 13.3–17.7)
MCH RBC QN AUTO: 32.4 PG (ref 26.5–33)
MCHC RBC AUTO-ENTMCNC: 32.4 G/DL (ref 31.5–36.5)
MCV RBC AUTO: 100 FL (ref 78–100)
PLATELET # BLD AUTO: 183 10E3/UL (ref 150–450)
POTASSIUM SERPL-SCNC: 3.8 MMOL/L (ref 3.4–5.3)
PROT SERPL-MCNC: 6.5 G/DL (ref 6.4–8.3)
RBC # BLD AUTO: 4.07 10E6/UL (ref 4.4–5.9)
SODIUM SERPL-SCNC: 136 MMOL/L (ref 135–145)
WBC # BLD AUTO: 8.7 10E3/UL (ref 4–11)

## 2024-10-05 PROCEDURE — 80053 COMPREHEN METABOLIC PANEL: CPT | Performed by: SPECIALIST

## 2024-10-05 PROCEDURE — 85027 COMPLETE CBC AUTOMATED: CPT | Performed by: SPECIALIST

## 2024-10-05 PROCEDURE — 36415 COLL VENOUS BLD VENIPUNCTURE: CPT | Performed by: SPECIALIST

## 2024-10-05 PROCEDURE — 99231 SBSQ HOSP IP/OBS SF/LOW 25: CPT | Mod: 24

## 2024-10-05 PROCEDURE — 258N000003 HC RX IP 258 OP 636: Performed by: INTERNAL MEDICINE

## 2024-10-05 PROCEDURE — 250N000013 HC RX MED GY IP 250 OP 250 PS 637: Performed by: INTERNAL MEDICINE

## 2024-10-05 PROCEDURE — 99232 SBSQ HOSP IP/OBS MODERATE 35: CPT | Performed by: STUDENT IN AN ORGANIZED HEALTH CARE EDUCATION/TRAINING PROGRAM

## 2024-10-05 PROCEDURE — 120N000001 HC R&B MED SURG/OB

## 2024-10-05 PROCEDURE — 250N000013 HC RX MED GY IP 250 OP 250 PS 637: Performed by: SPECIALIST

## 2024-10-05 PROCEDURE — G0378 HOSPITAL OBSERVATION PER HR: HCPCS

## 2024-10-05 RX ADMIN — SENNOSIDES AND DOCUSATE SODIUM 1 TABLET: 8.6; 5 TABLET ORAL at 21:19

## 2024-10-05 RX ADMIN — PREGABALIN 25 MG: 25 CAPSULE ORAL at 21:20

## 2024-10-05 RX ADMIN — ATORVASTATIN CALCIUM 20 MG: 10 TABLET, FILM COATED ORAL at 21:20

## 2024-10-05 RX ADMIN — ACETAMINOPHEN 975 MG: 325 TABLET ORAL at 21:21

## 2024-10-05 RX ADMIN — LORATADINE 10 MG: 10 TABLET ORAL at 08:31

## 2024-10-05 RX ADMIN — SODIUM CHLORIDE, POTASSIUM CHLORIDE, SODIUM LACTATE AND CALCIUM CHLORIDE: 600; 310; 30; 20 INJECTION, SOLUTION INTRAVENOUS at 01:04

## 2024-10-05 RX ADMIN — LATANOPROST 1 DROP: 50 SOLUTION/ DROPS OPHTHALMIC at 21:24

## 2024-10-05 RX ADMIN — ACETAMINOPHEN 975 MG: 325 TABLET ORAL at 06:48

## 2024-10-05 RX ADMIN — TAMSULOSIN HYDROCHLORIDE 0.4 MG: 0.4 CAPSULE ORAL at 21:20

## 2024-10-05 RX ADMIN — PREGABALIN 25 MG: 25 CAPSULE ORAL at 08:31

## 2024-10-05 RX ADMIN — FAMOTIDINE 20 MG: 20 TABLET ORAL at 21:19

## 2024-10-05 RX ADMIN — POLYETHYLENE GLYCOL 3350 17 G: 17 POWDER, FOR SOLUTION ORAL at 15:00

## 2024-10-05 RX ADMIN — ASPIRIN 325 MG ORAL TABLET 325 MG: 325 PILL ORAL at 08:31

## 2024-10-05 RX ADMIN — SENNOSIDES AND DOCUSATE SODIUM 1 TABLET: 8.6; 5 TABLET ORAL at 08:31

## 2024-10-05 ASSESSMENT — ACTIVITIES OF DAILY LIVING (ADL)
ADLS_ACUITY_SCORE: 36
ADLS_ACUITY_SCORE: 38
ADLS_ACUITY_SCORE: 42
ADLS_ACUITY_SCORE: 38
ADLS_ACUITY_SCORE: 36
ADLS_ACUITY_SCORE: 38
ADLS_ACUITY_SCORE: 36
ADLS_ACUITY_SCORE: 42
ADLS_ACUITY_SCORE: 36
ADLS_ACUITY_SCORE: 42
ADLS_ACUITY_SCORE: 42
ADLS_ACUITY_SCORE: 36
ADLS_ACUITY_SCORE: 38
ADLS_ACUITY_SCORE: 42
ADLS_ACUITY_SCORE: 36
ADLS_ACUITY_SCORE: 38
ADLS_ACUITY_SCORE: 38
ADLS_ACUITY_SCORE: 36
ADLS_ACUITY_SCORE: 42
ADLS_ACUITY_SCORE: 38
ADLS_ACUITY_SCORE: 36
ADLS_ACUITY_SCORE: 38
ADLS_ACUITY_SCORE: 38

## 2024-10-05 NOTE — PLAN OF CARE
PRIMARY DIAGNOSIS: ACUTE PAIN  OUTPATIENT/OBSERVATION GOALS TO BE MET BEFORE DISCHARGE:  1. Pain Status: Pain free.    2. Return to near baseline physical activity: No    3. Cleared for discharge by consultants (if involved): No    Discharge Planner Nurse   Safe discharge environment identified: Yes  Barriers to discharge: NG to LIS, no output. Hypoactive bowel sounds. Not passing gas yet. BP elevated, 178/80, not meeting order parameter for PRN IV hydralazine.         Entered by: Jacinta Murphy RN 10/04/2024 10:48 PM     Please review provider order for any additional goals.   Nurse to notify provider when observation goals have been met and patient is ready for discharge.

## 2024-10-05 NOTE — PROGRESS NOTES
"General Surgery Progress Note:    Hospital Day # 1    ASSESSMENT:  1. Obstruction concurrent with and due to inguinal hernia of right side        Antoni Stoll is a 89 year old male who is s/p open right inguinal hernia repair on 10/4/2024 after presenting with SBO secondary to right inguinal hernia.  NG remained to LIS, per nursing was clamped [id number] this morning and patient is passing small amounts of gas.  Patient remains afebrile and vitally stable although hypertensive, no leukocytosis.  Okay to remove NG and start patient on a clear liquid diet, if tolerating this can likely advance later on today.    PLAN:  -Okay to pull NG tubing  -Trial clear liquid diet  -Continuing monitoring for return of bowel functions  -Activity as tolerated and encouraged ambulation TID  -Okay to change bandage PRN over surgical site or leave open to air; steri strips in place until they fall off around 2 weeks   -Medical management per primary team    SUBJECTIVE:   Antoni Stoll was seen on rounds.  Patient states he is doing well this morning, states he is not having increased abdominal pain, believes he passed flatus about twice this morning.  States he tolerated the NG tube clamping well without nausea or vomiting or increased acid reflux.  No bowel movement yet postoperatively.  Afebrile.      Patient Vitals for the past 24 hrs:   BP Temp Temp src Pulse Resp SpO2 Height Weight   10/05/24 0733 (!) 172/77 97.7  F (36.5  C) Oral 52 20 94 % -- --   10/05/24 0443 (!) 160/73 98.4  F (36.9  C) Oral 53 20 93 % -- --   10/05/24 0037 (!) 135/93 98.2  F (36.8  C) Oral 53 20 92 % -- --   10/04/24 2053 (!) 178/80 98.1  F (36.7  C) Oral 53 20 98 % -- --   10/04/24 1828 (!) 172/78 98.2  F (36.8  C) Oral 57 20 95 % -- --   10/04/24 1800 (!) 174/79 97.5  F (36.4  C) Oral 58 16 94 % 1.676 m (5' 6\") 64.2 kg (141 lb 9.6 oz)   10/04/24 1700 (!) 157/75 98.4  F (36.9  C) -- 57 15 94 % -- --   10/04/24 1630 (!) 166/79 98.4  F (36.9  C) -- 55 14 " 95 % -- --   10/04/24 1600 (!) 158/76 98.1  F (36.7  C) -- 55 14 95 % -- --   10/04/24 1545 (!) 172/81 98.1  F (36.7  C) -- 56 21 95 % -- --   10/04/24 1530 (!) 172/81 97.9  F (36.6  C) -- 57 18 95 % -- --   10/04/24 1515 (!) 162/78 97.9  F (36.6  C) -- 56 17 95 % -- --   10/04/24 1500 (!) 158/76 97.9  F (36.6  C) -- 57 16 95 % -- --   10/04/24 1445 (!) 178/79 97.9  F (36.6  C) -- 58 18 95 % -- --   10/04/24 1430 (!) 171/81 97.9  F (36.6  C) -- 61 21 96 % -- --   10/04/24 1415 (!) 164/79 97.7  F (36.5  C) -- 62 20 95 % -- --   10/04/24 1407 (!) 185/83 97.7  F (36.5  C) Core 59 21 99 % -- --   10/04/24 1209 (!) 163/74 98.2  F (36.8  C) Core 66 18 95 % -- --         PHYSICAL EXAM:  General: patient seen resting in bed in no acute distress  Resp: no increased work of breathing, breathing comfortably on room air with NG in place but clamped  Abdomen: Generally softly distended abdomen with tenderness with palpation over all 4 quadrants, inguinal incision site is clean, dry, intact with Steri-Strips and bandage over top.  Extremities: No edema or cyanosis visualized on exam, no obvious deformities    10/04 0700 - 10/05 0659  In: -   Out: 1625 [Urine:1175]    Admission on 10/03/2024   Component Date Value    Sodium 10/03/2024 136     Potassium 10/03/2024 3.6     Carbon Dioxide (CO2) 10/03/2024 30 (H)     Anion Gap 10/03/2024 14     Urea Nitrogen 10/03/2024 70.2 (H)     Creatinine 10/03/2024 1.65 (H)     GFR Estimate 10/03/2024 40 (L)     Calcium 10/03/2024 9.5     Chloride 10/03/2024 92 (L)     Glucose 10/03/2024 146 (H)     Alkaline Phosphatase 10/03/2024 93     AST 10/03/2024 22     ALT 10/03/2024 12     Protein Total 10/03/2024 8.6 (H)     Albumin 10/03/2024 4.9     Bilirubin Total 10/03/2024 0.7     Lipase 10/03/2024 137 (H)     Bilirubin Direct 10/03/2024 <0.20     WBC Count 10/03/2024 11.9 (H)     RBC Count 10/03/2024 4.58     Hemoglobin 10/03/2024 15.0     Hematocrit 10/03/2024 45.3     MCV 10/03/2024 99     MCH  10/03/2024 32.8     MCHC 10/03/2024 33.1     RDW 10/03/2024 13.2     Platelet Count 10/03/2024 254     % Neutrophils 10/03/2024 80     % Lymphocytes 10/03/2024 9     % Monocytes 10/03/2024 11     % Eosinophils 10/03/2024 0     % Basophils 10/03/2024 0     % Immature Granulocytes 10/03/2024 1     NRBCs per 100 WBC 10/03/2024 0     Absolute Neutrophils 10/03/2024 9.5 (H)     Absolute Lymphocytes 10/03/2024 1.0     Absolute Monocytes 10/03/2024 1.3     Absolute Eosinophils 10/03/2024 0.0     Absolute Basophils 10/03/2024 0.0     Absolute Immature Granul* 10/03/2024 0.1     Absolute NRBCs 10/03/2024 0.0     Hold Specimen 10/03/2024 JIC     Hold Specimen 10/03/2024 JIC     Sodium 10/04/2024 138     Potassium 10/04/2024 4.1     Chloride 10/04/2024 99     Carbon Dioxide (CO2) 10/04/2024 29     Anion Gap 10/04/2024 10     Urea Nitrogen 10/04/2024 60.3 (H)     Creatinine 10/04/2024 1.29 (H)     GFR Estimate 10/04/2024 54 (L)     Calcium 10/04/2024 8.3 (L)     Glucose 10/04/2024 128 (H)     WBC Count 10/04/2024 9.4     RBC Count 10/04/2024 4.01 (L)     Hemoglobin 10/04/2024 13.0 (L)     Hematocrit 10/04/2024 40.0     MCV 10/04/2024 100     MCH 10/04/2024 32.4     MCHC 10/04/2024 32.5     RDW 10/04/2024 13.2     Platelet Count 10/04/2024 197     Sodium 10/05/2024 136     Potassium 10/05/2024 3.8     Carbon Dioxide (CO2) 10/05/2024 23     Anion Gap 10/05/2024 11     Urea Nitrogen 10/05/2024 31.2 (H)     Creatinine 10/05/2024 0.91     GFR Estimate 10/05/2024 82     Calcium 10/05/2024 8.2 (L)     Chloride 10/05/2024 102     Glucose 10/05/2024 91     Alkaline Phosphatase 10/05/2024 67     AST 10/05/2024 18     ALT 10/05/2024 8     Protein Total 10/05/2024 6.5     Albumin 10/05/2024 3.7     Bilirubin Total 10/05/2024 0.7     WBC Count 10/05/2024 8.7     RBC Count 10/05/2024 4.07 (L)     Hemoglobin 10/05/2024 13.2 (L)     Hematocrit 10/05/2024 40.7     MCV 10/05/2024 100     MCH 10/05/2024 32.4     MCHC 10/05/2024 32.4     RDW  10/05/2024 12.7     Platelet Count 10/05/2024 183         SUGEY [facility]  [facility] [phone]

## 2024-10-05 NOTE — PLAN OF CARE
Problem: Skin Injury Risk Increased  Goal: Skin Health and Integrity  Outcome: Progressing  Intervention: Plan: Nurse Driven Intervention: Friction and Shear  Recent Flowsheet Documentation  Taken 10/5/2024 1215 by Tish Fiore RN  Friction/Shear Interventions: HOB 30 degrees or less  Taken 10/5/2024 0800 by Tish Fiore RN  Friction/Shear Interventions: HOB 30 degrees or less  Intervention: Optimize Skin Protection  Recent Flowsheet Documentation  Taken 10/5/2024 1215 by Tish Fiore RN  Activity Management: activity adjusted per tolerance  Head of Bed (HOB) Positioning: HOB at 20 degrees  Taken 10/5/2024 0800 by Tish Fiore RN  Activity Management: activity adjusted per tolerance  Head of Bed (HOB) Positioning: HOB at 20 degrees     Problem: Risk for Delirium  Goal: Improved Behavioral Control  Outcome: Progressing  Intervention: Minimize Safety Risk  Recent Flowsheet Documentation  Taken 10/5/2024 1215 by Tish Fiore RN  Enhanced Safety Measures: patient/family teach back on injury risk  Taken 10/5/2024 0800 by Tish Fiore RN  Enhanced Safety Measures: patient/family teach back on injury risk  Goal: Improved Attention and Thought Clarity  Outcome: Progressing     Problem: Acute Kidney Injury/Impairment  Goal: Fluid and Electrolyte Balance  Outcome: Progressing  Goal: Improved Oral Intake  Outcome: Progressing  Goal: Effective Renal Function  Outcome: Progressing     Problem: Hernia Repair  Goal: Absence of Bleeding  Outcome: Progressing  Goal: Effective Bowel Elimination  Outcome: Progressing  Goal: Fluid and Electrolyte Balance  Outcome: Progressing  Goal: Absence of Infection Signs and Symptoms  Outcome: Progressing  Goal: Anesthesia/Sedation Recovery  Outcome: Progressing  Intervention: Optimize Anesthesia Recovery  Recent Flowsheet Documentation  Taken 10/5/2024 1215 by Tish Fiore RN  Safety Promotion/Fall Prevention:   activity supervised   assistive device/personal items  within reach   lighting adjusted   nonskid shoes/slippers when out of bed   patient and family education  Taken 10/5/2024 0800 by Tish Fiore RN  Safety Promotion/Fall Prevention:   activity supervised   assistive device/personal items within reach   lighting adjusted   nonskid shoes/slippers when out of bed   patient and family education  Goal: Optimal Pain Control and Function  Outcome: Progressing  Goal: Nausea and Vomiting Relief  Outcome: Progressing  Goal: Effective Urinary Elimination  Outcome: Progressing  Goal: Effective Oxygenation and Ventilation  Outcome: Progressing  Intervention: Optimize Oxygenation and Ventilation  Recent Flowsheet Documentation  Taken 10/5/2024 1215 by Tish Fiore RN  Head of Bed (HOB) Positioning: HOB at 20 degrees  Taken 10/5/2024 0800 by Tish Fiore RN  Head of Bed (HOB) Positioning: HOB at 20 degrees     Problem: Intestinal Obstruction  Goal: Optimal Bowel Function  Outcome: Progressing  Intervention: Promote Bowel Function  Recent Flowsheet Documentation  Taken 10/5/2024 1215 by Tish Fiore RN  Body Position: position changed independently  Head of Bed (HOB) Positioning: HOB at 20 degrees  Taken 10/5/2024 0844 by Tish Fiore RN  Chair: Upright in chair  Taken 10/5/2024 0800 by Tish Fiore RN  Body Position: position changed independently  Head of Bed (HOB) Positioning: HOB at 20 degrees  Goal: Fluid and Electrolyte Balance  Outcome: Progressing  Goal: Absence of Infection Signs and Symptoms  Outcome: Progressing     Problem: Comorbidity Management  Goal: Blood Pressure in Desired Range  Outcome: Progressing     Problem: Adult Inpatient Plan of Care  Goal: Optimal Comfort and Wellbeing  Outcome: Adequate for Care Transition     Problem: Adult Inpatient Plan of Care  Goal: Absence of Hospital-Acquired Illness or Injury  Intervention: Identify and Manage Fall Risk  Recent Flowsheet Documentation  Taken 10/5/2024 1215 by Tish Fiore RN  Safety  Promotion/Fall Prevention:   activity supervised   assistive device/personal items within reach   lighting adjusted   nonskid shoes/slippers when out of bed   patient and family education  Taken 10/5/2024 0800 by Tish Fiore RN  Safety Promotion/Fall Prevention:   activity supervised   assistive device/personal items within reach   lighting adjusted   nonskid shoes/slippers when out of bed   patient and family education  Intervention: Prevent Skin Injury  Recent Flowsheet Documentation  Taken 10/5/2024 1215 by Tish Fiore RN  Body Position: position changed independently  Taken 10/5/2024 0800 by Tish Fiore RN  Body Position: position changed independently  Intervention: Prevent Infection  Recent Flowsheet Documentation  Taken 10/5/2024 1215 by Tish Fiore RN  Infection Prevention:   equipment surfaces disinfected   hand hygiene promoted   personal protective equipment utilized   rest/sleep promoted   single patient room provided  Taken 10/5/2024 0800 by Tish Fiore RN  Infection Prevention:   equipment surfaces disinfected   hand hygiene promoted   personal protective equipment utilized   rest/sleep promoted   single patient room provided   Goal Outcome Evaluation:

## 2024-10-05 NOTE — PROGRESS NOTES
[facility]    Medicine Progress Note - Hospitalist Service    Date of Admission:  10/3/2024    Assessment & Plan   Antoni Stoll is a 89 year old male admitted on 10/3/2024. He came to the ED for evaluation of abdominal pain, nausea, vomiting and constipation. He was found to have SBO due to right inguinal hernia. Surgical intervention pending.      Small bowel obstruction secondary to right inguinal hernia:   CT abdomen: Small bowel containing right inguinal hernia causing small bowel obstruction with significant upstream small bowel distention as well as gastric distention.   Status post bedside reduction by ED provider with improvement of symptoms.  - s/p open right inguinal hernia repair on 10/4  - clear liquid diet after removing NG tube  - Symptomatic management     Acute kidney injury, improving   - likely prerenal secondary to decreased oral intake  - IV hydration and monitor     Essential hypertension:   Resume lisinopril     Coronary artery disease: History of CABG. No active issue currently. Resume PTA aspirin, atorvastatin when able to take oral intake.     BPH: Resume PTA tamsulosin when appropriate       Observation Goals: -diagnostic tests and consults completed and resulted, -vital signs normal or at patient baseline, Nurse to notify provider when observation goals have been met and patient is ready for discharge.  Diet: Clear Liquid Diet (no carbonation)    DVT Prophylaxis: Pneumatic Compression Devices  Cabrera Catheter: Not present  Lines: None     Cardiac Monitoring: None  Code Status: Full Code      Clinically Significant Risk Factors Present on Admission          # Hypocalcemia: Lowest Ca = 8.2 mg/dL in last 2 days, will monitor and replace as appropriate       # Drug Induced Platelet Defect: home medication list includes an antiplatelet medication   # Hypertension: Noted on problem list             # Financial/Environmental Concerns: none   # History of CABG: noted on  surgical history       Disposition Plan     Medically Ready for Discharge: Anticipated in 2-4 Days             Genaro Bianchi MD  Hospitalist Service  [facility]  Securely message with Kristin (more info)  Text page via [employer] Paging/Directory   ______________________________________________________________________    Interval History   Sitting in chair at the bedside.  No distress noted.  He denies having significant pain.  NG tube has been clamped for a couple of hours now.  Patient states passing gas.  Management plan discussed with the patient and he expressed understanding.    Physical Exam   Vital Signs: Temp: 97.7  F (36.5  C) Temp src: Oral BP: (!) 172/77 Pulse: 52   Resp: 20 SpO2: 94 % O2 Device: None (Room air)    Weight: 141 lbs 9.62 oz    General Appearance: No distress noted  Respiratory: Good air entry bilaterally  Cardiovascular: S1 and S2 are heard  GI: Soft abdomen, right lower abdominal tenderness, normoactive bowel sounds  Skin: Intact and warm      Medical Decision Making       40 MINUTES SPENT BY ME on the date of service doing chart review, history, exam, documentation & further activities per the note.      Data

## 2024-10-05 NOTE — PLAN OF CARE
PRIMARY DIAGNOSIS: ACUTE PAIN  OUTPATIENT/OBSERVATION GOALS TO BE MET BEFORE DISCHARGE:  1. Pain Status: Pain free.    2. Return to near baseline physical activity: No    3. Cleared for discharge by consultants (if involved): No    Discharge Planner Nurse   Safe discharge environment identified: No  Barriers to discharge: Yes       Entered by: Amy Moseley RN 10/05/2024 3:31 AM     Please review provider order for any additional goals.   Nurse to notify provider when observation goals have been met and patient is ready for discharge.Goal Outcome Evaluation:

## 2024-10-05 NOTE — PLAN OF CARE
"Goal Outcome Evaluation:  Pt is A&Ox4, SBA with walker for ambulation to BR, up in chair a few hours today and ambulated in hallway. Pt denied pain. Denied SOB, LS clear, sats WNL, no coough assessed, LS clear. Pt denied nausea, BS hypoactive, pt is passing gas. Received am meds, NG clamped for a few hours with no increased nausea or pain. New order to remove NG and try clear liquid, pt tolerating well. VSS, afebrile, no s/s of bleeding, labs stable. Pt was noted to be retaining urine this am, pt is voiding w/o difficulty now.   Problem: Adult Inpatient Plan of Care  Goal: Patient-Specific Goal (Individualized)  Description: You can add care plan individualizations to a care plan. Examples of Individualization might be:  \"Parent requests to be called daily at 9am for status\", \"I have a hard time hearing out of my right ear\", or \"Do not touch me to wake me up as it startles  me\".  Outcome: Progressing  Goal: Absence of Hospital-Acquired Illness or Injury  Outcome: Progressing  Intervention: Identify and Manage Fall Risk  Recent Flowsheet Documentation  Taken 10/5/2024 1215 by Tish Fiore RN  Safety Promotion/Fall Prevention:   activity supervised   assistive device/personal items within reach   lighting adjusted   nonskid shoes/slippers when out of bed   patient and family education  Taken 10/5/2024 0800 by Tish Fiore RN  Safety Promotion/Fall Prevention:   activity supervised   assistive device/personal items within reach   lighting adjusted   nonskid shoes/slippers when out of bed   patient and family education  Intervention: Prevent Skin Injury  Recent Flowsheet Documentation  Taken 10/5/2024 1215 by Tish Fiore RN  Body Position: position changed independently  Taken 10/5/2024 0800 by Tish Fiore RN  Body Position: position changed independently  Intervention: Prevent Infection  Recent Flowsheet Documentation  Taken 10/5/2024 1215 by Tish Fiore, RN  Infection Prevention:   equipment " surfaces disinfected   hand hygiene promoted   personal protective equipment utilized   rest/sleep promoted   single patient room provided  Taken 10/5/2024 0800 by Tish Fiore RN  Infection Prevention:   equipment surfaces disinfected   hand hygiene promoted   personal protective equipment utilized   rest/sleep promoted   single patient room provided     Problem: Skin Injury Risk Increased  Goal: Skin Health and Integrity  10/5/2024 1439 by Tish Fiore RN  Outcome: Progressing  10/5/2024 1438 by Tish Fiore RN  Outcome: Progressing  Intervention: Plan: Nurse Driven Intervention: Friction and Shear  Recent Flowsheet Documentation  Taken 10/5/2024 1215 by Tihs Fiore RN  Friction/Shear Interventions: HOB 30 degrees or less  Taken 10/5/2024 0800 by Tish Fiore RN  Friction/Shear Interventions: HOB 30 degrees or less  Intervention: Optimize Skin Protection  Recent Flowsheet Documentation  Taken 10/5/2024 1215 by Tish Fiore RN  Activity Management: activity adjusted per tolerance  Head of Bed (HOB) Positioning: HOB at 20 degrees  Taken 10/5/2024 0800 by Tish Fiore RN  Activity Management: activity adjusted per tolerance  Head of Bed (HOB) Positioning: HOB at 20 degrees     Problem: Risk for Delirium  Goal: Improved Behavioral Control  10/5/2024 1439 by Tish Fiore RN  Outcome: Progressing  10/5/2024 1438 by Tish Fiore RN  Outcome: Progressing  Intervention: Minimize Safety Risk  Recent Flowsheet Documentation  Taken 10/5/2024 1215 by Tish Fiore RN  Enhanced Safety Measures: patient/family teach back on injury risk  Taken 10/5/2024 0800 by Tish Fiore RN  Enhanced Safety Measures: patient/family teach back on injury risk  Goal: Improved Attention and Thought Clarity  10/5/2024 1439 by Tish Fiore RN  Outcome: Progressing  10/5/2024 1438 by Tish Fiore RN  Outcome: Progressing     Problem: Acute Kidney Injury/Impairment  Goal: Fluid and Electrolyte  Balance  10/5/2024 1439 by Tish Fiore RN  Outcome: Progressing  10/5/2024 1438 by Tish Fiore RN  Outcome: Progressing  Goal: Improved Oral Intake  10/5/2024 1439 by Tish Fiore RN  Outcome: Progressing  10/5/2024 1438 by Tish Fiore RN  Outcome: Progressing  Goal: Effective Renal Function  10/5/2024 1439 by Tish Fiore RN  Outcome: Progressing  10/5/2024 1438 by Tish Fiore RN  Outcome: Progressing     Problem: Hernia Repair  Goal: Absence of Bleeding  10/5/2024 1439 by Tish Fiore RN  Outcome: Progressing  10/5/2024 1438 by Tish Fiore RN  Outcome: Progressing  Goal: Effective Bowel Elimination  10/5/2024 1439 by Tish Fiore RN  Outcome: Progressing  10/5/2024 1438 by Tish Fiore RN  Outcome: Progressing  Goal: Fluid and Electrolyte Balance  10/5/2024 1439 by Tish Fiore RN  Outcome: Progressing  10/5/2024 1438 by Tish Fiore RN  Outcome: Progressing  Goal: Absence of Infection Signs and Symptoms  10/5/2024 1439 by Tish Fiore RN  Outcome: Progressing  10/5/2024 1438 by Tish Fiore RN  Outcome: Progressing  Goal: Anesthesia/Sedation Recovery  10/5/2024 1439 by Tish Fiore RN  Outcome: Progressing  10/5/2024 1438 by Tish Fiore RN  Outcome: Progressing  Intervention: Optimize Anesthesia Recovery  Recent Flowsheet Documentation  Taken 10/5/2024 1215 by Tish Fiore RN  Safety Promotion/Fall Prevention:   activity supervised   assistive device/personal items within reach   lighting adjusted   nonskid shoes/slippers when out of bed   patient and family education  Taken 10/5/2024 0800 by Tish Fiore RN  Safety Promotion/Fall Prevention:   activity supervised   assistive device/personal items within reach   lighting adjusted   nonskid shoes/slippers when out of bed   patient and family education  Goal: Optimal Pain Control and Function  10/5/2024 1439 by Fiore, Tish R, RN  Outcome: Progressing  10/5/2024 1438 by Tish Fiore  RN  Outcome: Progressing  Goal: Nausea and Vomiting Relief  10/5/2024 1439 by Tish Fiore RN  Outcome: Progressing  10/5/2024 1438 by Tish Fiore RN  Outcome: Progressing  Goal: Effective Urinary Elimination  10/5/2024 1439 by Tish Fiore RN  Outcome: Progressing  10/5/2024 1438 by Tish Fiore RN  Outcome: Progressing  Goal: Effective Oxygenation and Ventilation  10/5/2024 1439 by Tish Fiore RN  Outcome: Progressing  10/5/2024 1438 by Tish Fiore RN  Outcome: Progressing  Intervention: Optimize Oxygenation and Ventilation  Recent Flowsheet Documentation  Taken 10/5/2024 1215 by Tish Fiore RN  Head of Bed (HOB) Positioning: HOB at 20 degrees  Taken 10/5/2024 0800 by Tish Fiore RN  Head of Bed (HOB) Positioning: HOB at 20 degrees     Problem: Intestinal Obstruction  Goal: Optimal Bowel Function  Outcome: Progressing  Intervention: Promote Bowel Function  Recent Flowsheet Documentation  Taken 10/5/2024 1215 by Tish Fiore RN  Body Position: position changed independently  Head of Bed (HOB) Positioning: HOB at 20 degrees  Taken 10/5/2024 0844 by Tish Fiore RN  Chair: Upright in chair  Taken 10/5/2024 0800 by Tish Fiore RN  Body Position: position changed independently  Head of Bed (HOB) Positioning: HOB at 20 degrees  Goal: Fluid and Electrolyte Balance  10/5/2024 1439 by Tish Fiore RN  Outcome: Progressing  10/5/2024 1438 by Tish Fiore RN  Outcome: Progressing  Goal: Absence of Infection Signs and Symptoms  10/5/2024 1439 by Tish Fiore RN  Outcome: Progressing  10/5/2024 1438 by Tish Fiore RN  Outcome: Progressing  Goal: Optimize Nutrition Status  Outcome: Progressing  Goal: Optimal Pain Control and Function  Outcome: Progressing     Problem: Comorbidity Management  Goal: Blood Pressure in Desired Range  10/5/2024 1439 by Tish Fiore RN  Outcome: Progressing  10/5/2024 1438 by Tish Fiore RN  Outcome: Progressing     Problem:  Adult Inpatient Plan of Care  Goal: Optimal Comfort and Wellbeing  Outcome: Adequate for Care Transition

## 2024-10-05 NOTE — PLAN OF CARE
PRIMARY DIAGNOSIS: ACUTE PAIN  OUTPATIENT/OBSERVATION GOALS TO BE MET BEFORE DISCHARGE:  1. Pain Status: Pain free.    2. Return to near baseline physical activity: No    3. Cleared for discharge by consultants (if involved): No    Discharge Planner Nurse   Safe discharge environment identified: No  Barriers to discharge: Yes       Entered by: Amy Moseley RN 10/05/2024 7:37 AM     Please review provider order for any additional goals.   Nurse to notify provider when observation goals have been met and patient is ready for discharge.Goal Outcome Evaluation:       Pt alert and oriented x 4,scheduled tylenol effective for pain control,NG had 100 ml greenish  total output this shift,right lower abdominal incision site dry clean and intact,LR infusing at 75 ml/hr,voiding small amount,bladder scanned for 602 ml,assisted pt to bathroom and voided 250 ml,PVR was 419 ml, pt said he would try to void in one hour.

## 2024-10-06 ENCOUNTER — APPOINTMENT (OUTPATIENT)
Dept: PHYSICAL THERAPY | Facility: HOSPITAL | Age: 89
DRG: 351 | End: 2024-10-06
Attending: STUDENT IN AN ORGANIZED HEALTH CARE EDUCATION/TRAINING PROGRAM
Payer: MEDICARE

## 2024-10-06 VITALS
DIASTOLIC BLOOD PRESSURE: 56 MMHG | HEART RATE: 61 BPM | RESPIRATION RATE: 18 BRPM | WEIGHT: 141.6 LBS | SYSTOLIC BLOOD PRESSURE: 114 MMHG | HEIGHT: 66 IN | TEMPERATURE: 97.8 F | OXYGEN SATURATION: 97 % | BODY MASS INDEX: 22.76 KG/M2

## 2024-10-06 LAB
ANION GAP SERPL CALCULATED.3IONS-SCNC: 11 MMOL/L (ref 7–15)
BUN SERPL-MCNC: 23.6 MG/DL (ref 8–23)
CALCIUM SERPL-MCNC: 8.1 MG/DL (ref 8.8–10.4)
CHLORIDE SERPL-SCNC: 106 MMOL/L (ref 98–107)
CREAT SERPL-MCNC: 0.82 MG/DL (ref 0.67–1.17)
EGFRCR SERPLBLD CKD-EPI 2021: 85 ML/MIN/1.73M2
ERYTHROCYTE [DISTWIDTH] IN BLOOD BY AUTOMATED COUNT: 12.8 % (ref 10–15)
GLUCOSE SERPL-MCNC: 100 MG/DL (ref 70–99)
HCO3 SERPL-SCNC: 22 MMOL/L (ref 22–29)
HCT VFR BLD AUTO: 40.8 % (ref 40–53)
HGB BLD-MCNC: 12.9 G/DL (ref 13.3–17.7)
MCH RBC QN AUTO: 32.3 PG (ref 26.5–33)
MCHC RBC AUTO-ENTMCNC: 31.6 G/DL (ref 31.5–36.5)
MCV RBC AUTO: 102 FL (ref 78–100)
PLATELET # BLD AUTO: 162 10E3/UL (ref 150–450)
POTASSIUM SERPL-SCNC: 4 MMOL/L (ref 3.4–5.3)
RBC # BLD AUTO: 3.99 10E6/UL (ref 4.4–5.9)
SODIUM SERPL-SCNC: 139 MMOL/L (ref 135–145)
WBC # BLD AUTO: 8.1 10E3/UL (ref 4–11)

## 2024-10-06 PROCEDURE — 80048 BASIC METABOLIC PNL TOTAL CA: CPT | Performed by: STUDENT IN AN ORGANIZED HEALTH CARE EDUCATION/TRAINING PROGRAM

## 2024-10-06 PROCEDURE — 99239 HOSP IP/OBS DSCHRG MGMT >30: CPT | Performed by: STUDENT IN AN ORGANIZED HEALTH CARE EDUCATION/TRAINING PROGRAM

## 2024-10-06 PROCEDURE — 97116 GAIT TRAINING THERAPY: CPT | Mod: GP

## 2024-10-06 PROCEDURE — 99231 SBSQ HOSP IP/OBS SF/LOW 25: CPT | Mod: 24

## 2024-10-06 PROCEDURE — 250N000013 HC RX MED GY IP 250 OP 250 PS 637: Performed by: SPECIALIST

## 2024-10-06 PROCEDURE — 36415 COLL VENOUS BLD VENIPUNCTURE: CPT | Performed by: STUDENT IN AN ORGANIZED HEALTH CARE EDUCATION/TRAINING PROGRAM

## 2024-10-06 PROCEDURE — 97110 THERAPEUTIC EXERCISES: CPT | Mod: GP

## 2024-10-06 PROCEDURE — 250N000013 HC RX MED GY IP 250 OP 250 PS 637: Performed by: INTERNAL MEDICINE

## 2024-10-06 PROCEDURE — 85027 COMPLETE CBC AUTOMATED: CPT | Performed by: STUDENT IN AN ORGANIZED HEALTH CARE EDUCATION/TRAINING PROGRAM

## 2024-10-06 PROCEDURE — 250N000013 HC RX MED GY IP 250 OP 250 PS 637: Performed by: STUDENT IN AN ORGANIZED HEALTH CARE EDUCATION/TRAINING PROGRAM

## 2024-10-06 PROCEDURE — 97161 PT EVAL LOW COMPLEX 20 MIN: CPT | Mod: GP

## 2024-10-06 RX ORDER — FAMOTIDINE 20 MG/1
20 TABLET, FILM COATED ORAL AT BEDTIME
Qty: 30 TABLET | Refills: 1 | Status: SHIPPED | OUTPATIENT
Start: 2024-10-06

## 2024-10-06 RX ADMIN — POLYETHYLENE GLYCOL 3350 17 G: 17 POWDER, FOR SOLUTION ORAL at 09:27

## 2024-10-06 RX ADMIN — PREGABALIN 25 MG: 25 CAPSULE ORAL at 09:28

## 2024-10-06 RX ADMIN — ASPIRIN 325 MG ORAL TABLET 325 MG: 325 PILL ORAL at 09:27

## 2024-10-06 RX ADMIN — ACETAMINOPHEN 975 MG: 325 TABLET ORAL at 06:31

## 2024-10-06 RX ADMIN — LORATADINE 10 MG: 10 TABLET ORAL at 09:28

## 2024-10-06 RX ADMIN — LISINOPRIL 2.5 MG: 2.5 TABLET ORAL at 09:27

## 2024-10-06 RX ADMIN — SENNOSIDES AND DOCUSATE SODIUM 1 TABLET: 8.6; 5 TABLET ORAL at 09:28

## 2024-10-06 ASSESSMENT — ACTIVITIES OF DAILY LIVING (ADL)
ADLS_ACUITY_SCORE: 42
ADLS_ACUITY_SCORE: 40
ADLS_ACUITY_SCORE: 42

## 2024-10-06 NOTE — PLAN OF CARE
"PRIMARY DIAGNOSIS: \"GENERIC\" NURSING  OUTPATIENT/OBSERVATION GOALS TO BE MET BEFORE DISCHARGE:  ADLs back to baseline: No    Activity and level of assistance: Up with standby assistance.    Pain status: Pain free.    Return to near baseline physical activity: Yes     Discharge Planner Nurse   Safe discharge environment identified: No  Barriers to discharge: Yes       Entered by: Amy Moseley RN 10/06/2024 7:38 AM     Please review provider order for any additional goals.   Nurse to notify provider when observation goals have been met and patient is ready for discharge.Goal Outcome Evaluation:       VS stable on room air,slept between cares,incision site dressing intact,denied pain.               "

## 2024-10-06 NOTE — DISCHARGE SUMMARY
[facility]  Hospitalist Discharge Summary      Date of Admission:  10/3/2024  Date of Discharge:  10/6/2024  6:30 PM  Discharging Provider: Genaro Bianchi MD  Discharge Service: Hospitalist Service    Discharge Diagnoses   Small bowel obstruction secondary to right inguinal hernia   Acute kidney injury, improving  Essential hypertension  Coronary artery disease  BPH    Clinically Significant Risk Factors          Follow-ups Needed After Discharge   Follow-up Appointments     Follow-up and recommended labs and tests       Follow up with primary care provider, Sarthak Romano, within 7 days for   hospital follow- up.  No follow up labs or test are needed.          Unresulted Labs Ordered in the Past 30 Days of this Admission       No orders found from 9/3/2024 to 10/4/2024.        These results will be followed up by     Discharge Disposition   Discharged to home  Condition at discharge: Stable    Hospital Course   Antoni Stoll is a 89 year old male admitted on 10/3/2024. He came to the ED for evaluation of abdominal pain, nausea, vomiting and constipation. He was found to have SBO due to right inguinal hernia.   Small bowel obstruction secondary to right inguinal hernia  CT abdomen: Small bowel containing right inguinal hernia causing small bowel obstruction with significant upstream small bowel distention as well as gastric distention.   Status post bedside reduction by ED provider with improvement of symptoms.  - s/p open right inguinal hernia repair on 10/4  -Patient tolerated regular diet  Acute kidney injury, improving   - likely prerenal secondary to decreased oral intake  - IV hydration and monitor   Essential hypertension:   Resume lisinopril   Coronary artery disease: History of CABG. No active issue currently. Resume PTA aspirin, atorvastatin when able to take oral intake.   BPH: Resume PTA tamsulosin when appropriate      Patient is clinically and hemodynamically stable  enough to be discharged home.  Cleared by surgery for discharge.    Consultations This Hospital Stay   CARE MANAGEMENT / SOCIAL WORK IP CONSULT  SURGERY GENERAL IP CONSULT  PHYSICAL THERAPY ADULT IP CONSULT    Code Status   Full Code    Time Spent on this Encounter   I, Genaro Bianchi MD, personally saw the patient today and spent greater than 30 minutes discharging this patient.       Genaro Bianchi MD  [address] [address]  Phone: [phone]  Fax: [phone]  ______________________________________________________________________    Physical Exam   Vital Signs: Temp: 98.2  F (36.8  C) Temp src: Oral BP: (!) 155/66 Pulse: 51   Resp: 20 SpO2: 96 % O2 Device: None (Room air)    Weight: 141 lbs 9.62 oz    General Appearance:  No distress noted  Respiratory: Good air entry bilaterally  Cardiovascular: S1 and S2 are heard  GI: Soft abdomen, right lower abdominal tenderness, normoactive bowel sounds  Skin: Intact and warm       Primary Care Physician   Sarthak Romano    Discharge Orders      Reason for your hospital stay    SBO     Follow-up and recommended labs and tests     Follow up with primary care provider, Sarthak Romano, within 7 days for hospital follow- up.  No follow up labs or test are needed.     Activity    Your activity upon discharge: activity as tolerated     Diet    Follow this diet upon discharge: Current Diet:Orders Placed This Encounter      Regular Diet Adult       Significant Results and Procedures       Discharge Medications   Current Discharge Medication List        START taking these medications    Details   famotidine (PEPCID) 20 MG tablet Take 1 tablet (20 mg) by mouth at bedtime.  Qty: 30 tablet, Refills: 1    Associated Diagnoses: Gastroesophageal reflux disease without esophagitis           CONTINUE these medications which have NOT CHANGED    Details   acetaminophen (TYLENOL) 500 MG tablet Take 1,000 mg by mouth  every 8 hours as needed.      aspirin 325 MG tablet [ASPIRIN 325 MG TABLET] Take 325 mg by mouth daily.      atorvastatin (LIPITOR) 20 MG tablet Take 1 tablet (20 mg) by mouth daily  Qty: 90 tablet, Refills: 2    Associated Diagnoses: Pure hypercholesterolemia      calcium polycarbophil (FIBERCON) 625 mg tablet Take 2 tablets by mouth daily.      cholecalciferol, vitamin D3, 1,000 unit tablet Take 1 tablet by mouth daily.      fluticasone (FLONASE) 50 MCG/ACT nasal spray Spray 1 spray into both nostrils daily as needed.      glucosamine-chondroitin 500-400 mg cap [GLUCOSAMINE-CHONDROITIN 500-400 MG CAP] Take 1 capsule by mouth 2 (two) times a day.      latanoprost (XALATAN) 0.005 % ophthalmic solution Place 1 drop into both eyes At Bedtime      lisinopril (ZESTRIL) 2.5 MG tablet TAKE 1 TABLET BY MOUTH EVERY DAY  Qty: 90 tablet, Refills: 3    Associated Diagnoses: Essential hypertension      loratadine 10 MG capsule Take 10 mg by mouth daily      Multiple Vitamins-Minerals (PRESERVISION AREDS 2) CHEW Take 1 chew tab by mouth daily.      pregabalin (LYRICA) 25 MG capsule TAKE 1 CAPSULE BY MOUTH TWICE DAILY  Qty: 60 capsule, Refills: 2    Associated Diagnoses: Spinal stenosis of lumbar region with neurogenic claudication; Spondylolisthesis of lumbar region      saw palmetto fruit 450 mg cap [SAW PALMETTO FRUIT 450 MG CAP] Take 1 capsule by mouth 2 (two) times a day.      tamsulosin (FLOMAX) 0.4 mg Cp24 [TAMSULOSIN (FLOMAX) 0.4 MG CP24] Take 0.4 mg by mouth at bedtime.           Allergies   No Known Allergies

## 2024-10-06 NOTE — PLAN OF CARE
"PRIMARY DIAGNOSIS: \"GENERIC\" NURSING  OUTPATIENT/OBSERVATION GOALS TO BE MET BEFORE DISCHARGE:  ADLs back to baseline: No    Activity and level of assistance: Up with standby assistance.    Pain status: Pain free.    Return to near baseline physical activity: Yes     Discharge Planner Nurse   Safe discharge environment identified: No  Barriers to discharge: Yes       Entered by: Amy Moseley RN 10/06/2024 4:45 AM     Please review provider order for any additional goals.   Nurse to notify provider when observation goals have been met and patient is ready for discharge.Goal Outcome Evaluation:                        "

## 2024-10-06 NOTE — PLAN OF CARE
"  Problem: Adult Inpatient Plan of Care  Goal: Plan of Care Review  Description: The Plan of Care Review/Shift note should be completed every shift.  The Outcome Evaluation is a brief statement about your assessment that the patient is improving, declining, or no change.  This information will be displayed automatically on your shift  note.  Outcome: Progressing  Goal: Patient-Specific Goal (Individualized)  Description: You can add care plan individualizations to a care plan. Examples of Individualization might be:  \"Parent requests to be called daily at 9am for status\", \"I have a hard time hearing out of my right ear\", or \"Do not touch me to wake me up as it startles  me\".  Outcome: Progressing  Goal: Absence of Hospital-Acquired Illness or Injury  Outcome: Progressing  Intervention: Identify and Manage Fall Risk  Recent Flowsheet Documentation  Taken 10/5/2024 1700 by Veda Velazquez RN  Safety Promotion/Fall Prevention: activity supervised  Intervention: Prevent Infection  Recent Flowsheet Documentation  Taken 10/5/2024 1700 by Veda Velazquez RN  Infection Prevention: hand hygiene promoted  Goal: Readiness for Transition of Care  Outcome: Progressing     Problem: Skin Injury Risk Increased  Goal: Skin Health and Integrity  Outcome: Progressing  Intervention: Plan: Nurse Driven Intervention: Friction and Shear  Recent Flowsheet Documentation  Taken 10/5/2024 1700 by Veda Velazquez RN  Friction/Shear Interventions: HOB 30 degrees or less  Intervention: Optimize Skin Protection  Recent Flowsheet Documentation  Taken 10/5/2024 2130 by Veda Velazquez RN  Activity Management: ambulated outside room     Problem: Risk for Delirium  Goal: Optimal Coping  Outcome: Progressing  Intervention: Optimize Psychosocial Adjustment to Delirium  Recent Flowsheet Documentation  Taken 10/5/2024 1700 by Veda Velazquez RN  Supportive Measures: active listening utilized  Goal: Improved Behavioral Control  Outcome: Progressing  Intervention: " Minimize Safety Risk  Recent Flowsheet Documentation  Taken 10/5/2024 1700 by Veda Velazquez RN  Enhanced Safety Measures: patient/family teach back on injury risk  Goal: Improved Attention and Thought Clarity  Outcome: Progressing  Goal: Improved Sleep  Outcome: Progressing     Problem: Acute Kidney Injury/Impairment  Goal: Fluid and Electrolyte Balance  Outcome: Progressing  Goal: Improved Oral Intake  Outcome: Progressing  Goal: Effective Renal Function  Outcome: Progressing     Problem: Hernia Repair  Goal: Absence of Bleeding  Outcome: Progressing  Goal: Effective Bowel Elimination  Outcome: Progressing  Goal: Fluid and Electrolyte Balance  Outcome: Progressing  Goal: Absence of Infection Signs and Symptoms  Outcome: Progressing  Goal: Anesthesia/Sedation Recovery  Outcome: Progressing  Intervention: Optimize Anesthesia Recovery  Recent Flowsheet Documentation  Taken 10/5/2024 1700 by Veda Velazquez RN  Safety Promotion/Fall Prevention: activity supervised  Goal: Optimal Pain Control and Function  Outcome: Progressing  Goal: Nausea and Vomiting Relief  Outcome: Progressing  Goal: Effective Urinary Elimination  Outcome: Progressing  Goal: Effective Oxygenation and Ventilation  Outcome: Progressing     Problem: Intestinal Obstruction  Goal: Optimal Bowel Function  Outcome: Progressing  Goal: Fluid and Electrolyte Balance  Outcome: Progressing  Goal: Absence of Infection Signs and Symptoms  Outcome: Progressing  Goal: Optimize Nutrition Status  Outcome: Progressing  Goal: Optimal Pain Control and Function  Outcome: Progressing     Problem: Comorbidity Management  Goal: Blood Pressure in Desired Range  Outcome: Progressing   Goal Outcome Evaluation:       Patient tolerating full liquid. Rating pain 1. Up walking in hallway. Voided 400 ml bladder scan 110 ml after voiding. Changed dressing  at hs

## 2024-10-06 NOTE — PROGRESS NOTES
10/06/24 1328   Appointment Info   Signing Clinician's Name / Credentials (PT) Margarita Weems PT   Living Environment   People in Home alone   Current Living Arrangements house  ([facility])   Home Accessibility stairs within home   Number of Stairs, Within Home, Primary greater than 10 stairs   Stair Railings, Within Home, Primary railings safe and in good condition   Transportation Anticipated family or friend will provide   Self-Care   Equipment Currently Used at Home none;other (see comments)  (has cane and walking sticks available for use)   Fall history within last six months no   Activity/Exercise/Self-Care Comment independent ADL/IADL's   General Information   Onset of Illness/Injury or Date of Surgery 10/03/24   Referring Physician Dr. Bianchi   Patient/Family Therapy Goals Statement (PT) none stated   Pertinent History of Current Problem (include personal factors and/or comorbidities that impact the POC) SBO s/p open R inguinal hernia repair 10/4/24   Existing Precautions/Restrictions lifting  (lifting <20 lbs for 2-3 weeks)   Cognition   Affect/Mental Status (Cognition) WFL   Orientation Status (Cognition) oriented x 4   Follows Commands (Cognition) WFL   Range of Motion (ROM)   Range of Motion ROM is WFL   Strength (Manual Muscle Testing)   Strength (Manual Muscle Testing) Deficits observed during functional mobility   Transfers   Transfers sit-stand transfer   Sit-Stand Transfer   Sit-Stand [place] (Transfers) supervision;contact guard;verbal cues   Gait/Stairs (Locomotion)   [place] Level (Gait) supervision;contact guard   Assistive Device (Gait) other (see comments)  (none)   Distance in Feet (Gait) 150   Pattern (Gait) step-through   Deviations/Abnormal Patterns (Gait) weight shifting decreased;stride length decreased;lois decreased;other (see comments)  (decreased push-off BLE)   Negotiation (Stairs) stairs independence;stairs assistive device;number of steps;ascending  technique;descending technique   [place] Level (Stairs) modified independence   Number of Steps (Stairs) 4   Ascending Technique (Stairs) step-over-step   Descending Technique (Stairs) step-over-step   Balance   Balance Comments initial post. lean with standing with cga then sba without UE support; mod independent with UE support   Clinical Impression   Criteria for Skilled Therapeutic Intervention Yes, treatment indicated   PT Diagnosis (PT) impaired functional mobility   Influenced by the following impairments decreased balance, strength   Functional limitations due to impairments gait, transfers   Clinical Presentation (PT Evaluation Complexity) stable   Clinical Presentation Rationale pt presents as medically diagnosed   Clinical Decision Making (Complexity) low complexity   Planned Therapy Interventions (PT) balance training;home exercise program;neuromuscular re-education;patient/family education;strengthening;transfer training   Risk & Benefits of therapy have been explained evaluation/treatment results reviewed;patient   PT Total Evaluation Time   PT Eval, Low Complexity Minutes (41233) 10   Physical Therapy Goals   PT Frequency One time eval and treatment only   PT Predicted Duration/Target Date for Goal Attainment 10/06/24   PT Goals Transfers;Gait;PT Goal 1   PT: Transfers Modified independent;Independent;Sit to/from stand   PT: Gait Independent;Modified independent;150 feet;Assistive device   PT: Goal 1 Pt will demonstrate HEP for balance and strength independently.   Interventions   Interventions Quick Adds Gait Training;Therapeutic Procedure   Therapeutic Procedure/Exercise   Ther. Procedure: strength, endurance, ROM, flexibillity Minutes (37360) 8   Symptoms Noted During/After Treatment none   Treatment Detail/Skilled Intervention standing balance ex: staggered and lat. wt. shifting x10 BLE with cga/sba cuing and demonstration for technique; issued handout of standing LE HEP and pt completed BLE  ex x10 reps with UE support independently post cuing for technique   Gait Training   Gait Training Minutes (18757) 10   Symptoms Noted During/After Treatment (Gait Training) none   Treatment Detail/Skilled Intervention gait training with SEC with cuing for gait pattern and cane placement; gait training without AD with shoes on cuing for arm swing   Distance in Feet 200, 150   [place] Level (Gait Training) independent   Physical Assistance Level (Gait Training) verbal cues;nonverbal cues (demo/gestures);supervision   Weight Bearing (Gait Training) full weight-bearing   Assistive Device (Gait Training) straight cane;other (see comments)  (no AD second trial)   Pattern Analysis (Gait Training) swing-through gait   Gait Analysis Deviations decreased step length;decreased lois   Impairments (Gait Analysis/Training) balance impaired;strength decreased   PT Discharge Planning   PT Plan d/c PT   PT Discharge Recommendation (DC Rec) home   PT Rationale for DC Rec pt moving well with ambulation and stairs; may benefit from cane for ambulation   PT Equipment Needed at Discharge cane, straight   Total Session Time   Timed Code Treatment Minutes 18   Total Session Time (sum of timed and untimed services) 28

## 2024-10-06 NOTE — DISCHARGE INSTRUCTIONS
From your General Surgery Team:  You were seen by [facility] general surgery and your surgeon was Dr. Mario.  If you do not have an appointment and would like to would like to schedule a follow up appointment with general surgery in clinic, please call us at [phone] to schedule an appointment at your convenience.

## 2024-10-06 NOTE — PLAN OF CARE
Problem: Adult Inpatient Plan of Care  Goal: Absence of Hospital-Acquired Illness or Injury  Outcome: Progressing  Intervention: Identify and Manage Fall Risk  Recent Flowsheet Documentation  Taken 10/6/2024 1240 by Tish Fiore RN  Safety Promotion/Fall Prevention:   activity supervised   assistive device/personal items within reach   nonskid shoes/slippers when out of bed   patient and family education  Taken 10/6/2024 0748 by Tish Fiore RN  Safety Promotion/Fall Prevention:   activity supervised   assistive device/personal items within reach   nonskid shoes/slippers when out of bed   patient and family education  Intervention: Prevent Infection  Recent Flowsheet Documentation  Taken 10/6/2024 1240 by Tish Fiore RN  Infection Prevention:   equipment surfaces disinfected   hand hygiene promoted   personal protective equipment utilized   rest/sleep promoted   single patient room provided  Taken 10/6/2024 0748 by Tish Fiore RN  Infection Prevention:   equipment surfaces disinfected   hand hygiene promoted   personal protective equipment utilized   rest/sleep promoted   single patient room provided  Goal: Readiness for Transition of Care  Outcome: Progressing     Problem: Skin Injury Risk Increased  Goal: Skin Health and Integrity  Outcome: Progressing  Intervention: Plan: Nurse Driven Intervention: Friction and Shear  Recent Flowsheet Documentation  Taken 10/6/2024 1240 by Tish Fiore RN  Friction/Shear Interventions: HOB 30 degrees or less  Taken 10/6/2024 0748 by Tish Fiore RN  Friction/Shear Interventions: HOB 30 degrees or less  Intervention: Optimize Skin Protection  Recent Flowsheet Documentation  Taken 10/6/2024 1240 by Tish Fiore RN  Activity Management:   activity adjusted per tolerance   up in chair  Taken 10/6/2024 0748 by Tish Fiore RN  Activity Management:   activity adjusted per tolerance   up in chair     Problem: Risk for Delirium  Goal: Improved  Behavioral Control  Outcome: Progressing  Intervention: Minimize Safety Risk  Recent Flowsheet Documentation  Taken 10/6/2024 1240 by Tish Fiore RN  Enhanced Safety Measures: patient/family teach back on injury risk  Taken 10/6/2024 0748 by Tish Fiore RN  Enhanced Safety Measures: patient/family teach back on injury risk  Goal: Improved Attention and Thought Clarity  Outcome: Progressing     Problem: Acute Kidney Injury/Impairment  Goal: Fluid and Electrolyte Balance  Outcome: Progressing  Goal: Improved Oral Intake  Outcome: Progressing     Problem: Hernia Repair  Goal: Absence of Bleeding  Outcome: Progressing  Goal: Effective Bowel Elimination  Outcome: Progressing  Goal: Fluid and Electrolyte Balance  Outcome: Progressing  Goal: Absence of Infection Signs and Symptoms  Outcome: Progressing  Goal: Optimal Pain Control and Function  Outcome: Progressing  Goal: Nausea and Vomiting Relief  Outcome: Progressing  Goal: Effective Urinary Elimination  Outcome: Progressing  Goal: Effective Oxygenation and Ventilation  Outcome: Progressing     Problem: Intestinal Obstruction  Goal: Optimal Bowel Function  Outcome: Progressing  Intervention: Promote Bowel Function  Recent Flowsheet Documentation  Taken 10/6/2024 1240 by Tish Fiore RN  Chair: Upright in chair  Taken 10/6/2024 0748 by Tish Fiore RN  Chair: Upright in chair  Goal: Absence of Infection Signs and Symptoms  Outcome: Progressing  Goal: Optimize Nutrition Status  Outcome: Progressing  Goal: Optimal Pain Control and Function  Outcome: Progressing     Problem: Comorbidity Management  Goal: Blood Pressure in Desired Range  Outcome: Progressing     Problem: Hernia Repair  Goal: Anesthesia/Sedation Recovery  Intervention: Optimize Anesthesia Recovery  Recent Flowsheet Documentation  Taken 10/6/2024 1240 by Tish Fiore RN  Safety Promotion/Fall Prevention:   activity supervised   assistive device/personal items within reach   nonskid  shoes/slippers when out of bed   patient and family education  Taken 10/6/2024 0748 by Tish Fiore, RN  Safety Promotion/Fall Prevention:   activity supervised   assistive device/personal items within reach   nonskid shoes/slippers when out of bed   patient and family education   Goal Outcome Evaluation:

## 2024-10-06 NOTE — PROGRESS NOTES
General Surgery Progress Note:    Hospital Day # 1    ASSESSMENT:  1. Obstruction concurrent with and due to inguinal hernia of right side        Antoni Stoll is a 89 year old male who is s/p open right inguinal hernia repair on 10/4/2024 after presenting with SBO secondary to right inguinal hernia. NG out yesterday after passing flatus, tolerating full liquid diet and had a BM overnight. Advance diet today and okay for discharge per surgical standpoint    PLAN:  -Advance diet to regular as tolerated  -Activity as tolerated and encouraged  -Okay to discharge from surgical standpoint if deemed medically appropriate.  -Discharge recommendations involve instructions placed in AVS.  -Continue <20 lbs weight lifting restriction 2-3 weeks  -Steri strips to remain until they fall off or about 2 weeks  -Medical management per primary team    SUBJECTIVE:   Antoni Stoll was seen on rounds. Patient states he is doing well, tolerating full liquid diet. Had a larger bowel movement overnight. Passing flatus. Feeling well no return of abdominal pain. Denies feve,r chills, nausea, vomiting. Did have some reflux after tylenol this morning otherwise has not had any      Patient Vitals for the past 24 hrs:   BP Temp Temp src Pulse Resp SpO2   10/06/24 0047 (!) 158/73 97.5  F (36.4  C) Oral 51 20 94 %   10/05/24 1548 136/66 97.8  F (36.6  C) Oral 54 20 92 %   10/05/24 0733 (!) 172/77 97.7  F (36.5  C) Oral 52 20 94 %         PHYSICAL EXAM:  General: patient seen sitting upright in chair in no acute distress  Resp: no increased work of breathing, breathing comfortably on room air  Abdomen: generally soft and distended abdomen nontender with palpation over all four quadrants. No guarding. Incision clean, dry, intact with steri strips and clean bandage overtop.  Extremities: No edema or cyanosis visualized on exam, no obvious deformities    10/05 0700 - 10/06 0659  In: 560 [P.O.:560]  Out: 3950 [Urine:3850]    Admission on  10/03/2024   Component Date Value    Sodium 10/03/2024 136     Potassium 10/03/2024 3.6     Carbon Dioxide (CO2) 10/03/2024 30 (H)     Anion Gap 10/03/2024 14     Urea Nitrogen 10/03/2024 70.2 (H)     Creatinine 10/03/2024 1.65 (H)     GFR Estimate 10/03/2024 40 (L)     Calcium 10/03/2024 9.5     Chloride 10/03/2024 92 (L)     Glucose 10/03/2024 146 (H)     Alkaline Phosphatase 10/03/2024 93     AST 10/03/2024 22     ALT 10/03/2024 12     Protein Total 10/03/2024 8.6 (H)     Albumin 10/03/2024 4.9     Bilirubin Total 10/03/2024 0.7     Lipase 10/03/2024 137 (H)     Bilirubin Direct 10/03/2024 <0.20     WBC Count 10/03/2024 11.9 (H)     RBC Count 10/03/2024 4.58     Hemoglobin 10/03/2024 15.0     Hematocrit 10/03/2024 45.3     MCV 10/03/2024 99     MCH 10/03/2024 32.8     MCHC 10/03/2024 33.1     RDW 10/03/2024 13.2     Platelet Count 10/03/2024 254     % Neutrophils 10/03/2024 80     % Lymphocytes 10/03/2024 9     % Monocytes 10/03/2024 11     % Eosinophils 10/03/2024 0     % Basophils 10/03/2024 0     % Immature Granulocytes 10/03/2024 1     NRBCs per 100 WBC 10/03/2024 0     Absolute Neutrophils 10/03/2024 9.5 (H)     Absolute Lymphocytes 10/03/2024 1.0     Absolute Monocytes 10/03/2024 1.3     Absolute Eosinophils 10/03/2024 0.0     Absolute Basophils 10/03/2024 0.0     Absolute Immature Granul* 10/03/2024 0.1     Absolute NRBCs 10/03/2024 0.0     Hold Specimen 10/03/2024 JIC     Hold Specimen 10/03/2024 JIC     Sodium 10/04/2024 138     Potassium 10/04/2024 4.1     Chloride 10/04/2024 99     Carbon Dioxide (CO2) 10/04/2024 29     Anion Gap 10/04/2024 10     Urea Nitrogen 10/04/2024 60.3 (H)     Creatinine 10/04/2024 1.29 (H)     GFR Estimate 10/04/2024 54 (L)     Calcium 10/04/2024 8.3 (L)     Glucose 10/04/2024 128 (H)     WBC Count 10/04/2024 9.4     RBC Count 10/04/2024 4.01 (L)     Hemoglobin 10/04/2024 13.0 (L)     Hematocrit 10/04/2024 40.0     MCV 10/04/2024 100     MCH 10/04/2024 32.4     MCHC 10/04/2024  32.5     RDW 10/04/2024 13.2     Platelet Count 10/04/2024 197     Sodium 10/05/2024 136     Potassium 10/05/2024 3.8     Carbon Dioxide (CO2) 10/05/2024 23     Anion Gap 10/05/2024 11     Urea Nitrogen 10/05/2024 31.2 (H)     Creatinine 10/05/2024 0.91     GFR Estimate 10/05/2024 82     Calcium 10/05/2024 8.2 (L)     Chloride 10/05/2024 102     Glucose 10/05/2024 91     Alkaline Phosphatase 10/05/2024 67     AST 10/05/2024 18     ALT 10/05/2024 8     Protein Total 10/05/2024 6.5     Albumin 10/05/2024 3.7     Bilirubin Total 10/05/2024 0.7     WBC Count 10/05/2024 8.7     RBC Count 10/05/2024 4.07 (L)     Hemoglobin 10/05/2024 13.2 (L)     Hematocrit 10/05/2024 40.7     MCV 10/05/2024 100     MCH 10/05/2024 32.4     MCHC 10/05/2024 32.4     RDW 10/05/2024 12.7     Platelet Count 10/05/2024 183     WBC Count 10/06/2024 8.1     RBC Count 10/06/2024 3.99 (L)     Hemoglobin 10/06/2024 12.9 (L)     Hematocrit 10/06/2024 40.8     MCV 10/06/2024 102 (H)     MCH 10/06/2024 32.3     MCHC 10/06/2024 31.6     RDW 10/06/2024 12.8     Platelet Count 10/06/2024 162     Sodium 10/06/2024 139     Potassium 10/06/2024 4.0     Chloride 10/06/2024 106     Carbon Dioxide (CO2) 10/06/2024 22     Anion Gap 10/06/2024 11     Urea Nitrogen 10/06/2024 23.6 (H)     Creatinine 10/06/2024 0.82     GFR Estimate 10/06/2024 85     Calcium 10/06/2024 8.1 (L)     Glucose 10/06/2024 100 (H)         Karin Reese PA-C  [facility]  [address] [place] [id number]  [facility] [phone]

## 2024-10-06 NOTE — PLAN OF CARE
Physical Therapy Discharge Summary    Reason for therapy discharge:    All goals and outcomes met, no further needs identified.    Progress towards therapy goal(s). See goals on Care Plan in [facility] electronic health record for goal details.  Goals met    Therapy recommendation(s):    Continue home exercise program.

## 2024-10-14 ENCOUNTER — OFFICE VISIT (OUTPATIENT)
Dept: INTERNAL MEDICINE | Facility: CLINIC | Age: 89
End: 2024-10-14
Payer: MEDICARE

## 2024-10-14 VITALS
WEIGHT: 134 LBS | HEART RATE: 67 BPM | BODY MASS INDEX: 21.53 KG/M2 | TEMPERATURE: 97.8 F | OXYGEN SATURATION: 100 % | DIASTOLIC BLOOD PRESSURE: 62 MMHG | SYSTOLIC BLOOD PRESSURE: 156 MMHG | HEIGHT: 66 IN

## 2024-10-14 DIAGNOSIS — Z98.890 H/O RIGHT INGUINAL HERNIA REPAIR: Primary | ICD-10-CM

## 2024-10-14 DIAGNOSIS — I10 ESSENTIAL HYPERTENSION: ICD-10-CM

## 2024-10-14 DIAGNOSIS — I25.10 CORONARY ARTERY DISEASE DUE TO CALCIFIED CORONARY LESION: ICD-10-CM

## 2024-10-14 DIAGNOSIS — G47.33 OBSTRUCTIVE SLEEP APNEA ON CPAP: ICD-10-CM

## 2024-10-14 DIAGNOSIS — I25.84 CORONARY ARTERY DISEASE DUE TO CALCIFIED CORONARY LESION: ICD-10-CM

## 2024-10-14 DIAGNOSIS — Z87.19 H/O RIGHT INGUINAL HERNIA REPAIR: Primary | ICD-10-CM

## 2024-10-14 DIAGNOSIS — Z23 NEED FOR IMMUNIZATION AGAINST INFLUENZA: ICD-10-CM

## 2024-10-14 PROCEDURE — 90662 IIV NO PRSV INCREASED AG IM: CPT | Performed by: INTERNAL MEDICINE

## 2024-10-14 PROCEDURE — 99214 OFFICE O/P EST MOD 30 MIN: CPT | Performed by: INTERNAL MEDICINE

## 2024-10-14 PROCEDURE — G0008 ADMIN INFLUENZA VIRUS VAC: HCPCS | Performed by: INTERNAL MEDICINE

## 2024-10-14 NOTE — PATIENT INSTRUCTIONS
Get back to your regular activity and exercise.    Limit lifting to 20 pounds for the next week, however.    Wear your CPAP machine every night.    Continue your current lisinopril dose.    See me in January for adult wellness visit physical exam.    I would still recommend you consider a COVID-vaccine this fall.    You can use Pepcid or famotidine as needed for heartburn.  If it does affect your urination, you may not be able to use it however.  Sometimes famotidine can reduce the urinary stream.    Your groin incision is well-healed.  Just keep it clean and dry.

## 2024-10-14 NOTE — PROGRESS NOTES
Antoni Stoll   89 year old male    Date of Visit: 10/14/2024    Chief Complaint   Patient presents with    [facility] F/U     [facility] 10/3-10/6     Subjective  89-year-old male with previous history of inguinal hernia repair, has had some intermittent discomfort from a right inguinal hernia recurrence over the past 6 months.  He has been able to lie down and alleviate the pain fairly quickly previously.    This time the pain did not go away and he went to the emergency room and was found to have a small bowel obstruction with an incarcerated right inguinal hernia.  He underwent hernia repair on October 14.  He recovered well from surgery and is having normal bowel movements.  He has been eating well.    He was in acute renal failure with creatinine of 1.6 on admission on October 3.  He was throwing up at the time.    On October 6 at discharge his creatinine was back to normal at 0.82 and potassium 4.0.  Hemoglobin 12.9 and normal liver test.    Blood pressure has been well-controlled on lisinopril 2.5 mg a day.    He does have severe kyphosis with sleep apnea but is compliant with his CPAP and wearing that every night.  No history of atrial fibrillation or palpitations.  No increasing shortness of breath or edema.    He has been avoiding alcohol recently.  He used to go to the gym 5 days a week and is asking whether he can go back to doing that.    He enjoys bowling as well, has always just 15 pounds.    No flare of his chronic back pain.  He is on Lyrica.    No chest pain or chest pressure.  History of coronary bypass with coronary disease.  On aspirin daily and Lipitor 20 mg.    The CT scan of his abdomen on October 3 showed the small bowel obstruction with incarcerated hernia, but no abnormal lymph nodes or mass or aneurysm.    He was given Pepcid for heartburn and has been using it as needed.  Denies that it is affecting his urinary flow.  He is on Flomax for BPH.    He takes 5 prunes and MiraLAX  daily to help keep his bowels regular and they have been regular since discharge.    No cough or respiratory symptoms.    PMHx:    Past Medical History:   Diagnosis Date    Anemia     Basal cell carcinoma     Cancer (H)     colon    Carotid artery occlusion     Coronary artery disease     Glaucoma     High cholesterol     History of colon cancer 08/13/2018    Hyperlipidemia     Hypertension     Left inguinal hernia     Low back pain     Malignant melanoma (H)     Mild aortic insufficiency     Moderate tricuspid regurgitation     Postnasal drip     Sleep apnea, obstructive     CPAP    Spinal stenosis     lumbar    SSS (sick sinus syndrome) (H) 12/09/2019    Umbilical hernia     Unspecified cataract     Created by Conversion      PSHx:    Past Surgical History:   Procedure Laterality Date    ARTERIAL BYPASS SURGERY      BLEPHAROPLASTY Bilateral     BYPASS GRAFT ARTERY CORONARY  06/28/2013    5-vessel    BYPASS GRAFT ARTERY CORONARY  2013    5 vessel    CARDIAC CATHETERIZATION  06/03/2013    CATARACT EXTRACTION Bilateral     COLON SURGERY Right     HERNIA REPAIR      right inguinal    HERNIORRHAPHY INGUINAL Left 2/14/2022    Procedure: INCARCERATED LEFT INGUINAL HERNIA REPAIR WITH MESH;  Surgeon: Jack Naqvi MD;  Location: [facility] [place] OR    HERNIORRHAPHY INGUINAL Right 10/4/2024    Procedure: REPAIR OF RECURRENT RIGHT INGUINAL HERNIA WITH MESH;  Surgeon: Saundra Mario MD;  Location: [facility] [place] OR    STRIP VEIN Left      Immunizations:   Immunization History   Administered Date(s) Administered    COVID-19 Bivalent 12+ (Pfizer) 07/25/2023    COVID-19 MONOVALENT 12+ (Pfizer) 04/16/2021, 05/18/2021, 01/11/2022    COVID-19 Monovalent 12+ (Pfizer 2022) 07/25/2022    Flu, Unspecified 10/23/2007, 10/29/2009, 10/11/2010, 11/08/2011, 11/05/2021, 09/01/2022    Influenza (H1N1) 01/18/2010    Influenza (High Dose) Trivalent,PF (Fluzone) 10/13/2015, 10/18/2016, 11/01/2017, 12/10/2018, 12/11/2019, 10/14/2024    Influenza  "(IIV3) PF 10/23/2007, 10/29/2009, 11/08/2011, 11/29/2012, 10/25/2013, 11/19/2014    Influenza Vaccine 65+ (FLUAD) 11/05/2021    Influenza Vaccine 65+ (Fluzone HD) 11/30/2020, 01/25/2023, 09/26/2023    Influenza Vaccine, 6+MO IM (QUADRIVALENT W/PRESERVATIVES) 11/29/2012, 10/25/2013, 11/19/2014    Influenza, seasonal, injectable, PF 10/11/2010    Pneumo Conj 13-V (2010&after) 04/09/2015    Pneumococcal 23 valent 10/03/1999, 03/06/2007    Pneumococcal, Unspecified 01/01/2012, 04/09/2015    TDAP (Adacel,Boostrix) 08/26/2022    Td (Adult), Adsorbed 03/11/1992, 10/24/2000, 04/11/2011    Zoster vaccine, live 11/08/2007       ROS A comprehensive review of systems was performed and was otherwise negative    Medications, allergies, and problem list were reviewed and updated    Exam  BP (!) 156/62 (BP Location: Right arm, Patient Position: Sitting, Cuff Size: Adult Regular)   Pulse 67   Temp 97.8  F (36.6  C) (Oral)   Ht 1.676 m (5' 6\")   Wt 60.8 kg (134 lb)   SpO2 100%   BMI 21.63 kg/m    Alert and oriented x 3.  Severe kyphosis.  Able to stand and ambulate.  Lungs are clear.  Heart is regular without murmur.  Abdomen soft and nontender.  Large ventral hernia with weak abdominal muscle.  He has a well-healed right lower quadrant inguinal hernia repair scar without dehiscence or erythema.  No ankle edema.    Assessment/Plan  1. H/O right inguinal hernia repair  Has healed well.  Bandage removed, keep wound clean and dry.  I encouraged him to get back to his regular walking exercise and going to the gym, but no lifting over 20 pounds for another week.    2. Essential hypertension  Controlled.  Mildly high systolic blood pressure today, but usually well-controlled.  Continue current lisinopril and follow-up in January for physical.    Get back to regular exercise    3. Obstructive sleep apnea on CPAP  Compliant with CPAP    4. Need for immunization against influenza  Given today.  He declined a COVID shot.  I did recommend " he consider a COVID shot, however.  - INFLUENZA HIGH DOSE, TRIVALENT, PF (FLUZONE)    5. Coronary artery disease due to calcified coronary lesion  Asymptomatic.  Continue aspirin and atorvastatin.    Glaucoma on drops.    Patient can use Pepcid for heartburn but I did warn him on urinary flow risk with his history of BPH, currently controlled with Flomax.      Return in 4 months (on 1/31/2025) for Adult wellness visit physical exam.   Patient Instructions   Get back to your regular activity and exercise.    Limit lifting to 20 pounds for the next week, however.    Wear your CPAP machine every night.    Continue your current lisinopril dose.    See me in January for adult wellness visit physical exam.    I would still recommend you consider a COVID-vaccine this fall.    You can use Pepcid or famotidine as needed for heartburn.  If it does affect your urination, you may not be able to use it however.  Sometimes famotidine can reduce the urinary stream.    Your groin incision is well-healed.  Just keep it clean and dry.    Sarthak Romano MD, MD        Current Outpatient Medications   Medication Sig Dispense Refill    acetaminophen (TYLENOL) 500 MG tablet Take 1,000 mg by mouth every 8 hours as needed.      aspirin 325 MG tablet [ASPIRIN 325 MG TABLET] Take 325 mg by mouth daily.      atorvastatin (LIPITOR) 20 MG tablet Take 1 tablet (20 mg) by mouth daily 90 tablet 2    calcium polycarbophil (FIBERCON) 625 mg tablet Take 2 tablets by mouth daily.      cholecalciferol, vitamin D3, 1,000 unit tablet Take 1 tablet by mouth daily.      famotidine (PEPCID) 20 MG tablet Take 1 tablet (20 mg) by mouth at bedtime. 30 tablet 1    fluticasone (FLONASE) 50 MCG/ACT nasal spray Spray 1 spray into both nostrils daily as needed.      glucosamine-chondroitin 500-400 mg cap [GLUCOSAMINE-CHONDROITIN 500-400 MG CAP] Take 1 capsule by mouth 2 (two) times a day.      latanoprost (XALATAN) 0.005 % ophthalmic solution Place 1 drop into both  eyes At Bedtime      lisinopril (ZESTRIL) 2.5 MG tablet TAKE 1 TABLET BY MOUTH EVERY DAY 90 tablet 3    loratadine 10 MG capsule Take 10 mg by mouth daily      Multiple Vitamins-Minerals (PRESERVISION AREDS 2) CHEW Take 1 chew tab by mouth daily.      pregabalin (LYRICA) 25 MG capsule TAKE 1 CAPSULE BY MOUTH TWICE DAILY 60 capsule 2    saw palmetto fruit 450 mg cap [SAW PALMETTO FRUIT 450 MG CAP] Take 1 capsule by mouth 2 (two) times a day.      tamsulosin (FLOMAX) 0.4 mg Cp24 [TAMSULOSIN (FLOMAX) 0.4 MG CP24] Take 0.4 mg by mouth at bedtime.       No Known Allergies  Social History     Tobacco Use    Smoking status: Never     Passive exposure: Past    Smokeless tobacco: Never   Vaping Use    Vaping status: Never Used   Substance Use Topics    Alcohol use: Yes     Comment: Alcoholic Drinks/day: 5 drinks weekly    Drug use: No             Subjective   Wily is a 89 year old, presenting for the following health issues:  Hospital F/U ([facility] 10/3-10/6)      10/14/2024     1:54 PM   Additional Questions   Roomed by [facility]         Hospital Follow-up Visit:    Hospital/Nursing Home/IP Rehab Facility: [facility]  Date of Admission: 10/3/24  Date of Discharge: 10/6/24  Reason(s) for Admission: inguinal hernia, bowel obstroction  Was the patient in the ICU or did the patient experience delirium during hospitalization?  No  Do you have any other stressors you would like to discuss with your provider? No    Problems taking medications regularly:  None  Medication changes since discharge: None  Problems adhering to non-medication therapy:  None    Summary of hospitalization:  [facility] discharge summary reviewed  Diagnostic Tests/Treatments reviewed.  Follow up needed: none  Other Healthcare Providers Involved in Patient s Care:         None  Update since discharge: improved.         Plan of care communicated with patient                       Objective    BP (!) 156/62 (BP  "Location: Right arm, Patient Position: Sitting, Cuff Size: Adult Regular)   Pulse 67   Temp 97.8  F (36.6  C) (Oral)   Ht 1.676 m (5' 6\")   Wt 60.8 kg (134 lb)   SpO2 100%   BMI 21.63 kg/m    Body mass index is 21.63 kg/m .  Physical Exam               Signed Electronically by: Sarthak Romano MD    "

## 2024-11-13 ENCOUNTER — TRANSFERRED RECORDS (OUTPATIENT)
Dept: HEALTH INFORMATION MANAGEMENT | Facility: CLINIC | Age: 89
End: 2024-11-13
Payer: MEDICARE

## 2024-12-04 DIAGNOSIS — E78.00 PURE HYPERCHOLESTEROLEMIA: ICD-10-CM

## 2024-12-04 RX ORDER — ATORVASTATIN CALCIUM 20 MG/1
20 TABLET, FILM COATED ORAL DAILY
Qty: 90 TABLET | Refills: 2 | Status: SHIPPED | OUTPATIENT
Start: 2024-12-04

## 2024-12-11 ENCOUNTER — OFFICE VISIT (OUTPATIENT)
Dept: OTOLARYNGOLOGY | Facility: CLINIC | Age: 89
End: 2024-12-11
Payer: MEDICARE

## 2024-12-11 DIAGNOSIS — J34.89 NASAL CRUSTING: ICD-10-CM

## 2024-12-11 DIAGNOSIS — J31.0 CHRONIC RHINITIS: Primary | ICD-10-CM

## 2024-12-11 RX ORDER — MUPIROCIN 20 MG/G
OINTMENT TOPICAL
Qty: 22 G | Refills: 0 | Status: SHIPPED | OUTPATIENT
Start: 2024-12-11

## 2024-12-11 RX ORDER — AZELASTINE 1 MG/ML
SPRAY, METERED NASAL
Qty: 30 ML | Refills: 11 | Status: SHIPPED | OUTPATIENT
Start: 2024-12-11

## 2024-12-11 NOTE — PROGRESS NOTES
ENT FOLLOW UP VISIT NOTE    Patient presents with:  RECHECK: Chronic rhinitis and dysfunction of both eustachian tubes. Refill Astelin nasal spray. Still having nasal congestion, runny nose and occasional sneezing. Will get a bloody nose sometimes when blows nose hard. Has been using Flonase nightly.        HISTORY OF PRESENT ILLNESS    Wily was seen in follow up for         ALLERGIES    Patient has no known allergies.    CURRENT MEDICATIONS      Current Outpatient Medications:     acetaminophen (TYLENOL) 500 MG tablet, Take 1,000 mg by mouth every 8 hours as needed., Disp: , Rfl:     aspirin 325 MG tablet, [ASPIRIN 325 MG TABLET] Take 325 mg by mouth daily., Disp: , Rfl:     atorvastatin (LIPITOR) 20 MG tablet, TAKE 1 TABLET(20 MG) BY MOUTH DAILY, Disp: 90 tablet, Rfl: 2    azelastine (ASTELIN) 0.1 % nasal spray, 2 sprays each nostril 1-2x daily as needed for nasal congestion (use nightly for first 2 week), Disp: 30 mL, Rfl: 11    calcium polycarbophil (FIBERCON) 625 mg tablet, Take 2 tablets by mouth daily., Disp: , Rfl:     cholecalciferol, vitamin D3, 1,000 unit tablet, Take 1 tablet by mouth daily., Disp: , Rfl:     glucosamine-chondroitin 500-400 mg cap, [GLUCOSAMINE-CHONDROITIN 500-400 MG CAP] Take 1 capsule by mouth 2 (two) times a day., Disp: , Rfl:     latanoprost (XALATAN) 0.005 % ophthalmic solution, Place 1 drop into both eyes At Bedtime, Disp: , Rfl:     lisinopril (ZESTRIL) 2.5 MG tablet, TAKE 1 TABLET BY MOUTH EVERY DAY, Disp: 90 tablet, Rfl: 3    loratadine 10 MG capsule, Take 10 mg by mouth daily, Disp: , Rfl:     Multiple Vitamins-Minerals (PRESERVISION AREDS 2) CHEW, Take 1 chew tab by mouth daily., Disp: , Rfl:     mupirocin (BACTROBAN) 2 % external ointment, Apply a pea sized amount to inside of each nostril 3x daily with Qtip for 10 days, Disp: 22 g, Rfl: 0    pregabalin (LYRICA) 25 MG capsule, TAKE 1 CAPSULE BY MOUTH TWICE DAILY, Disp: 60 capsule, Rfl: 2    saw palmetto fruit 450 mg cap,  [SAW PALMETTO FRUIT 450 MG CAP] Take 1 capsule by mouth 2 (two) times a day., Disp: , Rfl:     tamsulosin (FLOMAX) 0.4 mg Cp24, [TAMSULOSIN (FLOMAX) 0.4 MG CP24] Take 0.4 mg by mouth at bedtime., Disp: , Rfl:      PAST MEDICAL HISTORY    PAST MEDICAL HISTORY:   Past Medical History:   Diagnosis Date    Anemia     Basal cell carcinoma     Cancer (H)     colon    Carotid artery occlusion     Coronary artery disease     Glaucoma     High cholesterol     History of colon cancer 08/13/2018    Hyperlipidemia     Hypertension     Left inguinal hernia     Low back pain     Malignant melanoma (H)     Mild aortic insufficiency     Moderate tricuspid regurgitation     Postnasal drip     Sleep apnea, obstructive     CPAP    Spinal stenosis     lumbar    SSS (sick sinus syndrome) (H) 12/09/2019    Umbilical hernia     Unspecified cataract     Created by Conversion        PAST SURGICAL HISTORY    PAST SURGICAL HISTORY:   Past Surgical History:   Procedure Laterality Date    ARTERIAL BYPASS SURGERY      BLEPHAROPLASTY Bilateral     BYPASS GRAFT ARTERY CORONARY  06/28/2013    5-vessel    BYPASS GRAFT ARTERY CORONARY  2013    5 vessel    CARDIAC CATHETERIZATION  06/03/2013    CATARACT EXTRACTION Bilateral     COLON SURGERY Right     HERNIA REPAIR      right inguinal    HERNIORRHAPHY INGUINAL Left 2/14/2022    Procedure: INCARCERATED LEFT INGUINAL HERNIA REPAIR WITH MESH;  Surgeon: Jack Naqvi MD;  Location: [facility] [place] OR    HERNIORRHAPHY INGUINAL Right 10/4/2024    Procedure: REPAIR OF RECURRENT RIGHT INGUINAL HERNIA WITH MESH;  Surgeon: Saundra Mario MD;  Location: [facility] [place] OR    STRIP VEIN Left        FAMILY  HISTORY    FAMILY HISTORY:   Family History   Problem Relation Age of Onset    Coronary Artery Disease Mother     Colon Cancer Father     Coronary Artery Disease Father        SOCIAL HISTORY    SOCIAL HISTORY:   Social History     Tobacco Use    Smoking status: Never     Passive exposure: Past    Smokeless  tobacco: Never   Substance Use Topics    Alcohol use: Yes     Comment: Alcoholic Drinks/day: 5 drinks weekly        PHYSICAL EXAM    HEAD: Normal appearance and symmetry:  No cutaneous lesions.      NECK:  supple     EARS:  Auricles normal without lesions    EYES:  EOMI    CN VII/XII:  intact     NOSE:  Septum: midline;  scant crusting right side with bloody crusting left side            ORAL CAVITY/OROPHARYNX:     Lips:  Normal.  Tongue: normal, midline  Mucosa:   no lesions     NECK:  Trachea:  midline.        NEURO:   Alert and Oriented        RESPIRATORY:   Symmetry and Respiratory effort     PSYCH:  Normal mood and affect     SKIN:   warm and dry         IMPRESSION:    Encounter Diagnoses   Name Primary?    Chronic rhinitis Yes    Nasal crusting           RECOMMENDATIONS:    Orders Placed This Encounter   Medications    azelastine (ASTELIN) 0.1 % nasal spray     Si sprays each nostril 1-2x daily as needed for nasal congestion (use nightly for first 2 week)     Dispense:  30 mL     Refill:  11    mupirocin (BACTROBAN) 2 % external ointment     Sig: Apply a pea sized amount to inside of each nostril 3x daily with Qtip for 10 days     Dispense:  22 g     Refill:  0      Return visit 6 months with hearing evaluation.

## 2024-12-11 NOTE — LETTER
12/11/2024      Antoni Stoll  [address] [id number]      Dear Colleague,    Thank you for referring your patient, Antoni Stoll, to the [facility]. Please see a copy of my visit note below.        ENT FOLLOW UP VISIT NOTE    Patient presents with:  RECHECK: Chronic rhinitis and dysfunction of both eustachian tubes. Refill Astelin nasal spray. Still having nasal congestion, runny nose and occasional sneezing. Will get a bloody nose sometimes when blows nose hard. Has been using Flonase nightly.        HISTORY OF PRESENT ILLNESS    Wily was seen in follow up for         ALLERGIES    Patient has no known allergies.    CURRENT MEDICATIONS      Current Outpatient Medications:      acetaminophen (TYLENOL) 500 MG tablet, Take 1,000 mg by mouth every 8 hours as needed., Disp: , Rfl:      aspirin 325 MG tablet, [ASPIRIN 325 MG TABLET] Take 325 mg by mouth daily., Disp: , Rfl:      atorvastatin (LIPITOR) 20 MG tablet, TAKE 1 TABLET(20 MG) BY MOUTH DAILY, Disp: 90 tablet, Rfl: 2     azelastine (ASTELIN) 0.1 % nasal spray, 2 sprays each nostril 1-2x daily as needed for nasal congestion (use nightly for first 2 week), Disp: 30 mL, Rfl: 11     calcium polycarbophil (FIBERCON) 625 mg tablet, Take 2 tablets by mouth daily., Disp: , Rfl:      cholecalciferol, vitamin D3, 1,000 unit tablet, Take 1 tablet by mouth daily., Disp: , Rfl:      glucosamine-chondroitin 500-400 mg cap, [GLUCOSAMINE-CHONDROITIN 500-400 MG CAP] Take 1 capsule by mouth 2 (two) times a day., Disp: , Rfl:      latanoprost (XALATAN) 0.005 % ophthalmic solution, Place 1 drop into both eyes At Bedtime, Disp: , Rfl:      lisinopril (ZESTRIL) 2.5 MG tablet, TAKE 1 TABLET BY MOUTH EVERY DAY, Disp: 90 tablet, Rfl: 3     loratadine 10 MG capsule, Take 10 mg by mouth daily, Disp: , Rfl:      Multiple Vitamins-Minerals (PRESERVISION AREDS 2) CHEW, Take 1 chew tab by mouth daily., Disp: , Rfl:      mupirocin (BACTROBAN) 2 %  external ointment, Apply a pea sized amount to inside of each nostril 3x daily with Qtip for 10 days, Disp: 22 g, Rfl: 0     pregabalin (LYRICA) 25 MG capsule, TAKE 1 CAPSULE BY MOUTH TWICE DAILY, Disp: 60 capsule, Rfl: 2     saw palmetto fruit 450 mg cap, [SAW PALMETTO FRUIT 450 MG CAP] Take 1 capsule by mouth 2 (two) times a day., Disp: , Rfl:      tamsulosin (FLOMAX) 0.4 mg Cp24, [TAMSULOSIN (FLOMAX) 0.4 MG CP24] Take 0.4 mg by mouth at bedtime., Disp: , Rfl:      PAST MEDICAL HISTORY    PAST MEDICAL HISTORY:   Past Medical History:   Diagnosis Date     Anemia      Basal cell carcinoma      Cancer (H)     colon     Carotid artery occlusion      Coronary artery disease      Glaucoma      High cholesterol      History of colon cancer 08/13/2018     Hyperlipidemia      Hypertension      Left inguinal hernia      Low back pain      Malignant melanoma (H)      Mild aortic insufficiency      Moderate tricuspid regurgitation      Postnasal drip      Sleep apnea, obstructive     CPAP     Spinal stenosis     lumbar     SSS (sick sinus syndrome) (H) 12/09/2019     Umbilical hernia      Unspecified cataract     Created by Conversion        PAST SURGICAL HISTORY    PAST SURGICAL HISTORY:   Past Surgical History:   Procedure Laterality Date     ARTERIAL BYPASS SURGERY       BLEPHAROPLASTY Bilateral      BYPASS GRAFT ARTERY CORONARY  06/28/2013    5-vessel     BYPASS GRAFT ARTERY CORONARY  2013    5 vessel     CARDIAC CATHETERIZATION  06/03/2013     CATARACT EXTRACTION Bilateral      COLON SURGERY Right      HERNIA REPAIR      right inguinal     HERNIORRHAPHY INGUINAL Left 2/14/2022    Procedure: INCARCERATED LEFT INGUINAL HERNIA REPAIR WITH MESH;  Surgeon: Jack Naqvi MD;  Location: [facility] [place] OR     HERNIORRHAPHY INGUINAL Right 10/4/2024    Procedure: REPAIR OF RECURRENT RIGHT INGUINAL HERNIA WITH MESH;  Surgeon: Saundra Mario MD;  Location: [facility] [place] OR     STRIP VEIN Left        FAMILY  HISTORY    FAMILY  HISTORY:   Family History   Problem Relation Age of Onset     Coronary Artery Disease Mother      Colon Cancer Father      Coronary Artery Disease Father        SOCIAL HISTORY    SOCIAL HISTORY:   Social History     Tobacco Use     Smoking status: Never     Passive exposure: Past     Smokeless tobacco: Never   Substance Use Topics     Alcohol use: Yes     Comment: Alcoholic Drinks/day: 5 drinks weekly        PHYSICAL EXAM    HEAD: Normal appearance and symmetry:  No cutaneous lesions.      NECK:  supple     EARS:  Auricles normal without lesions    EYES:  EOMI    CN VII/XII:  intact     NOSE:  Septum: midline;  scant crusting right side with bloody crusting left side            ORAL CAVITY/OROPHARYNX:     Lips:  Normal.  Tongue: normal, midline  Mucosa:   no lesions     NECK:  Trachea:  midline.        NEURO:   Alert and Oriented        RESPIRATORY:   Symmetry and Respiratory effort     PSYCH:  Normal mood and affect     SKIN:   warm and dry         IMPRESSION:    Encounter Diagnoses   Name Primary?     Chronic rhinitis Yes     Nasal crusting           RECOMMENDATIONS:    Orders Placed This Encounter   Medications     azelastine (ASTELIN) 0.1 % nasal spray     Si sprays each nostril 1-2x daily as needed for nasal congestion (use nightly for first 2 week)     Dispense:  30 mL     Refill:  11     mupirocin (BACTROBAN) 2 % external ointment     Sig: Apply a pea sized amount to inside of each nostril 3x daily with Qtip for 10 days     Dispense:  22 g     Refill:  0      Return visit 6 months with hearing evaluation.       Again, thank you for allowing me to participate in the care of your patient.        Sincerely,        Ez Portillo MD

## 2024-12-30 NOTE — PROGRESS NOTES
Assessment/Plan:      Wily was seen today for follow up.    Diagnoses and all orders for this visit:    Spinal stenosis of lumbar region with neurogenic claudication  -     Physical Therapy  Referral; Future    Spondylolisthesis of lumbar region  -     Physical Therapy  Referral; Future    Scoliosis of thoracolumbar spine, unspecified scoliosis type  -     Physical Therapy  Referral; Future    Paresthesia of foot, bilateral  -     Physical Therapy  Referral; Future    Osteoarthritis of right glenohumeral joint  -     Physical Therapy  Referral; Future         Assessment: Pleasant 89 year old male with history of colon cancer, hyperlipidemia, hypertension, carotid artery occlusion, coronary artery disease with:        1.  Persistent  Chronic lumbar spine pain with bilateral lower extremity claudication symptoms.    He has right greater than left lower extremity pain and paresthesias related to severe spinal stenosis L4-5 and slightly less at L3-4  with L4-5 spondylolisthesis.  There is tortuosity of the nerve roots.  Pain persist despite physical therapy, medications and lumbar epidurals. bilateral L5-S1 transforaminal epidural steroid injection did offer some relief but only 40 to 50% and that benefit slowly diminished over 1 month.  Ongoing numbness and tingling in his right foot.  He was seen by Dr. Kenny at [facility] orthopedics and is seeing Dr Torres.  He would be a surgical candidate but holding off as long as possible.   No weakness of the lower extremities.   Continues with lower extremity paresthesias up to the knees can only tolerate maybe 20 minutes of standing.  He does have chronic L5-S1 radicular findings on recent EMG and neurology.   Had been tolerating Lyrica 25 mg twice daily without significant side effects.  Has been now off of Lyrica couple of months with no change in symptoms.  Last MRI was from 2022.        2.  Bilateral foot paresthesias  consistent with peripheral polyneuropathy may have a contribution from spinal stenosis.     3.  Persistent chronic right shoulder pain.   complete tear of the rotator cuff supraspinatus with retraction of the glenohumeral joint effusion in the past which responded well to glenohumeral joint injection. No improvement with the last injection on September 7, 2022.    Did not have right shoulder block.         Discussion:    1.  We discussed the diagnosis and treatment options for the lumbar spine.  We discussed resuming Lyrica/pregabalin for the lower extremity symptoms of claudication symptoms, further lumbar injections, physical therapy or further imaging.  We also discussed options for the right shoulder.    2.  Start physical therapy for the right shoulder as well as lumbar spine pelvic tilt nerve glides and myofascial release.  He would like to try myofascial release and manual therapy.    3.  Can consider L5-S1 interlaminar epidural steroid injection would like him to try physical therapy initially and he currently has an upper respiratory infection will need to recover from that as well and he agrees.  He is on aspirin and will talk to his cardiologist about stopping aspirin for 1 week prior to interlaminar epidural.  Has had transforaminal epidural steroid injections with some temporary benefit a few years ago.    4.  We discussed options of right shoulder block for potential cool RF and he would like to hold off for now and try physical therapy.    5.  Remain off of Lyrica given that it was not that helpful for him in retrospect.    6.  Continue Tylenol 1000 mg 3 times daily as needed for pain.    7.  Follow-up 3 months.       It was our pleasure caring for your patient today, if there any questions or concerns please do not hesitate to contact us.      Subjective:   Patient ID: Antoni Stoll is a 89 year old male.    History of Present Illness: Patient presents for follow-up of lumbar spine pain bilateral  lower extremity paresthesias and right shoulder pain.  Low back pain lumbosacral junction bilateral gluteal region with numbness and tingling the feet up to the mid calves right greater than left.  Worse with standing and walking better with sitting.  Pain is 7/10 at worst 3/10 today 2/10 at best.  Was on pregabalin and tolerated that well with no side effects but stopped a few months ago and retrospect has not had any improvement or increase in symptoms after stopping the medication any questions its efficacy for his issue.  He has had injections in the past with minimal benefit no physical therapy for the past few years.  Tried acupuncture which was not helpful.  His pain is 7/10 at worst 3/10 today 2/10 at best.    Also continues to have right shoulder pain worse with big movements of the right shoulder lifting or raising his arm.  Did not have the right shoulder block after last visit as he went on a trip and really has been tolerating his symptoms in general with Tylenol though he has taken Tylenol once and occasionally twice a day for his back and right shoulder he would like his pain to have some improvement if possible but is relatively tolerable.  Has a current URI and taking NyQuil or DayQuil.    Patient recently discharged on October 6, 2024 for [facility] for small bowel obstruction related to right inguinal hernia.    Imaging: MRI of the thoracic spine from November 2023 was reviewed along with CT abdomen pelvis from October 2024 to evaluate the lumbar spine.  Thoracic spine MRI reveals scoliotic curve without high-grade central stenosis.  Foraminal stenosis greatest bilaterally which is moderate at T10-11 T11-12 on the left.    CT abdomen and pelvis shows scoliosis of the lumbar spine with L4 and L5 spondylolisthesis.  There is also at least moderate bilateral foraminal stenosis at L4-5 with severe facet arthropathy.  Hips show relatively mild osteoarthritis.    MRI lumbar spine 2022 personally  reviewed showing lumbar scoliotic curve severe lower lumbar facet arthropathy with severe spinal stenosis L3-4 and L4-5 with spondylolisthesis L4 and L5.  Severe thecal sac compression.    Review of Systems: Pertinent positives: Paresthesias weakness in the legs.  Pertinent negatives:  No bowel or bladder incontinence.  No urinary retention.  No fevers, unintentional weight loss, balance changes, headaches, frequent falling, difficulty swallowing.             Current Outpatient Medications   Medication Sig Dispense Refill    acetaminophen (TYLENOL) 500 MG tablet Take 1,000 mg by mouth every 8 hours as needed.      aspirin 325 MG tablet [ASPIRIN 325 MG TABLET] Take 325 mg by mouth daily.      atorvastatin (LIPITOR) 20 MG tablet TAKE 1 TABLET(20 MG) BY MOUTH DAILY 90 tablet 2    azelastine (ASTELIN) 0.1 % nasal spray 2 sprays each nostril 1-2x daily as needed for nasal congestion (use nightly for first 2 week) 30 mL 11    calcium polycarbophil (FIBERCON) 625 mg tablet Take 2 tablets by mouth daily.      cholecalciferol, vitamin D3, 1,000 unit tablet Take 1 tablet by mouth daily.      glucosamine-chondroitin 500-400 mg cap [GLUCOSAMINE-CHONDROITIN 500-400 MG CAP] Take 1 capsule by mouth 2 (two) times a day.      latanoprost (XALATAN) 0.005 % ophthalmic solution Place 1 drop into both eyes At Bedtime      lisinopril (ZESTRIL) 2.5 MG tablet TAKE 1 TABLET BY MOUTH EVERY DAY 90 tablet 3    loratadine 10 MG capsule Take 10 mg by mouth daily      Multiple Vitamins-Minerals (PRESERVISION AREDS 2) CHEW Take 1 chew tab by mouth daily.      mupirocin (BACTROBAN) 2 % external ointment Apply a pea sized amount to inside of each nostril 3x daily with Qtip for 10 days 22 g 0    pregabalin (LYRICA) 25 MG capsule TAKE 1 CAPSULE BY MOUTH TWICE DAILY 60 capsule 2    saw palmetto fruit 450 mg cap [SAW PALMETTO FRUIT 450 MG CAP] Take 1 capsule by mouth 2 (two) times a day.      tamsulosin (FLOMAX) 0.4 mg Cp24 [TAMSULOSIN (FLOMAX) 0.4 MG  "CP24] Take 0.4 mg by mouth at bedtime.       No current facility-administered medications for this visit.       Past Medical History:   Diagnosis Date    Anemia     Basal cell carcinoma     Cancer (H)     colon    Carotid artery occlusion     Coronary artery disease     Glaucoma     High cholesterol     History of colon cancer 08/13/2018    Hyperlipidemia     Hypertension     Left inguinal hernia     Low back pain     Malignant melanoma (H)     Mild aortic insufficiency     Moderate tricuspid regurgitation     Postnasal drip     Sleep apnea, obstructive     CPAP    Spinal stenosis     lumbar    SSS (sick sinus syndrome) (H) 12/09/2019    Umbilical hernia     Unspecified cataract     Created by Conversion        The following portions of the patient's history were reviewed and updated as appropriate: allergies, current medications, past family history, past medical history, past social history, past surgical history and problem list.           Objective:   Physical Exam:    BP (!) 181/77   Pulse 70   Ht 5' 6\" (1.676 m)   Wt 138 lb (62.6 kg)   BMI 22.27 kg/m    There is no height or weight on file to calculate BMI.      General: Alert and oriented with normal affect. Attention, knowledge, memory, and language are intact. No acute distress.   Eyes: Sclerae are clear.  Respirations: Unlabored. CV: No lower extremity edema.  Skin: No rashes seen.       Decreased range of motion right shoulder internal/external rotation.  Sensation is intact to light touch throughout the lower extremities.       Manual muscle testing reveals:  Right /Left out of 5     5/5 hip flexors  5/5 knee flexors  5/5 knee extensors  5/5 ankle plantar flexors  5/5 ankle dorsiflexors  5/5   ankle evertors  "

## 2025-01-06 ENCOUNTER — OFFICE VISIT (OUTPATIENT)
Dept: PHYSICAL MEDICINE AND REHAB | Facility: CLINIC | Age: 89
End: 2025-01-06
Payer: MEDICARE

## 2025-01-06 VITALS
SYSTOLIC BLOOD PRESSURE: 181 MMHG | HEIGHT: 66 IN | WEIGHT: 138 LBS | BODY MASS INDEX: 22.18 KG/M2 | HEART RATE: 70 BPM | DIASTOLIC BLOOD PRESSURE: 77 MMHG

## 2025-01-06 DIAGNOSIS — M41.9 SCOLIOSIS OF THORACOLUMBAR SPINE, UNSPECIFIED SCOLIOSIS TYPE: ICD-10-CM

## 2025-01-06 DIAGNOSIS — M43.16 SPONDYLOLISTHESIS OF LUMBAR REGION: ICD-10-CM

## 2025-01-06 DIAGNOSIS — R20.2 PARESTHESIA OF FOOT, BILATERAL: ICD-10-CM

## 2025-01-06 DIAGNOSIS — M48.062 SPINAL STENOSIS OF LUMBAR REGION WITH NEUROGENIC CLAUDICATION: Primary | ICD-10-CM

## 2025-01-06 DIAGNOSIS — M19.011 OSTEOARTHRITIS OF RIGHT GLENOHUMERAL JOINT: ICD-10-CM

## 2025-01-06 PROCEDURE — 99214 OFFICE O/P EST MOD 30 MIN: CPT | Performed by: PHYSICAL MEDICINE & REHABILITATION

## 2025-01-06 ASSESSMENT — PAIN SCALES - GENERAL: PAINLEVEL_OUTOF10: MILD PAIN (3)

## 2025-01-06 NOTE — LETTER
1/6/2025      Antoni Stoll  [address]  [place] [address]      Dear Colleague,    Thank you for referring your patient, Antoni Stoll, to the [facility]. Please see a copy of my visit note below.    Assessment/Plan:      Wily was seen today for follow up.    Diagnoses and all orders for this visit:    Spinal stenosis of lumbar region with neurogenic claudication  -     Physical Therapy  Referral; Future    Spondylolisthesis of lumbar region  -     Physical Therapy  Referral; Future    Scoliosis of thoracolumbar spine, unspecified scoliosis type  -     Physical Therapy  Referral; Future    Paresthesia of foot, bilateral  -     Physical Therapy  Referral; Future    Osteoarthritis of right glenohumeral joint  -     Physical Therapy  Referral; Future         Assessment: Pleasant 89 year old male with history of colon cancer, hyperlipidemia, hypertension, carotid artery occlusion, coronary artery disease with:        1.  Persistent  Chronic lumbar spine pain with bilateral lower extremity claudication symptoms.    He has right greater than left lower extremity pain and paresthesias related to severe spinal stenosis L4-5 and slightly less at L3-4  with L4-5 spondylolisthesis.  There is tortuosity of the nerve roots.  Pain persist despite physical therapy, medications and lumbar epidurals. bilateral L5-S1 transforaminal epidural steroid injection did offer some relief but only 40 to 50% and that benefit slowly diminished over 1 month.  Ongoing numbness and tingling in his right foot.  He was seen by Dr. Kenny at [facility] orthopedics and is seeing Dr Torres.  He would be a surgical candidate but holding off as long as possible.   No weakness of the lower extremities.   Continues with lower extremity paresthesias up to the knees can only tolerate maybe 20 minutes of standing.  He does have chronic L5-S1 radicular findings on recent  EMG and neurology.   Had been tolerating Lyrica 25 mg twice daily without significant side effects.  Has been now off of Lyrica couple of months with no change in symptoms.  Last MRI was from 2022.        2.  Bilateral foot paresthesias consistent with peripheral polyneuropathy may have a contribution from spinal stenosis.     3.  Persistent chronic right shoulder pain.   complete tear of the rotator cuff supraspinatus with retraction of the glenohumeral joint effusion in the past which responded well to glenohumeral joint injection. No improvement with the last injection on September 7, 2022.    Did not have right shoulder block.         Discussion:    1.  We discussed the diagnosis and treatment options for the lumbar spine.  We discussed resuming Lyrica/pregabalin for the lower extremity symptoms of claudication symptoms, further lumbar injections, physical therapy or further imaging.  We also discussed options for the right shoulder.    2.  Start physical therapy for the right shoulder as well as lumbar spine pelvic tilt nerve glides and myofascial release.  He would like to try myofascial release and manual therapy.    3.  Can consider L5-S1 interlaminar epidural steroid injection would like him to try physical therapy initially and he currently has an upper respiratory infection will need to recover from that as well and he agrees.  He is on aspirin and will talk to his cardiologist about stopping aspirin for 1 week prior to interlaminar epidural.  Has had transforaminal epidural steroid injections with some temporary benefit a few years ago.    4.  We discussed options of right shoulder block for potential cool RF and he would like to hold off for now and try physical therapy.    5.  Remain off of Lyrica given that it was not that helpful for him in retrospect.    6.  Continue Tylenol 1000 mg 3 times daily as needed for pain.    7.  Follow-up 3 months.       It was our pleasure caring for your patient today,  if there any questions or concerns please do not hesitate to contact us.      Subjective:   Patient ID: Antoni Stoll is a 89 year old male.    History of Present Illness: Patient presents for follow-up of lumbar spine pain bilateral lower extremity paresthesias and right shoulder pain.  Low back pain lumbosacral junction bilateral gluteal region with numbness and tingling the feet up to the mid calves right greater than left.  Worse with standing and walking better with sitting.  Pain is 7/10 at worst 3/10 today 2/10 at best.  Was on pregabalin and tolerated that well with no side effects but stopped a few months ago and retrospect has not had any improvement or increase in symptoms after stopping the medication any questions its efficacy for his issue.  He has had injections in the past with minimal benefit no physical therapy for the past few years.  Tried acupuncture which was not helpful.  His pain is 7/10 at worst 3/10 today 2/10 at best.    Also continues to have right shoulder pain worse with big movements of the right shoulder lifting or raising his arm.  Did not have the right shoulder block after last visit as he went on a trip and really has been tolerating his symptoms in general with Tylenol though he has taken Tylenol once and occasionally twice a day for his back and right shoulder he would like his pain to have some improvement if possible but is relatively tolerable.  Has a current URI and taking NyQuil or DayQuil.    Patient recently discharged on October 6, 2024 for [facility] for small bowel obstruction related to right inguinal hernia.    Imaging: MRI of the thoracic spine from November 2023 was reviewed along with CT abdomen pelvis from October 2024 to evaluate the lumbar spine.  Thoracic spine MRI reveals scoliotic curve without high-grade central stenosis.  Foraminal stenosis greatest bilaterally which is moderate at T10-11 T11-12 on the left.    CT abdomen and pelvis shows scoliosis  of the lumbar spine with L4 and L5 spondylolisthesis.  There is also at least moderate bilateral foraminal stenosis at L4-5 with severe facet arthropathy.  Hips show relatively mild osteoarthritis.    MRI lumbar spine 2022 personally reviewed showing lumbar scoliotic curve severe lower lumbar facet arthropathy with severe spinal stenosis L3-4 and L4-5 with spondylolisthesis L4 and L5.  Severe thecal sac compression.    Review of Systems: Pertinent positives: Paresthesias weakness in the legs.  Pertinent negatives:  No bowel or bladder incontinence.  No urinary retention.  No fevers, unintentional weight loss, balance changes, headaches, frequent falling, difficulty swallowing.             Current Outpatient Medications   Medication Sig Dispense Refill     acetaminophen (TYLENOL) 500 MG tablet Take 1,000 mg by mouth every 8 hours as needed.       aspirin 325 MG tablet [ASPIRIN 325 MG TABLET] Take 325 mg by mouth daily.       atorvastatin (LIPITOR) 20 MG tablet TAKE 1 TABLET(20 MG) BY MOUTH DAILY 90 tablet 2     azelastine (ASTELIN) 0.1 % nasal spray 2 sprays each nostril 1-2x daily as needed for nasal congestion (use nightly for first 2 week) 30 mL 11     calcium polycarbophil (FIBERCON) 625 mg tablet Take 2 tablets by mouth daily.       cholecalciferol, vitamin D3, 1,000 unit tablet Take 1 tablet by mouth daily.       glucosamine-chondroitin 500-400 mg cap [GLUCOSAMINE-CHONDROITIN 500-400 MG CAP] Take 1 capsule by mouth 2 (two) times a day.       latanoprost (XALATAN) 0.005 % ophthalmic solution Place 1 drop into both eyes At Bedtime       lisinopril (ZESTRIL) 2.5 MG tablet TAKE 1 TABLET BY MOUTH EVERY DAY 90 tablet 3     loratadine 10 MG capsule Take 10 mg by mouth daily       Multiple Vitamins-Minerals (PRESERVISION AREDS 2) CHEW Take 1 chew tab by mouth daily.       mupirocin (BACTROBAN) 2 % external ointment Apply a pea sized amount to inside of each nostril 3x daily with Qtip for 10 days 22 g 0     pregabalin  "(LYRICA) 25 MG capsule TAKE 1 CAPSULE BY MOUTH TWICE DAILY 60 capsule 2     saw palmetto fruit 450 mg cap [SAW PALMETTO FRUIT 450 MG CAP] Take 1 capsule by mouth 2 (two) times a day.       tamsulosin (FLOMAX) 0.4 mg Cp24 [TAMSULOSIN (FLOMAX) 0.4 MG CP24] Take 0.4 mg by mouth at bedtime.       No current facility-administered medications for this visit.       Past Medical History:   Diagnosis Date     Anemia      Basal cell carcinoma      Cancer (H)     colon     Carotid artery occlusion      Coronary artery disease      Glaucoma      High cholesterol      History of colon cancer 08/13/2018     Hyperlipidemia      Hypertension      Left inguinal hernia      Low back pain      Malignant melanoma (H)      Mild aortic insufficiency      Moderate tricuspid regurgitation      Postnasal drip      Sleep apnea, obstructive     CPAP     Spinal stenosis     lumbar     SSS (sick sinus syndrome) (H) 12/09/2019     Umbilical hernia      Unspecified cataract     Created by Conversion        The following portions of the patient's history were reviewed and updated as appropriate: allergies, current medications, past family history, past medical history, past social history, past surgical history and problem list.           Objective:   Physical Exam:    BP (!) 181/77   Pulse 70   Ht 5' 6\" (1.676 m)   Wt 138 lb (62.6 kg)   BMI 22.27 kg/m    There is no height or weight on file to calculate BMI.      General: Alert and oriented with normal affect. Attention, knowledge, memory, and language are intact. No acute distress.   Eyes: Sclerae are clear.  Respirations: Unlabored. CV: No lower extremity edema.  Skin: No rashes seen.       Decreased range of motion right shoulder internal/external rotation.  Sensation is intact to light touch throughout the lower extremities.       Manual muscle testing reveals:  Right /Left out of 5     5/5 hip flexors  5/5 knee flexors  5/5 knee extensors  5/5 ankle plantar flexors  5/5 ankle " dorsiflexors  5/5   ankle evertors      Again, thank you for allowing me to participate in the care of your patient.        Sincerely,        Orlin Mir, DO    Electronically signed

## 2025-01-06 NOTE — PATIENT INSTRUCTIONS
A physical therapy order was provided for you today.  You will be contacted by physical therapy.  If nobody contacts you within 3 to 5 days, please contact the clinic at [phone].  It will be very important for you to do your physical therapy exercises on a regular basis to decrease your pain and prevent future pain flares.   Talk to your doctor about if it is ok to stop Aspirin for 1 week if you decide to have an injection  Stop Lyrica  Take tylenol up to 1000mg three times daily as needed. 3000mg daily max.

## 2025-01-15 ENCOUNTER — OFFICE VISIT (OUTPATIENT)
Dept: PULMONOLOGY | Facility: CLINIC | Age: 89
End: 2025-01-15
Attending: INTERNAL MEDICINE
Payer: MEDICARE

## 2025-01-15 VITALS
BODY MASS INDEX: 22.27 KG/M2 | DIASTOLIC BLOOD PRESSURE: 76 MMHG | SYSTOLIC BLOOD PRESSURE: 134 MMHG | HEART RATE: 74 BPM | OXYGEN SATURATION: 97 % | WEIGHT: 138 LBS

## 2025-01-15 DIAGNOSIS — R06.09 DOE (DYSPNEA ON EXERTION): Primary | ICD-10-CM

## 2025-01-15 PROCEDURE — G0009 ADMIN PNEUMOCOCCAL VACCINE: HCPCS | Performed by: INTERNAL MEDICINE

## 2025-01-15 PROCEDURE — 99214 OFFICE O/P EST MOD 30 MIN: CPT | Mod: 25 | Performed by: INTERNAL MEDICINE

## 2025-01-15 PROCEDURE — 90677 PCV20 VACCINE IM: CPT | Mod: JZ | Performed by: INTERNAL MEDICINE

## 2025-01-15 NOTE — LETTER
1/15/2025      Antoni Stoll  [address]  [place] [address]      Dear Colleague,    Thank you for referring your patient, Antoni Stoll, to the [facility]. Please see a copy of my visit note below.    Pulmonary Clinic Follow-up Visit    Impression: 89M with hx of spinal stenosis, scoliosis, CAD s/p CABG, mild AI and MR, HLD, HTN, pulmonary hypertension (WHO group II, left-sided heart disease namely diastolic dysfunction), history of colon cancer s/p partial colectomy, here for follow up chronic dyspnea on exertion which has slightly worsened over the last year. His PFTs in 2023 were essentially normal; he did have very mild restriction based on the TLC z-score, likely from his scoliosis which is likely contributing to his mild VILLA, as well as his pulmonary hypertension. He also has some peripheral edema and with his extensive CAD history I would be concerned about possible cardiac etiology for his symptoms. He is overdue for follow up with cardiology. We will refer him to pulmonary rehab. His last echocardiogram did show a dilated LA, TR max velocity >280 cm/s, normal average E/e'; as well as mild pulmonary hypertension with estimated PASP 32mm Hg + RAP. Based on that echocardiogram he had grade II diastolic dysfunction with elevated left-sided filling pressures (this was incorrectly reported as grade 1 or early diastolic dysfunction on the echo report) and certainly this and his pulmonary artery pressures may have worsened over the last few years. Therefore we'll get an updated echocardiogram in anticipation of his visit with his cardiologist.      Recommendations:  - encouraged to continue to exercise and remain active as he's doing  - echocardiogram  - pulmonary rehab referral  - follow up with Dr. Guevara next month, as scheduled  - UTD with vaccinations. We'll give him PCV20 today. Should get RSV vaccine; defer to his PMD.    Follow up in 6 months.     The  longitudinal plan of care for the diagnosis(es)/condition(s) as documented were addressed during this visit. Due to the added complexity in care, I will continue to support Wily in the subsequent management and with ongoing continuity of care.     Landon Fung MD (Avi)  [facility] Pulmonary & Critical Care ([facility])  Send me a secure message using [email or web address]  Clinic [phone]  Fax [phone]     CCx: dyspnea on exertion     HPI: Interim history: Wily was last seen by Dr. Young 1/9/2024. Since that time, he reports he's doing about the same. His breathing is slightly worse. He has a little trouble with normal household tasks such as going up stairs, laundry, etc. He does participate in senior fitness activities in the community center. He can use the treadmill for 30 minutes. He doesn't really feel limited when he exercises.     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 He is unmarried and has no children. He travels to [place] and [place] twice a year to visit family.     ROS:  A 12-system review was obtained and was negative with the exception of the symptoms endorsed in the history of present illness.    PMH:  Past Medical History:   Diagnosis Date     Anemia      Basal cell carcinoma      Cancer (H)     colon     Carotid artery occlusion      Coronary artery disease      Glaucoma      High cholesterol      History of colon cancer 08/13/2018     Hyperlipidemia      Hypertension      Left inguinal hernia      Low back pain      Malignant melanoma (H)      Mild aortic insufficiency      Moderate tricuspid regurgitation      Postnasal drip      Sleep apnea, obstructive     CPAP     Spinal stenosis     lumbar     SSS (sick sinus syndrome) (H) 12/09/2019     Umbilical hernia      Unspecified cataract     Created by Conversion         PSH:  Past Surgical History:   Procedure Laterality Date     ARTERIAL BYPASS SURGERY       BLEPHAROPLASTY  Bilateral      BYPASS GRAFT ARTERY CORONARY  06/28/2013    5-vessel     BYPASS GRAFT ARTERY CORONARY  2013    5 vessel     CARDIAC CATHETERIZATION  06/03/2013     CATARACT EXTRACTION Bilateral      COLON SURGERY Right      HERNIA REPAIR      right inguinal     HERNIORRHAPHY INGUINAL Left 2/14/2022    Procedure: INCARCERATED LEFT INGUINAL HERNIA REPAIR WITH MESH;  Surgeon: Jack Naqvi MD;  Location: [facility]     HERNIORRHAPHY INGUINAL Right 10/4/2024    Procedure: REPAIR OF RECURRENT RIGHT INGUINAL HERNIA WITH MESH;  Surgeon: Saundra Mario MD;  Location: [facility]     STRIP VEIN Left        Allergies:  No Known Allergies    Family HX:  Family History   Problem Relation Age of Onset     Coronary Artery Disease Mother      Colon Cancer Father      Coronary Artery Disease Father        Social Hx:  Social History     Socioeconomic History     Marital status: Single     Spouse name: Not on file     Number of children: Not on file     Years of education: Not on file     Highest education level: Not on file   Occupational History     Not on file   Tobacco Use     Smoking status: Never     Passive exposure: Past     Smokeless tobacco: Never   Vaping Use     Vaping status: Never Used   Substance and Sexual Activity     Alcohol use: Yes     Comment: Alcoholic Drinks/day: 5 drinks weekly     Drug use: No     Sexual activity: Not on file   Other Topics Concern     Parent/sibling w/ CABG, MI or angioplasty before 65F 55M? Not Asked   Social History Narrative     Not on file     Social Drivers of Health     Financial Resource Strain: Low Risk  (1/25/2024)    Financial Resource Strain      Within the past 12 months, have you or your family members you live with been unable to get utilities (heat, electricity) when it was really needed?: No   Food Insecurity: Low Risk  (1/25/2024)    Food Insecurity      Within the past 12 months, did you worry that your food would run out before you got money to buy more?: No       Within the past 12 months, did the food you bought just not last and you didn t have money to get more?: No   Transportation Needs: Low Risk  (1/25/2024)    Transportation Needs      Within the past 12 months, has lack of transportation kept you from medical appointments, getting your medicines, non-medical meetings or appointments, work, or from getting things that you need?: No   Physical Activity: Not on file   Stress: Not on file   Social Connections: Not on file   Interpersonal Safety: Low Risk  (7/29/2024)    Interpersonal Safety      Do you feel physically and emotionally safe where you currently live?: Yes      Within the past 12 months, have you been hit, slapped, kicked or otherwise physically hurt by someone?: No      Within the past 12 months, have you been humiliated or emotionally abused in other ways by your partner or ex-partner?: No   Housing Stability: Low Risk  (1/25/2024)    Housing Stability      Do you have housing? : Yes      Are you worried about losing your housing?: No       Current Meds:  Current Outpatient Medications   Medication Sig Dispense Refill     acetaminophen (TYLENOL) 500 MG tablet Take 1,000 mg by mouth every 8 hours as needed.       aspirin 325 MG tablet [ASPIRIN 325 MG TABLET] Take 325 mg by mouth daily.       atorvastatin (LIPITOR) 20 MG tablet TAKE 1 TABLET(20 MG) BY MOUTH DAILY 90 tablet 2     azelastine (ASTELIN) 0.1 % nasal spray 2 sprays each nostril 1-2x daily as needed for nasal congestion (use nightly for first 2 week) 30 mL 11     calcium polycarbophil (FIBERCON) 625 mg tablet Take 2 tablets by mouth daily.       cholecalciferol, vitamin D3, 1,000 unit tablet Take 1 tablet by mouth daily.       glucosamine-chondroitin 500-400 mg cap [GLUCOSAMINE-CHONDROITIN 500-400 MG CAP] Take 1 capsule by mouth 2 (two) times a day.       latanoprost (XALATAN) 0.005 % ophthalmic solution Place 1 drop into both eyes At Bedtime       lisinopril (ZESTRIL) 2.5 MG tablet TAKE 1  TABLET BY MOUTH EVERY DAY 90 tablet 3     loratadine 10 MG capsule Take 10 mg by mouth daily       Multiple Vitamins-Minerals (PRESERVISION AREDS 2) CHEW Take 1 chew tab by mouth daily.       saw palmetto fruit 450 mg cap [SAW PALMETTO FRUIT 450 MG CAP] Take 1 capsule by mouth 2 (two) times a day.       tamsulosin (FLOMAX) 0.4 mg Cp24 [TAMSULOSIN (FLOMAX) 0.4 MG CP24] Take 0.4 mg by mouth at bedtime.       mupirocin (BACTROBAN) 2 % external ointment Apply a pea sized amount to inside of each nostril 3x daily with Qtip for 10 days 22 g 0     pregabalin (LYRICA) 25 MG capsule TAKE 1 CAPSULE BY MOUTH TWICE DAILY 60 capsule 2       Physical Exam:  /76   Pulse 74   Wt 62.6 kg (138 lb)   SpO2 97%   BMI 22.27 kg/m    Gen: awake, alert, oriented, no distress  HEENT: nasal turbinates are unremarkable, no oropharyngeal lesions, no cervical or supraclavicular lymphadenopathy  CV: RRR, no M/G/R  Resp: CTAB, no focal crackles or wheezes  Skin: no apparent rashes  Ext: no cyanosis, clubbing or edema  Neuro: alert, nonfocal    Labs:  reviewed    Imaging studies:  Personally reviewed    EXAM: XR CHEST 2 VIEWS  LOCATION: [facility]  DATE: 1/9/2024     INDICATION: dyspnea on exertion  COMPARISON: 05/27/2021                                                                      IMPRESSION: Sternotomy. Some minimal scattered fibrosis. Lungs otherwise clear. No acute findings.    Echo from 2023  Interpretation Summary     Left ventricular size, wall motion and function are normal. The ejection  fraction is 60-65%.  Normal right ventricle size and systolic function.  There is mild (1+) aortic regurgitation.  There is mild (1+) mitral regurgitation.     No previous study for comparison.    Pulmonary Function Testing  5/3/2023  FEV1 2.21L, 92%  FVC 2.9L 90%  Ratio 0.76  TLC 5.52L, 82%, mild restriction based on z-score.  Dlco 91% marbin for hgb    Again, thank you for allowing me to participate in the care of  your patient.        Sincerely,        Landon Fung MD    Electronically signed   Statement Selected

## 2025-01-15 NOTE — PROGRESS NOTES
Pulmonary Clinic Follow-up Visit    Impression: 89M with hx of spinal stenosis, scoliosis, CAD s/p CABG, mild AI and MR, HLD, HTN, pulmonary hypertension (WHO group II, left-sided heart disease namely diastolic dysfunction), history of colon cancer s/p partial colectomy, here for follow up chronic dyspnea on exertion which has slightly worsened over the last year. His PFTs in 2023 were essentially normal; he did have very mild restriction based on the TLC z-score, likely from his scoliosis which is likely contributing to his mild VILLA, as well as his pulmonary hypertension. He also has some peripheral edema and with his extensive CAD history I would be concerned about possible cardiac etiology for his symptoms. He is overdue for follow up with cardiology. We will refer him to pulmonary rehab. His last echocardiogram did show a dilated LA, TR max velocity >280 cm/s, normal average E/e'; as well as mild pulmonary hypertension with estimated PASP 32mm Hg + RAP. Based on that echocardiogram he had grade II diastolic dysfunction with elevated left-sided filling pressures (this was incorrectly reported as grade 1 or early diastolic dysfunction on the echo report) and certainly this and his pulmonary artery pressures may have worsened over the last few years. Therefore we'll get an updated echocardiogram in anticipation of his visit with his cardiologist.      Recommendations:  - encouraged to continue to exercise and remain active as he's doing  - echocardiogram  - pulmonary rehab referral  - follow up with Dr. Guevara next month, as scheduled  - UTD with vaccinations. We'll give him PCV20 today. Should get RSV vaccine; defer to his PMD.    Follow up in 6 months.     The longitudinal plan of care for the diagnosis(es)/condition(s) as documented were addressed during this visit. Due to the added complexity in care, I will continue to support Wily in the subsequent management and with ongoing continuity of care.     Landon  (Joel) MD [place]  [facility] Pulmonary & Critical Care ([facility])  Send me a secure message using [employer]  Clinic [phone]  Fax [phone]     CCx: dyspnea on exertion     HPI: Interim history: Wily was last seen by Dr. Young 1/9/2024. Since that time, he reports he's doing about the same. His breathing is slightly worse. He has a little trouble with normal household tasks such as going up stairs, laundry, etc. He does participate in senior fitness activities in the community center. He can use the treadmill for 30 minutes. He doesn't really feel limited when he exercises.     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 He is unmarried and has no children. He travels to [place] and [place] twice a year to visit family.     ROS:  A 12-system review was obtained and was negative with the exception of the symptoms endorsed in the history of present illness.    PMH:  Past Medical History:   Diagnosis Date    Anemia     Basal cell carcinoma     Cancer (H)     colon    Carotid artery occlusion     Coronary artery disease     Glaucoma     High cholesterol     History of colon cancer 08/13/2018    Hyperlipidemia     Hypertension     Left inguinal hernia     Low back pain     Malignant melanoma (H)     Mild aortic insufficiency     Moderate tricuspid regurgitation     Postnasal drip     Sleep apnea, obstructive     CPAP    Spinal stenosis     lumbar    SSS (sick sinus syndrome) (H) 12/09/2019    Umbilical hernia     Unspecified cataract     Created by Conversion         PSH:  Past Surgical History:   Procedure Laterality Date    ARTERIAL BYPASS SURGERY      BLEPHAROPLASTY Bilateral     BYPASS GRAFT ARTERY CORONARY  06/28/2013    5-vessel    BYPASS GRAFT ARTERY CORONARY  2013    5 vessel    CARDIAC CATHETERIZATION  06/03/2013    CATARACT EXTRACTION Bilateral     COLON SURGERY Right     HERNIA REPAIR      right inguinal    HERNIORRHAPHY INGUINAL Left  2/14/2022    Procedure: INCARCERATED LEFT INGUINAL HERNIA REPAIR WITH MESH;  Surgeon: Jack Naqvi MD;  Location: [facility]    HERNIORRHAPHY INGUINAL Right 10/4/2024    Procedure: REPAIR OF RECURRENT RIGHT INGUINAL HERNIA WITH MESH;  Surgeon: Saundra Mario MD;  Location: [facility]    STRIP VEIN Left        Allergies:  No Known Allergies    Family HX:  Family History   Problem Relation Age of Onset    Coronary Artery Disease Mother     Colon Cancer Father     Coronary Artery Disease Father        Social Hx:  Social History     Socioeconomic History    Marital status: Single     Spouse name: Not on file    Number of children: Not on file    Years of education: Not on file    Highest education level: Not on file   Occupational History    Not on file   Tobacco Use    Smoking status: Never     Passive exposure: Past    Smokeless tobacco: Never   Vaping Use    Vaping status: Never Used   Substance and Sexual Activity    Alcohol use: Yes     Comment: Alcoholic Drinks/day: 5 drinks weekly    Drug use: No    Sexual activity: Not on file   Other Topics Concern    Parent/sibling w/ CABG, MI or angioplasty before 65F 55M? Not Asked   Social History Narrative    Not on file     Social Drivers of Health     Financial Resource Strain: Low Risk  (1/25/2024)    Financial Resource Strain     Within the past 12 months, have you or your family members you live with been unable to get utilities (heat, electricity) when it was really needed?: No   Food Insecurity: Low Risk  (1/25/2024)    Food Insecurity     Within the past 12 months, did you worry that your food would run out before you got money to buy more?: No     Within the past 12 months, did the food you bought just not last and you didn t have money to get more?: No   Transportation Needs: Low Risk  (1/25/2024)    Transportation Needs     Within the past 12 months, has lack of transportation kept you from medical appointments, getting your medicines,  non-medical meetings or appointments, work, or from getting things that you need?: No   Physical Activity: Not on file   Stress: Not on file   Social Connections: Not on file   Interpersonal Safety: Low Risk  (7/29/2024)    Interpersonal Safety     Do you feel physically and emotionally safe where you currently live?: Yes     Within the past 12 months, have you been hit, slapped, kicked or otherwise physically hurt by someone?: No     Within the past 12 months, have you been humiliated or emotionally abused in other ways by your partner or ex-partner?: No   Housing Stability: Low Risk  (1/25/2024)    Housing Stability     Do you have housing? : Yes     Are you worried about losing your housing?: No       Current Meds:  Current Outpatient Medications   Medication Sig Dispense Refill    acetaminophen (TYLENOL) 500 MG tablet Take 1,000 mg by mouth every 8 hours as needed.      aspirin 325 MG tablet [ASPIRIN 325 MG TABLET] Take 325 mg by mouth daily.      atorvastatin (LIPITOR) 20 MG tablet TAKE 1 TABLET(20 MG) BY MOUTH DAILY 90 tablet 2    azelastine (ASTELIN) 0.1 % nasal spray 2 sprays each nostril 1-2x daily as needed for nasal congestion (use nightly for first 2 week) 30 mL 11    calcium polycarbophil (FIBERCON) 625 mg tablet Take 2 tablets by mouth daily.      cholecalciferol, vitamin D3, 1,000 unit tablet Take 1 tablet by mouth daily.      glucosamine-chondroitin 500-400 mg cap [GLUCOSAMINE-CHONDROITIN 500-400 MG CAP] Take 1 capsule by mouth 2 (two) times a day.      latanoprost (XALATAN) 0.005 % ophthalmic solution Place 1 drop into both eyes At Bedtime      lisinopril (ZESTRIL) 2.5 MG tablet TAKE 1 TABLET BY MOUTH EVERY DAY 90 tablet 3    loratadine 10 MG capsule Take 10 mg by mouth daily      Multiple Vitamins-Minerals (PRESERVISION AREDS 2) CHEW Take 1 chew tab by mouth daily.      saw palmetto fruit 450 mg cap [SAW PALMETTO FRUIT 450 MG CAP] Take 1 capsule by mouth 2 (two) times a day.      tamsulosin  (FLOMAX) 0.4 mg Cp24 [TAMSULOSIN (FLOMAX) 0.4 MG CP24] Take 0.4 mg by mouth at bedtime.      mupirocin (BACTROBAN) 2 % external ointment Apply a pea sized amount to inside of each nostril 3x daily with Qtip for 10 days 22 g 0    pregabalin (LYRICA) 25 MG capsule TAKE 1 CAPSULE BY MOUTH TWICE DAILY 60 capsule 2       Physical Exam:  /76   Pulse 74   Wt 62.6 kg (138 lb)   SpO2 97%   BMI 22.27 kg/m    Gen: awake, alert, oriented, no distress  HEENT: nasal turbinates are unremarkable, no oropharyngeal lesions, no cervical or supraclavicular lymphadenopathy  CV: RRR, no M/G/R  Resp: CTAB, no focal crackles or wheezes  Skin: no apparent rashes  Ext: no cyanosis, clubbing or edema  Neuro: alert, nonfocal    Labs:  reviewed    Imaging studies:  Personally reviewed    EXAM: XR CHEST 2 VIEWS  LOCATION: [facility]  DATE: 1/9/2024     INDICATION: dyspnea on exertion  COMPARISON: 05/27/2021                                                                      IMPRESSION: Sternotomy. Some minimal scattered fibrosis. Lungs otherwise clear. No acute findings.    Echo from 2023  Interpretation Summary     Left ventricular size, wall motion and function are normal. The ejection  fraction is 60-65%.  Normal right ventricle size and systolic function.  There is mild (1+) aortic regurgitation.  There is mild (1+) mitral regurgitation.     No previous study for comparison.    Pulmonary Function Testing  5/3/2023  FEV1 2.21L, 92%  FVC 2.9L 90%  Ratio 0.76  TLC 5.52L, 82%, mild restriction based on z-score.  Dlco 91% marbin for hgb

## 2025-02-03 ENCOUNTER — HOSPITAL ENCOUNTER (OUTPATIENT)
Dept: CARDIAC REHAB | Facility: HOSPITAL | Age: 89
Discharge: HOME | End: 2025-02-03
Attending: INTERNAL MEDICINE
Payer: MEDICARE

## 2025-02-03 DIAGNOSIS — R06.09 DOE (DYSPNEA ON EXERTION): ICD-10-CM

## 2025-02-03 PROCEDURE — G0238 OTH RESP PROC, INDIV: HCPCS

## 2025-02-04 ENCOUNTER — HOSPITAL ENCOUNTER (OUTPATIENT)
Dept: CARDIAC REHAB | Facility: HOSPITAL | Age: 89
Discharge: HOME | End: 2025-02-04
Attending: FAMILY MEDICINE
Payer: MEDICARE

## 2025-02-04 PROCEDURE — G0238 OTH RESP PROC, INDIV: HCPCS

## 2025-02-04 NOTE — PROGRESS NOTES
PHYSICAL THERAPY EVALUATION  Type of Visit: Evaluation       Fall Risk Screen:  Fall screen completed by: PT  Have you fallen 2 or more times in the past year?: No  Have you fallen and had an injury in the past year?: No  Is patient a fall risk?: No    Subjective         Presenting condition or subjective complaint: back, neck and shoulder pain  Date of onset: 01/06/25    Relevant medical history: Arthritis; Bladder or bowel problems; Cancer; Hearing problems; High blood pressure; Numbness or tingling in perianal area; Pain at night or rest; Sleep disorder like apnea   Active Ambulatory Problems     Diagnosis Date Noted    Unspecified glaucoma     Inguinal hernia     Hypercholesterolemia     Essential hypertension     Coronary artery disease involving native coronary artery of native heart without angina pectoris     Sleep Apnea     Anemia 09/16/2017    BPH (benign prostatic hyperplasia) 09/16/2017    Spinal stenosis 12/07/2017    Dyspnea on exertion 01/17/2022    Melanoma in situ of scalp (H) 07/29/2024    Obstruction concurrent with and due to inguinal hernia of right side 10/03/2024    LOUIS (acute kidney injury) 10/04/2024     Resolved Ambulatory Problems     Diagnosis Date Noted    Colon cancer (H) 09/13/2017    History of colon cancer 08/13/2018    SSS (sick sinus syndrome) (H) 12/09/2019     Past Medical History:   Diagnosis Date    Basal cell carcinoma     Cancer (H)     Carotid artery occlusion     Coronary artery disease     Glaucoma     High cholesterol     Hyperlipidemia     Hypertension     Left inguinal hernia     Low back pain     Malignant melanoma (H)     Mild aortic insufficiency     Moderate tricuspid regurgitation     Postnasal drip     Sleep apnea, obstructive     Umbilical hernia     Unspecified cataract       Dates & types of surgery: see EMR  Past Surgical History:   Procedure Laterality Date    ARTERIAL BYPASS SURGERY      BLEPHAROPLASTY Bilateral     BYPASS GRAFT ARTERY CORONARY  06/28/2013     5-vessel    BYPASS GRAFT ARTERY CORONARY  2013    5 vessel    CARDIAC CATHETERIZATION  06/03/2013    CATARACT EXTRACTION Bilateral     COLON SURGERY Right     HERNIA REPAIR      right inguinal    HERNIORRHAPHY INGUINAL Left 2/14/2022    Procedure: INCARCERATED LEFT INGUINAL HERNIA REPAIR WITH MESH;  Surgeon: Jack Naqvi MD;  Location: [facility] OR    HERNIORRHAPHY INGUINAL Right 10/4/2024    Procedure: REPAIR OF RECURRENT RIGHT INGUINAL HERNIA WITH MESH;  Surgeon: Saundra Mario MD;  Location: [facility] OR    STRIP VEIN Left       Prior diagnostic imaging/testing results: MRI; CT scan; X-ray; EMG     Prior therapy history for the same diagnosis, illness or injury: Yes PT 5+ years ago, acupuncture 1 year ago    Prior Level of Function  Transfers: Independent  Ambulation: Independent  ADL: Independent      Living Environment  Social support: Alone   Type of home: [facility]; 2-story   Stairs to enter the home: Yes 2 Is there a railing: No     Ramp: No   Stairs inside the home: Yes 13 Is there a railing: Yes     Help at home: None  Equipment owned: Grab bars; Bath bench     Employment: No    Hobbies/Interests:      Patient goals for therapy: stand and walk without pain, reach and perform overhead activity without pain    Pain assessment: Pain present  See objective evaluation for additional pain details     Objective   LUMBAR SPINE EVALUATION  PAIN: Pain Level at Rest: 3/10  Pain Level with Use: 6/10  Pain Location: cervical spine, thoracic spine, and lumbar spine  Pain Quality: Aching, Sharp, and Tingling  Pain Frequency: intermittent or depending on activity   Pain is Exacerbated By: standing walking  Pain is Relieved By: rest and sitting      POSTURE: Standing Posture: Rounded shoulders, Forward head, Thoracic kyphosis increased, scoliosis, right thoracic rib hump    GAIT:   Weightbearing Status:  full  Assistive Device(s): None  Gait Deviations: Base of support increased  Stride length  decreased  Gris decreased    BALANCE/PROPRIOCEPTION: NT    ROM:   Lumbar ROM Left Right   Lumbar Side Bending Mod severe loss Mod severe loss   Lumbar Rotation mod mod   Lumbar Flexion Min-mod loss   Lumbar Extension Mod loss   Pain:   End feel:   STRENGTH (MYOTOMES):   */5 Left Right   Hip Flexion (L2) 4+ 4+   Hip Extension (L3)     Hip Abduction 4 4   Hip Adduction  4 4   Hip Internal Rotation     Hip External Rotation     Knee Flexion 5 5   Knee Extension 5 5   Dorsiflexion (L4) 5 5   Great Toe Extension (L5)     Plantarflexion (S1)       FLEXIBILITY: Decreased piriformis L, Decreased hip flexors L, Decreased hamstrings L, Decreased piriformis R, Decreased hip flexors R, Decreased hamstrings R         SHOULDER EVALUATION  PAIN: Pain Level at Rest: 0/10  Pain Level with Use: 6/10  Pain Location: right shoulder  Pain Quality: Aching and Sharp  Pain Frequency: intermittent  Pain is Exacerbated By: moving, reaching overhead  Pain is Relieved By: rest      ROM:   Shoulder and Elbow ROM ( )   AROM in degrees    Left Right   Shoulder Flexion (0-180 ) 160 160   Shoulder Abduction (0-180 ) 160 160   Shoulder Extension (0-60 )     Shoulder ER (0-90 ) 80 80   Shoulder IR (0-70 )     Shoulder IR/EXT     Elbow Flexion (150 )     Elbow Extension (0 )      PROM in degrees    Left Right   Shoulder Flexion (0-180 )     Shoulder Abduction (0-180 )     Shoulder Extension (0-60 )     Shoulder ER (0-90 )     Shoulder IR (0-70 )     Elbow Flexion (150 )     Elbow Extension (0 )         STRENGTH:   Left Right   Shoulder Flexion 5 4+   Shoulder Extension     Shoulder Abduction 5 4   Shoulder Adduction     Shoulder Internal Rotation 5 4+   Shoulder External Rotation 5 4   Elbow Flexion 5 5   Elbow Extension 5 5   Mid Trap     Lower Trap     Rhomboid     Serratus Anterior       FLEXIBILITY: Decreased scalenes R, Decreased upper trap R, Decreased levator R, Decreased pectoralis major R, Decreased pectoralis minor R        PALPATION:   increased myofascial tension noted in right: scalenes, upper trap, levator, pec major/zhanna,   JOINT MOBILITY:  mild GH hypomoblity.      Assessment & Plan   CLINICAL IMPRESSIONS  Medical Diagnosis: Spinal stenosis of lumbar region with neurogenic claudication  Spondylolisthesis of lumbar region  Scoliosis of thoracolumbar spine, unspecified scoliosis type  Paresthesia of foot, bilateral  Osteoarthritis of right glenohumeral joint    Treatment Diagnosis: Spinal stenosis of lumbar region with neurogenic claudication  Spondylolisthesis of lumbar region  Scoliosis of thoracolumbar spine, unspecified scoliosis type  Paresthesia of foot, bilateral  Osteoarthritis of right glenohumeral joint   Impression/Assessment: Patient is a 89 year old male with low back pain and shoulder pain complaints.  The following significant findings have been identified: Pain, Decreased ROM/flexibility, Decreased joint mobility, Decreased strength, Impaired gait, Impaired muscle performance, Decreased activity tolerance, and Impaired posture. These impairments interfere with their ability to perform self care tasks, recreational activities, household chores, household mobility, and community mobility as compared to previous level of function.     Clinical Decision Making (Complexity):  Clinical Presentation: Stable/Uncomplicated  Clinical Presentation Rationale: based on medical and personal factors listed in PT evaluation  Clinical Decision Making (Complexity): Low complexity    PLAN OF CARE  Treatment Interventions:  Interventions: Gait Training, Manual Therapy, Neuromuscular Re-education, Therapeutic Activity, Therapeutic Exercise, Self-Care/Home Management    Long Term Goals     PT Goal 1  Goal Identifier: self management  Goal Description: Patient will be independent with HEP and self management of symptoms to facilitate return to PLOF  Rationale: to maximize safety and independence within the home;to maximize safety and independence with  performance of ADLs and functional tasks  Goal Progress: initial  Target Date: 04/06/25  PT Goal 2  Goal Identifier: walking  Goal Description: Patient will return to walking one mile for community distance and exercise with pain 2/10 or less  Rationale: to maximize safety and independence with performance of ADLs and functional tasks;to maximize safety and independence within the community  Goal Progress: initial  Target Date: 04/06/25  PT Goal 3  Goal Identifier: standing  Goal Description: Patient will stand 30 minutes for household chores with pain 2/10 or less  Rationale: to maximize safety and independence with performance of ADLs and functional tasks  Goal Progress: initial  Target Date: 04/06/25  PT Goal 4  Goal Identifier: reaching  Goal Description: Patient will use right arm for overhead household tasks with pain 2/10 or less  Rationale: to maximize safety and independence with performance of ADLs and functional tasks  Goal Progress: initial  Target Date: 04/06/25      Frequency of Treatment: 1x/week  Duration of Treatment: 60 days    Recommended Referrals to Other Professionals: not at this time  Education Assessment:   Learner/Method: Patient;No Barriers to Learning    Risks and benefits of evaluation/treatment have been explained.   Patient/Family/caregiver agrees with Plan of Care.     Evaluation Time:     PT Eval, Low Complexity Minutes (63814): 30       Signing Clinician: Jb Hernandes, PT        [facility]                                                                                   OUTPATIENT PHYSICAL THERAPY      PLAN OF TREATMENT FOR OUTPATIENT REHABILITATION   Patient's Last Name, First Name, Antoni Barber YOB: 1935   Provider's Name   [facility]   Medical Record No.  [id number]     Onset Date: 01/06/25  Start of Care Date: 02/05/25     Medical Diagnosis:  Spinal stenosis of lumbar region with  neurogenic claudication  Spondylolisthesis of lumbar region  Scoliosis of thoracolumbar spine, unspecified scoliosis type  Paresthesia of foot, bilateral  Osteoarthritis of right glenohumeral joint      PT Treatment Diagnosis:  Spinal stenosis of lumbar region with neurogenic claudication  Spondylolisthesis of lumbar region  Scoliosis of thoracolumbar spine, unspecified scoliosis type  Paresthesia of foot, bilateral  Osteoarthritis of right glenohumeral joint Plan of Treatment  Frequency/Duration: 1x/week/ 60 days    Certification date from 02/05/25 to 04/06/25         See note for plan of treatment details and functional goals     Jb Hernandes, PT                         I CERTIFY THE NEED FOR THESE SERVICES FURNISHED UNDER        THIS PLAN OF TREATMENT AND WHILE UNDER MY CARE     (Physician attestation of this document indicates review and certification of the therapy plan).              Referring Provider:  Orlin Mir    Initial Assessment  See Epic Evaluation- Start of Care Date: 02/05/25

## 2025-02-05 ENCOUNTER — THERAPY VISIT (OUTPATIENT)
Dept: PHYSICAL THERAPY | Facility: REHABILITATION | Age: 89
End: 2025-02-05
Attending: PHYSICAL MEDICINE & REHABILITATION
Payer: MEDICARE

## 2025-02-05 DIAGNOSIS — R20.2 PARESTHESIA OF FOOT, BILATERAL: ICD-10-CM

## 2025-02-05 DIAGNOSIS — M43.16 SPONDYLOLISTHESIS OF LUMBAR REGION: ICD-10-CM

## 2025-02-05 DIAGNOSIS — M19.011 OSTEOARTHRITIS OF RIGHT GLENOHUMERAL JOINT: ICD-10-CM

## 2025-02-05 DIAGNOSIS — M48.062 SPINAL STENOSIS OF LUMBAR REGION WITH NEUROGENIC CLAUDICATION: ICD-10-CM

## 2025-02-05 DIAGNOSIS — M41.9 SCOLIOSIS OF THORACOLUMBAR SPINE, UNSPECIFIED SCOLIOSIS TYPE: ICD-10-CM

## 2025-02-05 PROCEDURE — 97161 PT EVAL LOW COMPLEX 20 MIN: CPT | Mod: GP | Performed by: PHYSICAL THERAPIST

## 2025-02-05 PROCEDURE — 97110 THERAPEUTIC EXERCISES: CPT | Mod: GP | Performed by: PHYSICAL THERAPIST

## 2025-02-07 ENCOUNTER — HOSPITAL ENCOUNTER (OUTPATIENT)
Dept: CARDIOLOGY | Facility: HOSPITAL | Age: 89
Discharge: HOME | End: 2025-02-07
Attending: INTERNAL MEDICINE | Admitting: INTERNAL MEDICINE
Payer: MEDICARE

## 2025-02-07 DIAGNOSIS — R06.09 DOE (DYSPNEA ON EXERTION): ICD-10-CM

## 2025-02-07 LAB — LVEF ECHO: NORMAL

## 2025-02-07 PROCEDURE — 93306 TTE W/DOPPLER COMPLETE: CPT

## 2025-02-07 PROCEDURE — 93306 TTE W/DOPPLER COMPLETE: CPT | Mod: 26 | Performed by: INTERNAL MEDICINE

## 2025-02-11 ENCOUNTER — HOSPITAL ENCOUNTER (OUTPATIENT)
Dept: CARDIAC REHAB | Facility: HOSPITAL | Age: 89
Discharge: HOME | End: 2025-02-11
Attending: INTERNAL MEDICINE
Payer: MEDICARE

## 2025-02-11 PROCEDURE — G0239 OTH RESP PROC, GROUP: HCPCS

## 2025-02-12 ENCOUNTER — THERAPY VISIT (OUTPATIENT)
Dept: PHYSICAL THERAPY | Facility: REHABILITATION | Age: 89
End: 2025-02-12
Attending: PHYSICAL MEDICINE & REHABILITATION
Payer: MEDICARE

## 2025-02-12 DIAGNOSIS — M41.9 SCOLIOSIS OF THORACOLUMBAR SPINE, UNSPECIFIED SCOLIOSIS TYPE: ICD-10-CM

## 2025-02-12 DIAGNOSIS — M19.011 OSTEOARTHRITIS OF RIGHT GLENOHUMERAL JOINT: ICD-10-CM

## 2025-02-12 DIAGNOSIS — M48.062 SPINAL STENOSIS OF LUMBAR REGION WITH NEUROGENIC CLAUDICATION: Primary | ICD-10-CM

## 2025-02-12 DIAGNOSIS — M43.16 SPONDYLOLISTHESIS OF LUMBAR REGION: ICD-10-CM

## 2025-02-12 DIAGNOSIS — R20.2 PARESTHESIA OF FOOT, BILATERAL: ICD-10-CM

## 2025-02-12 PROCEDURE — 97110 THERAPEUTIC EXERCISES: CPT | Mod: GP | Performed by: PHYSICAL THERAPIST

## 2025-02-18 ENCOUNTER — HOSPITAL ENCOUNTER (OUTPATIENT)
Dept: CARDIAC REHAB | Facility: HOSPITAL | Age: 89
Discharge: HOME | End: 2025-02-18
Attending: INTERNAL MEDICINE
Payer: MEDICARE

## 2025-02-18 PROCEDURE — G0239 OTH RESP PROC, GROUP: HCPCS

## 2025-02-19 ENCOUNTER — THERAPY VISIT (OUTPATIENT)
Dept: PHYSICAL THERAPY | Facility: REHABILITATION | Age: 89
End: 2025-02-19
Attending: PHYSICAL MEDICINE & REHABILITATION
Payer: MEDICARE

## 2025-02-19 DIAGNOSIS — M19.011 OSTEOARTHRITIS OF RIGHT GLENOHUMERAL JOINT: ICD-10-CM

## 2025-02-19 DIAGNOSIS — M43.16 SPONDYLOLISTHESIS OF LUMBAR REGION: ICD-10-CM

## 2025-02-19 DIAGNOSIS — M48.062 SPINAL STENOSIS OF LUMBAR REGION WITH NEUROGENIC CLAUDICATION: Primary | ICD-10-CM

## 2025-02-19 DIAGNOSIS — R20.2 PARESTHESIA OF FOOT, BILATERAL: ICD-10-CM

## 2025-02-19 DIAGNOSIS — M41.9 SCOLIOSIS OF THORACOLUMBAR SPINE, UNSPECIFIED SCOLIOSIS TYPE: ICD-10-CM

## 2025-02-19 PROCEDURE — 97110 THERAPEUTIC EXERCISES: CPT | Mod: GP | Performed by: PHYSICAL THERAPIST

## 2025-02-19 PROCEDURE — 97140 MANUAL THERAPY 1/> REGIONS: CPT | Mod: GP | Performed by: PHYSICAL THERAPIST

## 2025-02-25 ENCOUNTER — HOSPITAL ENCOUNTER (OUTPATIENT)
Dept: CARDIAC REHAB | Facility: HOSPITAL | Age: 89
Discharge: HOME | End: 2025-02-25
Attending: INTERNAL MEDICINE
Payer: MEDICARE

## 2025-02-25 ENCOUNTER — OFFICE VISIT (OUTPATIENT)
Dept: CARDIOLOGY | Facility: CLINIC | Age: 89
End: 2025-02-25
Payer: MEDICARE

## 2025-02-25 VITALS
DIASTOLIC BLOOD PRESSURE: 70 MMHG | SYSTOLIC BLOOD PRESSURE: 132 MMHG | BODY MASS INDEX: 24.8 KG/M2 | WEIGHT: 140 LBS | OXYGEN SATURATION: 99 % | HEART RATE: 75 BPM

## 2025-02-25 DIAGNOSIS — I25.10 CORONARY ARTERY DISEASE INVOLVING NATIVE CORONARY ARTERY OF NATIVE HEART WITHOUT ANGINA PECTORIS: Primary | ICD-10-CM

## 2025-02-25 PROCEDURE — G0239 OTH RESP PROC, GROUP: HCPCS

## 2025-02-25 PROCEDURE — 99214 OFFICE O/P EST MOD 30 MIN: CPT | Performed by: INTERNAL MEDICINE

## 2025-02-25 PROCEDURE — 3078F DIAST BP <80 MM HG: CPT | Performed by: INTERNAL MEDICINE

## 2025-02-25 PROCEDURE — G2211 COMPLEX E/M VISIT ADD ON: HCPCS | Performed by: INTERNAL MEDICINE

## 2025-02-25 PROCEDURE — 3075F SYST BP GE 130 - 139MM HG: CPT | Performed by: INTERNAL MEDICINE

## 2025-02-25 NOTE — PROGRESS NOTES
Cardiology Progress Note     Assessment:  Coronary artery disease status post coronary artery bypass graft surgery in 2013, no angina  Chronic exertional dyspnea, suspect multifactorial related to kyphoscoliosis, deconditioning and diastolic dysfunction, no overt fluid overload  History of mild sinus bradycardia, resolved after discontinuation of atenolol  Hypercholesterolemia, good control  Hypertension, good control per home readings  Spinal stenosis  Colon cancer, status post partial colectomy      Plan:  He does not appear to be fluid overloaded on exam today.  Previously he has normal BNP's.  I do not feel that adding diuretics would be of great benefit.  May consider Jardiance if he were to develop signs of fluid overload    We discussed results of recent echo    Follow-up in 1 year  The longitudinal plan of care for the diagnosis(es)/condition(s) as documented were addressed during this visit. Due to the added complexity in care, I will continue to support Wily in the subsequent management and with ongoing continuity of care.   Subjective:   This is 89 year old male who comes in today for follow-up visit.  He has chronic complaints of dyspnea.  He denies any recent significant changes to weight.  His weight has not increased.  He has not had PND orthopnea or pedal edema.  He denies exertional chest pain.  He also denies heart palpitations syncope.  He brought blood pressure readings from home.  His systolic blood pressure is around 130 diastolic less than 85    Review of Systems:   Negative other than history of present illness    Objective:   /70   Pulse 75   Wt 63.5 kg (140 lb)   SpO2 99%   BMI 24.80 kg/m    Physical Exam:  GENERAL: no distress  NECK: No JVD  LUNGS: Clear to auscultation.  CARDIAC: regular rhythm, S1 & S2 normal.  No heaves, thrills, gallops or murmurs.  ABDOMEN: flat, negative hepatosplenomegaly, soft and non-tender.  EXTREMITIES: No evidence of cyanosis, clubbing or  edema.    Current Outpatient Medications   Medication Sig Dispense Refill    acetaminophen (TYLENOL) 500 MG tablet Take 1,000 mg by mouth every 8 hours as needed.      aspirin 325 MG tablet [ASPIRIN 325 MG TABLET] Take 325 mg by mouth daily.      atorvastatin (LIPITOR) 20 MG tablet TAKE 1 TABLET(20 MG) BY MOUTH DAILY 90 tablet 2    azelastine (ASTELIN) 0.1 % nasal spray 2 sprays each nostril 1-2x daily as needed for nasal congestion (use nightly for first 2 week) 30 mL 11    calcium polycarbophil (FIBERCON) 625 mg tablet Take 2 tablets by mouth daily.      cholecalciferol, vitamin D3, 1,000 unit tablet Take 1 tablet by mouth daily.      glucosamine-chondroitin 500-400 mg cap [GLUCOSAMINE-CHONDROITIN 500-400 MG CAP] Take 1 capsule by mouth 2 (two) times a day.      latanoprost (XALATAN) 0.005 % ophthalmic solution Place 1 drop into both eyes At Bedtime      lisinopril (ZESTRIL) 2.5 MG tablet TAKE 1 TABLET BY MOUTH EVERY DAY 90 tablet 3    loratadine 10 MG capsule Take 10 mg by mouth daily      Multiple Vitamins-Minerals (PRESERVISION AREDS 2) CHEW Take 1 chew tab by mouth daily.      saw palmetto fruit 450 mg cap [SAW PALMETTO FRUIT 450 MG CAP] Take 1 capsule by mouth 2 (two) times a day.      tamsulosin (FLOMAX) 0.4 mg Cp24 [TAMSULOSIN (FLOMAX) 0.4 MG CP24] Take 0.4 mg by mouth at bedtime.         Cardiographics:      Holter: January 2020  Borderline Holter monitor tracing with evidence of sinus bradycardia and some tendency towards chronotropic incompetence.  This is further supported by the patient being short of breath while climbing steps with a heart rate of only 67 bpm.  Further clinical correlation may be warranted.  Indication for study: Sick sinus syndrome: The patient does manifest some degree of chronotropic incompetence and symptom correlation is present and he may be a candidate for device-based therapy.  No sustained atrial or ventricular tachyarrhythmia was demonstrated.  No profound bradycardia or  pauses.           Stress echo: January 2019  Normal stress echocardiogram without evidence of stress induced ischemia.   Normal resting LV systolic performance with an ejection fraction of 55-60%. There is normal improvement in left ventricular systolic performance with a peak ejection fraction of 70-75%.  No ECG evidence of ischemia.  No anginal chest pain reported with exercise.  Good functional capacity for age.        Echo: June 2021    When compared to the previous study dated 1/28/2019, there is mild aortic and pulmonic regurgitation as well as moderate tricuspid regurgitation. There has been interval improvement in RV function    Left ventricle ejection fraction is normal. The calculated left ventricular ejection fraction is 76%.    Normal right ventricular size and systolic function.    Mild aortic regurgitation.    Moderate tricuspid valve regurgitation.    Mild pulmonic regurgitation.    February 2025  Left ventricular size, wall motion and function are normal. The ejection  fraction is 55-60%.  Normal right ventricle size and systolic function.  The left atrium is moderately dilated.  The right atrium is moderately dilated.  There is mild (1+) aortic regurgitation.  There is mild (1+) mitral regurgitation.  There is mild (1+) tricuspid regurgitation.   Lab Results    Chemistry/lipid CBC Cardiac Enzymes/BNP/TSH/INR   Recent Labs   Lab Test 01/31/25  1145   CHOL 97   HDL 54   LDL 37   TRIG 28     Recent Labs   Lab Test 01/31/25  1145 01/29/24  1151 01/25/23  1032   LDL 37 29 37     Recent Labs   Lab Test 01/31/25  1145      POTASSIUM 4.2   CHLORIDE 104   CO2 27   GLC 96   BUN 21.7   CR 1.03   GFRESTIMATED 70   GEGE 9.4     Recent Labs   Lab Test 01/31/25  1145 10/06/24  0622 10/05/24  0729   CR 1.03 0.82 0.91     Recent Labs   Lab Test 01/31/25  1145   A1C 6.0*          Recent Labs   Lab Test 01/31/25  1145   WBC 6.8   HGB 11.6*   HCT 36.3*   *        Recent Labs   Lab Test 01/31/25  1145  "10/06/24  0622 10/05/24  0729   HGB 11.6* 12.9* 13.2*    No results for input(s): \"TROPONINI\" in the last 70329 hours.  Recent Labs   Lab Test 01/18/23  1417 01/17/22  1405   BNP  --  168*   NTBNP 414  --      Recent Labs   Lab Test 05/27/21  1627   TSH 0.79     No results for input(s): \"INR\" in the last 51907 hours.                  "

## 2025-02-25 NOTE — LETTER
2/25/2025    Sarthak Romano MD  [address] [place] [address]    RE: Antoni Stoll       Dear Colleague,     I had the pleasure of seeing Antoni Stoll in the [facility].      Cardiology Progress Note     Assessment:  Coronary artery disease status post coronary artery bypass graft surgery in 2013, no angina  Chronic exertional dyspnea, suspect multifactorial related to kyphoscoliosis, deconditioning and diastolic dysfunction, no overt fluid overload  History of mild sinus bradycardia, resolved after discontinuation of atenolol  Hypercholesterolemia, good control  Hypertension, good control per home readings  Spinal stenosis  Colon cancer, status post partial colectomy      Plan:  He does not appear to be fluid overloaded on exam today.  Previously he has normal BNP's.  I do not feel that adding diuretics would be of great benefit.  May consider Jardiance if he were to develop signs of fluid overload    We discussed results of recent echo    Follow-up in 1 year  The longitudinal plan of care for the diagnosis(es)/condition(s) as documented were addressed during this visit. Due to the added complexity in care, I will continue to support Wily in the subsequent management and with ongoing continuity of care.   Subjective:   This is 89 year old male who comes in today for follow-up visit.  He has chronic complaints of dyspnea.  He denies any recent significant changes to weight.  His weight has not increased.  He has not had PND orthopnea or pedal edema.  He denies exertional chest pain.  He also denies heart palpitations syncope.  He brought blood pressure readings from home.  His systolic blood pressure is around 130 diastolic less than 85    Review of Systems:   Negative other than history of present illness    Objective:   /70   Pulse 75   Wt 63.5 kg (140 lb)   SpO2 99%   BMI 24.80 kg/m    Physical Exam:  GENERAL: no distress  NECK: No JVD  LUNGS: Clear to  auscultation.  CARDIAC: regular rhythm, S1 & S2 normal.  No heaves, thrills, gallops or murmurs.  ABDOMEN: flat, negative hepatosplenomegaly, soft and non-tender.  EXTREMITIES: No evidence of cyanosis, clubbing or edema.    Current Outpatient Medications   Medication Sig Dispense Refill     acetaminophen (TYLENOL) 500 MG tablet Take 1,000 mg by mouth every 8 hours as needed.       aspirin 325 MG tablet [ASPIRIN 325 MG TABLET] Take 325 mg by mouth daily.       atorvastatin (LIPITOR) 20 MG tablet TAKE 1 TABLET(20 MG) BY MOUTH DAILY 90 tablet 2     azelastine (ASTELIN) 0.1 % nasal spray 2 sprays each nostril 1-2x daily as needed for nasal congestion (use nightly for first 2 week) 30 mL 11     calcium polycarbophil (FIBERCON) 625 mg tablet Take 2 tablets by mouth daily.       cholecalciferol, vitamin D3, 1,000 unit tablet Take 1 tablet by mouth daily.       glucosamine-chondroitin 500-400 mg cap [GLUCOSAMINE-CHONDROITIN 500-400 MG CAP] Take 1 capsule by mouth 2 (two) times a day.       latanoprost (XALATAN) 0.005 % ophthalmic solution Place 1 drop into both eyes At Bedtime       lisinopril (ZESTRIL) 2.5 MG tablet TAKE 1 TABLET BY MOUTH EVERY DAY 90 tablet 3     loratadine 10 MG capsule Take 10 mg by mouth daily       Multiple Vitamins-Minerals (PRESERVISION AREDS 2) CHEW Take 1 chew tab by mouth daily.       saw palmetto fruit 450 mg cap [SAW PALMETTO FRUIT 450 MG CAP] Take 1 capsule by mouth 2 (two) times a day.       tamsulosin (FLOMAX) 0.4 mg Cp24 [TAMSULOSIN (FLOMAX) 0.4 MG CP24] Take 0.4 mg by mouth at bedtime.         Cardiographics:      Holter: January 2020  Borderline Holter monitor tracing with evidence of sinus bradycardia and some tendency towards chronotropic incompetence.  This is further supported by the patient being short of breath while climbing steps with a heart rate of only 67 bpm.  Further clinical correlation may be warranted.  Indication for study: Sick sinus syndrome: The patient does manifest  some degree of chronotropic incompetence and symptom correlation is present and he may be a candidate for device-based therapy.  No sustained atrial or ventricular tachyarrhythmia was demonstrated.  No profound bradycardia or pauses.           Stress echo: January 2019  Normal stress echocardiogram without evidence of stress induced ischemia.   Normal resting LV systolic performance with an ejection fraction of 55-60%. There is normal improvement in left ventricular systolic performance with a peak ejection fraction of 70-75%.  No ECG evidence of ischemia.  No anginal chest pain reported with exercise.  Good functional capacity for age.        Echo: June 2021    When compared to the previous study dated 1/28/2019, there is mild aortic and pulmonic regurgitation as well as moderate tricuspid regurgitation. There has been interval improvement in RV function    Left ventricle ejection fraction is normal. The calculated left ventricular ejection fraction is 76%.    Normal right ventricular size and systolic function.    Mild aortic regurgitation.    Moderate tricuspid valve regurgitation.    Mild pulmonic regurgitation.    February 2025  Left ventricular size, wall motion and function are normal. The ejection  fraction is 55-60%.  Normal right ventricle size and systolic function.  The left atrium is moderately dilated.  The right atrium is moderately dilated.  There is mild (1+) aortic regurgitation.  There is mild (1+) mitral regurgitation.  There is mild (1+) tricuspid regurgitation.   Lab Results    Chemistry/lipid CBC Cardiac Enzymes/BNP/TSH/INR   Recent Labs   Lab Test 01/31/25  1145   CHOL 97   HDL 54   LDL 37   TRIG 28     Recent Labs   Lab Test 01/31/25  1145 01/29/24  1151 01/25/23  1032   LDL 37 29 37     Recent Labs   Lab Test 01/31/25  1145      POTASSIUM 4.2   CHLORIDE 104   CO2 27   GLC 96   BUN 21.7   CR 1.03   GFRESTIMATED 70   GEGE 9.4     Recent Labs   Lab Test 01/31/25  1145 10/06/24  0622  "10/05/24  0729   CR 1.03 0.82 0.91     Recent Labs   Lab Test 01/31/25  1145   A1C 6.0*          Recent Labs   Lab Test 01/31/25  1145   WBC 6.8   HGB 11.6*   HCT 36.3*   *        Recent Labs   Lab Test 01/31/25  1145 10/06/24  0622 10/05/24  0729   HGB 11.6* 12.9* 13.2*    No results for input(s): \"TROPONINI\" in the last 67482 hours.  Recent Labs   Lab Test 01/18/23  1417 01/17/22  1405   BNP  --  168*   NTBNP 414  --      Recent Labs   Lab Test 05/27/21  1627   TSH 0.79     No results for input(s): \"INR\" in the last 82659 hours.                      Thank you for allowing me to participate in the care of your patient.      Sincerely,     Dewey Guevara MD     [facility]  cc:   Mars Guevara MD  [address]  [address]  [place] [address]      "

## 2025-02-26 ENCOUNTER — THERAPY VISIT (OUTPATIENT)
Dept: PHYSICAL THERAPY | Facility: REHABILITATION | Age: 89
End: 2025-02-26
Payer: MEDICARE

## 2025-02-26 DIAGNOSIS — R20.2 PARESTHESIA OF FOOT, BILATERAL: ICD-10-CM

## 2025-02-26 DIAGNOSIS — M43.16 SPONDYLOLISTHESIS OF LUMBAR REGION: ICD-10-CM

## 2025-02-26 DIAGNOSIS — M19.011 OSTEOARTHRITIS OF RIGHT GLENOHUMERAL JOINT: ICD-10-CM

## 2025-02-26 DIAGNOSIS — M48.062 SPINAL STENOSIS OF LUMBAR REGION WITH NEUROGENIC CLAUDICATION: Primary | ICD-10-CM

## 2025-02-26 DIAGNOSIS — M41.9 SCOLIOSIS OF THORACOLUMBAR SPINE, UNSPECIFIED SCOLIOSIS TYPE: ICD-10-CM

## 2025-02-26 PROCEDURE — 97110 THERAPEUTIC EXERCISES: CPT | Mod: GP | Performed by: PHYSICAL THERAPIST

## 2025-02-26 PROCEDURE — 97140 MANUAL THERAPY 1/> REGIONS: CPT | Mod: GP | Performed by: PHYSICAL THERAPIST

## 2025-03-04 ENCOUNTER — HOSPITAL ENCOUNTER (OUTPATIENT)
Dept: CARDIAC REHAB | Facility: HOSPITAL | Age: 89
Discharge: HOME | End: 2025-03-04
Attending: INTERNAL MEDICINE
Payer: MEDICARE

## 2025-03-04 PROCEDURE — G0239 OTH RESP PROC, GROUP: HCPCS

## 2025-03-05 ENCOUNTER — THERAPY VISIT (OUTPATIENT)
Dept: PHYSICAL THERAPY | Facility: REHABILITATION | Age: 89
End: 2025-03-05
Payer: MEDICARE

## 2025-03-05 DIAGNOSIS — M48.062 SPINAL STENOSIS OF LUMBAR REGION WITH NEUROGENIC CLAUDICATION: Primary | ICD-10-CM

## 2025-03-05 DIAGNOSIS — M41.9 SCOLIOSIS OF THORACOLUMBAR SPINE, UNSPECIFIED SCOLIOSIS TYPE: ICD-10-CM

## 2025-03-05 DIAGNOSIS — M43.16 SPONDYLOLISTHESIS OF LUMBAR REGION: ICD-10-CM

## 2025-03-05 DIAGNOSIS — R20.2 PARESTHESIA OF FOOT, BILATERAL: ICD-10-CM

## 2025-03-05 DIAGNOSIS — M19.011 OSTEOARTHRITIS OF RIGHT GLENOHUMERAL JOINT: ICD-10-CM

## 2025-03-11 ENCOUNTER — HOSPITAL ENCOUNTER (OUTPATIENT)
Dept: CARDIAC REHAB | Facility: HOSPITAL | Age: 89
Discharge: HOME | End: 2025-03-11
Attending: INTERNAL MEDICINE
Payer: MEDICARE

## 2025-03-11 PROCEDURE — G0239 OTH RESP PROC, GROUP: HCPCS

## 2025-03-12 ENCOUNTER — THERAPY VISIT (OUTPATIENT)
Dept: PHYSICAL THERAPY | Facility: REHABILITATION | Age: 89
End: 2025-03-12
Payer: MEDICARE

## 2025-03-12 DIAGNOSIS — R20.2 PARESTHESIA OF FOOT, BILATERAL: ICD-10-CM

## 2025-03-12 DIAGNOSIS — M43.16 SPONDYLOLISTHESIS OF LUMBAR REGION: ICD-10-CM

## 2025-03-12 DIAGNOSIS — M48.062 SPINAL STENOSIS OF LUMBAR REGION WITH NEUROGENIC CLAUDICATION: Primary | ICD-10-CM

## 2025-03-12 DIAGNOSIS — M19.011 OSTEOARTHRITIS OF RIGHT GLENOHUMERAL JOINT: ICD-10-CM

## 2025-03-12 DIAGNOSIS — M41.9 SCOLIOSIS OF THORACOLUMBAR SPINE, UNSPECIFIED SCOLIOSIS TYPE: ICD-10-CM

## 2025-03-13 ENCOUNTER — HOSPITAL ENCOUNTER (OUTPATIENT)
Dept: CARDIAC REHAB | Facility: HOSPITAL | Age: 89
Discharge: HOME | End: 2025-03-13
Attending: INTERNAL MEDICINE
Payer: MEDICARE

## 2025-03-13 PROCEDURE — G0239 OTH RESP PROC, GROUP: HCPCS

## 2025-03-25 ENCOUNTER — HOSPITAL ENCOUNTER (OUTPATIENT)
Dept: CARDIAC REHAB | Facility: HOSPITAL | Age: 89
Discharge: HOME | End: 2025-03-25
Attending: INTERNAL MEDICINE
Payer: MEDICARE

## 2025-03-25 PROCEDURE — G0239 OTH RESP PROC, GROUP: HCPCS

## 2025-03-26 ENCOUNTER — THERAPY VISIT (OUTPATIENT)
Dept: PHYSICAL THERAPY | Facility: REHABILITATION | Age: 89
End: 2025-03-26
Payer: MEDICARE

## 2025-03-26 DIAGNOSIS — M41.9 SCOLIOSIS OF THORACOLUMBAR SPINE, UNSPECIFIED SCOLIOSIS TYPE: ICD-10-CM

## 2025-03-26 DIAGNOSIS — M43.16 SPONDYLOLISTHESIS OF LUMBAR REGION: ICD-10-CM

## 2025-03-26 DIAGNOSIS — R20.2 PARESTHESIA OF FOOT, BILATERAL: ICD-10-CM

## 2025-03-26 DIAGNOSIS — M19.011 OSTEOARTHRITIS OF RIGHT GLENOHUMERAL JOINT: ICD-10-CM

## 2025-03-26 DIAGNOSIS — M48.062 SPINAL STENOSIS OF LUMBAR REGION WITH NEUROGENIC CLAUDICATION: Primary | ICD-10-CM

## 2025-03-26 PROCEDURE — 97110 THERAPEUTIC EXERCISES: CPT | Mod: GP | Performed by: PHYSICAL THERAPIST

## 2025-03-26 PROCEDURE — 97140 MANUAL THERAPY 1/> REGIONS: CPT | Mod: GP | Performed by: PHYSICAL THERAPIST

## 2025-03-26 NOTE — PROGRESS NOTES
PLAN  Continue therapy per current plan of care as appropriate.  Follow up with referring provider.    Beginning/End Dates of Progress Note Reporting Period:  02/05/25 to 03/26/2025    Referring Provider:  Orlin Mir     03/26/25 0500   Appointment Info   Signing clinician's name / credentials Jb Hernandes, PT   Visits Used 7   Medical Diagnosis Spinal stenosis of lumbar region with neurogenic claudication  Spondylolisthesis of lumbar region  Scoliosis of thoracolumbar spine, unspecified scoliosis type  Paresthesia of foot, bilateral  Osteoarthritis of right glenohumeral joint   PT Tx Diagnosis Spinal stenosis of lumbar region with neurogenic claudication  Spondylolisthesis of lumbar region  Scoliosis of thoracolumbar spine, unspecified scoliosis type  Paresthesia of foot, bilateral  Osteoarthritis of right glenohumeral joint   Progress Note/Certification   Start of Care Date 02/05/25   Onset of illness/injury or Date of Surgery 01/06/25   Therapy Frequency 1x/week   Predicted Duration 60 days   Certification date from 02/05/25   Certification date to 04/06/25   Progress Note Due Date 04/06/25   Progress Note Completed Date 02/05/25   PT Goal 1   Goal Identifier self management   Goal Description Patient will be independent with HEP and self management of symptoms to facilitate return to PLOF   Rationale to maximize safety and independence within the home;to maximize safety and independence with performance of ADLs and functional tasks   Goal Progress in progress   Target Date 04/06/25   PT Goal 2   Goal Identifier walking   Goal Description Patient will return to walking one mile for community distance and exercise with pain 2/10 or less   Rationale to maximize safety and independence with performance of ADLs and functional tasks;to maximize safety and independence within the community   Goal Progress improving, continue goal   Target Date 04/06/25   PT Goal 3   Goal Identifier standing   Goal  Description Patient will stand 30 minutes for household chores with pain 2/10 or less   Rationale to maximize safety and independence with performance of ADLs and functional tasks   Goal Progress improving,  currently 4-5/10,  continue goal   Target Date 04/06/25   PT Goal 4   Goal Identifier reaching   Goal Description Patient will use right arm for overhead household tasks with pain 2/10 or less   Rationale to maximize safety and independence with performance of ADLs and functional tasks   Goal Progress able to reach overhead with right arm with discomfort 1-2/10   Target Date 04/06/25   Date Met 03/26/25   Subjective Report   Subjective Report He missed his last visit. He had to go out of town.  He had an increase in left shoulder pain on Monday.  He had yoga in the morning, but didn't notice any injury.  Back feels same as usual.  pain 2-3/10.   50% overall improvement of shoulder symptoms since starting PT.  30-40% overall improvement of back symptoms since starting PT.   Therapeutic Procedure/Exercise   Therapeutic Procedures: strength, endurance, ROM, flexibility minutes (82223) 30   Ther Proc 1 nustep handles 10 seat 10   Ther Proc 1 - Details WL 5 x 5 minutes with subjective discussion of current symptoms and HEP.   Ther Proc 2 Pallof press   Ther Proc 2 - Details x15 R/L (Blue TB at home)   Ther Proc 3 shoulder extension   Ther Proc 3 - Details x 15 B (Blue TB at home)   Ther Proc 4 Bent over Ts - Add to HEP   Ther Proc 4 - Details 2x10 bilat   Ther Proc 5 LTR   Ther Proc 5 - Details x 10 B alt cues for slow controlled movement.   Ther Proc 6 supine LE nerve glide   Ther Proc 6 - Details x 10 R/L   Ther Proc 7 bridging + marches   Ther Proc 7 - Details x10 cues and trials of knee bent with knee extension   Ther Proc 8 posterior pelvic tilt with two leg marching   Ther Proc 8 - Details x 10   Ther Proc 9 posterior pelvic tilt with tabletop foot taps   Ther Proc 9 - Details x10   Ther Proc 10 shouder rows    Ther Proc 10 - Details Blue TB x 15 B   Skilled Intervention review and progression of HEP to improve core strength and nerve mobility.   Patient Response/Progress tolerated well, verbalized understanding   Manual Therapy   Manual Therapy: Mobilization, MFR, MLD, friction massage minutes (27331) 25   Manual Therapy 1 STM   Manual Therapy 1 - Details MFR layers 2,3 R/L: upper trap, pec minor/major, infraspinatus, deltoid, (supine 90/90 with head elevated)   Manual Therapy 2 R/L GH joint moblizations   Manual Therapy 2 - Details grade 2 distraction, grade 3 lateral distraction , loose pack distraction, posterior glide   Skilled Intervention manual therapy to improve tissue moblity and decrease pain   Patient Response/Progress tolerated well with report of decreased pain   Education   Learner/Method Patient;No Barriers to Learning   Plan   Plan for next session nustep, review HEP, progress core strengthening,  continue manual therapy.   Comments   Comments Wily returns very motivated and compliant with HEP.  Good HEP performance.  We were able to review HEP and progress resistance of shoulder and core exercises.  Manual therapy was well tolerated and decreased pain levels. follow up with Dr. Mir.  Continue with POC as appropriate.   Total Session Time   Timed Code Treatment Minutes 55   Total Treatment Time (sum of timed and untimed services) 55

## 2025-04-03 ENCOUNTER — HOSPITAL ENCOUNTER (OUTPATIENT)
Dept: CARDIAC REHAB | Facility: HOSPITAL | Age: 89
Discharge: HOME | End: 2025-04-03
Attending: INTERNAL MEDICINE
Payer: MEDICARE

## 2025-04-03 PROCEDURE — G0239 OTH RESP PROC, GROUP: HCPCS

## 2025-04-07 ENCOUNTER — OFFICE VISIT (OUTPATIENT)
Dept: PHYSICAL MEDICINE AND REHAB | Facility: CLINIC | Age: 89
End: 2025-04-07
Payer: MEDICARE

## 2025-04-07 ENCOUNTER — TELEPHONE (OUTPATIENT)
Dept: PHYSICAL MEDICINE AND REHAB | Facility: CLINIC | Age: 89
End: 2025-04-07

## 2025-04-07 VITALS — SYSTOLIC BLOOD PRESSURE: 189 MMHG | DIASTOLIC BLOOD PRESSURE: 78 MMHG | HEART RATE: 61 BPM

## 2025-04-07 DIAGNOSIS — M48.062 SPINAL STENOSIS OF LUMBAR REGION WITH NEUROGENIC CLAUDICATION: Primary | ICD-10-CM

## 2025-04-07 DIAGNOSIS — M25.562 ACUTE PAIN OF LEFT KNEE: ICD-10-CM

## 2025-04-07 DIAGNOSIS — R20.2 PARESTHESIA OF FOOT, BILATERAL: ICD-10-CM

## 2025-04-07 DIAGNOSIS — M41.9 SCOLIOSIS OF THORACOLUMBAR SPINE, UNSPECIFIED SCOLIOSIS TYPE: ICD-10-CM

## 2025-04-07 DIAGNOSIS — M43.16 SPONDYLOLISTHESIS OF LUMBAR REGION: ICD-10-CM

## 2025-04-07 DIAGNOSIS — M25.512 ACUTE PAIN OF LEFT SHOULDER: ICD-10-CM

## 2025-04-07 DIAGNOSIS — M75.121 COMPLETE TEAR OF RIGHT ROTATOR CUFF, UNSPECIFIED WHETHER TRAUMATIC: ICD-10-CM

## 2025-04-07 ASSESSMENT — PAIN SCALES - GENERAL: PAINLEVEL_OUTOF10: MILD PAIN (3)

## 2025-04-07 NOTE — PATIENT INSTRUCTIONS
A lumbar epidural injection has been ordered today. Please schedule this injection at least   2 weeks from now to allow time for insurance prior authorization. On the day of your injection, you cannot be sick or taking antibiotics. If you become sick and are prescribed, please call the clinic so your injection can be rescheduled for once you have completed your antibiotics.  It is recommended that you do not have a vaccination within 2 weeks of your procedure if it will contain steroids, as this may make the vaccination less effective.  You will need to bring a  with you for your injection. If you have any questions or concerns prior to your injection, please do not hesitate to call the nurse navigation line at [phone].   You will need to be off of you aspirin for 1 week.  You will need to get this cleared by your primary care provider or you cardiologist. Please call our nurses if you have any questions: Clinic Phone # [phone].  An xray was ordered for you today.  Please call Radiology at [phone].     You can trial Voltaren gel over-the-counter over the left knee.

## 2025-04-07 NOTE — LETTER
4/7/2025      Antoni Stoll  [address]  [facility] [address]      Dear Colleague,    Thank you for referring your patient, Antoni Stoll, to the [facility]. Please see a copy of my visit note below.    Assessment/Plan:      Wily was seen today for back pain.    Diagnoses and all orders for this visit:    Spinal stenosis of lumbar region with neurogenic claudication  -     PAIN Interlaminar Epidural Steroid Injection Lumbar/Sacral; Future    Spondylolisthesis of lumbar region    Scoliosis of thoracolumbar spine, unspecified scoliosis type    Paresthesia of foot, bilateral    Acute pain of left shoulder  -     XR Shoulder Left 2 Views; Future    Complete tear of right rotator cuff, unspecified whether traumatic         Assessment: Pleasant 89 year old male with history of colon cancer, hyperlipidemia, hypertension, carotid artery occlusion, coronary artery disease with:        1.  Persistent   chronic lumbar spine pain with bilateral lower extremity claudication symptoms.    He has right greater than left lower extremity pain and paresthesias related to severe spinal stenosis L4-5 and slightly less at L3-4  with L4-5 spondylolisthesis.  There is tortuosity of the nerve roots.  Pain persist despite physical therapy, medications and lumbar epidurals. bilateral L5-S1 transforaminal epidural steroid injection did offer some relief but only 40 to 50% and that benefit slowly diminished over 1 month.  Ongoing numbness and tingling in his right foot.  He was seen by Dr. Kenny at [facility] orthopedics and is seeing Dr Torres.  He would be a surgical candidate but holding off as long as possible.   No weakness of the lower extremities.   Continues with lower extremity paresthesias up to the knees can only tolerate maybe 20 minutes of standing.  He does have chronic L5-S1 radicular findings on recent EMG and neurology.   Had been tolerating Lyrica 25 mg twice daily without  significant side effects.  Has been now off of Lyrica couple of months with no change in symptoms.  Last MRI was from 2022.  Minimal if any benefit with recent physical therapy.         2.  Significant increase left shoulder pain which is new.  Consistent with glenohumeral joint osteoarthritis versus rotator cuff strain.  Has worked with physical therapy during his recent sessions.      3.  Bilateral foot paresthesias consistent with peripheral polyneuropathy may have a contribution from spinal stenosis.  No improvement     4.  Persistent chronic right shoulder pain.   complete tear of the rotator cuff supraspinatus with retraction of the glenohumeral joint effusion in the past which responded well to glenohumeral joint injection. No improvement with the last injection on September 7, 2022.    Did not have right shoulder block.     5.  Few days of left knee pain.    Discussion:    1.  We discussed the diagnosis and treatment options.  We discussed options of further physical therapy, returning back to Lyrica, injections, return back to spinal cord stimulator trial as he was set up for spinal cord stimulator in the past.  Have not yet done interlaminar epidural steroid injection.    2.  L5-S1 interlaminar epidural steroid injection.  He is on aspirin and will need to get this cleared through cardiology.  I have messaged nurse navigation.    3.  Plain films of the left shoulder evaluate extent of osteoarthritis.    4.  Can trial Voltaren gel left knee.     5.  If epidural not helpful can consider updated MRI versus consideration for spinal cord stimulator.    6.  Follow-up at earliest convenience for epidural steroid injection.    It was our pleasure caring for your patient today, if there any questions or concerns please do not hesitate to contact us.    The longitudinal plan of care for the diagnosis(es)/condition(s) as documented were addressed during this visit. Due to the added complexity in care, I will continue  to support Wily in the subsequent management and with ongoing continuity of care.      Subjective:   Patient ID: Antoni Stoll is a 89 year old male.    History of Present Illness: Patient presents for evaluation of lumbar spine pain left greater than right lower extremity radicular pain and claudication symptoms also having new left shoulder pain.  Lumbar spine pain is chronic has recently had physical therapy no significant improvement may have slight improvement with home exercise.  Pain is a 6/10 at worst 2/10 today and at best.  Worse with walking lifting better with sitting.  In review of records prior L4-5 and L5-S1 TFESI's bilaterally were minimally helpful.  He was then evaluated for spinal cord stimulator trial and he opted against it and we discussed that again today.    Also has left shoulder pain chronic right shoulder pain with rotator cuff tear.  Left shoulder over the past month that started to bother him as well.      Imaging: CT abdomen pelvis from October 2024 personally reviewed.  Along with MRI lumbar spine from October 2022.  Severe right lumbar scoliotic curve CT scan.  Hips are unremarkable.  Grade 1-2 spondylolisthesis L4 and L5 with severe facet arthropathy appears to have severe spinal stenosis L4-5.    Review of Systems: Complains of numbness tingling weakness in the legs.  Denies bowel or bladder incontinence headache falls swallowing issues fevers or unintentional weight loss.           Current Outpatient Medications   Medication Sig Dispense Refill     acetaminophen (TYLENOL) 500 MG tablet Take 1,000 mg by mouth every 8 hours as needed.       aspirin 325 MG tablet [ASPIRIN 325 MG TABLET] Take 325 mg by mouth daily.       atorvastatin (LIPITOR) 20 MG tablet TAKE 1 TABLET(20 MG) BY MOUTH DAILY 90 tablet 2     azelastine (ASTELIN) 0.1 % nasal spray 2 sprays each nostril 1-2x daily as needed for nasal congestion (use nightly for first 2 week) (Patient not taking: Reported on 2/28/2025) 30  mL 11     calcium polycarbophil (FIBERCON) 625 mg tablet Take 2 tablets by mouth daily.       cholecalciferol, vitamin D3, 1,000 unit tablet Take 1 tablet by mouth daily.       glucosamine-chondroitin 500-400 mg cap [GLUCOSAMINE-CHONDROITIN 500-400 MG CAP] Take 1 capsule by mouth 2 (two) times a day.       latanoprost (XALATAN) 0.005 % ophthalmic solution Place 1 drop into both eyes At Bedtime       lisinopril (ZESTRIL) 2.5 MG tablet TAKE 1 TABLET BY MOUTH EVERY DAY 90 tablet 3     loratadine 10 MG capsule Take 10 mg by mouth daily       Multiple Vitamins-Minerals (PRESERVISION AREDS 2) CHEW Take 1 chew tab by mouth daily.       saw palmetto fruit 450 mg cap [SAW PALMETTO FRUIT 450 MG CAP] Take 1 capsule by mouth 2 (two) times a day.       tamsulosin (FLOMAX) 0.4 mg Cp24 [TAMSULOSIN (FLOMAX) 0.4 MG CP24] Take 0.4 mg by mouth at bedtime.       No current facility-administered medications for this visit.       Past Medical History:   Diagnosis Date     Anemia      Basal cell carcinoma      Cancer (H)     colon     Carotid artery occlusion      Coronary artery disease      Glaucoma      High cholesterol      History of colon cancer 08/13/2018     Hyperlipidemia      Hypertension      Left inguinal hernia      Low back pain      Malignant melanoma (H)      Mild aortic insufficiency      Moderate tricuspid regurgitation      Postnasal drip      Sleep apnea, obstructive     CPAP     Spinal stenosis     lumbar     SSS (sick sinus syndrome) (H) 12/09/2019     Umbilical hernia      Unspecified cataract     Created by Conversion        The following portions of the patient's history were reviewed and updated as appropriate: allergies, current medications, past family history, past medical history, past social history, past surgical history and problem list.           Objective:   Physical Exam:    BP (!) 189/78   Pulse 61   There is no height or weight on file to calculate BMI.      General: Alert and oriented with normal  affect. Attention, knowledge, memory, and language are intact. No acute distress.   Eyes: Sclerae are clear.  Respirations: Unlabored.    Gait:  Nonantalgic  Full range of motion left shoulder and abduction.  Negative arm drop empty can speeds test on the left as well as the right.  Sensation is intact to light touch throughout the   lower extremities.  Reflexes are  2+ patellar and 0 Achilles     Manual muscle testing reveals:  Right /Left out of 5     5/5 hip flexors  5/5 knee flexors  5/5 knee extensors  5/5 ankle plantar flexors  5/5 ankle dorsiflexors  5/5  ankle evertors      Again, thank you for allowing me to participate in the care of your patient.        Sincerely,        Orlin Mir, DO    Electronically signed

## 2025-04-07 NOTE — PROGRESS NOTES
Assessment/Plan:      Wily was seen today for back pain.    Diagnoses and all orders for this visit:    Spinal stenosis of lumbar region with neurogenic claudication  -     PAIN Interlaminar Epidural Steroid Injection Lumbar/Sacral; Future    Spondylolisthesis of lumbar region    Scoliosis of thoracolumbar spine, unspecified scoliosis type    Paresthesia of foot, bilateral    Acute pain of left shoulder  -     XR Shoulder Left 2 Views; Future    Complete tear of right rotator cuff, unspecified whether traumatic         Assessment: Pleasant 89 year old male with history of colon cancer, hyperlipidemia, hypertension, carotid artery occlusion, coronary artery disease with:        1.  Persistent   chronic lumbar spine pain with bilateral lower extremity claudication symptoms.    He has right greater than left lower extremity pain and paresthesias related to severe spinal stenosis L4-5 and slightly less at L3-4  with L4-5 spondylolisthesis.  There is tortuosity of the nerve roots.  Pain persist despite physical therapy, medications and lumbar epidurals. bilateral L5-S1 transforaminal epidural steroid injection did offer some relief but only 40 to 50% and that benefit slowly diminished over 1 month.  Ongoing numbness and tingling in his right foot.  He was seen by Dr. Kenny at [facility] orthopedics and is seeing Dr Torres.  He would be a surgical candidate but holding off as long as possible.   No weakness of the lower extremities.   Continues with lower extremity paresthesias up to the knees can only tolerate maybe 20 minutes of standing.  He does have chronic L5-S1 radicular findings on recent EMG and neurology.   Had been tolerating Lyrica 25 mg twice daily without significant side effects.  Has been now off of Lyrica couple of months with no change in symptoms.  Last MRI was from 2022.  Minimal if any benefit with recent physical therapy.         2.  Significant increase left shoulder pain which is new.  Consistent  with glenohumeral joint osteoarthritis versus rotator cuff strain.  Has worked with physical therapy during his recent sessions.      3.  Bilateral foot paresthesias consistent with peripheral polyneuropathy may have a contribution from spinal stenosis.  No improvement     4.  Persistent chronic right shoulder pain.   complete tear of the rotator cuff supraspinatus with retraction of the glenohumeral joint effusion in the past which responded well to glenohumeral joint injection. No improvement with the last injection on September 7, 2022.    Did not have right shoulder block.     5.  Few days of left knee pain.    Discussion:    1.  We discussed the diagnosis and treatment options.  We discussed options of further physical therapy, returning back to Lyrica, injections, return back to spinal cord stimulator trial as he was set up for spinal cord stimulator in the past.  Have not yet done interlaminar epidural steroid injection.    2.  L5-S1 interlaminar epidural steroid injection.  He is on aspirin and will need to get this cleared through cardiology.  I have messaged nurse navigation.    3.  Plain films of the left shoulder evaluate extent of osteoarthritis.    4.  Can trial Voltaren gel left knee.     5.  If epidural not helpful can consider updated MRI versus consideration for spinal cord stimulator.    6.  Follow-up at earliest convenience for epidural steroid injection.    It was our pleasure caring for your patient today, if there any questions or concerns please do not hesitate to contact us.    The longitudinal plan of care for the diagnosis(es)/condition(s) as documented were addressed during this visit. Due to the added complexity in care, I will continue to support Wily in the subsequent management and with ongoing continuity of care.      Subjective:   Patient ID: Antoni Stoll is a 89 year old male.    History of Present Illness: Patient presents for evaluation of lumbar spine pain left greater than  right lower extremity radicular pain and claudication symptoms also having new left shoulder pain.  Lumbar spine pain is chronic has recently had physical therapy no significant improvement may have slight improvement with home exercise.  Pain is a 6/10 at worst 2/10 today and at best.  Worse with walking lifting better with sitting.  In review of records prior L4-5 and L5-S1 TFESI's bilaterally were minimally helpful.  He was then evaluated for spinal cord stimulator trial and he opted against it and we discussed that again today.    Also has left shoulder pain chronic right shoulder pain with rotator cuff tear.  Left shoulder over the past month that started to bother him as well.      Imaging: CT abdomen pelvis from October 2024 personally reviewed.  Along with MRI lumbar spine from October 2022.  Severe right lumbar scoliotic curve CT scan.  Hips are unremarkable.  Grade 1-2 spondylolisthesis L4 and L5 with severe facet arthropathy appears to have severe spinal stenosis L4-5.    Review of Systems: Complains of numbness tingling weakness in the legs.  Denies bowel or bladder incontinence headache falls swallowing issues fevers or unintentional weight loss.           Current Outpatient Medications   Medication Sig Dispense Refill    acetaminophen (TYLENOL) 500 MG tablet Take 1,000 mg by mouth every 8 hours as needed.      aspirin 325 MG tablet [ASPIRIN 325 MG TABLET] Take 325 mg by mouth daily.      atorvastatin (LIPITOR) 20 MG tablet TAKE 1 TABLET(20 MG) BY MOUTH DAILY 90 tablet 2    azelastine (ASTELIN) 0.1 % nasal spray 2 sprays each nostril 1-2x daily as needed for nasal congestion (use nightly for first 2 week) (Patient not taking: Reported on 2/28/2025) 30 mL 11    calcium polycarbophil (FIBERCON) 625 mg tablet Take 2 tablets by mouth daily.      cholecalciferol, vitamin D3, 1,000 unit tablet Take 1 tablet by mouth daily.      glucosamine-chondroitin 500-400 mg cap [GLUCOSAMINE-CHONDROITIN 500-400 MG CAP]  Take 1 capsule by mouth 2 (two) times a day.      latanoprost (XALATAN) 0.005 % ophthalmic solution Place 1 drop into both eyes At Bedtime      lisinopril (ZESTRIL) 2.5 MG tablet TAKE 1 TABLET BY MOUTH EVERY DAY 90 tablet 3    loratadine 10 MG capsule Take 10 mg by mouth daily      Multiple Vitamins-Minerals (PRESERVISION AREDS 2) CHEW Take 1 chew tab by mouth daily.      saw palmetto fruit 450 mg cap [SAW PALMETTO FRUIT 450 MG CAP] Take 1 capsule by mouth 2 (two) times a day.      tamsulosin (FLOMAX) 0.4 mg Cp24 [TAMSULOSIN (FLOMAX) 0.4 MG CP24] Take 0.4 mg by mouth at bedtime.       No current facility-administered medications for this visit.       Past Medical History:   Diagnosis Date    Anemia     Basal cell carcinoma     Cancer (H)     colon    Carotid artery occlusion     Coronary artery disease     Glaucoma     High cholesterol     History of colon cancer 08/13/2018    Hyperlipidemia     Hypertension     Left inguinal hernia     Low back pain     Malignant melanoma (H)     Mild aortic insufficiency     Moderate tricuspid regurgitation     Postnasal drip     Sleep apnea, obstructive     CPAP    Spinal stenosis     lumbar    SSS (sick sinus syndrome) (H) 12/09/2019    Umbilical hernia     Unspecified cataract     Created by Conversion        The following portions of the patient's history were reviewed and updated as appropriate: allergies, current medications, past family history, past medical history, past social history, past surgical history and problem list.           Objective:   Physical Exam:    BP (!) 189/78   Pulse 61   There is no height or weight on file to calculate BMI.      General: Alert and oriented with normal affect. Attention, knowledge, memory, and language are intact. No acute distress.   Eyes: Sclerae are clear.  Respirations: Unlabored.    Gait:  Nonantalgic  Full range of motion left shoulder and abduction.  Negative arm drop empty can speeds test on the left as well as the  right.  Sensation is intact to light touch throughout the   lower extremities.  Reflexes are  2+ patellar and 0 Achilles     Manual muscle testing reveals:  Right /Left out of 5     5/5 hip flexors  5/5 knee flexors  5/5 knee extensors  5/5 ankle plantar flexors  5/5 ankle dorsiflexors  5/5  ankle evertors

## 2025-04-07 NOTE — TELEPHONE ENCOUNTER
Procedure ordered? L5-s1 [place] [facility] - Dr Van - ON ASA     What insurance are we billing for this procedure?  Medicare.BCBS   IF SCHEDULING AT [facility] PLEASE SCHEDULE AT LEAST 7-10 BUSINESS DAYS OUT SO A PA CAN BE OBTAINED    Is a  PAIN  order available to link to injection appointment or does one need to be transcribed?  YES: L5-s1 [place] [facility] - Dr Van - ON ASA    If needed, route to CN to assist in doing so.    Is  needed?: No   If YES, please initiate in-person  request.    Will patient have a ?  Yes    All fluoro procedures require a .  These US procedures also require a : piriformis, pudendal, scalene, & carpal tunnel.    Is patient taking any blood thinners (i.e. Plavix, coumadin, jantoven, warfarin, heparin, Xarelto, Pradaxa, Eliquis, Brilinta, or Effient, etc)? No   If YES, schedule out 2 weeks, and route to RN pool to determine if medication hold is needed.    Is patient taking aspirin? Yes - 325           For CERVICAL or INTERLAMINAR procedures, route message to Care Navigation so they can seek approval from managing provider to hold aspirin for 6 days prior to the procedure.    Does patient have an allergy to contrast dye or iodine?  No  If YES, OK to schedule. Route to RN pool AND add allergy information to appointment notes    Is patient diabetic? No If YES, blood glucose level will be checked on day of procedure and will need to be 300mg/dL or below (for steroid injections).    Does patient have an active infection or treated for one within the past week? No   If YES, do NOT schedule and route to Care Navigation.     Is patient currently taking any antibiotics or have an active infection?  No  For patients on chronic, preventative, or prophylactic antibiotics, procedures may be scheduled.   For patients on antibiotics for active or recent infection: antibiotic course must have been completed and symptom-free of infection to safely  proceed with injection.  Send to Care Navigation if unsure.    Is patient actively being treated for cancer or immunocompromised? No  If YES, do NOT schedule and route to RN pool.     Any chance of pregnancy? NO   If YES, do NOT schedule and route to RN pool.    Have you had any vaccines in the last 2 weeks? NO  If YES, please route to Care Navigation to discuss before scheduling.     Reminders:  -  If you are started on any steroids or antibiotics between now and your appointment, you must contact us because it may affect our ability to perform your procedure.   reviewed    -  Informed patient that it is OK to take normal medications before the procedure and must hold blood thinners as instructed.  reviewed    -  Patients scheduled for MBBs should not take pain meds prior to injection and pain rating needs to be 5/10 or greater the day of the procedure.    -  Informed cervical injection patients that an IV will be placed as a precautionary measure.  reviewed    -  Patients should eat a light meal prior to their procedure appointment.  reviewed    -  All radiofrequency ablations are in a 40 minute time slot and not to be scheduled until in-basket message has been sent by the procedure team that the PA has been  received.  reviewed and not discussed     -  Spinal cord stimulator trial scheduling is coordinated by the procedure team.    -  Care Navigation (#[phone]) is available to assist patients with additional questions.  reviewed    -  Cervical TFESIs with Dr. Van only.

## 2025-04-08 ENCOUNTER — HOSPITAL ENCOUNTER (OUTPATIENT)
Dept: GENERAL RADIOLOGY | Facility: HOSPITAL | Age: 89
Discharge: HOME | End: 2025-04-08
Attending: PHYSICAL MEDICINE & REHABILITATION | Admitting: PHYSICAL MEDICINE & REHABILITATION
Payer: MEDICARE

## 2025-04-08 ENCOUNTER — HOSPITAL ENCOUNTER (OUTPATIENT)
Dept: CARDIAC REHAB | Facility: HOSPITAL | Age: 89
Discharge: HOME | End: 2025-04-08
Attending: INTERNAL MEDICINE
Payer: MEDICARE

## 2025-04-08 DIAGNOSIS — M25.512 ACUTE PAIN OF LEFT SHOULDER: ICD-10-CM

## 2025-04-08 PROCEDURE — 73030 X-RAY EXAM OF SHOULDER: CPT | Mod: LT

## 2025-04-08 PROCEDURE — G0239 OTH RESP PROC, GROUP: HCPCS

## 2025-04-10 ENCOUNTER — TELEPHONE (OUTPATIENT)
Dept: INTERNAL MEDICINE | Facility: CLINIC | Age: 89
End: 2025-04-10
Payer: MEDICARE

## 2025-04-10 ENCOUNTER — HOSPITAL ENCOUNTER (OUTPATIENT)
Dept: CARDIAC REHAB | Facility: HOSPITAL | Age: 89
Discharge: HOME | End: 2025-04-10
Attending: INTERNAL MEDICINE
Payer: MEDICARE

## 2025-04-10 PROCEDURE — G0239 OTH RESP PROC, GROUP: HCPCS

## 2025-04-10 NOTE — TELEPHONE ENCOUNTER
Outgoing call to nurse navigation line and left message on secured line with provider orders. Instructed to call back with any further questions.     Outgoing call to patient and relayed provider message. He stated understanding and had no further questions.

## 2025-04-10 NOTE — TELEPHONE ENCOUNTER
General Call      Reason for Call:  Stop med request     What are your questions or concerns:  Dr Campuzano at [facility] Needs OK for Iwly to Stop Asprin 1 week prior to spin injection which is May 2nd    [phone] Phone        Date of last appointment with provider: 2/28/25    Could we send this information to you in [facility] or would you prefer to receive a phone call?:   Patient would prefer a phone call   Okay to leave a detailed message?: Yes at Cell number on file:    Telephone Information:   Mobile [phone]

## 2025-04-15 ENCOUNTER — HOSPITAL ENCOUNTER (OUTPATIENT)
Dept: CARDIAC REHAB | Facility: HOSPITAL | Age: 89
Discharge: HOME | End: 2025-04-15
Attending: INTERNAL MEDICINE
Payer: MEDICARE

## 2025-04-15 PROCEDURE — G0239 OTH RESP PROC, GROUP: HCPCS

## 2025-04-17 ENCOUNTER — HOSPITAL ENCOUNTER (OUTPATIENT)
Dept: CARDIAC REHAB | Facility: HOSPITAL | Age: 89
Discharge: HOME | End: 2025-04-17
Attending: INTERNAL MEDICINE
Payer: MEDICARE

## 2025-04-17 PROCEDURE — G0239 OTH RESP PROC, GROUP: HCPCS

## 2025-04-21 ENCOUNTER — THERAPY VISIT (OUTPATIENT)
Dept: PHYSICAL THERAPY | Facility: REHABILITATION | Age: 89
End: 2025-04-21
Payer: MEDICARE

## 2025-04-21 DIAGNOSIS — M19.011 OSTEOARTHRITIS OF RIGHT GLENOHUMERAL JOINT: ICD-10-CM

## 2025-04-21 DIAGNOSIS — M41.9 SCOLIOSIS OF THORACOLUMBAR SPINE, UNSPECIFIED SCOLIOSIS TYPE: ICD-10-CM

## 2025-04-21 DIAGNOSIS — M43.16 SPONDYLOLISTHESIS OF LUMBAR REGION: ICD-10-CM

## 2025-04-21 DIAGNOSIS — M48.062 SPINAL STENOSIS OF LUMBAR REGION WITH NEUROGENIC CLAUDICATION: Primary | ICD-10-CM

## 2025-04-21 DIAGNOSIS — R20.2 PARESTHESIA OF FOOT, BILATERAL: ICD-10-CM

## 2025-04-21 PROCEDURE — 97140 MANUAL THERAPY 1/> REGIONS: CPT | Mod: GP | Performed by: PHYSICAL THERAPIST

## 2025-04-21 PROCEDURE — 97110 THERAPEUTIC EXERCISES: CPT | Mod: GP | Performed by: PHYSICAL THERAPIST

## 2025-04-21 NOTE — PROGRESS NOTES
[facility]                                                                                   OUTPATIENT PHYSICAL THERAPY    PLAN OF TREATMENT FOR OUTPATIENT REHABILITATION   Patient's Last Name, First Name, Antoni Barber YOB: 1935   Provider's Name   [facility]   Medical Record No.  [id number]     Onset Date: 01/06/25  Start of Care Date: 02/05/25     Medical Diagnosis:  Spinal stenosis of lumbar region with neurogenic claudication  Spondylolisthesis of lumbar region  Scoliosis of thoracolumbar spine, unspecified scoliosis type  Paresthesia of foot, bilateral  Osteoarthritis of right glenohumeral joint      PT Treatment Diagnosis:  Spinal stenosis of lumbar region with neurogenic claudication  Spondylolisthesis of lumbar region  Scoliosis of thoracolumbar spine, unspecified scoliosis type  Paresthesia of foot, bilateral  Osteoarthritis of right glenohumeral joint Plan of Treatment  Frequency/Duration: 1x/week as needed/ 30 days    Certification date from 04/06/25 to 05/05/25         See note for plan of treatment details and functional goals     Jb Hernandes PT                         I CERTIFY THE NEED FOR THESE SERVICES FURNISHED UNDER        THIS PLAN OF TREATMENT AND WHILE UNDER MY CARE     (Physician attestation of this document indicates review and certification of the therapy plan).              Referring Provider:  Orlin Mir    Initial Assessment  See Epic Evaluation- Start of Care Date: 02/05/25            PLAN  Continue therapy per current plan of care.  See below.    Beginning/End Dates of Progress Note Reporting Period:  02/05/25 to 04/21/2025    Referring Provider:  Orlin Mir     04/21/25 0500   Appointment Info   Signing clinician's name / credentials Jb Hernandes PT   Visits Used 8   Medical Diagnosis Spinal stenosis of lumbar region with neurogenic claudication  Spondylolisthesis of lumbar  region  Scoliosis of thoracolumbar spine, unspecified scoliosis type  Paresthesia of foot, bilateral  Osteoarthritis of right glenohumeral joint   PT Tx Diagnosis Spinal stenosis of lumbar region with neurogenic claudication  Spondylolisthesis of lumbar region  Scoliosis of thoracolumbar spine, unspecified scoliosis type  Paresthesia of foot, bilateral  Osteoarthritis of right glenohumeral joint   Progress Note/Certification   Start of Care Date 02/05/25   Onset of illness/injury or Date of Surgery 01/06/25   Therapy Frequency 1x/week as needed   Predicted Duration 30 days   Certification date from 04/06/25   Certification date to 05/05/25   Progress Note Due Date 04/06/25   Progress Note Completed Date 02/05/25   PT Goal 1   Goal Identifier self management   Goal Description Patient will be independent with HEP and self management of symptoms to facilitate return to PLOF   Rationale to maximize safety and independence within the home;to maximize safety and independence with performance of ADLs and functional tasks   Goal Progress GOAL MET-  independent with HEP   Target Date 04/06/25   Date Met 04/21/25   PT Goal 2   Goal Identifier walking   Goal Description Patient will return to walking one mile for community distance and exercise with pain 2/10 or less   Rationale to maximize safety and independence with performance of ADLs and functional tasks;to maximize safety and independence within the community   Goal Progress 30 minute walk on treadmill. Pain gets up to 3-4/10   Target Date 05/05/25   PT Goal 3   Goal Identifier standing   Goal Description Patient will stand 30 minutes for household chores with pain 2/10 or less   Rationale to maximize safety and independence with performance of ADLs and functional tasks   Goal Progress improving,  currently 3-4/10,  continue goal   Target Date 05/05/25   PT Goal 4   Goal Identifier reaching   Goal Description Patient will use right arm for overhead household tasks with  pain 2/10 or less   Rationale to maximize safety and independence with performance of ADLs and functional tasks   Goal Progress able to reach overhead with right arm with discomfort 1-2/10   Target Date 04/06/25   Date Met 03/26/25   Subjective Report   Subjective Report He returns to PT to finalize the HEP and reports that he has been doing pretty good.  he is scheduled for an injection 5/2/25.   Therapeutic Procedure/Exercise   Therapeutic Procedures: strength, endurance, ROM, flexibility minutes (32043) 27   Ther Proc 1 - Details subjective discussion of current symptoms, goals, progress and HEP.   Ther Proc 2 Pallof press   Ther Proc 2 - Details x15 R/L (Black TB at home)   Ther Proc 3 shoulder extension   Ther Proc 3 - Details x 15 B (Black TB at home)   Ther Proc 4 Bent over Ts   Ther Proc 4 - Details 2 x 12 bilat   Ther Proc 5 LTR   Ther Proc 5 - Details x 10 B alt cues for slow controlled movement.   Ther Proc 6 supine LE nerve glide   Ther Proc 6 - Details x 10 R/L   Ther Proc 7 bridging + marches   Ther Proc 7 - Details x15  with knee extension   Ther Proc 8 posterior pelvic tilt with two leg marching   Ther Proc 9 posterior pelvic tilt with tabletop foot taps   Ther Proc 9 - Details x10 R/L alt   Ther Proc 10 shouder rows   Ther Proc 10 - Details Black TB x 15 B   Skilled Intervention review and progression of HEP to improve core strength and nerve mobility.   Patient Response/Progress tolerated well, verbalized understanding   Manual Therapy   Manual Therapy: Mobilization, MFR, MLD, friction massage minutes (44179) 15   Manual Therapy 1 STM   Manual Therapy 1 - Details MFR layers 2,3 R/L: upper trap, pec minor/major, infraspinatus, deltoid, (supine 90/90 with head elevated)   Manual Therapy 2 R/L GH joint moblizations   Manual Therapy 2 - Details grade 2 distraction, grade 3 lateral distraction , loose pack distraction, posterior glide   Skilled Intervention manual therapy to improve tissue moblity and  decrease pain   Patient Response/Progress tolerated well with report of decreased pain   Education   Learner/Method Patient;No Barriers to Learning   Plan   Plan for next session hold chart 30 days   Comments   Comments Wily returns very motivated and compliant with HEP.  Good HEP performance.  We were able to review HEP and progress resistance of shoulder and core exercises.  Manual therapy was well tolerated and decreased pain levels.  Trial of independent self management.   Total Session Time   Timed Code Treatment Minutes 42   Total Treatment Time (sum of timed and untimed services) 42     Jb Hernandes, PT

## 2025-04-22 ENCOUNTER — HOSPITAL ENCOUNTER (OUTPATIENT)
Dept: CARDIAC REHAB | Facility: HOSPITAL | Age: 89
Discharge: HOME | End: 2025-04-22
Attending: INTERNAL MEDICINE
Payer: MEDICARE

## 2025-04-22 PROCEDURE — G0239 OTH RESP PROC, GROUP: HCPCS

## 2025-04-24 ENCOUNTER — HOSPITAL ENCOUNTER (OUTPATIENT)
Dept: CARDIAC REHAB | Facility: HOSPITAL | Age: 89
Discharge: HOME | End: 2025-04-24
Attending: INTERNAL MEDICINE
Payer: MEDICARE

## 2025-04-24 PROCEDURE — G0239 OTH RESP PROC, GROUP: HCPCS

## 2025-04-29 ENCOUNTER — HOSPITAL ENCOUNTER (OUTPATIENT)
Dept: CARDIAC REHAB | Facility: HOSPITAL | Age: 89
Discharge: HOME | End: 2025-04-29
Attending: INTERNAL MEDICINE
Payer: MEDICARE

## 2025-04-29 PROCEDURE — G0239 OTH RESP PROC, GROUP: HCPCS

## 2025-05-01 ENCOUNTER — HOSPITAL ENCOUNTER (OUTPATIENT)
Dept: CARDIAC REHAB | Facility: HOSPITAL | Age: 89
Discharge: HOME | End: 2025-05-01
Attending: INTERNAL MEDICINE
Payer: MEDICARE

## 2025-05-01 PROCEDURE — G0239 OTH RESP PROC, GROUP: HCPCS

## 2025-05-01 PROCEDURE — G0238 OTH RESP PROC, INDIV: HCPCS

## 2025-06-25 ENCOUNTER — OFFICE VISIT (OUTPATIENT)
Dept: INTERNAL MEDICINE | Facility: CLINIC | Age: 89
End: 2025-06-25
Payer: MEDICARE

## 2025-06-25 VITALS
BODY MASS INDEX: 24.63 KG/M2 | WEIGHT: 139 LBS | SYSTOLIC BLOOD PRESSURE: 150 MMHG | TEMPERATURE: 98 F | DIASTOLIC BLOOD PRESSURE: 69 MMHG | HEART RATE: 60 BPM | HEIGHT: 63 IN | RESPIRATION RATE: 15 BRPM | OXYGEN SATURATION: 99 %

## 2025-06-25 DIAGNOSIS — I25.10 CORONARY ARTERY DISEASE DUE TO CALCIFIED CORONARY LESION: ICD-10-CM

## 2025-06-25 DIAGNOSIS — G47.33 OSA (OBSTRUCTIVE SLEEP APNEA): ICD-10-CM

## 2025-06-25 DIAGNOSIS — I25.84 CORONARY ARTERY DISEASE DUE TO CALCIFIED CORONARY LESION: ICD-10-CM

## 2025-06-25 DIAGNOSIS — I10 ESSENTIAL HYPERTENSION: Primary | ICD-10-CM

## 2025-06-25 PROCEDURE — 3078F DIAST BP <80 MM HG: CPT | Performed by: INTERNAL MEDICINE

## 2025-06-25 PROCEDURE — G2211 COMPLEX E/M VISIT ADD ON: HCPCS | Performed by: INTERNAL MEDICINE

## 2025-06-25 PROCEDURE — 3077F SYST BP >= 140 MM HG: CPT | Performed by: INTERNAL MEDICINE

## 2025-06-25 PROCEDURE — 99213 OFFICE O/P EST LOW 20 MIN: CPT | Performed by: INTERNAL MEDICINE

## 2025-06-25 NOTE — PROGRESS NOTES
Antoni Stoll   89 year old male    Date of Visit: 6/25/2025    Chief Complaint   Patient presents with    Follow Up     BP check     Subjective  89-year-old male whose had elevated blood pressures in clinic consistently.  He has been following his blood pressure closely with nearly daily blood pressure checks.  He has occasional higher blood pressure spikes up in the 170/90 range, which is generally seen in clinic.  But otherwise his blood pressure is usually normal in the 120-140s/70s and occasionally on the lower side in the 110/60 range with some mild orthostasis but not severe.    He still going to the gym and doing the treadmill and being active 5 days a week with good exertional ability.    He is eating well.  Weight is stable.  No abdominal pain complaints or blood in stool or bleeding issues.    No chest pain or chest pressure or palpitations.    He is highly compliant with his CPAP at night.  No flare of his spinal stenosis pain.        PMHx:    Past Medical History:   Diagnosis Date    Anemia     Basal cell carcinoma     Cancer (H)     colon    Carotid artery occlusion     Coronary artery disease     Glaucoma     High cholesterol     History of colon cancer 08/13/2018    Hyperlipidemia     Hypertension     Left inguinal hernia     Low back pain     Malignant melanoma (H)     Mild aortic insufficiency     Moderate tricuspid regurgitation     Postnasal drip     Sleep apnea, obstructive     CPAP    Spinal stenosis     lumbar    SSS (sick sinus syndrome) (H) 12/09/2019    Umbilical hernia     Unspecified cataract     Created by Conversion      PSHx:    Past Surgical History:   Procedure Laterality Date    ARTERIAL BYPASS SURGERY      BLEPHAROPLASTY Bilateral     BYPASS GRAFT ARTERY CORONARY  06/28/2013    5-vessel    BYPASS GRAFT ARTERY CORONARY  2013    5 vessel    CARDIAC CATHETERIZATION  06/03/2013    CATARACT EXTRACTION Bilateral     COLON SURGERY Right     HERNIA REPAIR      right inguinal     "HERNIORRHAPHY INGUINAL Left 2/14/2022    Procedure: INCARCERATED LEFT INGUINAL HERNIA REPAIR WITH MESH;  Surgeon: Jack Naqvi MD;  Location: [facility] OR    HERNIORRHAPHY INGUINAL Right 10/4/2024    Procedure: REPAIR OF RECURRENT RIGHT INGUINAL HERNIA WITH MESH;  Surgeon: Saundra Mario MD;  Location: [facility] OR    STRIP VEIN Left      Immunizations:   Immunization History   Administered Date(s) Administered    COVID-19 Bivalent 12+ (Pfizer) 07/25/2023    COVID-19 MONOVALENT 12+ (Pfizer) 04/16/2021, 05/18/2021, 01/11/2022    COVID-19 Monovalent 12+ (Pfizer 2022) 07/25/2022    Flu, Unspecified 10/23/2007, 10/29/2009, 10/11/2010, 11/08/2011, 11/05/2021, 09/01/2022    Influenza (H1N1) 01/18/2010    Influenza (High Dose) Trivalent,PF (Fluzone) 10/13/2015, 10/18/2016, 11/01/2017, 12/10/2018, 12/11/2019, 10/14/2024    Influenza (IIV3) PF 10/23/2007, 10/29/2009, 11/08/2011, 11/29/2012, 10/25/2013, 11/19/2014    Influenza (prior to 2024) 10/11/2010    Influenza Vaccine 65+ (FLUAD) 11/05/2021    Influenza Vaccine 65+ (Fluzone HD) 11/30/2020, 01/25/2023, 09/26/2023    Influenza Vaccine, 6+MO IM (QUADRIVALENT W/PRESERVATIVES) 11/29/2012, 10/25/2013, 11/19/2014    Pneumo Conj 13-V (2010&after) 04/09/2015    Pneumococcal 20 valent Conjugate (Prevnar 20) 01/15/2025    Pneumococcal 23 valent 10/03/1999, 03/06/2007    Pneumococcal, Unspecified 01/01/2012, 04/09/2015, 04/09/2016    TDAP (Adacel,Boostrix) 08/26/2022    Td (Adult), Adsorbed 03/11/1992, 10/24/2000, 04/11/2011    Zoster vaccine, live 11/08/2007       ROS A comprehensive review of systems was performed and was otherwise negative    Medications, allergies, and problem list were reviewed and updated    Exam  BP (!) 150/69   Pulse 60   Temp 98  F (36.7  C)   Resp 15   Ht 1.6 m (5' 3\")   Wt 63 kg (139 lb)   SpO2 99%   BMI 24.62 kg/m    Moderate kyphosis.  Heart is regular without murmur.  No ankle edema.        Assessment/Plan  1. Essential " hypertension (Primary)  Blood pressure controlled at home with extensive regular data.  He has a whitecoat hypertension syndrome and some fluctuations in his blood pressure consistent with age and likely some level of autonomic insufficiency.    He is compliant with CPAP and regular exercise and diet.    Continue on the lisinopril 2.5 mg a day.  His high blood pressure spikes are fairly infrequent and not severe.    Could consider changing to amlodipine if he is having more low blood pressure symptoms..  Otherwise follow-up with his physical exam next winter    2. ALECIA (obstructive sleep apnea)  Continue CPAP    3. Coronary artery disease due to calcified coronary lesion   asymptomatic.  Continue aspirin and atorvastatin.     The longitudinal plan of care for the diagnosis(es)/condition(s) as documented were addressed during this visit. Due to the added complexity in care, I will continue to support Wily in the subsequent management and with ongoing continuity of care.        Return in 32 weeks (on 2/4/2026) for Annual wellness visit.   Patient Instructions   No change in medication.  See me next winter for your physical     Sarthak Romano MD, MD        Current Outpatient Medications   Medication Sig Dispense Refill    acetaminophen (TYLENOL) 500 MG tablet Take 1,000 mg by mouth every 8 hours as needed.      atorvastatin (LIPITOR) 20 MG tablet TAKE 1 TABLET(20 MG) BY MOUTH DAILY 90 tablet 2    azelastine (ASTELIN) 0.1 % nasal spray 2 sprays each nostril 1-2x daily as needed for nasal congestion (use nightly for first 2 week) 30 mL 11    calcium polycarbophil (FIBERCON) 625 mg tablet Take 2 tablets by mouth daily.      cholecalciferol, vitamin D3, 1,000 unit tablet Take 1 tablet by mouth daily.      glucosamine-chondroitin 500-400 mg cap [GLUCOSAMINE-CHONDROITIN 500-400 MG CAP] Take 1 capsule by mouth 2 (two) times a day.      latanoprost (XALATAN) 0.005 % ophthalmic solution Place 1 drop into both eyes At Bedtime       "lisinopril (ZESTRIL) 2.5 MG tablet TAKE 1 TABLET BY MOUTH EVERY DAY 90 tablet 3    loratadine 10 MG capsule Take 10 mg by mouth daily      Multiple Vitamins-Minerals (PRESERVISION AREDS 2) CHEW Take 1 chew tab by mouth daily.      tamsulosin (FLOMAX) 0.4 mg Cp24 [TAMSULOSIN (FLOMAX) 0.4 MG CP24] Take 0.4 mg by mouth at bedtime.       No Known Allergies  Social History     Tobacco Use    Smoking status: Never     Passive exposure: Past    Smokeless tobacco: Never   Vaping Use    Vaping status: Never Used   Substance Use Topics    Alcohol use: Yes     Comment: Alcoholic Drinks/day: 5 drinks weekly    Drug use: No             Subjective   Wily is a 89 year old, presenting for the following health issues:  Follow Up (BP check)        6/25/2025     2:21 PM   Additional Questions   Roomed by Marilee RAIN   Accompanied by bryn     History of Present Illness       Hypertension: He presents for follow up of hypertension.  He does check blood pressure  regularly outside of the clinic. Outside blood pressures have been over 140/90. He follows a low salt diet.     He eats 2-3 servings of fruits and vegetables daily.He consumes 1 sweetened beverage(s) daily.He exercises with enough effort to increase his heart rate 30 to 60 minutes per day.  He exercises with enough effort to increase his heart rate 5 days per week.   He is taking medications regularly.                      Objective    BP (!) 150/69   Pulse 60   Temp 98  F (36.7  C)   Resp 15   Ht 1.6 m (5' 3\")   Wt 63 kg (139 lb)   SpO2 99%   BMI 24.62 kg/m    Body mass index is 24.62 kg/m .  Physical Exam               Signed Electronically by: Sarthak Romano MD    "

## 2025-07-10 ENCOUNTER — TRANSFERRED RECORDS (OUTPATIENT)
Dept: HEALTH INFORMATION MANAGEMENT | Facility: CLINIC | Age: 89
End: 2025-07-10
Payer: MEDICARE

## 2025-07-21 ENCOUNTER — OFFICE VISIT (OUTPATIENT)
Dept: PULMONOLOGY | Facility: CLINIC | Age: 89
End: 2025-07-21
Attending: INTERNAL MEDICINE
Payer: MEDICARE

## 2025-07-21 VITALS
BODY MASS INDEX: 25.05 KG/M2 | DIASTOLIC BLOOD PRESSURE: 86 MMHG | OXYGEN SATURATION: 100 % | HEART RATE: 61 BPM | WEIGHT: 141.4 LBS | SYSTOLIC BLOOD PRESSURE: 144 MMHG

## 2025-07-21 DIAGNOSIS — R06.09 DOE (DYSPNEA ON EXERTION): Primary | ICD-10-CM

## 2025-07-21 PROCEDURE — 99213 OFFICE O/P EST LOW 20 MIN: CPT | Performed by: INTERNAL MEDICINE

## 2025-07-21 PROCEDURE — G2211 COMPLEX E/M VISIT ADD ON: HCPCS | Performed by: INTERNAL MEDICINE

## 2025-07-21 PROCEDURE — 3079F DIAST BP 80-89 MM HG: CPT | Performed by: INTERNAL MEDICINE

## 2025-07-21 PROCEDURE — 3077F SYST BP >= 140 MM HG: CPT | Performed by: INTERNAL MEDICINE

## 2025-07-21 NOTE — LETTER
7/21/2025      Antoni Stoll  [address]  [facility] [address]      Dear Colleague,    Thank you for referring your patient, Antoni Stoll, to the [facility]. Please see a copy of my visit note below.    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 His PFTs in 2023 were essentially normal; he did have very mild restriction based on the TLC z-score, likely from his kyphoscoliosis which is likely contributing to his mild VILLA, as well as his pulmonary hypertension. He appears euvolemic today. His last CXR was in 2024. He is not having any concerning respiratory symptoms to warrant additional chest imaging. His VILLA appears to be on the mild side and he is not really limited by his breathing. I suspect it's mostly due to HFpEF/diastolic dysfunction. We will repeat his PFTs at the next visit to see if there has been any change in his lung function.    Recommendations:  - encouraged to continue to exercise and remain active as he's doing  - continue follow up   - repeat PFTs next visit  - heart healthy diet reinforced  - UTD with vaccinations. Should get RSV vaccine; defer to his PMD.    Follow up in 6 months with PFTs.    The longitudinal plan of care for the diagnosis(es)/condition(s) as documented were addressed during this visit. Due to the added complexity in care, I will continue to support Wily in the subsequent management and with ongoing continuity of care.     Landon (Joel) MD [place]  [facility] ([facility])  Send me a secure message using [employer] [phone]  Fax [phone]     CCx: dyspnea on exertion     HPI:  Interim history: I last saw Wily on 1/15. Since that time, he has been participating in pulmonary rehab.   Today, he reports he's doing about the same. He still has dyspnea on exertion. It is generally manageable. He remains very active at his senior living facility. He participates in exercises classes, yoga, pool therapy and walks on a treadmill. He has trouble climbing a flight of stairs. Overall he feels it's manageable but has progressed over the years. He doesn't feel limited by his breathing.   He denies any cough, wheezing, chest pain or chest pressure, fevers or night sweats.     ROS:  A 12-system review was obtained and was negative with the exception of the symptoms endorsed in the history of present illness.    PMH:  Past Medical History:   Diagnosis Date     Anemia      Basal cell carcinoma      Cancer (H)     colon     Carotid artery occlusion      Coronary artery disease      Glaucoma      High cholesterol      History of colon cancer 08/13/2018     Hyperlipidemia      Hypertension      Left inguinal hernia      Low back pain      Malignant melanoma (H)      Mild aortic insufficiency      Moderate tricuspid regurgitation      Postnasal drip      Sleep apnea, obstructive     CPAP     Spinal stenosis     lumbar     SSS (sick sinus syndrome) (H) 12/09/2019     Umbilical hernia      Unspecified cataract     Created by Conversion         PSH:  Past Surgical History:   Procedure Laterality Date     ARTERIAL BYPASS SURGERY       BLEPHAROPLASTY Bilateral      BYPASS GRAFT ARTERY CORONARY  06/28/2013    5-vessel     BYPASS GRAFT ARTERY CORONARY  2013    5 vessel     CARDIAC CATHETERIZATION  06/03/2013     CATARACT EXTRACTION Bilateral      COLON SURGERY Right      HERNIA REPAIR      right inguinal     HERNIORRHAPHY INGUINAL Left 2/14/2022    Procedure: INCARCERATED LEFT INGUINAL HERNIA REPAIR WITH MESH;  Surgeon: Jack Naqvi MD;  Location: [facility] [place] OR     HERNIORRHAPHY INGUINAL Right 10/4/2024     Procedure: REPAIR OF RECURRENT RIGHT INGUINAL HERNIA WITH MESH;  Surgeon: Saundra Mario MD;  Location: [facility] Main OR     STRIP VEIN Left        Allergies:  No Known Allergies    Family HX:  Family History   Problem Relation Age of Onset     Coronary Artery Disease Mother      Colon Cancer Father      Coronary Artery Disease Father        Social Hx:  Social History     Socioeconomic History     Marital status: Single     Spouse name: Not on file     Number of children: Not on file     Years of education: Not on file     Highest education level: Not on file   Occupational History     Not on file   Tobacco Use     Smoking status: Never     Passive exposure: Past     Smokeless tobacco: Never   Vaping Use     Vaping status: Never Used   Substance and Sexual Activity     Alcohol use: Yes     Comment: Alcoholic Drinks/day: 5 drinks weekly     Drug use: No     Sexual activity: Not on file   Other Topics Concern     Parent/sibling w/ CABG, MI or angioplasty before 65F 55M? Not Asked   Social History Narrative     Not on file     Social Drivers of Health     Financial Resource Strain: Low Risk  (1/27/2025)    Financial Resource Strain      Within the past 12 months, have you or your family members you live with been unable to get utilities (heat, electricity) when it was really needed?: No   Food Insecurity: Low Risk  (1/27/2025)    Food Insecurity      Within the past 12 months, did you worry that your food would run out before you got money to buy more?: No      Within the past 12 months, did the food you bought just not last and you didn t have money to get more?: No   Transportation Needs: Low Risk  (1/27/2025)    Transportation Needs      Within the past 12 months, has lack of transportation kept you from medical appointments, getting your medicines, non-medical meetings or appointments, work, or from getting things that you need?: No   Physical Activity: Sufficiently Active (1/27/2025)    Exercise Vital Sign       Days of Exercise per Week: 5 days      Minutes of Exercise per Session: 80 min   Stress: No Stress Concern Present (1/27/2025)    [employer] - Occupational Stress Questionnaire      Feeling of Stress : Not at all   Social Connections: Unknown (1/27/2025)    Social Connection and Isolation Panel [NHANES]      Frequency of Communication with Friends and Family: Not on file      Frequency of Social Gatherings with Friends and Family: Three times a week      Attends [employer] Services: Not on file      Active Member of Clubs or Organizations: Not on file      Attends Club or Organization Meetings: Not on file      Marital Status: Not on file   Interpersonal Safety: Low Risk  (1/31/2025)    Interpersonal Safety      Do you feel physically and emotionally safe where you currently live?: Yes      Within the past 12 months, have you been hit, slapped, kicked or otherwise physically hurt by someone?: No      Within the past 12 months, have you been humiliated or emotionally abused in other ways by your partner or ex-partner?: No   Housing Stability: Low Risk  (1/27/2025)    Housing Stability      Do you have housing? : Yes      Are you worried about losing your housing?: No       Current Meds:  Current Outpatient Medications   Medication Sig Dispense Refill     acetaminophen (TYLENOL) 500 MG tablet Take 1,000 mg by mouth every 8 hours as needed.       atorvastatin (LIPITOR) 20 MG tablet TAKE 1 TABLET(20 MG) BY MOUTH DAILY 90 tablet 2     azelastine (ASTELIN) 0.1 % nasal spray 2 sprays each nostril 1-2x daily as needed for nasal congestion (use nightly for first 2 week) 30 mL 11     calcium polycarbophil (FIBERCON) 625 mg tablet Take 2 tablets by mouth daily.       cholecalciferol, vitamin D3, 1,000 unit tablet Take 1 tablet by mouth daily.       glucosamine-chondroitin 500-400 mg cap [GLUCOSAMINE-CHONDROITIN 500-400 MG CAP] Take 1 capsule by mouth 2 (two) times a day.       latanoprost (XALATAN)  0.005 % ophthalmic solution Place 1 drop into both eyes At Bedtime       lisinopril (ZESTRIL) 2.5 MG tablet TAKE 1 TABLET BY MOUTH EVERY DAY 90 tablet 3     loratadine 10 MG capsule Take 10 mg by mouth daily       tamsulosin (FLOMAX) 0.4 mg Cp24 [TAMSULOSIN (FLOMAX) 0.4 MG CP24] Take 0.4 mg by mouth at bedtime.         Physical Exam:  BP (!) 144/86 (BP Location: Left arm, Patient Position: Sitting, Cuff Size: Adult Regular)   Pulse 61   Wt 64.1 kg (141 lb 6.4 oz)   SpO2 100%   BMI 25.05 kg/m    Gen: awake, alert, oriented, no distress  HEENT: nasal turbinates are unremarkable, no oropharyngeal lesions, no cervical or supraclavicular lymphadenopathy  CV: RRR, no M/G/R  Resp: CTAB, no focal crackles or wheezes  Skin: no apparent rashes  Ext: no cyanosis, clubbing or edema  Neuro: alert, nonfocal    Labs:  reviewed    Imaging studies:  Personally reviewed    EXAM: XR CHEST 2 VIEWS  LOCATION: [facility]  DATE: 1/9/2024     INDICATION: dyspnea on exertion  COMPARISON: 05/27/2021                                                                      IMPRESSION: Sternotomy. Some minimal scattered fibrosis. Lungs otherwise clear. No acute findings.    Echo from Feb 2025  Interpretation Summary     Left ventricular size, wall motion and function are normal. The ejection  fraction is 55-60%.  Normal right ventricle size and systolic function.  The left atrium is moderately dilated.  The right atrium is moderately dilated.  There is mild (1+) aortic regurgitation.  There is mild (1+) mitral regurgitation.  There is mild (1+) tricuspid regurgitation.     When compared to previous study on 2-8-2023, both atria are enlarged.    Pulmonary Function Testing  5/3/2023  FEV1 2.21L, 92%  FVC 2.9L 90%  Ratio 0.76  TLC 5.52L, 82%, mild restriction based on z-score.  Dlco 91% marbin for hgb    Again, thank you for allowing me to participate in the care of your patient.        Sincerely,        Landon Fung,  MD    Electronically signed

## 2025-07-21 NOTE — PROGRESS NOTES
Patient oxygen saturation on RA at rest is 95%. Heart rate: 110  Oxygen saturation after ambulating 400 feet on RA is 94%. Heart rate: 94    Patient is ambulatory within his/her home.      Alex BRITO CMA, [facility]

## 2025-07-21 NOTE — PROGRESS NOTES
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 His PFTs in 2023 were essentially normal; he did have very mild restriction based on the TLC z-score, likely from his kyphoscoliosis which is likely contributing to his mild VILLA, as well as his pulmonary hypertension. He appears euvolemic today. His last CXR was in 2024. He is not having any concerning respiratory symptoms to warrant additional chest imaging. His VILLA appears to be on the mild side and he is not really limited by his breathing. I suspect it's mostly due to HFpEF/diastolic dysfunction. We will repeat his PFTs at the next visit to see if there has been any change in his lung function.    Recommendations:  - encouraged to continue to exercise and remain active as he's doing  - continue follow up   - repeat PFTs next visit  - heart healthy diet reinforced  - UTD with vaccinations. Should get RSV vaccine; defer to his PMD.    Follow up in 6 months with PFTs.    The longitudinal plan of care for the diagnosis(es)/condition(s) as documented were addressed during this visit. Due to the added complexity in care, I will continue to support Wily in the subsequent management and with ongoing continuity of care.     Landon Fung MD (Avi)  [facility] ([facility])  Send me a secure message using [employer]  Clinic [phone]  Fax [phone]     CCx: dyspnea on exertion     HPI: Interim history: I last saw Wily on 1/15. Since that time, he has been participating in pulmonary rehab.   Today, he reports he's doing about the same. He still has dyspnea on exertion. It is generally manageable. He remains very active at his senior  living facility. He participates in exercises classes, yoga, pool therapy and walks on a treadmill. He has trouble climbing a flight of stairs. Overall he feels it's manageable but has progressed over the years. He doesn't feel limited by his breathing.   He denies any cough, wheezing, chest pain or chest pressure, fevers or night sweats.     ROS:  A 12-system review was obtained and was negative with the exception of the symptoms endorsed in the history of present illness.    PMH:  Past Medical History:   Diagnosis Date    Anemia     Basal cell carcinoma     Cancer (H)     colon    Carotid artery occlusion     Coronary artery disease     Glaucoma     High cholesterol     History of colon cancer 08/13/2018    Hyperlipidemia     Hypertension     Left inguinal hernia     Low back pain     Malignant melanoma (H)     Mild aortic insufficiency     Moderate tricuspid regurgitation     Postnasal drip     Sleep apnea, obstructive     CPAP    Spinal stenosis     lumbar    SSS (sick sinus syndrome) (H) 12/09/2019    Umbilical hernia     Unspecified cataract     Created by Conversion         PSH:  Past Surgical History:   Procedure Laterality Date    ARTERIAL BYPASS SURGERY      BLEPHAROPLASTY Bilateral     BYPASS GRAFT ARTERY CORONARY  06/28/2013    5-vessel    BYPASS GRAFT ARTERY CORONARY  2013    5 vessel    CARDIAC CATHETERIZATION  06/03/2013    CATARACT EXTRACTION Bilateral     COLON SURGERY Right     HERNIA REPAIR      right inguinal    HERNIORRHAPHY INGUINAL Left 2/14/2022    Procedure: INCARCERATED LEFT INGUINAL HERNIA REPAIR WITH MESH;  Surgeon: Jack Naqvi MD;  Location: [facility] [place] [place] OR    HERNIORRHAPHY INGUINAL Right 10/4/2024    Procedure: REPAIR OF RECURRENT RIGHT INGUINAL HERNIA WITH MESH;  Surgeon: Saundra Mario MD;  Location: [facility] [place] [place] OR    STRIP VEIN Left        Allergies:  No Known Allergies    Family HX:  Family History   Problem Relation Age of Onset    Coronary Artery Disease Mother     Colon  Cancer Father     Coronary Artery Disease Father        Social Hx:  Social History     Socioeconomic History    Marital status: Single     Spouse name: Not on file    Number of children: Not on file    Years of education: Not on file    Highest education level: Not on file   Occupational History    Not on file   Tobacco Use    Smoking status: Never     Passive exposure: Past    Smokeless tobacco: Never   Vaping Use    Vaping status: Never Used   Substance and Sexual Activity    Alcohol use: Yes     Comment: Alcoholic Drinks/day: 5 drinks weekly    Drug use: No    Sexual activity: Not on file   Other Topics Concern    Parent/sibling w/ CABG, MI or angioplasty before 65F 55M? Not Asked   Social History Narrative    Not on file     Social Drivers of Health     Financial Resource Strain: Low Risk  (1/27/2025)    Financial Resource Strain     Within the past 12 months, have you or your family members you live with been unable to get utilities (heat, electricity) when it was really needed?: No   Food Insecurity: Low Risk  (1/27/2025)    Food Insecurity     Within the past 12 months, did you worry that your food would run out before you got money to buy more?: No     Within the past 12 months, did the food you bought just not last and you didn t have money to get more?: No   Transportation Needs: Low Risk  (1/27/2025)    Transportation Needs     Within the past 12 months, has lack of transportation kept you from medical appointments, getting your medicines, non-medical meetings or appointments, work, or from getting things that you need?: No   Physical Activity: Sufficiently Active (1/27/2025)    Exercise Vital Sign     Days of Exercise per Week: 5 days     Minutes of Exercise per Session: 80 min   Stress: No Stress Concern Present (1/27/2025)    [employer] - Occupational Stress Questionnaire     Feeling of Stress : Not at all   Social Connections: Unknown (1/27/2025)    Social Connection and  Isolation Panel [NHANES]     Frequency of Communication with Friends and Family: Not on file     Frequency of Social Gatherings with Friends and Family: Three times a week     Attends Scientology Services: Not on file     Active Member of Clubs or Organizations: Not on file     Attends Club or Organization Meetings: Not on file     Marital Status: Not on file   Interpersonal Safety: Low Risk  (1/31/2025)    Interpersonal Safety     Do you feel physically and emotionally safe where you currently live?: Yes     Within the past 12 months, have you been hit, slapped, kicked or otherwise physically hurt by someone?: No     Within the past 12 months, have you been humiliated or emotionally abused in other ways by your partner or ex-partner?: No   Housing Stability: Low Risk  (1/27/2025)    Housing Stability     Do you have housing? : Yes     Are you worried about losing your housing?: No       Current Meds:  Current Outpatient Medications   Medication Sig Dispense Refill    acetaminophen (TYLENOL) 500 MG tablet Take 1,000 mg by mouth every 8 hours as needed.      atorvastatin (LIPITOR) 20 MG tablet TAKE 1 TABLET(20 MG) BY MOUTH DAILY 90 tablet 2    azelastine (ASTELIN) 0.1 % nasal spray 2 sprays each nostril 1-2x daily as needed for nasal congestion (use nightly for first 2 week) 30 mL 11    calcium polycarbophil (FIBERCON) 625 mg tablet Take 2 tablets by mouth daily.      cholecalciferol, vitamin D3, 1,000 unit tablet Take 1 tablet by mouth daily.      glucosamine-chondroitin 500-400 mg cap [GLUCOSAMINE-CHONDROITIN 500-400 MG CAP] Take 1 capsule by mouth 2 (two) times a day.      latanoprost (XALATAN) 0.005 % ophthalmic solution Place 1 drop into both eyes At Bedtime      lisinopril (ZESTRIL) 2.5 MG tablet TAKE 1 TABLET BY MOUTH EVERY DAY 90 tablet 3    loratadine 10 MG capsule Take 10 mg by mouth daily      tamsulosin (FLOMAX) 0.4 mg Cp24 [TAMSULOSIN (FLOMAX) 0.4 MG CP24] Take 0.4 mg by mouth at bedtime.         Physical  Exam:  BP (!) 144/86 (BP Location: Left arm, Patient Position: Sitting, Cuff Size: Adult Regular)   Pulse 61   Wt 64.1 kg (141 lb 6.4 oz)   SpO2 100%   BMI 25.05 kg/m    Gen: awake, alert, oriented, no distress  HEENT: nasal turbinates are unremarkable, no oropharyngeal lesions, no cervical or supraclavicular lymphadenopathy  CV: RRR, no M/G/R  Resp: CTAB, no focal crackles or wheezes  Skin: no apparent rashes  Ext: no cyanosis, clubbing or edema  Neuro: alert, nonfocal    Labs:  reviewed    Imaging studies:  Personally reviewed    EXAM: XR CHEST 2 VIEWS  LOCATION: [facility]  DATE: 1/9/2024     INDICATION: dyspnea on exertion  COMPARISON: 05/27/2021                                                                      IMPRESSION: Sternotomy. Some minimal scattered fibrosis. Lungs otherwise clear. No acute findings.    Echo from Feb 2025  Interpretation Summary     Left ventricular size, wall motion and function are normal. The ejection  fraction is 55-60%.  Normal right ventricle size and systolic function.  The left atrium is moderately dilated.  The right atrium is moderately dilated.  There is mild (1+) aortic regurgitation.  There is mild (1+) mitral regurgitation.  There is mild (1+) tricuspid regurgitation.     When compared to previous study on 2-8-2023, both atria are enlarged.    Pulmonary Function Testing  5/3/2023  FEV1 2.21L, 92%  FVC 2.9L 90%  Ratio 0.76  TLC 5.52L, 82%, mild restriction based on z-score.  Dlco 91% marbin for hgb

## 2025-07-30 NOTE — PROGRESS NOTES
Assessment/Plan:      Wily was seen today for back pain.    Diagnoses and all orders for this visit:    Spinal stenosis of lumbar region with neurogenic claudication    Spondylolisthesis of lumbar region    Scoliosis of thoracolumbar spine, unspecified scoliosis type    Paresthesia of foot, bilateral         Assessment: Pleasant 89 year old male with history of colon cancer, hyperlipidemia, hypertension, carotid artery occlusion, coronary artery disease with:        1.  Persistent chronic lumbar spine pain with bilateral lower extremity claudication symptoms.    He has right greater than left lower extremity pain and paresthesias related to severe spinal stenosis L4-5 and slightly less at L3-4  with L4-5 spondylolisthesis.  There is tortuosity of the nerve roots.  Pain persist despite physical therapy, medications and lumbar epidurals. bilateral L5-S1 transforaminal epidural steroid injection did offer some relief but only 40 to 50% and that benefit slowly diminished over 1 month.  Ongoing numbness and tingling in his right foot.  He was seen by Dr. Kenny at [facility] orthopedics and is seeing Dr Torres.  He would be a surgical candidate but holding off as long as possible.   No weakness of the lower extremities.   Continues with lower extremity paresthesias up to the knees can only tolerate maybe 20 minutes of standing.  He does have chronic L5-S1 radicular findings on recent EMG and neurology.   Had been tolerating Lyrica 25 mg twice daily without significant side effects.  Has been now off of Lyrica couple of months with no change in symptoms.  Last MRI was from 2022.  Minimal if any benefit with recent physical therapy.  Over 50% improvement following L5-S1 interlaminar epidural steroid injection for 2 months now pain is worsened back to baseline.  Has [facility] of [facility] feels that is going well.        2.  Bilateral foot paresthesias consistent with peripheral polyneuropathy may have a  contribution from spinal stenosis.  No improvement      4.  Persistent chronic right shoulder pain.   complete tear of the rotator cuff supraspinatus with retraction of the glenohumeral joint effusion in the past which responded well to glenohumeral joint injection. No improvement with the last injection on September 7, 2022.    Did not have right shoulder block.  Not treated today         Discussion:    1.  We discussed his current progress.  His pain is essentially tolerable with physical therapy and Tylenol.  He continues to have the paresthesias as lumbar pain and shoulder pain.  We discussed medication options for neuropathy neuropathic pain but he is not interested in medications and he has not had any significant benefit from medications in the past.    2.  Continue with physical therapy and he will look at continuing a home exercise program in the pool on his own.    3.  Continue Tylenol as needed.    4.  Can consider additional lumbar epidural steroid injection if his symptoms significantly flare in the future.  Had 2 months of over 50% relief of his symptoms now pain is worsening.  This was with an interlaminar can consider TFESI or caudal epidural to see if he would have more benefit.    5.  Follow-up 3 months which will be after his physical therapy for ongoing management of his chronic complex musculoskeletal case.    It was our pleasure caring for your patient today, if there any questions or concerns please do not hesitate to contact us.    The longitudinal plan of care for the diagnosis(es)/condition(s) as documented were addressed during this visit. Due to the added complexity in care, I will continue to support Wily in the subsequent management and with ongoing continuity of care.      Subjective:   Patient ID: Antoni Stoll is a 89 year old male.    History of Present Illness: Patient presents for follow-up of lumbar spine pain bilateral gluteal pain left greater than right bilateral foot  paresthesias also has neck shoulder pain.  Working with [employer] physical therapy.  His back pain is worse with standing walking better with sitting or lying down.  Pain is 6/10 at worst 3/10 today 1/10 at best.  Has been to 3-4 visits of pool therapy very comfortable in the water doing his exercises when he gets out of the pool has not noticed any significant improvement in symptoms as of yet.  The injection back in May lasted about 6 to 8 weeks of over 50% relief of symptoms and the pain is now worsened again back to prior to the injection.  Taking Tylenol which keeps him comfortable.  Tried gabapentin in the past as well as Lyrica for his paresthesias and low back pain which did not offer any benefit is now off the medications wants to avoid meds if possible.      Imaging: MRI lumbar spine August 2022 was reviewed showing grade 1 spondylolisthesis L3 and L4 L4 and L5 mild disc height loss L5-S1 with moderate facet arthropathy no central canal or foraminal stenosis.  L4-5 severe central stenosis L3-4 moderate right mild left foraminal stenosis and severe spinal stenosis.    I reviewed CT abdomen and pelvis as well from October 2024 showing significant left lumbar scoliotic curve L4-L5-S1 spondylolisthesis with severe facet arthropathy.    Review of Systems: Review of systems complains of paresthesias and weakness.  Denies bowel or bladder incontinence, headaches, falls, swallowing issues fevers or unintentional weight loss.       Current Outpatient Medications   Medication Sig Dispense Refill    acetaminophen (TYLENOL) 500 MG tablet Take 1,000 mg by mouth every 8 hours as needed.      atorvastatin (LIPITOR) 20 MG tablet TAKE 1 TABLET(20 MG) BY MOUTH DAILY 90 tablet 2    azelastine (ASTELIN) 0.1 % nasal spray 2 sprays each nostril 1-2x daily as needed for nasal congestion (use nightly for first 2 week) 30 mL 11    calcium polycarbophil (FIBERCON) 625 mg tablet Take 2 tablets by mouth daily.      cholecalciferol,  vitamin D3, 1,000 unit tablet Take 1 tablet by mouth daily.      glucosamine-chondroitin 500-400 mg cap [GLUCOSAMINE-CHONDROITIN 500-400 MG CAP] Take 1 capsule by mouth 2 (two) times a day.      latanoprost (XALATAN) 0.005 % ophthalmic solution Place 1 drop into both eyes At Bedtime      lisinopril (ZESTRIL) 2.5 MG tablet TAKE 1 TABLET BY MOUTH EVERY DAY 90 tablet 3    loratadine 10 MG capsule Take 10 mg by mouth daily      tamsulosin (FLOMAX) 0.4 mg Cp24 [TAMSULOSIN (FLOMAX) 0.4 MG CP24] Take 0.4 mg by mouth at bedtime.       No current facility-administered medications for this visit.       Past Medical History:   Diagnosis Date    Anemia     Basal cell carcinoma     Cancer (H)     colon    Carotid artery occlusion     Coronary artery disease     Glaucoma     High cholesterol     History of colon cancer 08/13/2018    Hyperlipidemia     Hypertension     Left inguinal hernia     Low back pain     Malignant melanoma (H)     Mild aortic insufficiency     Moderate tricuspid regurgitation     Postnasal drip     Sleep apnea, obstructive     CPAP    Spinal stenosis     lumbar    SSS (sick sinus syndrome) (H) 12/09/2019    Umbilical hernia     Unspecified cataract     Created by Conversion        The following portions of the patient's history were reviewed and updated as appropriate: allergies, current medications, past family history, past medical history, past social history, past surgical history and problem list.           Objective:   Physical Exam:    /63   Pulse 59   There is no height or weight on file to calculate BMI.      General: Alert and oriented with normal affect. Attention, knowledge, memory, and language are intact. No acute distress.   Eyes: Sclerae are clear.  Respirations: Unlabored. CV: No lower extremity edema.  Skin: No rashes seen over the legs.         Sensation is intact to light touch throughout the   lower extremities.  Reflexes are    1+ patellar and 0 Achilles     Manual muscle  testing reveals:  Right /Left out of 5     5/5 hip flexors  5/5 knee flexors  5/5 knee extensors  5/5 ankle plantar flexors  5/5 ankle dorsiflexors  5/5    ankle evertors

## 2025-07-31 ENCOUNTER — OFFICE VISIT (OUTPATIENT)
Dept: PHYSICAL MEDICINE AND REHAB | Facility: CLINIC | Age: 89
End: 2025-07-31
Payer: MEDICARE

## 2025-07-31 VITALS — SYSTOLIC BLOOD PRESSURE: 133 MMHG | HEART RATE: 59 BPM | DIASTOLIC BLOOD PRESSURE: 63 MMHG

## 2025-07-31 DIAGNOSIS — M43.16 SPONDYLOLISTHESIS OF LUMBAR REGION: ICD-10-CM

## 2025-07-31 DIAGNOSIS — M48.062 SPINAL STENOSIS OF LUMBAR REGION WITH NEUROGENIC CLAUDICATION: Primary | ICD-10-CM

## 2025-07-31 DIAGNOSIS — I10 ESSENTIAL HYPERTENSION: ICD-10-CM

## 2025-07-31 DIAGNOSIS — M41.9 SCOLIOSIS OF THORACOLUMBAR SPINE, UNSPECIFIED SCOLIOSIS TYPE: ICD-10-CM

## 2025-07-31 DIAGNOSIS — R20.2 PARESTHESIA OF FOOT, BILATERAL: ICD-10-CM

## 2025-07-31 RX ORDER — LISINOPRIL 2.5 MG/1
2.5 TABLET ORAL DAILY
Qty: 90 TABLET | Refills: 3 | Status: SHIPPED | OUTPATIENT
Start: 2025-07-31

## 2025-07-31 ASSESSMENT — PAIN SCALES - GENERAL: PAINLEVEL_OUTOF10: MILD PAIN (3)

## 2025-07-31 NOTE — LETTER
7/31/2025      Antoni Stoll  [address]  [place] [address]      Dear Colleague,    Thank you for referring your patient, Antoni Stoll, to the [facility]. Please see a copy of my visit note below.    Assessment/Plan:      Wily was seen today for back pain.    Diagnoses and all orders for this visit:    Spinal stenosis of lumbar region with neurogenic claudication    Spondylolisthesis of lumbar region    Scoliosis of thoracolumbar spine, unspecified scoliosis type    Paresthesia of foot, bilateral         Assessment: Pleasant 89 year old male with history of colon cancer, hyperlipidemia, hypertension, carotid artery occlusion, coronary artery disease with:        1.  Persistent chronic lumbar spine pain with bilateral lower extremity claudication symptoms.    He has right greater than left lower extremity pain and paresthesias related to severe spinal stenosis L4-5 and slightly less at L3-4  with L4-5 spondylolisthesis.  There is tortuosity of the nerve roots.  Pain persist despite physical therapy, medications and lumbar epidurals. bilateral L5-S1 transforaminal epidural steroid injection did offer some relief but only 40 to 50% and that benefit slowly diminished over 1 month.  Ongoing numbness and tingling in his right foot.  He was seen by Dr. Kenny at [facility] orthopedics and is seeing Dr Torres.  He would be a surgical candidate but holding off as long as possible.   No weakness of the lower extremities.   Continues with lower extremity paresthesias up to the knees can only tolerate maybe 20 minutes of standing.  He does have chronic L5-S1 radicular findings on recent EMG and neurology.   Had been tolerating Lyrica 25 mg twice daily without significant side effects.  Has been now off of Lyrica couple of months with no change in symptoms.  Last MRI was from 2022.  Minimal if any benefit with recent physical therapy.  Over 50% improvement following L5-S1  interlaminar epidural steroid injection for 2 months now pain is worsened back to baseline.  [facility] feels that is going well.        2.  Bilateral foot paresthesias consistent with peripheral polyneuropathy may have a contribution from spinal stenosis.  No improvement      4.  Persistent chronic right shoulder pain.   complete tear of the rotator cuff supraspinatus with retraction of the glenohumeral joint effusion in the past which responded well to glenohumeral joint injection. No improvement with the last injection on September 7, 2022.    Did not have right shoulder block.  Not treated today         Discussion:    1.  We discussed his current progress.  His pain is essentially tolerable with physical therapy and Tylenol.  He continues to have the paresthesias as lumbar pain and shoulder pain.  We discussed medication options for neuropathy neuropathic pain but he is not interested in medications and he has not had any significant benefit from medications in the past.    2.  Continue with physical therapy and he will look at continuing a home exercise program in the pool on his own.    3.  Continue Tylenol as needed.    4.  Can consider additional lumbar epidural steroid injection if his symptoms significantly flare in the future.  Had 2 months of over 50% relief of his symptoms now pain is worsening.  This was with an interlaminar can consider TFESI or caudal epidural to see if he would have more benefit.    5.  Follow-up 3 months which will be after his physical therapy for ongoing management of his chronic complex musculoskeletal case.    It was our pleasure caring for your patient today, if there any questions or concerns please do not hesitate to contact us.    The longitudinal plan of care for the diagnosis(es)/condition(s) as documented were addressed during this visit. Due to the added complexity in care, I will continue to support Wily in the subsequent management and  with ongoing continuity of care.      Subjective:   Patient ID: Antoni Stoll is a 89 year old male.    History of Present Illness: Patient presents for follow-up of lumbar spine pain bilateral gluteal pain left greater than right bilateral foot paresthesias also has neck shoulder pain.  Working with [employer] physical therapy.  His back pain is worse with standing walking better with sitting or lying down.  Pain is 6/10 at worst 3/10 today 1/10 at best.  Has been to 3-4 visits of pool therapy very comfortable in the water doing his exercises when he gets out of the pool has not noticed any significant improvement in symptoms as of yet.  The injection back in May lasted about 6 to 8 weeks of over 50% relief of symptoms and the pain is now worsened again back to prior to the injection.  Taking Tylenol which keeps him comfortable.  Tried gabapentin in the past as well as Lyrica for his paresthesias and low back pain which did not offer any benefit is now off the medications wants to avoid meds if possible.      Imaging: MRI lumbar spine August 2022 was reviewed showing grade 1 spondylolisthesis L3 and L4 L4 and L5 mild disc height loss L5-S1 with moderate facet arthropathy no central canal or foraminal stenosis.  L4-5 severe central stenosis L3-4 moderate right mild left foraminal stenosis and severe spinal stenosis.    I reviewed CT abdomen and pelvis as well from October 2024 showing significant left lumbar scoliotic curve L4-L5-S1 spondylolisthesis with severe facet arthropathy.    Review of Systems: Review of systems complains of paresthesias and weakness.  Denies bowel or bladder incontinence, headaches, falls, swallowing issues fevers or unintentional weight loss.       Current Outpatient Medications   Medication Sig Dispense Refill     acetaminophen (TYLENOL) 500 MG tablet Take 1,000 mg by mouth every 8 hours as needed.       atorvastatin (LIPITOR) 20 MG tablet TAKE 1 TABLET(20 MG) BY MOUTH DAILY 90 tablet 2      azelastine (ASTELIN) 0.1 % nasal spray 2 sprays each nostril 1-2x daily as needed for nasal congestion (use nightly for first 2 week) 30 mL 11     calcium polycarbophil (FIBERCON) 625 mg tablet Take 2 tablets by mouth daily.       cholecalciferol, vitamin D3, 1,000 unit tablet Take 1 tablet by mouth daily.       glucosamine-chondroitin 500-400 mg cap [GLUCOSAMINE-CHONDROITIN 500-400 MG CAP] Take 1 capsule by mouth 2 (two) times a day.       latanoprost (XALATAN) 0.005 % ophthalmic solution Place 1 drop into both eyes At Bedtime       lisinopril (ZESTRIL) 2.5 MG tablet TAKE 1 TABLET BY MOUTH EVERY DAY 90 tablet 3     loratadine 10 MG capsule Take 10 mg by mouth daily       tamsulosin (FLOMAX) 0.4 mg Cp24 [TAMSULOSIN (FLOMAX) 0.4 MG CP24] Take 0.4 mg by mouth at bedtime.       No current facility-administered medications for this visit.       Past Medical History:   Diagnosis Date     Anemia      Basal cell carcinoma      Cancer (H)     colon     Carotid artery occlusion      Coronary artery disease      Glaucoma      High cholesterol      History of colon cancer 08/13/2018     Hyperlipidemia      Hypertension      Left inguinal hernia      Low back pain      Malignant melanoma (H)      Mild aortic insufficiency      Moderate tricuspid regurgitation      Postnasal drip      Sleep apnea, obstructive     CPAP     Spinal stenosis     lumbar     SSS (sick sinus syndrome) (H) 12/09/2019     Umbilical hernia      Unspecified cataract     Created by Conversion        The following portions of the patient's history were reviewed and updated as appropriate: allergies, current medications, past family history, past medical history, past social history, past surgical history and problem list.           Objective:   Physical Exam:    /63   Pulse 59   There is no height or weight on file to calculate BMI.      General: Alert and oriented with normal affect. Attention, knowledge, memory, and language are intact. No acute  distress.   Eyes: Sclerae are clear.  Respirations: Unlabored. CV: No lower extremity edema.  Skin: No rashes seen over the legs.         Sensation is intact to light touch throughout the   lower extremities.  Reflexes are    1+ patellar and 0 Achilles     Manual muscle testing reveals:  Right /Left out of 5     5/5 hip flexors  5/5 knee flexors  5/5 knee extensors  5/5 ankle plantar flexors  5/5 ankle dorsiflexors  5/5    ankle evertors    Again, thank you for allowing me to participate in the care of your patient.        Sincerely,        Orlin Mir DO    Electronically signed

## 2025-09-02 DIAGNOSIS — E78.00 PURE HYPERCHOLESTEROLEMIA: ICD-10-CM

## 2025-09-02 RX ORDER — ATORVASTATIN CALCIUM 20 MG/1
20 TABLET, FILM COATED ORAL DAILY
Qty: 90 TABLET | Refills: 1 | Status: SHIPPED | OUTPATIENT
Start: 2025-09-02

## (undated) DEVICE — DRSG PRIMAPORE 02X3"

## (undated) DEVICE — GLOVE PI ULTRATCH M LF SZ 6.5 PF CUFF TEXT STRL LF 42665

## (undated) DEVICE — SU VICRYL+ 3-0 27IN SH UND VCP416H

## (undated) DEVICE — SOL NACL 0.9% IRRIG 1000ML BOTTLE 2F7124

## (undated) DEVICE — ESU PENCIL SMOKE EVAC W/ROCKER SWITCH 0703-047-000

## (undated) DEVICE — SUTURE VICRYL+ 2-0 27IN CT-1 UND VCP259H

## (undated) DEVICE — DRESSING PRIMAPORE 4 X 3-1/8 66000317

## (undated) DEVICE — Device

## (undated) DEVICE — DRSG PRIMAPORE 03 1/8X6" 66000318

## (undated) DEVICE — TUBE PENROSE 3/8 X 12 STRL LTX 0912020

## (undated) DEVICE — SUTURE VICRYL+ 4-0 UNDYED PS-2 VCP496H

## (undated) DEVICE — GLOVE BIOGEL PI ULTRATOUCH G SZ 7.5 42175

## (undated) DEVICE — SYR 10ML LL W/O NDL 302995

## (undated) DEVICE — SU MONOCRYL+ 4-0 18IN PS2 UND MCP496G

## (undated) DEVICE — NEEDLE HYPO 22X1-1/2 SAFETY 305900

## (undated) DEVICE — DECANTER VIAL 2006S

## (undated) DEVICE — PREP CHLORAPREP 26ML TINTED HI-LITE ORANGE 930815

## (undated) DEVICE — SUCTION MANIFOLD NEPTUNE 2 SYS 1 PORT 702-025-000

## (undated) DEVICE — ADH SKIN CLOSURE PREMIERPRO EXOFIN 1.0ML 3470

## (undated) DEVICE — SPONGE KITNER DISSECTING 7102*

## (undated) DEVICE — DRAPE TRANSVERSE LAPAROTOMY 120X100X72" 89281

## (undated) DEVICE — PLATE GROUNDING ADULT W/CORD 9165L

## (undated) DEVICE — GOWN LG DISP 9515

## (undated) DEVICE — CUSTOM PACK GEN MAJOR SBA5BGMHEA

## (undated) DEVICE — SOL WATER IRRIG 1000ML BOTTLE 2F7114

## (undated) DEVICE — SUTURE SILK 0 TIES 30IN SA86G

## (undated) DEVICE — SU ETHIBOND 0 MO-7 CR 8X18" CX41D

## (undated) DEVICE — BLADE KNIFE SURG 15 371115

## (undated) DEVICE — GOWN IMPERVIOUS BREATHABLE SMART LG 89015

## (undated) DEVICE — SU PROLENE 0 CT-1 30" 8424H

## (undated) DEVICE — ESU GROUND PAD ADULT REM W/15' CORD E7507DB

## (undated) DEVICE — NEEDLE HYPO MAGELLAN SAFETY 22GA 1 1/2IN 8881850215

## (undated) RX ORDER — FENTANYL CITRATE 50 UG/ML
INJECTION, SOLUTION INTRAMUSCULAR; INTRAVENOUS
Status: DISPENSED
Start: 2024-10-04

## (undated) RX ORDER — DEXAMETHASONE SODIUM PHOSPHATE 10 MG/ML
INJECTION, EMULSION INTRAMUSCULAR; INTRAVENOUS
Status: DISPENSED
Start: 2022-02-14

## (undated) RX ORDER — LIDOCAINE HYDROCHLORIDE AND EPINEPHRINE 10; 10 MG/ML; UG/ML
INJECTION, SOLUTION INFILTRATION; PERINEURAL
Status: DISPENSED
Start: 2022-02-14

## (undated) RX ORDER — FENTANYL CITRATE 50 UG/ML
INJECTION, SOLUTION INTRAMUSCULAR; INTRAVENOUS
Status: DISPENSED
Start: 2022-02-14

## (undated) RX ORDER — BUPIVACAINE HYDROCHLORIDE 2.5 MG/ML
INJECTION, SOLUTION EPIDURAL; INFILTRATION; INTRACAUDAL
Status: DISPENSED
Start: 2024-10-04

## (undated) RX ORDER — PROPOFOL 10 MG/ML
INJECTION, EMULSION INTRAVENOUS
Status: DISPENSED
Start: 2022-02-14

## (undated) RX ORDER — ONDANSETRON 2 MG/ML
INJECTION INTRAMUSCULAR; INTRAVENOUS
Status: DISPENSED
Start: 2022-02-14

## (undated) RX ORDER — CEFAZOLIN SODIUM 1 G/3ML
INJECTION, POWDER, FOR SOLUTION INTRAMUSCULAR; INTRAVENOUS
Status: DISPENSED
Start: 2022-02-14